# Patient Record
Sex: MALE | Race: WHITE | Employment: FULL TIME | URBAN - METROPOLITAN AREA
[De-identification: names, ages, dates, MRNs, and addresses within clinical notes are randomized per-mention and may not be internally consistent; named-entity substitution may affect disease eponyms.]

---

## 2015-06-15 LAB — LV EF: 60 %

## 2017-02-05 RX ORDER — CARVEDILOL 6.25 MG/1
TABLET ORAL
Qty: 180 TAB | Refills: 1 | Status: SHIPPED | OUTPATIENT
Start: 2017-02-05 | End: 2017-07-18 | Stop reason: SDUPTHER

## 2017-02-06 ENCOUNTER — HOSPITAL ENCOUNTER (EMERGENCY)
Age: 52
Discharge: HOME | End: 2017-02-06
Attending: EMERGENCY MEDICINE
Payer: COMMERCIAL

## 2017-02-06 ENCOUNTER — APPOINTMENT (OUTPATIENT)
Dept: CT IMAGING | Age: 52
End: 2017-02-06
Attending: EMERGENCY MEDICINE
Payer: COMMERCIAL

## 2017-02-06 VITALS
SYSTOLIC BLOOD PRESSURE: 128 MMHG | RESPIRATION RATE: 20 BRPM | DIASTOLIC BLOOD PRESSURE: 85 MMHG | BODY MASS INDEX: 37.65 KG/M2 | HEART RATE: 107 BPM | OXYGEN SATURATION: 94 % | WEIGHT: 263 LBS | TEMPERATURE: 98 F | HEIGHT: 70 IN

## 2017-02-06 DIAGNOSIS — R55 SYNCOPE AND COLLAPSE: Primary | ICD-10-CM

## 2017-02-06 DIAGNOSIS — T50.905A MEDICATION REACTION, INITIAL ENCOUNTER: ICD-10-CM

## 2017-02-06 LAB
ANION GAP BLD CALC-SCNC: 21 MMOL/L (ref 5–15)
APPEARANCE UR: CLEAR
ATRIAL RATE: 100 BPM
BACTERIA URNS QL MICRO: NEGATIVE /HPF
BASOPHILS # BLD AUTO: 0 K/UL (ref 0–0.1)
BASOPHILS # BLD: 0 % (ref 0–1)
BILIRUB UR QL: NEGATIVE
BNP SERPL-MCNC: 12 PG/ML (ref 0–125)
BUN BLD-MCNC: 13 MG/DL (ref 9–20)
CA-I BLD-MCNC: 1.23 MMOL/L (ref 1.12–1.32)
CALCULATED P AXIS, ECG09: 16 DEGREES
CALCULATED R AXIS, ECG10: -30 DEGREES
CALCULATED T AXIS, ECG11: 15 DEGREES
CHLORIDE BLD-SCNC: 100 MMOL/L (ref 98–107)
CK MB CFR SERPL CALC: 0.4 % (ref 0–2.5)
CK MB SERPL-MCNC: 2 NG/ML (ref 5–25)
CK SERPL-CCNC: 491 U/L (ref 39–308)
CO2 BLD-SCNC: 24 MMOL/L (ref 21–32)
COLOR UR: ABNORMAL
CREAT BLD-MCNC: 0.8 MG/DL (ref 0.6–1.3)
DIAGNOSIS, 93000: NORMAL
EOSINOPHIL # BLD: 0.1 K/UL (ref 0–0.4)
EOSINOPHIL NFR BLD: 1 % (ref 0–7)
EPITH CASTS URNS QL MICRO: ABNORMAL /LPF
ERYTHROCYTE [DISTWIDTH] IN BLOOD BY AUTOMATED COUNT: 13.5 % (ref 11.5–14.5)
GLUCOSE BLD STRIP.AUTO-MCNC: 127 MG/DL (ref 65–100)
GLUCOSE BLD-MCNC: 123 MG/DL (ref 75–110)
GLUCOSE UR STRIP.AUTO-MCNC: NEGATIVE MG/DL
HCT VFR BLD AUTO: 44.9 % (ref 36.6–50.3)
HCT VFR BLD CALC: 46 % (ref 36.6–50.3)
HGB BLD-MCNC: 15 G/DL (ref 12.1–17)
HGB BLD-MCNC: 15.6 GM/DL (ref 12.1–17)
HGB UR QL STRIP: NEGATIVE
HYALINE CASTS URNS QL MICRO: ABNORMAL /LPF (ref 0–5)
KETONES UR QL STRIP.AUTO: NEGATIVE MG/DL
LEUKOCYTE ESTERASE UR QL STRIP.AUTO: NEGATIVE
LYMPHOCYTES # BLD AUTO: 15 % (ref 12–49)
LYMPHOCYTES # BLD: 2 K/UL (ref 0.8–3.5)
MCH RBC QN AUTO: 28.6 PG (ref 26–34)
MCHC RBC AUTO-ENTMCNC: 33.4 G/DL (ref 30–36.5)
MCV RBC AUTO: 85.5 FL (ref 80–99)
MONOCYTES # BLD: 0.5 K/UL (ref 0–1)
MONOCYTES NFR BLD AUTO: 4 % (ref 5–13)
NEUTS SEG # BLD: 10.5 K/UL (ref 1.8–8)
NEUTS SEG NFR BLD AUTO: 80 % (ref 32–75)
NITRITE UR QL STRIP.AUTO: NEGATIVE
P-R INTERVAL, ECG05: 152 MS
PH UR STRIP: 6 [PH] (ref 5–8)
PLATELET # BLD AUTO: 269 K/UL (ref 150–400)
POTASSIUM BLD-SCNC: 3.3 MMOL/L (ref 3.5–5.1)
PROT UR STRIP-MCNC: 30 MG/DL
Q-T INTERVAL, ECG07: 356 MS
QRS DURATION, ECG06: 104 MS
QTC CALCULATION (BEZET), ECG08: 459 MS
RBC # BLD AUTO: 5.25 M/UL (ref 4.1–5.7)
RBC #/AREA URNS HPF: ABNORMAL /HPF (ref 0–5)
SERVICE CMNT-IMP: ABNORMAL
SERVICE CMNT-IMP: ABNORMAL
SODIUM BLD-SCNC: 140 MMOL/L (ref 136–145)
SP GR UR REFRACTOMETRY: 1.02 (ref 1–1.03)
TROPONIN I SERPL-MCNC: <0.04 NG/ML
UA: UC IF INDICATED,UAUC: ABNORMAL
UROBILINOGEN UR QL STRIP.AUTO: 0.2 EU/DL (ref 0.2–1)
VENTRICULAR RATE, ECG03: 100 BPM
WBC # BLD AUTO: 13.1 K/UL (ref 4.1–11.1)
WBC URNS QL MICRO: ABNORMAL /HPF (ref 0–4)

## 2017-02-06 PROCEDURE — 82962 GLUCOSE BLOOD TEST: CPT

## 2017-02-06 PROCEDURE — 83880 ASSAY OF NATRIURETIC PEPTIDE: CPT | Performed by: EMERGENCY MEDICINE

## 2017-02-06 PROCEDURE — 93005 ELECTROCARDIOGRAM TRACING: CPT

## 2017-02-06 PROCEDURE — 70450 CT HEAD/BRAIN W/O DYE: CPT

## 2017-02-06 PROCEDURE — 93970 EXTREMITY STUDY: CPT

## 2017-02-06 PROCEDURE — 85025 COMPLETE CBC W/AUTO DIFF WBC: CPT | Performed by: EMERGENCY MEDICINE

## 2017-02-06 PROCEDURE — 74011250636 HC RX REV CODE- 250/636: Performed by: EMERGENCY MEDICINE

## 2017-02-06 PROCEDURE — 84484 ASSAY OF TROPONIN QUANT: CPT | Performed by: EMERGENCY MEDICINE

## 2017-02-06 PROCEDURE — 96361 HYDRATE IV INFUSION ADD-ON: CPT

## 2017-02-06 PROCEDURE — 71275 CT ANGIOGRAPHY CHEST: CPT

## 2017-02-06 PROCEDURE — 74011636320 HC RX REV CODE- 636/320: Performed by: RADIOLOGY

## 2017-02-06 PROCEDURE — 99285 EMERGENCY DEPT VISIT HI MDM: CPT

## 2017-02-06 PROCEDURE — 74011250637 HC RX REV CODE- 250/637: Performed by: EMERGENCY MEDICINE

## 2017-02-06 PROCEDURE — 80047 BASIC METABLC PNL IONIZED CA: CPT

## 2017-02-06 PROCEDURE — 81001 URINALYSIS AUTO W/SCOPE: CPT | Performed by: EMERGENCY MEDICINE

## 2017-02-06 PROCEDURE — 82550 ASSAY OF CK (CPK): CPT | Performed by: EMERGENCY MEDICINE

## 2017-02-06 PROCEDURE — 36415 COLL VENOUS BLD VENIPUNCTURE: CPT | Performed by: EMERGENCY MEDICINE

## 2017-02-06 PROCEDURE — 96360 HYDRATION IV INFUSION INIT: CPT

## 2017-02-06 RX ORDER — CARVEDILOL 6.25 MG/1
6.25 TABLET ORAL
Status: COMPLETED | OUTPATIENT
Start: 2017-02-06 | End: 2017-02-06

## 2017-02-06 RX ADMIN — CARVEDILOL 6.25 MG: 6.25 TABLET, FILM COATED ORAL at 14:08

## 2017-02-06 RX ADMIN — IOPAMIDOL 90 ML: 755 INJECTION, SOLUTION INTRAVENOUS at 13:11

## 2017-02-06 RX ADMIN — SODIUM CHLORIDE 1000 ML: 900 INJECTION, SOLUTION INTRAVENOUS at 13:44

## 2017-02-06 NOTE — ED TRIAGE NOTES
Patient arrived with c/o syncopal episode this morning and bilateral leg pain post fall. Blood sugar was 114. Patient is A & O x 3. Cannot tell me the month.   Denies CP, n/v/d.

## 2017-02-06 NOTE — DISCHARGE INSTRUCTIONS
We hope that we have addressed all of your medical concerns. The examination and treatment you received in the Emergency Department were for an emergent problem and were not intended as complete care. It is important that you follow up with your healthcare provider(s) for ongoing care. If your symptoms worsen or do not improve as expected, and you are unable to reach your usual health care provider(s), you should return to the Emergency Department. Today's healthcare is undergoing tremendous change, and patient satisfaction surveys are one of the many tools to assess the quality of medical care. You may receive a survey from the [employer] regarding your experience in the Emergency Department. I hope that your experience has been completely positive, particularly the medical care that I provided. As such, please participate in the survey; anything less than excellent does not meet my expectations or intentions. [facility] and [facility] participate in nationally recognized quality of care measures. If your blood pressure is greater than 120/80, as reported below, we urge that you seek medical care to address the potential of high blood pressure, commonly known as hypertension. Hypertension can be hereditary or can be caused by certain medical conditions, pain, stress, or \"white coat syndrome. \"       Please make an appointment with your health care provider(s) for follow up of your Emergency Department visit.        VITALS:   Patient Vitals for the past 8 hrs:   Temp Pulse Resp BP SpO2   02/06/17 1345 - (!) 107 20 128/85 94 %   02/06/17 1336 - (!) 103 20 125/78 -   02/06/17 1331 - (!) 112 23 (!) 122/91 95 %   02/06/17 1330 - (!) 104 22 125/78 96 %   02/06/17 1319 - (!) 106 17 138/74 95 %   02/06/17 1233 98 °F (36.7 °C) - - - -   02/06/17 1142 - - - - 96 %   02/06/17 1110 97.8 °F (36.6 °C) 97 17 130/82 98 %          Thank you for allowing us to provide you with medical care today. We realize that you have many choices for your emergency care needs. Please choose us in the future for any continued health care needs. Andrew Rollins MD    [facility], [facility].   Office: [phone]            Recent Results (from the past 24 hour(s))   GLUCOSE, POC    Collection Time: 02/06/17 11:17 AM   Result Value Ref Range    Glucose (POC) 127 (H) 65 - 100 mg/dL    Performed by Grady De Anda    EKG, 12 LEAD, INITIAL    Collection Time: 02/06/17 11:30 AM   Result Value Ref Range    Ventricular Rate 100 BPM    Atrial Rate 100 BPM    P-R Interval 152 ms    QRS Duration 104 ms    Q-T Interval 356 ms    QTC Calculation (Bezet) 459 ms    Calculated P Axis 16 degrees    Calculated R Axis -30 degrees    Calculated T Axis 15 degrees    Diagnosis       Normal sinus rhythm  Left axis deviation  Abnormal ECG  No previous ECGs available     POC CHEM8    Collection Time: 02/06/17 11:40 AM   Result Value Ref Range    Calcium, ionized (POC) 1.23 1.12 - 1.32 MMOL/L    Sodium (POC) 140 136 - 145 MMOL/L    Potassium (POC) 3.3 (L) 3.5 - 5.1 MMOL/L    Chloride (POC) 100 98 - 107 MMOL/L    CO2 (POC) 24 21 - 32 MMOL/L    Anion gap (POC) 21 (H) 5 - 15 mmol/L    Glucose (POC) 123 (H) 75 - 110 MG/DL    BUN (POC) 13 9 - 20 MG/DL    Creatinine (POC) 0.8 0.6 - 1.3 MG/DL    GFR-AA (POC) >60 >60 ml/min/1.73m2    GFR, non-AA (POC) >60 >60 ml/min/1.73m2    Hemoglobin (POC) 15.6 12.1 - 17.0 GM/DL    Hematocrit (POC) 46 36.6 - 50.3 %    Comment Comment Not Indicated. CBC WITH AUTOMATED DIFF    Collection Time: 02/06/17 11:41 AM   Result Value Ref Range    WBC 13.1 (H) 4.1 - 11.1 K/uL    RBC 5.25 4. 10 - 5.70 M/uL    HGB 15.0 12.1 - 17.0 g/dL    HCT 44.9 36.6 - 50.3 %    MCV 85.5 80.0 - 99.0 FL    MCH 28.6 26.0 - 34.0 PG    MCHC 33.4 30.0 - 36.5 g/dL    RDW 13.5 11.5 - 14.5 %    PLATELET 738 723 - 629 K/uL    NEUTROPHILS 80 (H) 32 - 75 %    LYMPHOCYTES 15 12 - 49 %    MONOCYTES 4 (L) 5 - 13 %    EOSINOPHILS 1 0 - 7 %    BASOPHILS 0 0 - 1 %    ABS. NEUTROPHILS 10.5 (H) 1.8 - 8.0 K/UL    ABS. LYMPHOCYTES 2.0 0.8 - 3.5 K/UL    ABS. MONOCYTES 0.5 0.0 - 1.0 K/UL    ABS. EOSINOPHILS 0.1 0.0 - 0.4 K/UL    ABS. BASOPHILS 0.0 0.0 - 0.1 K/UL   CK W/ CKMB & INDEX    Collection Time: 02/06/17 11:41 AM   Result Value Ref Range     (H) 39 - 308 U/L    CK - MB 2.0 <3.6 NG/ML    CK-MB Index 0.4 0 - 2.5     TROPONIN I    Collection Time: 02/06/17 11:41 AM   Result Value Ref Range    Troponin-I, Qt. <0.04 <0.05 ng/mL   PRO-BNP    Collection Time: 02/06/17 11:41 AM   Result Value Ref Range    NT pro-BNP 12 0 - 125 PG/ML   URINALYSIS W/ REFLEX CULTURE    Collection Time: 02/06/17  1:55 PM   Result Value Ref Range    Color YELLOW/STRAW      Appearance CLEAR CLEAR      Specific gravity 1.025 1.003 - 1.030      pH (UA) 6.0 5.0 - 8.0      Protein 30 (A) NEG mg/dL    Glucose NEGATIVE  NEG mg/dL    Ketone NEGATIVE  NEG mg/dL    Bilirubin NEGATIVE  NEG      Blood NEGATIVE  NEG      Urobilinogen 0.2 0.2 - 1.0 EU/dL    Nitrites NEGATIVE  NEG      Leukocyte Esterase NEGATIVE  NEG      WBC 0-4 0 - 4 /hpf    RBC 0-5 0 - 5 /hpf    Epithelial cells FEW FEW /lpf    Bacteria NEGATIVE  NEG /hpf    UA:UC IF INDICATED CULTURE NOT INDICATED BY UA RESULT CNI      Hyaline cast 0-2 0 - 5 /lpf       Ct Head Wo Cont    Result Date: 2/6/2017  EXAM:  CT HEAD WO CONT INDICATION: Syncope with head injury today. COMPARISON: None TECHNIQUE: Noncontrast head CT. Coronal and sagittal reformats. CT dose reduction was achieved through use of a standardized protocol tailored for this examination and automatic exposure control for dose modulation. FINDINGS: The ventricles and sulci are age-appropriate without hydrocephalus. There is no mass effect or midline shift. There is no intracranial hemorrhage or extra-axial fluid collection. There is no abnormal parenchymal attenuation.  The gray-white matter differentiation is maintained. The basal cisterns are patent. The osseous structures are intact. The visualized paranasal sinuses and mastoid air cells are clear. IMPRESSION: There is no acute intracranial abnormality. Cta Chest W Wo Cont    Result Date: 2/6/2017  EXAM:  CT angiography chest INDICATION:  Tachycardia and hypoxia. Syncope this morning. COMPARISON: None. TECHNIQUE: Helical thin section chest CT following uneventful intravenous administration of nonionic contrast 90 mL of Isovue-370 according to departmental PE protocol. Coronal and sagittal reformats were performed. 3D/MIP post processing was performed. CT dose reduction was achieved through use of a standardized protocol tailored for this examination and automatic exposure control for dose modulation. FINDINGS: This is a fair quality study for the evaluation of pulmonary embolism to the proximal segmental arterial level. There is no pulmonary embolism to this level. The aorta and main pulmonary artery are normal in caliber. Cardiac size is within normal limits. No pericardial effusion. No lymphadenopathy by imaging size criteria. The lungs are clear. No pleural effusion or pneumothorax. Central airways are unremarkable. Limited images of the upper abdomen are within normal limits. The bony structures are age-appropriate     IMPRESSION: There is no acute pulmonary embolism or other acute abnormality in the chest.     Venous doppler negative     Fainting: Care Instructions  Your Care Instructions    When you faint, or pass out, you lose consciousness for a short time. A brief drop in blood flow to the brain often causes it. When you fall or lie down, more blood flows to your brain and you regain consciousness. Emotional stress, pain, or overheating--especially if you have been standing--can make you faint. In these cases, fainting is usually not serious. But fainting can be a sign of a more serious problem.  Your doctor may want you to have more tests to rule out other causes. The treatment you need depends on the reason why you fainted. The doctor has checked you carefully, but problems can develop later. If you notice any problems or new symptoms, get medical treatment right away. Follow-up care is a key part of your treatment and safety. Be sure to make and go to all appointments, and call your doctor if you are having problems. It's also a good idea to know your test results and keep a list of the medicines you take. How can you care for yourself at home? · Drink plenty of fluids to prevent dehydration. If you have kidney, heart, or liver disease and have to limit fluids, talk with your doctor before you increase your fluid intake. When should you call for help? Call 911 anytime you think you may need emergency care. For example, call if:  · You have symptoms of a heart problem. These may include:  ¨ Chest pain or pressure. ¨ Severe trouble breathing. ¨ A fast or irregular heartbeat. ¨ Lightheadedness or sudden weakness. ¨ Coughing up pink, foamy mucus. ¨ Passing out. After you call 911, the  may tell you to chew 1 adult-strength or 2 to 4 low-dose aspirin. Wait for an ambulance. Do not try to drive yourself. · You have symptoms of a stroke. These may include:  ¨ Sudden numbness, tingling, weakness, or loss of movement in your face, arm, or leg, especially on only one side of your body. ¨ Sudden vision changes. ¨ Sudden trouble speaking. ¨ Sudden confusion or trouble understanding simple statements. ¨ Sudden problems with walking or balance. ¨ A sudden, severe headache that is different from past headaches. · You passed out (lost consciousness) again. Watch closely for changes in your health, and be sure to contact your doctor if:  · You do not get better as expected. Where can you learn more? Go to http://gerard-kristin.info/. Enter Q773 in the search box to learn more about \"Fainting: Care Instructions. \"  Current as of: May 27, 2016  Content Version: 11.1  © 2197-1201 [employer]. Care instructions adapted under license by [employer] (which disclaims liability or warranty for this information). If you have questions about a medical condition or this instruction, always ask your healthcare professional. [address] any warranty or liability for your use of this information. Side Effects of Medicine: Care Instructions  Your Care Instructions  Medicines are a big part of treatment for many health problems. But all medicines have side effects. Often these are mild problems. They might include a dry mouth or upset stomach. But sometimes medicines can cause dangerous side effects. One example is a bad allergic reaction. The best treatment will depend on what side effects you have. If you have a serious side effect, you may need to stop taking the medicine. You may also need to take another medicine to treat the side effect. If you have a mild side effect, it may go away after you take the medicine for a while. The doctor has checked you carefully, but problems can develop later. If you notice any problems or new symptoms, get medical treatment right away. Follow-up care is a key part of your treatment and safety. Be sure to make and go to all appointments, and call your doctor if you are having problems. It's also a good idea to know your test results and keep a list of the medicines you take. How can you care for yourself at home? · Be safe with medicines. Take your medicines exactly as prescribed. Call your doctor if you think you are having a problem with your medicine. · Call your doctor if side effects bother you and you wonder if you should keep taking a medicine. Your doctor may be able to lower your dose or change your medicine. Do not suddenly quit taking your medicine unless a doctor tells you to.   · Make sure your doctor has a list of all the medicines, vitamins, supplements, and herbal remedies you take. Ask about side effects. When should you call for help? Call 911 anytime you think you may need emergency care. For example, call if:  · You have symptoms of a severe allergic reaction. These may include:  ¨ Sudden raised, red areas (hives) all over your body. ¨ Swelling of the throat, mouth, lips, or tongue. ¨ Trouble breathing. ¨ Passing out (losing consciousness). Or you may feel very lightheaded or suddenly feel weak, confused, or restless. Call your doctor now or seek immediate medical care if:  · You have symptoms of an allergic reaction, such as:  ¨ A rash or hives (raised, red areas on the skin). ¨ Itching. ¨ Swelling. ¨ Belly pain, nausea, or vomiting. Watch closely for changes in your health, and be sure to contact your doctor if:  · You think you are having a new problem with your medicine. · You do not get better as expected. Where can you learn more? Go to [employer]  Enter D152 in the search box to learn more about \"Side Effects of Medicine: Care Instructions. \"   © 0179-2435 [employer]. Care instructions adapted under license by [facility] [employer] (which disclaims liability or warranty for this information). This care instruction is for use with your licensed healthcare professional. If you have questions about a medical condition or this instruction, always ask your healthcare professional. [place] [address] any warranty or liability for your use of this information. Content Version: 24.6.870189; Current as of: November 20, 2015             Vasovagal Syncope: Care Instructions  Your Care Instructions    Vasovagal syncope (say \"[id number]\") is sudden dizziness or fainting that can be set off by things such as pain, stress, fear, or trauma. You may sweat or feel lightheaded, sick to your stomach, or tingly.   The problem causes the heart rate to slow and the blood vessels to widen, or dilate, for a short time. When this happens, blood pools in the lower body, and less blood goes to the brain. You can usually get relief by lying down with your legs raised (elevated). This helps more blood to flow to your brain and may help relieve symptoms like feeling dizzy. Some doctors may recommend a technique that involves tensing your fists and arms. This type of fainting is often easy to predict. For example, it happens to some people when they see blood or have to get a shot. They may feel symptoms before they faint. An episode of vasovagal syncope usually responds well to self-care. Other treatment often isn't needed. But if the fainting keeps happening, your doctor may suggest further treatments. Follow-up care is a key part of your treatment and safety. Be sure to make and go to all appointments, and call your doctor if you are having problems. It's also a good idea to know your test results and keep a list of the medicines you take. How can you care for yourself at home? · Drink plenty of fluids to prevent dehydration. If you have kidney, heart, or liver disease and have to limit fluids, talk with your doctor before you increase your fluid intake. · Try to avoid things that you think may set off vasovagal syncope. · Talk to your doctor about any medicines you take. Some medicines may increase the chance of this condition occurring. · If you feel symptoms, lie down with your legs raised. Talk to your doctor about what to do if your symptoms come back. When should you call for help? Call 911 anytime you think you may need emergency care. For example, call if:  · You have symptoms of a heart problem. These may include:  ¨ Chest pain or pressure. ¨ Severe trouble breathing. ¨ A fast or irregular heartbeat. Watch closely for changes in your health, and be sure to contact your doctor if:  · You have more episodes of fainting at home. · You do not get better as expected.   Where can you learn more? Go to http://gerard-kristin.info/. Enter L754 in the search box to learn more about \"Vasovagal Syncope: Care Instructions. \"  Current as of: May 27, 2016  Content Version: 11.1  © 7600-1823 [employer]. Care instructions adapted under license by [employer] (which disclaims liability or warranty for this information). If you have questions about a medical condition or this instruction, always ask your healthcare professional. [address] any warranty or liability for your use of this information.

## 2017-02-06 NOTE — ED NOTES
Patient ambulated around ED with pulse of 113 approx and O2 sat of 98%. Reported feeling better but not back to baseline.

## 2017-02-06 NOTE — ED PROVIDER NOTES
HPI Comments: 49 y.o. male with past medical history significant for sleep apnea, GERD, depression, HTN, tachycardia, prediabetes, heart attack, colonoscopy, and GI  who presents from home for evaluation after syncopal episode. Pt c/o dizziness that onset 3 hours ago while the pt was sitting at his computer working from home eventually causing the pt to black out. Pt claims he woke up approximately an hour later on the floor of his office face down, and felt as if he was just waking up in the morning, groggy and confused. He called his friend, who claims the pt sounded disoriented with garbled speech, and states the pt was diaphoretic and nauseated upon arrival. Pt also c/o leg pain bilaterally near the achilles when he tries to move, and a slight HA. Per friend, pt's blood sugar was 114, HR was 110(typically in 90s), and his BP was 107/76 upon his arrival to the pt's house. Pt denies urinating on himself, defecating, or biting his tongue during his loss of consciousness. Pt denies anyone else witnessing the episode. Pt claims he had a MI and respiratory failure event 2 years ago while walking he collapsed, was taken to [facility] where he was \"comatose\" for about 24 hours, and had stress tests come back normal. Pt denies any stents or catheterization with the MI episode but was told he had reduced heart function which has since returned to normal. Pt claims the dosage of his testosterone injections (every 7 days, givien in R leg) was recently increased by 0.25mg. Pt also takes effexor, losartin, metformin, and coreg. Pt denies taking his coreg this morning. Pt denies any COPD, asthma, or hx of seizures. Pt denies having any back pain, vomiting, or recent illnesses. There are no other acute medical concerns at this time.     PCP: Melissa Crouch MD  Cardiologist: Dr. Larisa Felton  Note written by Vahid Hunt, as dictated by Patito Hamilton MD 12:21 PM        The history is provided by the patient and a friend. No  was used. Past Medical History:   Diagnosis Date    Depression     GERD (gastroesophageal reflux disease)     Heart attack ([facility] [address] 04/2015    Hypertension     Prediabetes     Tachycardia        Past Surgical History:   Procedure Laterality Date    Hx colonoscopy  11/2016         Family History:   Problem Relation Age of Onset    Cancer Mother      stomach and liver    Hypertension Father     Diabetes Maternal Grandmother     Hypertension Maternal Grandfather        Social History     Social History    Marital status:      Spouse name: N/A    Number of children: N/A    Years of education: N/A     Occupational History    Not on file. Social History Main Topics    Smoking status: Never Smoker    Smokeless tobacco: Not on file    Alcohol use 0.0 oz/week     0 Standard drinks or equivalent per week      Comment: 1 drink/week    Drug use: No    Sexual activity: Yes     Other Topics Concern    Not on file     Social History Narrative         ALLERGIES: Review of patient's allergies indicates no known allergies. Review of Systems   Constitutional: Positive for diaphoresis. Gastrointestinal: Positive for nausea. Negative for diarrhea and vomiting. Genitourinary: Negative for discharge. Musculoskeletal: Negative for back pain. Pain in back of lower legs with movement. Neurological: Positive for dizziness (dizzy prior to passing out) and speech difficulty (\"garbled speech\"). Psychiatric/Behavioral: Positive for confusion (Pt disoriented per friend). All other systems reviewed and are negative. Vitals:    02/06/17 1110   BP: 130/82   Pulse: 97   Resp: 17   Temp: 97.8 °F (36.6 °C)   SpO2: 98%   Weight: 119.3 kg (263 lb)   Height: 5' 9.5\" (1.765 m)            Physical Exam   Constitutional: He is oriented to person, place, and time. He appears well-developed and well-nourished. No distress. HENT:   Head: Normocephalic and atraumatic. Right Ear: External ear normal.   Left Ear: External ear normal.   Eyes: EOM are normal. Pupils are equal, round, and reactive to light. Neck: Normal range of motion. Neck supple. Cardiovascular: Regular rhythm, normal heart sounds and intact distal pulses. Exam reveals no friction rub. No murmur heard. tachycardic   Pulmonary/Chest: Effort normal and breath sounds normal. No respiratory distress. He has no wheezes. He has no rales. He exhibits no tenderness. Abdominal: Soft. Bowel sounds are normal. He exhibits no distension. There is no tenderness. There is no rebound and no guarding. Musculoskeletal: Normal range of motion. He exhibits no edema or tenderness. Palpable dp and pt pulses b/l; FROM both LE with no pain or deformity   Neurological: He is alert and oriented to person, place, and time. No cranial nerve deficit. Coordination normal.   Skin: Skin is warm and dry. He is not diaphoretic. No pallor. Psychiatric: He has a normal mood and affect. His behavior is normal.   Nursing note and vitals reviewed. MDM  Number of Diagnoses or Management Options  Medication reaction, initial encounter:   Syncope and collapse:   Diagnosis management comments: Syncope vs seizure.  Not clearly a seizure sounds more like vasovagal syncope though unclear why as no position change    Achilles discomfort- pt with from may just be strain    eval for PE given vital signs and increase in testosterone dose, can also get seizure and vasodilation from testosterone as well- just did 2nd injection of higher dose recently, check for anemia, electrolyes orthostatics  Ct head given unresponsive for a long period of time       Amount and/or Complexity of Data Reviewed  Clinical lab tests: ordered and reviewed  Tests in the radiology section of CPT®: ordered and reviewed  Obtain history from someone other than the patient: yes (friend)  Independent visualization of images, tracings, or specimens: yes (ekg)    Patient Progress  Patient progress: stable    ED Course       Procedures    EKG interpretation: (Preliminary)  Rhythm: sinus tachycardia; and regular . Rate (approx.): 100; Axis: left axis deviation; P wave: normal; QRS interval: normal ; ST/T wave: non-specific changes    PROGRESS NOTE:  1:30 PM  Spoke with Jet Harrington, Radiologist, trying to get a final result. He doesn't see a large PE but will get official read      PROGRESS NOTE:  2:34 PM  Negative duplex. Talked with pt and , suspect it's a side effect of testosterone. Will call his endocrinologist about stopping or lowering dosage. Until he sees PCP no driving, climbing ladders or swimming for suspicion of seizures. Suspect tachycardia is due to missed dosage of carvedilol this morning. ptr ambulated in department with no difficulty.  They agree with plan and will return if worsening sx

## 2017-02-06 NOTE — PROCEDURES
[facility]  *** FINAL REPORT ***    Name: Ludin Nelson  MRN: [id number]  : 12 Oct 1965  HIS Order #: [id number]  [address] [facility] Visit #: [id number]  Date: 2017    TYPE OF TEST: Peripheral Venous Testing    REASON FOR TEST  Concern for possible PE    Right Leg:-  Deep venous thrombosis:           No  Superficial venous thrombosis:    No  Deep venous insufficiency:        No  Superficial venous insufficiency: No    Left Leg:-  Deep venous thrombosis:           No  Superficial venous thrombosis:    No  Deep venous insufficiency:        No  Superficial venous insufficiency: No      INTERPRETATION/FINDINGS  PROCEDURE:  BILATERAL LE VENOUS DUPLEX. Evaluation of lower extremity veins with ultrasound (B-mode imaging,  pulsed Doppler, color Doppler). Includes the common femoral, deep  femoral, femoral, popliteal, posterior tibial, peroneal, and great  saphenous veins. FINDINGS:  Oliver Paulair scale and color flow duplex images of the veins in  both lower extremities demonstrate normal compressibility, spontaneous   and augmented flow profiles, and absence of filling defects  throughout the deep and superficial veins in both lower extremities. CONCLUSION:  Bilateral lower extremity venous duplex negative for deep   venous thrombosis or thrombophlebitis. NOTE: Venous reflux was seen  in the left calf. ADDITIONAL COMMENTS    I have personally reviewed the data relevant to the interpretation of  this  study. TECHNOLOGIST: Yuliya Estrella. Ayaan  Signed: 2017 02:39 PM    PHYSICIAN: Jim Murdock.  Jay Martin MD  Signed: 2017 09:26 AM

## 2017-03-17 ENCOUNTER — OFFICE VISIT (OUTPATIENT)
Dept: INTERNAL MEDICINE CLINIC | Age: 52
End: 2017-03-17

## 2017-03-17 VITALS
DIASTOLIC BLOOD PRESSURE: 80 MMHG | TEMPERATURE: 97.5 F | OXYGEN SATURATION: 99 % | SYSTOLIC BLOOD PRESSURE: 121 MMHG | HEIGHT: 70 IN | BODY MASS INDEX: 37.31 KG/M2 | WEIGHT: 260.6 LBS | HEART RATE: 90 BPM

## 2017-03-17 DIAGNOSIS — G47.33 OSA ON CPAP: ICD-10-CM

## 2017-03-17 DIAGNOSIS — R74.8 ELEVATED CK: ICD-10-CM

## 2017-03-17 DIAGNOSIS — Z99.89 OSA ON CPAP: ICD-10-CM

## 2017-03-17 DIAGNOSIS — R55 VASOVAGAL SYNCOPE: ICD-10-CM

## 2017-03-17 DIAGNOSIS — R79.89 LOW TESTOSTERONE: ICD-10-CM

## 2017-03-17 DIAGNOSIS — E11.9 DIABETES MELLITUS WITHOUT COMPLICATION (HCC): ICD-10-CM

## 2017-03-17 DIAGNOSIS — I25.10 CORONARY ARTERY DISEASE INVOLVING NATIVE CORONARY ARTERY OF NATIVE HEART WITHOUT ANGINA PECTORIS: ICD-10-CM

## 2017-03-17 DIAGNOSIS — Z20.6 HIV EXPOSURE: ICD-10-CM

## 2017-03-17 DIAGNOSIS — K21.9 GASTROESOPHAGEAL REFLUX DISEASE WITHOUT ESOPHAGITIS: ICD-10-CM

## 2017-03-17 DIAGNOSIS — I10 ESSENTIAL HYPERTENSION WITH GOAL BLOOD PRESSURE LESS THAN 140/90: Primary | ICD-10-CM

## 2017-03-17 NOTE — MR AVS SNAPSHOT
Visit Information Date & Time Provider Department Dept. Phone Encounter #  
 3/17/2017  9:00 AM Chris Rankin, [address] [place] [address],[address] [phone] [id number] Follow-up Instructions Return in about 3 months (around 6/17/2017). Upcoming Health Maintenance Date Due Pneumococcal 19-64 Medium Risk (1 of 1 - PPSV23) 10/12/1985 COLONOSCOPY 11/21/2021 DTaP/Tdap/Td series (2 - Td) 8/29/2026 Allergies as of 3/17/2017  Review Complete On: 3/17/2017 By: Ayaan Bee No Known Allergies Current Immunizations  Reviewed on 10/24/2016 Name Date Influenza Vaccine (Quad) PF 10/24/2016 Tdap 8/29/2016 Not reviewed this visit You Were Diagnosed With   
  
 Codes Comments Essential hypertension with goal blood pressure less than 140/90    -  Primary ICD-10-CM: I10 
ICD-9-CM: 401.9 Coronary artery disease involving native coronary artery of native heart without angina pectoris     ICD-10-CM: I25.10 ICD-9-CM: 414.01 Vasovagal syncope     ICD-10-CM: R55 
ICD-9-CM: 780.2 Low testosterone     ICD-10-CM: E29.1 ICD-9-CM: 257.2 SCOTTY on CPAP     ICD-10-CM: G47.33 
ICD-9-CM: 327.23 Gastroesophageal reflux disease without esophagitis     ICD-10-CM: K21.9 ICD-9-CM: 530.81 Diabetes mellitus without complication ([facility]     RDK-06-HR: E11.9 ICD-9-CM: 250.00 Elevated CK     ICD-10-CM: R74.8 ICD-9-CM: 790.5 HIV exposure     ICD-10-CM: Z20.6 ICD-9-CM: V01.79 Vitals BP Pulse Temp Height(growth percentile) Weight(growth percentile) SpO2  
 121/80 (BP 1 Location: Right arm, BP Patient Position: Sitting) 90 97.5 °F (36.4 °C) (Oral) 5' 9.5\" (1.765 m) 260 lb 9.6 oz (118.2 kg) 99% BMI Smoking Status 37.93 kg/m2 Never Smoker BMI and BSA Data Body Mass Index Body Surface Area  
 37.93 kg/m 2 2.41 m 2 Preferred Pharmacy Pharmacy Name Phone 305 [facility], [address] [place] [address] [address] Chidi Medrano Your Updated Medication List  
  
   
This list is accurate as of: 3/17/17  9:35 AM.  Always use your most recent med list.  
  
  
  
  
 aspirin 81 mg chewable tablet Take 81 mg by mouth daily. carvedilol 6.25 mg tablet Commonly known as:  Cori Gautam Take 1 tablet by mouth two  times daily  
  
 ciprofloxacin-dexamethasone 0.3-0.1 % otic suspension Commonly known as:  Ina Letitia Administer 4 Drops in left ear two (2) times a day. losartan 50 mg tablet Commonly known as:  COZAAR Take 1 Tab by mouth daily. metFORMIN 500 mg tablet Commonly known as:  GLUCOPHAGE Take 1 Tab by mouth two (2) times daily (with meals). omeprazole 20 mg capsule Commonly known as:  PRILOSEC Take 1 Cap by mouth daily. One Touch Delica 33 gauge Misc Generic drug:  lancets ONETOUCH VERIO strip Generic drug:  glucose blood VI test strips  
  
 pravastatin 20 mg tablet Commonly known as:  PRAVACHOL Take 1 Tab by mouth nightly. testosterone cypionate 200 mg/mL injection Commonly known as:  DEPOTESTOTERONE CYPIONATE  
by IntraMUSCular route every seven (7) days. venlafaxine- mg capsule Commonly known as:  EFFEXOR-XR Take 1 Cap by mouth daily. We Performed the Following CBC W/O DIFF [45355 CPT(R)] CK [id number] CPT(R)] HEMOGLOBIN A1C WITH EAG [77709 CPT(R)] METABOLIC PANEL, COMPREHENSIVE [54410 CPT(R)] REFERRAL TO INFECTIOUS DISEASE [REF37 Custom] Comments:  
 Please evaluate patient for hiv prevention TSH 3RD GENERATION [40580 CPT(R)] Follow-up Instructions Return in about 3 months (around 6/17/2017). To-Do List   
 03/17/2017 Imaging:  DUPLEX CAROTID BILATERAL Referral Information Referral ID Referred By Referred To  
  
 [id number] Josef Ahmadi MD   
   [address] [place] [address] [address]ández , [address] Phone: [phone] Fax: [phone] Visits Status Start Date End Date 1 New Request 3/17/17 3/17/18 If your referral has a status of pending review or denied, additional information will be sent to support the outcome of this decision. Patient Instructions Get echocardiogram and event monitor with dr Karla Cespedes Schedule ultrasound of neck Labs today Introducing [facility]! Dear Jenaro Hillman: 
Thank you for requesting a trippiece account. Our records indicate that you already have an active trippiece account. You can access your account anytime at [email or web address] Did you know that you can access your hospital and ER discharge instructions at any time in trippiece? You can also review all of your test results from your hospital stay or ER visit. Additional Information If you have questions, please visit the Frequently Asked Questions section of the trippiece website at [email or web address] Remember, trippiece is NOT to be used for urgent needs. For medical emergencies, dial 911. Now available from your iPhone and Android! Please provide this summary of care documentation to your next provider. Your primary care clinician is listed as Anam Joseph. If you have any questions after today's visit, please call [phone].

## 2017-03-17 NOTE — PATIENT INSTRUCTIONS
Get echocardiogram and event monitor with dr Waylon Arredondo     Schedule ultrasound of neck     Labs today

## 2017-03-18 LAB
ALBUMIN SERPL-MCNC: 4.4 G/DL (ref 3.5–5.5)
ALBUMIN/GLOB SERPL: 1.3 {RATIO} (ref 1.2–2.2)
ALP SERPL-CCNC: 53 IU/L (ref 39–117)
ALT SERPL-CCNC: 27 IU/L (ref 0–44)
AST SERPL-CCNC: 21 IU/L (ref 0–40)
BILIRUB SERPL-MCNC: 0.5 MG/DL (ref 0–1.2)
BUN SERPL-MCNC: 14 MG/DL (ref 6–24)
BUN/CREAT SERPL: 15 (ref 9–20)
CALCIUM SERPL-MCNC: 9.7 MG/DL (ref 8.7–10.2)
CHLORIDE SERPL-SCNC: 98 MMOL/L (ref 96–106)
CK SERPL-CCNC: 220 U/L (ref 24–204)
CO2 SERPL-SCNC: 24 MMOL/L (ref 18–29)
CREAT SERPL-MCNC: 0.92 MG/DL (ref 0.76–1.27)
ERYTHROCYTE [DISTWIDTH] IN BLOOD BY AUTOMATED COUNT: 14.7 % (ref 12.3–15.4)
EST. AVERAGE GLUCOSE BLD GHB EST-MCNC: 117 MG/DL
GLOBULIN SER CALC-MCNC: 3.3 G/DL (ref 1.5–4.5)
GLUCOSE SERPL-MCNC: 104 MG/DL (ref 65–99)
HBA1C MFR BLD: 5.7 % (ref 4.8–5.6)
HCT VFR BLD AUTO: 44.9 % (ref 37.5–51)
HGB BLD-MCNC: 15.3 G/DL (ref 12.6–17.7)
MCH RBC QN AUTO: 28.9 PG (ref 26.6–33)
MCHC RBC AUTO-ENTMCNC: 34.1 G/DL (ref 31.5–35.7)
MCV RBC AUTO: 85 FL (ref 79–97)
PLATELET # BLD AUTO: 319 X10E3/UL (ref 150–379)
POTASSIUM SERPL-SCNC: 4.7 MMOL/L (ref 3.5–5.2)
PROT SERPL-MCNC: 7.7 G/DL (ref 6–8.5)
RBC # BLD AUTO: 5.29 X10E6/UL (ref 4.14–5.8)
SODIUM SERPL-SCNC: 138 MMOL/L (ref 134–144)
TSH SERPL DL<=0.005 MIU/L-ACNC: 0.85 UIU/ML (ref 0.45–4.5)
WBC # BLD AUTO: 9.5 X10E3/UL (ref 3.4–10.8)

## 2017-04-10 RX ORDER — OMEPRAZOLE 20 MG/1
CAPSULE, DELAYED RELEASE ORAL
Qty: 90 CAP | Refills: 2 | Status: SHIPPED | OUTPATIENT
Start: 2017-04-10 | End: 2018-01-28 | Stop reason: SDUPTHER

## 2017-04-10 RX ORDER — PRAVASTATIN SODIUM 20 MG/1
TABLET ORAL
Qty: 90 TAB | Refills: 2 | Status: SHIPPED | OUTPATIENT
Start: 2017-04-10 | End: 2018-01-22 | Stop reason: ALTCHOICE

## 2017-04-10 RX ORDER — METFORMIN HYDROCHLORIDE 500 MG/1
TABLET ORAL
Qty: 180 TAB | Refills: 1 | Status: SHIPPED | OUTPATIENT
Start: 2017-04-10 | End: 2017-11-24 | Stop reason: SDUPTHER

## 2017-04-10 RX ORDER — LOSARTAN POTASSIUM 50 MG/1
TABLET ORAL
Qty: 90 TAB | Refills: 1 | Status: SHIPPED | OUTPATIENT
Start: 2017-04-10 | End: 2017-08-13

## 2017-04-10 RX ORDER — VENLAFAXINE HYDROCHLORIDE 150 MG/1
CAPSULE, EXTENDED RELEASE ORAL
Qty: 90 CAP | Refills: 2 | Status: SHIPPED | OUTPATIENT
Start: 2017-04-10 | End: 2018-01-28 | Stop reason: SDUPTHER

## 2017-06-05 ENCOUNTER — OFFICE VISIT (OUTPATIENT)
Dept: INTERNAL MEDICINE CLINIC | Age: 52
End: 2017-06-05

## 2017-06-05 VITALS
HEART RATE: 96 BPM | SYSTOLIC BLOOD PRESSURE: 127 MMHG | DIASTOLIC BLOOD PRESSURE: 77 MMHG | RESPIRATION RATE: 18 BRPM | OXYGEN SATURATION: 95 % | TEMPERATURE: 97.9 F | HEIGHT: 70 IN

## 2017-06-05 DIAGNOSIS — R74.8 ELEVATED CK: ICD-10-CM

## 2017-06-05 DIAGNOSIS — E11.9 DIABETES MELLITUS WITHOUT COMPLICATION (HCC): ICD-10-CM

## 2017-06-05 DIAGNOSIS — G56.03 BILATERAL CARPAL TUNNEL SYNDROME: ICD-10-CM

## 2017-06-05 DIAGNOSIS — I10 ESSENTIAL HYPERTENSION WITH GOAL BLOOD PRESSURE LESS THAN 140/90: ICD-10-CM

## 2017-06-05 DIAGNOSIS — I25.10 CORONARY ARTERY DISEASE INVOLVING NATIVE CORONARY ARTERY OF NATIVE HEART WITHOUT ANGINA PECTORIS: ICD-10-CM

## 2017-06-05 DIAGNOSIS — Z00.00 PHYSICAL EXAM, ANNUAL: Primary | ICD-10-CM

## 2017-06-05 DIAGNOSIS — F32.9 REACTIVE DEPRESSION: ICD-10-CM

## 2017-06-05 DIAGNOSIS — I50.22 CHRONIC SYSTOLIC CONGESTIVE HEART FAILURE (HCC): ICD-10-CM

## 2017-06-05 NOTE — PROGRESS NOTES
HISTORY OF PRESENT ILLNESS  Blas Gonzales is a 49 y.o. male. HPI   Last here 3/17/17. Pt is here to f/u on chronic conditions.     BP today is controlled  BP at home running around 120s/80s  Continues losartan 50mg and coreg 6.25mg BID    Pt reports recent exacerbation of allergy symptoms with warmer weather  Pt wonders about appropriate allergy medications d/t HTN  Advised zyrtec and to avoid anything with D in it and to take qhs d/t possible sedation    BS at home running around 120-130, average 128, highest 139  Denies any lows under 70  Continues metformin 500mg BID   He also takes ASA 81mg daily    Pt reports 1 week ago he felt like he was walking on something over his left foot  He noticed a blood blister over the area  He states this has been improving    Pt reports continued tingling sensations in his bilateral hands with his right hand worse than left  His sxs are worse at night  He has been wearing a wrist splint occasionally   Discussed wearing wrist splints nightly  If sxs progress, will refer to hand surgery    Pt was in ED for syncopal event, 2/06/17  Reviewed head CT: negative  Reviewed chest CT: negative for PE  Reviewed EKG: nsr   Pt had been sitting at his computer working and got very dizzy   He passed out and woke up on the floor about one hour per pt's partner but remains unclear  His partner notes this was about one hour from times between messages  Pt's BS that day was good at 104 and had been compliant with cpap, his BP was lower and his pulse was elevated at that time   Of note, Dr. José Law had increased his testosterone recently and this was the day after his second dose/injection- this was decreased after his syncopal event   This was ultimately thought related to his increased testosterone dose  This was also attributed to panic attack type sx from increased stress with work   Denies any further syncopal events since this   Advised f/u with his cardiologist, Dr. Jennifer Lerma prilosec 20mg daily for reflux, works well, no breakthrough     Pt follows with Dr. Donato Sky (endo)   Reviewed notes 1/19/17: BP good, on metformin, on effexor, taking pravachol for cholesterol. Increase testosterone to 1.75 with shots  His testosterone dose has since been decreased d/t syncopal event   Dr. Donato Sky checks PSA regularly    Reviewed last labs 5/17  CK improved to 220 in 3/17, down from 491 in 2/17 during ED visit  Repeat labs today    Pt follows with Dr. Sona Jiménez (cardio) annually in June  Advised him to f/u with his cardiologist at this time    Continues pravachol 20mg daily for cholesterol      Wt is up 6 lbs since last visit   Pt has been working on diet and weight loss   He drinks soda occasionally  He has switched to eating brown rice. Pt has been trying to get to the gym more regularly with a goal of 3 times per week.    Discussed weight loss and diet      Pt follows with Dr. Dina Harding (pul)  Last saw him in 9/16, follows annually   This physician manages his SCOTTY and titrates cpap    Compliant with cpap nightly      Continues effexor 150mg daily for depression, works well, happy with dose     Pt's partner is HIV positive  They are interested in medication to prevent transmission  Discussed I do not prescribe this, provided referral to Dr. Rayne Velazquez    He would like to have an HIV test today    PREVENTIVE:  Colonoscopy: 11/21/2016, Dr. Rita Evangelista, repeat 5 years  PSA: Dr. Donato Sky (endo) follows, 1/17 1.4  AAA screen: not needed, non smoker   Tdap: 8/29/2016  Pneumovax: 4/2015  Zostavax: not yet needed  Flu shot: 10/24/2016  Microalbumin: 12/16  Foot exam: 6/17  A1c: 5/16 6.2, 8/16 6.0, 12/16 6.0, 3/17 5.7  Eye exam: Dr. Destiney Aguiar (ophthalmologist), 3/16, due, will schedule  Lipids: 12/16 LDL 58      Patient Active Problem List    Diagnosis Date Noted    Diabetes mellitus without complication ([facility] 86/82/7537    Reactive depression 12/12/2016    Essential hypertension with goal blood pressure less than 140/90 05/23/2016    Coronary artery disease involving native coronary artery of native heart without angina pectoris 05/23/2016    Mild episode of recurrent major depressive disorder ([facility] 05/23/2016    SCOTTY on CPAP 05/23/2016    Chronic systolic congestive heart failure (HCC) 05/23/2016    Low testosterone 05/23/2016    Gastroesophageal reflux disease without esophagitis 05/23/2016     Current Outpatient Prescriptions   Medication Sig Dispense Refill    omeprazole (PRILOSEC) 20 mg capsule Take 1 capsule by mouth  daily 90 Cap 2    losartan (COZAAR) 50 mg tablet Take 1 tablet by mouth  daily 90 Tab 1    pravastatin (PRAVACHOL) 20 mg tablet Take 1 tablet by mouth  nightly 90 Tab 2    venlafaxine-SR (EFFEXOR-XR) 150 mg capsule Take 1 capsule by mouth  daily 90 Cap 2    metFORMIN (GLUCOPHAGE) 500 mg tablet Take 1 tablet by mouth two  times daily with meals 180 Tab 1    carvedilol (COREG) 6.25 mg tablet Take 1 tablet by mouth two  times daily 180 Tab 1    ciprofloxacin-dexamethasone (CIPRODEX) 0.3-0.1 % otic suspension Administer 4 Drops in left ear two (2) times a day. 7.5 mL 0    ONETOUCH VERIO strip       ONE TOUCH DELICA 33 gauge misc       testosterone cypionate (DEPOTESTOTERONE CYPIONATE) 200 mg/mL injection by IntraMUSCular route every seven (7) days.  aspirin 81 mg chewable tablet Take 81 mg by mouth daily.        Past Surgical History:   Procedure Laterality Date    HX COLONOSCOPY  11/2016    HX GI      colonoscopy    HX OTHER SURGICAL      anal fissure      Lab Results  Component Value Date/Time   WBC 9.5 03/17/2017 11:17 AM   Hemoglobin (POC) 15.6 02/06/2017 11:40 AM   HGB 15.3 03/17/2017 11:17 AM   Hematocrit (POC) 46 02/06/2017 11:40 AM   HCT 44.9 03/17/2017 11:17 AM   PLATELET 567 07/67/8508 11:17 AM   MCV 85 03/17/2017 11:17 AM       Lab Results  Component Value Date/Time   Cholesterol, total 168 12/12/2016 09:03 AM   HDL Cholesterol 31 12/12/2016 09:03 AM   LDL, calculated 58 12/12/2016 09:03 AM   Triglyceride 395 12/12/2016 09:03 AM       Lab Results  Component Value Date/Time   GFR est  03/17/2017 11:17 AM   GFR est non-AA 97 03/17/2017 11:17 AM   Creatinine (POC) 0.8 02/06/2017 11:40 AM   Creatinine 0.92 03/17/2017 11:17 AM   BUN 14 03/17/2017 11:17 AM   BUN (POC) 13 02/06/2017 11:40 AM   Sodium (POC) 140 02/06/2017 11:40 AM   Sodium 138 03/17/2017 11:17 AM   Potassium 4.7 03/17/2017 11:17 AM   Potassium (POC) 3.3 02/06/2017 11:40 AM   Chloride (POC) 100 02/06/2017 11:40 AM   Chloride 98 03/17/2017 11:17 AM   CO2 24 03/17/2017 11:17 AM         Review of Systems   Respiratory: Negative for shortness of breath. Cardiovascular: Negative for chest pain. Musculoskeletal: Positive for myalgias (foot pain). Neurological: Positive for tingling (right hand). Physical Exam   Constitutional: He is oriented to person, place, and time. He appears well-developed and well-nourished. No distress. HENT:   Head: Normocephalic and atraumatic. Mouth/Throat: Oropharynx is clear and moist. No oropharyngeal exudate. Slight erythema in bilateral TMs   Eyes: Conjunctivae and EOM are normal. Right eye exhibits no discharge. Left eye exhibits no discharge. Neck: Normal range of motion. Neck supple. No carotid bruits   Cardiovascular: Normal rate, regular rhythm, normal heart sounds and intact distal pulses. Exam reveals no gallop and no friction rub. No murmur heard. Pulmonary/Chest: Effort normal and breath sounds normal. No respiratory distress. He has no wheezes. He has no rales. He exhibits no tenderness. Abdominal: Soft. He exhibits no distension and no mass. There is no tenderness. There is no rebound and no guarding. Musculoskeletal: Normal range of motion. He exhibits no edema, tenderness or deformity. Lymphadenopathy:     He has no cervical adenopathy. Neurological: He is alert and oriented to person, place, and time. He has normal reflexes.  Coordination normal. Monofilament test normal bilaterally. Skin: Skin is warm and dry. No rash noted. He is not diaphoretic. No erythema. No pallor. Psychiatric: He has a normal mood and affect. His behavior is normal.       ASSESSMENT and PLAN    ICD-10-CM ICD-9-CM    1. Physical exam, annual    Discussed diet and weight loss. Due for eye exam. Immunizations UTD. EKG completed with cardiologist and reminded he is due for f/u. Colonoscopy UTD.  J45.51 D55.7 METABOLIC PANEL, COMPREHENSIVE      HEMOGLOBIN A1C WITH EAG      LIPID PANEL      HIV 1/2 AG/AB, 4TH GENERATION,W RFLX CONFIRM       DIABETES FOOT EXAM   2. Diabetes mellitus without complication (HCC)    Well controlled on metformin. Continue. E11.9 250.00    3. Essential hypertension with goal blood pressure less than 140/90    Controlled on losartan 50 mg and coreg 6.25 mg BID I10 401.9    4. Coronary artery disease involving native coronary artery of native heart without angina pectoris    Advised to schedule f/u with Dr. Jesus Lopez. I25.10 414.01    5. Reactive depression    Stable on Effexor. F32.9 300.4    6. Chronic systolic congestive heart failure (HCC)    Stable. I50.22 428.22      428.0    7. Elevated CK    Improved since 2/17 ER visit. Ok to continue pravachol.  R74.8 790.5    8. Bilateral carpal tunnel syndrome    Discussed wrist splints. G56.03 354.0         Written by Bartolo Rock, as dictated by Krysten Pemberton MD.     Current diagnosis and concerns discussed with pt at length. Understands risks and benefits or current treatment plan and medications and accepts the treatment and medication with any possible risks.   Pt asks appropriate questions which were answered.   Pt instructed to call with any concerns or problems. This note will not be viewable in [address]

## 2017-06-05 NOTE — MR AVS SNAPSHOT
Visit Information Date & Time Provider Department Dept. Phone Encounter #  
 6/5/2017  9:00 AM Jefe Villarreal, [address] [phone] Follow-up Instructions Return in about 3 months (around 9/5/2017). Upcoming Health Maintenance Date Due  
 EYE EXAM RETINAL OR DILATED Q1 10/12/1976 Pneumococcal 19-64 Medium Risk (1 of 1 - PPSV23) 10/12/1985 INFLUENZA AGE 9 TO ADULT 8/1/2017 FOOT EXAM Q1 8/29/2017 HEMOGLOBIN A1C Q6M 9/17/2017 MICROALBUMIN Q1 12/12/2017 LIPID PANEL Q1 12/12/2017 COLONOSCOPY 11/21/2021 DTaP/Tdap/Td series (2 - Td) 8/29/2026 Allergies as of 6/5/2017  Review Complete On: 3/17/2017 By: Jefe Villarreal MD  
 No Known Allergies Current Immunizations  Reviewed on 10/24/2016 Name Date Influenza Vaccine (Quad) PF 10/24/2016 Tdap 8/29/2016 Not reviewed this visit You Were Diagnosed With   
  
 Codes Comments Physical exam, annual    -  Primary ICD-10-CM: Z00.00 ICD-9-CM: V70.0 Diabetes mellitus without complication ([facility]     FAHEEM-19-WK: E11.9 ICD-9-CM: 250.00 Essential hypertension with goal blood pressure less than 140/90     ICD-10-CM: I10 
ICD-9-CM: 401.9 Coronary artery disease involving native coronary artery of native heart without angina pectoris     ICD-10-CM: I25.10 ICD-9-CM: 414.01 Reactive depression     ICD-10-CM: F32.9 ICD-9-CM: 300.4 Chronic systolic congestive heart failure (HCC)     ICD-10-CM: I50.22 ICD-9-CM: 428.22, 428.0 Elevated CK     ICD-10-CM: R74.8 ICD-9-CM: 790.5 Bilateral carpal tunnel syndrome     ICD-10-CM: G56.03 
ICD-9-CM: 354.0 Vitals Smoking Status Never Smoker Preferred Pharmacy Pharmacy Name Phone 305 [facility], [address] [address] [place] [address] [address] Your Updated Medication List  
  
   
 This list is accurate as of: 6/5/17  9:09 AM.  Always use your most recent med list.  
  
  
  
  
 aspirin 81 mg chewable tablet Take 81 mg by mouth daily. carvedilol 6.25 mg tablet Commonly known as:  Carline Breed Take 1 tablet by mouth two  times daily  
  
 ciprofloxacin-dexamethasone 0.3-0.1 % otic suspension Commonly known as:  Dusty Button Administer 4 Drops in left ear two (2) times a day. losartan 50 mg tablet Commonly known as:  COZAAR Take 1 tablet by mouth  daily  
  
 metFORMIN 500 mg tablet Commonly known as:  GLUCOPHAGE Take 1 tablet by mouth two  times daily with meals  
  
 omeprazole 20 mg capsule Commonly known as:  PRILOSEC Take 1 capsule by mouth  daily One Touch Delica 33 gauge Misc Generic drug:  lancets ONETOUCH VERIO strip Generic drug:  glucose blood VI test strips  
  
 pravastatin 20 mg tablet Commonly known as:  PRAVACHOL Take 1 tablet by mouth  nightly  
  
 testosterone cypionate 200 mg/mL injection Commonly known as:  DEPOTESTOTERONE CYPIONATE  
by IntraMUSCular route every seven (7) days. venlafaxine- mg capsule Commonly known as:  EFFEXOR-XR Take 1 capsule by mouth  daily We Performed the Following HEMOGLOBIN A1C WITH EAG [47718 CPT(R)] HIV 1/2 AG/AB, 4TH GENERATION,W RFLX CONFIRM [[id number] Custom]  DIABETES FOOT EXAM [HM7 Custom] LIPID PANEL [93244 CPT(R)] METABOLIC PANEL, COMPREHENSIVE [47296 CPT(R)] Follow-up Instructions Return in about 3 months (around 9/5/2017). Patient Instructions Labs today Schedule eye exam  
 
  
Introducing [facility]! Dear Dearl Bert: 
Thank you for requesting a CorkShare account. Our records indicate that you already have an active CorkShare account. You can access your account anytime at [email or web address] Did you know that you can access your hospital and ER discharge instructions at any time in Mechio? You can also review all of your test results from your hospital stay or ER visit. Additional Information If you have questions, please visit the Frequently Asked Questions section of the Mechio website at [email or web address] Remember, Mechio is NOT to be used for urgent needs. For medical emergencies, dial 911. Now available from your iPhone and Android! Please provide this summary of care documentation to your next provider. Your primary care clinician is listed as Frances Rojas. If you have any questions after today's visit, please call [phone].

## 2017-06-06 LAB
ALBUMIN SERPL-MCNC: 4.4 G/DL (ref 3.5–5.5)
ALBUMIN/GLOB SERPL: 1.5 {RATIO} (ref 1.2–2.2)
ALP SERPL-CCNC: 51 IU/L (ref 39–117)
ALT SERPL-CCNC: 36 IU/L (ref 0–44)
AST SERPL-CCNC: 24 IU/L (ref 0–40)
BILIRUB SERPL-MCNC: 0.5 MG/DL (ref 0–1.2)
BUN SERPL-MCNC: 11 MG/DL (ref 6–24)
BUN/CREAT SERPL: 12 (ref 9–20)
CALCIUM SERPL-MCNC: 9.5 MG/DL (ref 8.7–10.2)
CHLORIDE SERPL-SCNC: 95 MMOL/L (ref 96–106)
CHOLEST SERPL-MCNC: 165 MG/DL (ref 100–199)
CO2 SERPL-SCNC: 24 MMOL/L (ref 18–29)
CREAT SERPL-MCNC: 0.9 MG/DL (ref 0.76–1.27)
EST. AVERAGE GLUCOSE BLD GHB EST-MCNC: 120 MG/DL
GLOBULIN SER CALC-MCNC: 3 G/DL (ref 1.5–4.5)
GLUCOSE SERPL-MCNC: 104 MG/DL (ref 65–99)
HBA1C MFR BLD: 5.8 % (ref 4.8–5.6)
HDLC SERPL-MCNC: 30 MG/DL
HIV 1+2 AB+HIV1 P24 AG SERPL QL IA: NON REACTIVE
LDLC SERPL CALC-MCNC: 60 MG/DL (ref 0–99)
POTASSIUM SERPL-SCNC: 4.4 MMOL/L (ref 3.5–5.2)
PROT SERPL-MCNC: 7.4 G/DL (ref 6–8.5)
SODIUM SERPL-SCNC: 137 MMOL/L (ref 134–144)
TRIGL SERPL-MCNC: 376 MG/DL (ref 0–149)
VLDLC SERPL CALC-MCNC: 75 MG/DL (ref 5–40)

## 2017-07-19 RX ORDER — CARVEDILOL 6.25 MG/1
TABLET ORAL
Qty: 180 TAB | Refills: 1 | Status: SHIPPED | OUTPATIENT
Start: 2017-07-19 | End: 2018-01-01 | Stop reason: SDUPTHER

## 2017-08-13 ENCOUNTER — APPOINTMENT (OUTPATIENT)
Dept: CT IMAGING | Age: 52
End: 2017-08-13
Attending: EMERGENCY MEDICINE
Payer: COMMERCIAL

## 2017-08-13 ENCOUNTER — APPOINTMENT (OUTPATIENT)
Dept: GENERAL RADIOLOGY | Age: 52
End: 2017-08-13
Attending: EMERGENCY MEDICINE
Payer: COMMERCIAL

## 2017-08-13 ENCOUNTER — HOSPITAL ENCOUNTER (EMERGENCY)
Age: 52
Discharge: HOME | End: 2017-08-13
Attending: EMERGENCY MEDICINE
Payer: COMMERCIAL

## 2017-08-13 VITALS
RESPIRATION RATE: 28 BRPM | SYSTOLIC BLOOD PRESSURE: 113 MMHG | TEMPERATURE: 99.1 F | HEIGHT: 69 IN | OXYGEN SATURATION: 98 % | DIASTOLIC BLOOD PRESSURE: 77 MMHG | WEIGHT: 262.57 LBS | BODY MASS INDEX: 38.89 KG/M2 | HEART RATE: 119 BPM

## 2017-08-13 DIAGNOSIS — R50.9 FEVER, UNSPECIFIED FEVER CAUSE: Primary | ICD-10-CM

## 2017-08-13 DIAGNOSIS — B34.9 VIRAL ILLNESS: ICD-10-CM

## 2017-08-13 LAB
ALBUMIN SERPL BCP-MCNC: 3.9 G/DL (ref 3.5–5)
ALBUMIN/GLOB SERPL: 1 {RATIO} (ref 1.1–2.2)
ALP SERPL-CCNC: 60 U/L (ref 45–117)
ALT SERPL-CCNC: 41 U/L (ref 12–78)
ANION GAP BLD CALC-SCNC: 12 MMOL/L (ref 5–15)
APPEARANCE UR: CLEAR
AST SERPL W P-5'-P-CCNC: 21 U/L (ref 15–37)
BACTERIA URNS QL MICRO: NEGATIVE /HPF
BASOPHILS # BLD AUTO: 0 K/UL (ref 0–0.1)
BASOPHILS # BLD: 0 % (ref 0–1)
BILIRUB SERPL-MCNC: 1.1 MG/DL (ref 0.2–1)
BILIRUB UR QL: NEGATIVE
BUN SERPL-MCNC: 13 MG/DL (ref 6–20)
BUN/CREAT SERPL: 12 (ref 12–20)
CALCIUM SERPL-MCNC: 9.1 MG/DL (ref 8.5–10.1)
CHLORIDE SERPL-SCNC: 100 MMOL/L (ref 97–108)
CO2 SERPL-SCNC: 26 MMOL/L (ref 21–32)
COLOR UR: ABNORMAL
CREAT SERPL-MCNC: 1.05 MG/DL (ref 0.7–1.3)
EOSINOPHIL # BLD: 0.1 K/UL (ref 0–0.4)
EOSINOPHIL NFR BLD: 0 % (ref 0–7)
EPITH CASTS URNS QL MICRO: ABNORMAL /LPF
ERYTHROCYTE [DISTWIDTH] IN BLOOD BY AUTOMATED COUNT: 13.9 % (ref 11.5–14.5)
GLOBULIN SER CALC-MCNC: 4.1 G/DL (ref 2–4)
GLUCOSE SERPL-MCNC: 124 MG/DL (ref 65–100)
GLUCOSE UR STRIP.AUTO-MCNC: NEGATIVE MG/DL
HCT VFR BLD AUTO: 44.7 % (ref 36.6–50.3)
HGB BLD-MCNC: 15.8 G/DL (ref 12.1–17)
HGB UR QL STRIP: NEGATIVE
KETONES UR QL STRIP.AUTO: ABNORMAL MG/DL
LACTATE SERPL-SCNC: 2.5 MMOL/L (ref 0.4–2)
LEUKOCYTE ESTERASE UR QL STRIP.AUTO: NEGATIVE
LIPASE SERPL-CCNC: 113 U/L (ref 73–393)
LYMPHOCYTES # BLD AUTO: 9 % (ref 12–49)
LYMPHOCYTES # BLD: 1.3 K/UL (ref 0.8–3.5)
MCH RBC QN AUTO: 29.5 PG (ref 26–34)
MCHC RBC AUTO-ENTMCNC: 35.3 G/DL (ref 30–36.5)
MCV RBC AUTO: 83.4 FL (ref 80–99)
MONOCYTES # BLD: 0.6 K/UL (ref 0–1)
MONOCYTES NFR BLD AUTO: 4 % (ref 5–13)
NEUTS SEG # BLD: 12.7 K/UL (ref 1.8–8)
NEUTS SEG NFR BLD AUTO: 87 % (ref 32–75)
NITRITE UR QL STRIP.AUTO: NEGATIVE
PH UR STRIP: 6 [PH] (ref 5–8)
PLATELET # BLD AUTO: 227 K/UL (ref 150–400)
POTASSIUM SERPL-SCNC: 3.3 MMOL/L (ref 3.5–5.1)
PROT SERPL-MCNC: 8 G/DL (ref 6.4–8.2)
PROT UR STRIP-MCNC: 30 MG/DL
RBC # BLD AUTO: 5.36 M/UL (ref 4.1–5.7)
RBC #/AREA URNS HPF: ABNORMAL /HPF (ref 0–5)
SODIUM SERPL-SCNC: 138 MMOL/L (ref 136–145)
SP GR UR REFRACTOMETRY: 1.01 (ref 1–1.03)
UROBILINOGEN UR QL STRIP.AUTO: 0.2 EU/DL (ref 0.2–1)
WBC # BLD AUTO: 14.6 K/UL (ref 4.1–11.1)
WBC URNS QL MICRO: ABNORMAL /HPF (ref 0–4)

## 2017-08-13 PROCEDURE — 99285 EMERGENCY DEPT VISIT HI MDM: CPT

## 2017-08-13 PROCEDURE — 74177 CT ABD & PELVIS W/CONTRAST: CPT

## 2017-08-13 PROCEDURE — 80053 COMPREHEN METABOLIC PANEL: CPT | Performed by: EMERGENCY MEDICINE

## 2017-08-13 PROCEDURE — 96374 THER/PROPH/DIAG INJ IV PUSH: CPT

## 2017-08-13 PROCEDURE — 96361 HYDRATE IV INFUSION ADD-ON: CPT

## 2017-08-13 PROCEDURE — 83605 ASSAY OF LACTIC ACID: CPT | Performed by: EMERGENCY MEDICINE

## 2017-08-13 PROCEDURE — 87086 URINE CULTURE/COLONY COUNT: CPT | Performed by: EMERGENCY MEDICINE

## 2017-08-13 PROCEDURE — 71020 XR CHEST PA LAT: CPT

## 2017-08-13 PROCEDURE — 81001 URINALYSIS AUTO W/SCOPE: CPT | Performed by: EMERGENCY MEDICINE

## 2017-08-13 PROCEDURE — 74011250636 HC RX REV CODE- 250/636: Performed by: EMERGENCY MEDICINE

## 2017-08-13 PROCEDURE — 83690 ASSAY OF LIPASE: CPT | Performed by: EMERGENCY MEDICINE

## 2017-08-13 PROCEDURE — 85025 COMPLETE CBC W/AUTO DIFF WBC: CPT | Performed by: EMERGENCY MEDICINE

## 2017-08-13 PROCEDURE — 87040 BLOOD CULTURE FOR BACTERIA: CPT | Performed by: EMERGENCY MEDICINE

## 2017-08-13 PROCEDURE — 36415 COLL VENOUS BLD VENIPUNCTURE: CPT | Performed by: EMERGENCY MEDICINE

## 2017-08-13 PROCEDURE — 93005 ELECTROCARDIOGRAM TRACING: CPT

## 2017-08-13 PROCEDURE — 74011636320 HC RX REV CODE- 636/320: Performed by: RADIOLOGY

## 2017-08-13 RX ORDER — KETOROLAC TROMETHAMINE 30 MG/ML
30 INJECTION, SOLUTION INTRAMUSCULAR; INTRAVENOUS
Status: COMPLETED | OUTPATIENT
Start: 2017-08-13 | End: 2017-08-13

## 2017-08-13 RX ORDER — SODIUM CHLORIDE 0.9 % (FLUSH) 0.9 %
5-10 SYRINGE (ML) INJECTION AS NEEDED
Status: DISCONTINUED | OUTPATIENT
Start: 2017-08-13 | End: 2017-08-14 | Stop reason: HOSPADM

## 2017-08-13 RX ORDER — LOSARTAN POTASSIUM 50 MG/1
50 TABLET ORAL
COMMUNITY
End: 2017-11-03 | Stop reason: SDUPTHER

## 2017-08-13 RX ADMIN — KETOROLAC TROMETHAMINE 30 MG: 30 INJECTION, SOLUTION INTRAMUSCULAR at 19:51

## 2017-08-13 RX ADMIN — SODIUM CHLORIDE 3573 ML: 900 INJECTION, SOLUTION INTRAVENOUS at 19:33

## 2017-08-13 RX ADMIN — IOPAMIDOL 100 ML: 755 INJECTION, SOLUTION INTRAVENOUS at 19:18

## 2017-08-13 NOTE — LETTER
[address] [place] [address] Dr 
[facility] EMERGENCY DEPT 
[address] [place] [address] [address] [id number] [phone] Work/School Note Date: 8/13/2017 To Whom It May concern: 
 
Alice Goyal was seen and treated today in the emergency room by the following provider(s): 
Attending Provider: Rosa Agee may return to work on 8/16/2017. Sincerely, Daniel Xiong, DO

## 2017-08-13 NOTE — ED PROVIDER NOTES
HPI Comments: 49 y.o. male with past medical history significant for GERD, depression, HTN, Type 2 DM (controlled with PO hypoglycemic medications), chronic tachycardia, who presents from home with chief complaint of fevers and generalized fatigue x 2-3 days.  states that the patient has been complaining of \"not feeling well\" for the past several days. He notes that they were at a friend's house two days ago, but when they got home pt took his temperature found that he had a fever.  reports pt having a Tmax of 102.7F, and his last dose of Tylenol was 2 hours PTA to the ED. Pt additionally c/o chills, a decrease in appetite, daily diarrhea (2 episodes per day), a 4/10 HA, and  states that pt has had a \"weird odor\" and has been more \"dazed and confused\" today. Pt reports that he recently started a new medication, [place], at the end of July, and he is slowly increasing the dose of the medication. He is now up to 1.8 mg of Saxenda as of Friday 8/11/2017.  notes that pt's BG was 104 mg/dL today, which is abnormally low compared to his baseline. Pt denies any abdominal pain, and he states that his last colonoscopy was performed within the past year and was reportedly normal. He specifically denies any nausea, vomiting, cough, SOB, CP, dysuria, groin pain, and rashes. There are no other acute medical concerns at this time. Social hx: - Tobacco, + EtOH (rarely), - Illicit Drug Use  PCP: Bella Wray MD     Note written by Favian Holliday, as dictated by Kristan Costello DO 7:09 PM     The history is provided by the patient and the spouse. No  was used.         Past Medical History:   Diagnosis Date    Depression     GERD (gastroesophageal reflux disease)     Heart attack ([facility] [address] 04/2015    Hypertension     Prediabetes     Tachycardia        Past Surgical History:   Procedure Laterality Date    HX COLONOSCOPY  11/2016    HX GI      colonoscopy  HX OTHER SURGICAL      anal fissure         Family History:   Problem Relation Age of Onset    Cancer Mother      stomach and liver    Hypertension Father     Diabetes Maternal Grandmother     Hypertension Maternal Grandfather        Social History     Social History    Marital status:      Spouse name: N/A    Number of children: N/A    Years of education: N/A     Occupational History    Not on file. Social History Main Topics    Smoking status: Never Smoker    Smokeless tobacco: Not on file    Alcohol use 0.0 oz/week     0 Standard drinks or equivalent per week      Comment: rarely    Drug use: No    Sexual activity: Yes     Other Topics Concern    Not on file     Social History Narrative     ALLERGIES: Review of patient's allergies indicates no known allergies. Review of Systems   Constitutional: Positive for appetite change (decreased), chills, fatigue and fever. Negative for unexpected weight change. HENT: Negative for ear pain, hearing loss, rhinorrhea and trouble swallowing. Eyes: Negative for pain and visual disturbance. Respiratory: Negative for cough, chest tightness and shortness of breath. Cardiovascular: Negative for chest pain and palpitations. Gastrointestinal: Positive for diarrhea. Negative for abdominal distention, abdominal pain, blood in stool and vomiting. Genitourinary: Negative for dysuria, hematuria, penile pain, testicular pain and urgency. Musculoskeletal: Negative for back pain and myalgias. Skin: Negative for rash. Neurological: Positive for headaches. Negative for dizziness, syncope, weakness and numbness. Psychiatric/Behavioral: Positive for confusion. Negative for suicidal ideas. All other systems reviewed and are negative.       Vitals:    08/13/17 1854   BP: 127/68   Pulse: (!) 130   Resp: 20   Temp: 99.5 °F (37.5 °C)   SpO2: 96%   Weight: 119.1 kg (262 lb 9.1 oz)   Height: 5' 9\" (1.753 m)            Physical Exam Constitutional: He is oriented to person, place, and time. He appears well-developed and well-nourished. No distress. HENT:   Head: Normocephalic and atraumatic. Right Ear: External ear normal.   Left Ear: External ear normal.   Nose: Nose normal.   Mouth/Throat: Oropharynx is clear and moist. No oropharyngeal exudate. Eyes: Conjunctivae and EOM are normal. Pupils are equal, round, and reactive to light. Right eye exhibits no discharge. Left eye exhibits no discharge. No scleral icterus. Neck: Normal range of motion. Neck supple. No JVD present. No tracheal deviation present. No meningismus signs   Cardiovascular: Regular rhythm, normal heart sounds and intact distal pulses. Tachycardia present. Exam reveals no gallop and no friction rub. No murmur heard. Pulmonary/Chest: Effort normal and breath sounds normal. No stridor. No respiratory distress. He has no decreased breath sounds. He has no wheezes. He has no rhonchi. He has no rales. He exhibits no tenderness. Abdominal: Soft. Bowel sounds are normal. He exhibits no distension. There is tenderness in the left lower quadrant. There is no rebound and no guarding. Genitourinary: Testes normal and penis normal.   Genitourinary Comments: There are no lesions or skin issues   Musculoskeletal: Normal range of motion. He exhibits no edema or tenderness. Neurological: He is alert and oriented to person, place, and time. He has normal strength and normal reflexes. No cranial nerve deficit or sensory deficit. He exhibits normal muscle tone. Coordination normal. GCS eye subscore is 4. GCS verbal subscore is 5. GCS motor subscore is 6. Skin: Skin is warm and dry. No rash noted. He is not diaphoretic. No erythema. No pallor. Psychiatric: He has a normal mood and affect. His behavior is normal. Judgment and thought content normal.   Nursing note and vitals reviewed. Note written by Ardyth Jeans. Mely Pierce, as dictated by Belkys Peterson, DO 7:10 PM        MDM  Number of Diagnoses or Management Options  Fever, unspecified fever cause:   Viral illness:      Amount and/or Complexity of Data Reviewed  Clinical lab tests: ordered and reviewed  Tests in the radiology section of CPT®: ordered and reviewed  Tests in the medicine section of CPT®: ordered and reviewed  Independent visualization of images, tracings, or specimens: yes (ekg)    Risk of Complications, Morbidity, and/or Mortality  Presenting problems: moderate  Diagnostic procedures: moderate  Management options: moderate    Critical Care  Total time providing critical care: 30-74 minutes (50 minutes CCT regardless of any procedures that might have been done.)    Patient Progress  Patient progress: stable    ED Course       Procedures    PROGRESS NOTE:  10:44 PM  Pt states that he is feeling better and vital signs are improved in the ED. There was no source of infection found, likely viral. Will discharge home with close PCP follow-up. Chief Complaint   Patient presents with    Fever       12:14 AM  The patients presenting problems have been discussed, and they are in agreement with the care plan formulated and outlined with them. I have encouraged them to ask questions as they arise throughout their visit.     MEDICATIONS GIVEN:  Medications   sodium chloride (NS) flush 5-10 mL (not administered)   sodium chloride 0.9 % bolus infusion 3,573 mL (0 mL/kg × 119.1 kg IntraVENous IV Completed 8/13/17 2248)   ketorolac (TORADOL) injection 30 mg (30 mg IntraVENous Given 8/13/17 1951)   iopamidol (ISOVUE-370) 76 % injection 100 mL (100 mL IntraVENous Given 8/13/17 1918)       LABS REVIEWED:  Recent Results (from the past 24 hour(s))   LACTIC ACID    Collection Time: 08/13/17  7:34 PM   Result Value Ref Range    Lactic acid 2.5 (HH) 0.4 - 2.0 MMOL/L   METABOLIC PANEL, COMPREHENSIVE    Collection Time: 08/13/17  7:34 PM   Result Value Ref Range    Sodium 138 136 - 145 mmol/L    Potassium 3.3 (L) 3.5 - 5.1 mmol/L    Chloride 100 97 - 108 mmol/L    CO2 26 21 - 32 mmol/L    Anion gap 12 5 - 15 mmol/L    Glucose 124 (H) 65 - 100 mg/dL    BUN 13 6 - 20 MG/DL    Creatinine 1.05 0.70 - 1.30 MG/DL    BUN/Creatinine ratio 12 12 - 20      GFR est AA >60 >60 ml/min/1.73m2    GFR est non-AA >60 >60 ml/min/1.73m2    Calcium 9.1 8.5 - 10.1 MG/DL    Bilirubin, total 1.1 (H) 0.2 - 1.0 MG/DL    ALT (SGPT) 41 12 - 78 U/L    AST (SGOT) 21 15 - 37 U/L    Alk. phosphatase 60 45 - 117 U/L    Protein, total 8.0 6.4 - 8.2 g/dL    Albumin 3.9 3.5 - 5.0 g/dL    Globulin 4.1 (H) 2.0 - 4.0 g/dL    A-G Ratio 1.0 (L) 1.1 - 2.2     CBC WITH AUTOMATED DIFF    Collection Time: 08/13/17  7:34 PM   Result Value Ref Range    WBC 14.6 (H) 4.1 - 11.1 K/uL    RBC 5.36 4.10 - 5.70 M/uL    HGB 15.8 12.1 - 17.0 g/dL    HCT 44.7 36.6 - 50.3 %    MCV 83.4 80.0 - 99.0 FL    MCH 29.5 26.0 - 34.0 PG    MCHC 35.3 30.0 - 36.5 g/dL    RDW 13.9 11.5 - 14.5 %    PLATELET 164 603 - 698 K/uL    NEUTROPHILS 87 (H) 32 - 75 %    LYMPHOCYTES 9 (L) 12 - 49 %    MONOCYTES 4 (L) 5 - 13 %    EOSINOPHILS 0 0 - 7 %    BASOPHILS 0 0 - 1 %    ABS. NEUTROPHILS 12.7 (H) 1.8 - 8.0 K/UL    ABS. LYMPHOCYTES 1.3 0.8 - 3.5 K/UL    ABS. MONOCYTES 0.6 0.0 - 1.0 K/UL    ABS. EOSINOPHILS 0.1 0.0 - 0.4 K/UL    ABS.  BASOPHILS 0.0 0.0 - 0.1 K/UL   LIPASE    Collection Time: 08/13/17  7:34 PM   Result Value Ref Range    Lipase 113 73 - 393 U/L   URINALYSIS W/MICROSCOPIC    Collection Time: 08/13/17  9:11 PM   Result Value Ref Range    Color YELLOW/STRAW      Appearance CLEAR CLEAR      Specific gravity 1.010 1.003 - 1.030      pH (UA) 6.0 5.0 - 8.0      Protein 30 (A) NEG mg/dL    Glucose NEGATIVE  NEG mg/dL    Ketone TRACE (A) NEG mg/dL    Bilirubin NEGATIVE  NEG      Blood NEGATIVE  NEG      Urobilinogen 0.2 0.2 - 1.0 EU/dL    Nitrites NEGATIVE  NEG      Leukocyte Esterase NEGATIVE  NEG      WBC 0-4 0 - 4 /hpf    RBC 0-5 0 - 5 /hpf    Epithelial cells FEW FEW /lpf    Bacteria NEGATIVE  NEG /hpf       VITAL SIGNS:  Patient Vitals for the past 12 hrs:   Temp Pulse Resp BP SpO2   08/13/17 2230 - - - 113/77 98 %   08/13/17 2215 - (!) 119 - 116/69 95 %   08/13/17 2204 - (!) 119 - - 94 %   08/13/17 2200 - (!) 119 - 112/68 -   08/13/17 2145 - (!) 122 - 115/69 90 %   08/13/17 2111 99.1 °F (37.3 °C) - - - -   08/13/17 2104 - - - - 95 %   08/13/17 2055 - (!) 125 - - 95 %   08/13/17 2030 - (!) 128 28 115/66 -   08/13/17 2015 - (!) 123 25 119/75 92 %   08/13/17 2000 - (!) 126 17 112/79 92 %   08/13/17 1945 100.3 °F (37.9 °C) (!) 132 13 123/77 94 %   08/13/17 1915 - - - 118/87 95 %   08/13/17 1854 99.5 °F (37.5 °C) (!) 130 20 127/68 96 %       RADIOLOGY RESULTS:  The following have been ordered and reviewed:  CT ABD PELV W CONT   Final Result      XR CHEST PA LAT   Final Result        Study Result      EXAM:  CT ABD PELV W CONT     INDICATION: Abdominal pain and fever. The patient reports fevers and generalized  fatigue x 2-3 days.  states that the patient has been complaining of \"not  feeling well\" for the past several days. He notes that they were at a friend's  house two days ago, but when they got home pt took his temperature found that he  had a fever.      COMPARISON: None.     CONTRAST:  100 mL of Isovue-370.     TECHNIQUE: Following the uneventful intravenous administration of contrast, thin  axial images were obtained through the abdomen and pelvis. Coronal and sagittal  reconstructions were generated. Oral contrast was not administered. CT dose  reduction was achieved through use of a standardized protocol tailored for this  examination and automatic exposure control for dose modulation.     FINDINGS:      LUNG BASES: Clear. INCIDENTALLY IMAGED HEART AND MEDIASTINUM: The visualized heart is normal in  size without pericardial effusion. LIVER: Diffusely hypodense with areas of sparing about the gallbladder fossa. No  focal lesions otherwise. GALLBLADDER: Unremarkable.   SPLEEN: Unremarkable. PANCREAS: No mass or duct dilation. ADRENALS: Unremarkable. KIDNEYS: Right renal cysts. No mass, calculi, or hydronephrosis. STOMACH: Unremarkable. SMALL BOWEL: No dilatation or wall thickening. COLON: No dilation or wall thickening. APPENDIX: Unremarkable. PERITONEUM: No ascites or pneumoperitoneum. RETROPERITONEUM: No lymphadenopathy or aortic aneurysm. REPRODUCTIVE ORGANS: The prostate and seminal vesicles appear normal.  URINARY BLADDER: No mass or calculus. BONES: Degenerative spine changes with no acute fracture or aggressive lesion. ADDITIONAL COMMENTS: Left greater the right fat-containing inguinal hernias.     IMPRESSION  IMPRESSION: No acute findings. Study Result      EXAM:  XR CHEST PA LAT     INDICATION:   fever, ? Source. Fevers and generalized fatigue x 2-3 days.  states that the patient has been complaining of \"not feeling well\" for  the past several days     COMPARISON: CT thorax 2/6/2017. .     FINDINGS: PA and lateral radiographs of the chest demonstrate clear lungs. The  cardiac and mediastinal contours and pulmonary vascularity are normal.  The  bones and soft tissues are within normal limits apart from mild degenerative  spine changes and right posterior T8 rib bone island.      IMPRESSION  IMPRESSION: No acute findings. ED EKG interpretation:  Rhythm: sinus tachycardia and LAFB; and regular . Rate (approx.): 130; Axis: normal; P wave: normal; QRS interval: normal ; ST/T wave: normal; Other findings: abnormal ekg. This EKG was interpreted by Arron Ochoa DO,ED Provider. PROGRESS NOTES:  Pt feeling better. VS improved. Discussed results and plan with patient. Patient will be discharged home with PCP followup. Patient instructed to return to the emergency room for any worsening symptoms or any other concerns. DIAGNOSIS:    1. Fever, unspecified fever cause    2.  Viral illness        PLAN:  Follow-up Information     Follow up With Details Comments Contact Info    Marixa Manuel MD Schedule an appointment as soon as possible for a visit  [address] [place] [address] [phone] [address] [facility] DEPT  If symptoms worsen [address] [facility]  [phone]        Discharge Medication List as of 8/13/2017 10:26 PM      CONTINUE these medications which have NOT CHANGED    Details   liraglutide (SAXENDA) 3 mg/0.5 mL (18 mg/3 mL) pen 1.8 mg by SubCUTAneous route daily. , Historical Med      losartan (COZAAR) 50 mg tablet Take 50 mg by mouth nightly., Historical Med      carvedilol (COREG) 6.25 mg tablet Take 1 tablet by mouth two  times daily, Normal, Disp-180 Tab, R-1      omeprazole (PRILOSEC) 20 mg capsule Take 1 capsule by mouth  daily, Normal, Disp-90 Cap, R-2      pravastatin (PRAVACHOL) 20 mg tablet Take 1 tablet by mouth  nightly, Normal, Disp-90 Tab, R-2      venlafaxine-SR (EFFEXOR-XR) 150 mg capsule Take 1 capsule by mouth  daily, Normal, Disp-90 Cap, R-2      metFORMIN (GLUCOPHAGE) 500 mg tablet Take 1 tablet by mouth two  times daily with meals, Normal, Disp-180 Tab, R-1      testosterone cypionate (DEPOTESTOTERONE CYPIONATE) 200 mg/mL injection by IntraMUSCular route every Sunday., Historical Med      aspirin 81 mg chewable tablet Take 81 mg by mouth nightly., Historical Med               ED COURSE: The patients hospital course has been uncomplicated.

## 2017-08-13 NOTE — ED TRIAGE NOTES
Pt reports not feeling well since this afternoon with fever up to 102. Feels generalized weakness. Denies N/V/D. Taken extra strength tylenol about 2 hrs PTA. Started a new medication 18 days ago, saxenda for diabetes.

## 2017-08-14 NOTE — DISCHARGE INSTRUCTIONS
We hope that we have addressed all of your medical concerns. The examination and treatment you received in the Emergency Department were for an emergent problem and were not intended as complete care. It is important that you follow up with your healthcare provider(s) for ongoing care. If your symptoms worsen or do not improve as expected, and you are unable to reach your usual health care provider(s), you should return to the Emergency Department. Today's healthcare is undergoing tremendous change, and patient satisfaction surveys are one of the many tools to assess the quality of medical care. You may receive a survey from the [employer] regarding your experience in the Emergency Department. I hope that your experience has been completely positive, particularly the medical care that I provided. As such, please participate in the survey; anything less than excellent does not meet my expectations or intentions. [facility] and [facility] participate in nationally recognized quality of care measures. If your blood pressure is greater than 120/80, as reported below, we urge that you seek medical care to address the potential of high blood pressure, commonly known as hypertension. Hypertension can be hereditary or can be caused by certain medical conditions, pain, stress, or \"white coat syndrome. \"       Please make an appointment with your health care provider(s) for follow up of your Emergency Department visit.        VITALS:   Patient Vitals for the past 8 hrs:   Temp Pulse Resp BP SpO2   08/13/17 2215 - (!) 119 - 116/69 95 %   08/13/17 2204 - (!) 119 - - 94 %   08/13/17 2200 - (!) 119 - 112/68 -   08/13/17 2145 - (!) 122 - 115/69 90 %   08/13/17 2111 99.1 °F (37.3 °C) - - - -   08/13/17 2104 - - - - 95 %   08/13/17 2055 - (!) 125 - - 95 %   08/13/17 2030 - (!) 128 28 115/66 -   08/13/17 2015 - (!) 123 25 119/75 92 %   08/13/17 2000 - (!) 126 17 112/79 92 %   08/13/17 1945 100.3 °F (37.9 °C) (!) 132 13 123/77 94 %   08/13/17 1915 - - - 118/87 95 %   08/13/17 1854 99.5 °F (37.5 °C) (!) 130 20 127/68 96 %          Thank you for allowing us to provide you with medical care today. We realize that you have many choices for your emergency care needs. Please choose us in the future for any continued health care needs. Keyannaaniya Valencia, [address] [place] [address].   Office: [phone]            Recent Results (from the past 24 hour(s))   LACTIC ACID    Collection Time: 08/13/17  7:34 PM   Result Value Ref Range    Lactic acid 2.5 (HH) 0.4 - 2.0 MMOL/L   METABOLIC PANEL, COMPREHENSIVE    Collection Time: 08/13/17  7:34 PM   Result Value Ref Range    Sodium 138 136 - 145 mmol/L    Potassium 3.3 (L) 3.5 - 5.1 mmol/L    Chloride 100 97 - 108 mmol/L    CO2 26 21 - 32 mmol/L    Anion gap 12 5 - 15 mmol/L    Glucose 124 (H) 65 - 100 mg/dL    BUN 13 6 - 20 MG/DL    Creatinine 1.05 0.70 - 1.30 MG/DL    BUN/Creatinine ratio 12 12 - 20      GFR est AA >60 >60 ml/min/1.73m2    GFR est non-AA >60 >60 ml/min/1.73m2    Calcium 9.1 8.5 - 10.1 MG/DL    Bilirubin, total 1.1 (H) 0.2 - 1.0 MG/DL    ALT (SGPT) 41 12 - 78 U/L    AST (SGOT) 21 15 - 37 U/L    Alk. phosphatase 60 45 - 117 U/L    Protein, total 8.0 6.4 - 8.2 g/dL    Albumin 3.9 3.5 - 5.0 g/dL    Globulin 4.1 (H) 2.0 - 4.0 g/dL    A-G Ratio 1.0 (L) 1.1 - 2.2     CBC WITH AUTOMATED DIFF    Collection Time: 08/13/17  7:34 PM   Result Value Ref Range    WBC 14.6 (H) 4.1 - 11.1 K/uL    RBC 5.36 4.10 - 5.70 M/uL    HGB 15.8 12.1 - 17.0 g/dL    HCT 44.7 36.6 - 50.3 %    MCV 83.4 80.0 - 99.0 FL    MCH 29.5 26.0 - 34.0 PG    MCHC 35.3 30.0 - 36.5 g/dL    RDW 13.9 11.5 - 14.5 %    PLATELET 516 463 - 650 K/uL    NEUTROPHILS 87 (H) 32 - 75 %    LYMPHOCYTES 9 (L) 12 - 49 %    MONOCYTES 4 (L) 5 - 13 %    EOSINOPHILS 0 0 - 7 %    BASOPHILS 0 0 - 1 %    ABS.  NEUTROPHILS 12.7 (H) 1.8 - 8.0 K/UL    ABS. LYMPHOCYTES 1.3 0.8 - 3.5 K/UL    ABS. MONOCYTES 0.6 0.0 - 1.0 K/UL    ABS. EOSINOPHILS 0.1 0.0 - 0.4 K/UL    ABS. BASOPHILS 0.0 0.0 - 0.1 K/UL   LIPASE    Collection Time: 08/13/17  7:34 PM   Result Value Ref Range    Lipase 113 73 - 393 U/L   URINALYSIS W/MICROSCOPIC    Collection Time: 08/13/17  9:11 PM   Result Value Ref Range    Color YELLOW/STRAW      Appearance CLEAR CLEAR      Specific gravity 1.010 1.003 - 1.030      pH (UA) 6.0 5.0 - 8.0      Protein 30 (A) NEG mg/dL    Glucose NEGATIVE  NEG mg/dL    Ketone TRACE (A) NEG mg/dL    Bilirubin NEGATIVE  NEG      Blood NEGATIVE  NEG      Urobilinogen 0.2 0.2 - 1.0 EU/dL    Nitrites NEGATIVE  NEG      Leukocyte Esterase NEGATIVE  NEG      WBC 0-4 0 - 4 /hpf    RBC 0-5 0 - 5 /hpf    Epithelial cells FEW FEW /lpf    Bacteria NEGATIVE  NEG /hpf       Xr Chest Pa Lat    Result Date: 8/13/2017  EXAM:  XR CHEST PA LAT INDICATION:   fever, ? Source. Fevers and generalized fatigue x 2-3 days.  states that the patient has been complaining of \"not feeling well\" for the past several days COMPARISON: CT thorax 2/6/2017. [facility] Maria Dolores FINDINGS: PA and lateral radiographs of the chest demonstrate clear lungs. The cardiac and mediastinal contours and pulmonary vascularity are normal.  The bones and soft tissues are within normal limits apart from mild degenerative spine changes and right posterior T8 rib bone island. IMPRESSION: No acute findings. Ct Abd Pelv W Cont    Result Date: 8/13/2017  EXAM:  CT ABD PELV W CONT INDICATION: Abdominal pain and fever. The patient reports fevers and generalized fatigue x 2-3 days.  states that the patient has been complaining of \"not feeling well\" for the past several days. He notes that they were at a friend's house two days ago, but when they got home pt took his temperature found that he had a fever. COMPARISON: None. CONTRAST:  100 mL of Isovue-370.  TECHNIQUE: Following the uneventful intravenous administration of contrast, thin axial images were obtained through the abdomen and pelvis. Coronal and sagittal reconstructions were generated. Oral contrast was not administered. CT dose reduction was achieved through use of a standardized protocol tailored for this examination and automatic exposure control for dose modulation. FINDINGS: LUNG BASES: Clear. INCIDENTALLY IMAGED HEART AND MEDIASTINUM: The visualized heart is normal in size without pericardial effusion. LIVER: Diffusely hypodense with areas of sparing about the gallbladder fossa. No focal lesions otherwise. GALLBLADDER: Unremarkable. SPLEEN: Unremarkable. PANCREAS: No mass or duct dilation. ADRENALS: Unremarkable. KIDNEYS: Right renal cysts. No mass, calculi, or hydronephrosis. STOMACH: Unremarkable. SMALL BOWEL: No dilatation or wall thickening. COLON: No dilation or wall thickening. APPENDIX: Unremarkable. PERITONEUM: No ascites or pneumoperitoneum. RETROPERITONEUM: No lymphadenopathy or aortic aneurysm. REPRODUCTIVE ORGANS: The prostate and seminal vesicles appear normal. URINARY BLADDER: No mass or calculus. BONES: Degenerative spine changes with no acute fracture or aggressive lesion. ADDITIONAL COMMENTS: Left greater the right fat-containing inguinal hernias. IMPRESSION: No acute findings. Learning About Fever  What is a fever? A fever is a high body temperature. It's one way your body fights being sick. A fever shows that the body is responding to infection or other illnesses, both minor and severe. A fever is a symptom, not an illness by itself. A fever can be a sign that you are ill, but most fevers are not caused by a serious problem. You may have a fever with a minor illness, such as a cold. But sometimes a very serious infection may cause little or no fever.  It is important to look at other symptoms, other conditions you have, and how you feel in general. In children, notice how they act and see what symptoms they complain of.  What is a normal body temperature? A normal body temperature is about 98. 6ºF. Some people have a normal temperature that is a little higher or a little lower than this. Your temperature may be a little lower in the morning than it is later in the day. It may go up during hot weather or when you exercise, wear heavy clothes, or take a hot bath. Your temperature may also be different depending on how you take it. A temperature taken in the mouth (oral) or under the arm may be a little lower than your core temperature (rectal). What is a fever temperature? A core temperature of 100.4°F or above is considered a fever. What can cause a fever? A fever may be caused by:  · Infections. This is the most common cause of a fever. Examples of infections that can cause a fever include the flu, a kidney infection, or pneumonia. · Some medicines. · Severe trauma or injury, such as a heart attack, stroke, heatstroke, or burns. · Other medical conditions, such as arthritis and some cancers. How can you treat a fever at home? · Ask your doctor if you can take an over-the-counter pain medicine, such as acetaminophen (Tylenol), ibuprofen (Advil, Motrin), or naproxen (Aleve). Be safe with medicines. Read and follow all instructions on the label. · To prevent dehydration, drink plenty of fluids. Choose water and other caffeine-free clear liquids until you feel better. If you have kidney, heart, or liver disease and have to limit fluids, talk with your doctor before you increase the amount of fluids you drink. Follow-up care is a key part of your treatment and safety. Be sure to make and go to all appointments, and call your doctor if you are having problems. It's also a good idea to know your test results and keep a list of the medicines you take. Where can you learn more? Go to http://gerard-kristin.info/. Enter G123 in the search box to learn more about \"Learning About Fever. \"  Current as of: March 20, 2017  Content Version: 11.3  © 3657-4087 [employer]. Care instructions adapted under license by [employer] (which disclaims liability or warranty for this information). If you have questions about a medical condition or this instruction, always ask your healthcare professional. [address] any warranty or liability for your use of this information. Viral Infections: Care Instructions  Your Care Instructions  You don't feel well, but it's not clear what's causing it. You may have a viral infection. Viruses cause many illnesses, such as the common cold, influenza, fever, rashes, and the diarrhea, nausea, and vomiting that are often called \"stomach flu. \" You may wonder if antibiotic medicines could make you feel better. But antibiotics only treat infections caused by bacteria. They don't work on viruses. The good news is that viral infections usually aren't serious. Most will go away in a few days without medical treatment. In the meantime, there are a few things you can do to make yourself more comfortable. Follow-up care is a key part of your treatment and safety. Be sure to make and go to all appointments, and call your doctor if you are having problems. It's also a good idea to know your test results and keep a list of the medicines you take. How can you care for yourself at home? · Get plenty of rest if you feel tired. · Take an over-the-counter pain medicine if needed, such as acetaminophen (Tylenol), ibuprofen (Advil, Motrin), or naproxen (Aleve). Read and follow all instructions on the label. · Be careful when taking over-the-counter cold or flu medicines and Tylenol at the same time. Many of these medicines have acetaminophen, which is Tylenol. Read the labels to make sure that you are not taking more than the recommended dose. Too much acetaminophen (Tylenol) can be harmful.   · Drink plenty of fluids, enough so that your urine is light yellow or clear like water. If you have kidney, heart, or liver disease and have to limit fluids, talk with your doctor before you increase the amount of fluids you drink. · Stay home from work, school, and other public places while you have a fever. When should you call for help? Call 911 anytime you think you may need emergency care. For example, call if:  · You have severe trouble breathing. · You passed out (lost consciousness). Call your doctor now or seek immediate medical care if:  · You seem to be getting much sicker. · You have a new or higher fever. · You have blood in your stools. · You have new belly pain, or your pain gets worse. · You have a new rash. Watch closely for changes in your health, and be sure to contact your doctor if:  · You start to get better and then get worse. · You do not get better as expected. Where can you learn more? Go to http://gerard-kristin.info/. Enter S177 in the search box to learn more about \"Viral Infections: Care Instructions. \"  Current as of: March 3, 2017  Content Version: 11.3  © 4144-4906 [employer]. Care instructions adapted under license by [employer] (which disclaims liability or warranty for this information). If you have questions about a medical condition or this instruction, always ask your healthcare professional. [address] any warranty or liability for your use of this information.

## 2017-08-15 LAB
ATRIAL RATE: 130 BPM
CALCULATED P AXIS, ECG09: 28 DEGREES
CALCULATED R AXIS, ECG10: -45 DEGREES
CALCULATED T AXIS, ECG11: 25 DEGREES
DIAGNOSIS, 93000: NORMAL
P-R INTERVAL, ECG05: 142 MS
Q-T INTERVAL, ECG07: 296 MS
QRS DURATION, ECG06: 96 MS
QTC CALCULATION (BEZET), ECG08: 435 MS
VENTRICULAR RATE, ECG03: 130 BPM

## 2017-08-16 LAB
BACTERIA SPEC CULT: NORMAL
CC UR VC: NORMAL
SERVICE CMNT-IMP: NORMAL

## 2017-08-19 LAB
BACTERIA SPEC CULT: NORMAL
BACTERIA SPEC CULT: NORMAL
SERVICE CMNT-IMP: NORMAL
SERVICE CMNT-IMP: NORMAL

## 2017-10-16 ENCOUNTER — OFFICE VISIT (OUTPATIENT)
Dept: INTERNAL MEDICINE CLINIC | Age: 52
End: 2017-10-16

## 2017-10-16 VITALS
HEIGHT: 69 IN | HEART RATE: 98 BPM | BODY MASS INDEX: 38.66 KG/M2 | OXYGEN SATURATION: 97 % | TEMPERATURE: 98.2 F | RESPIRATION RATE: 16 BRPM | SYSTOLIC BLOOD PRESSURE: 131 MMHG | DIASTOLIC BLOOD PRESSURE: 89 MMHG | WEIGHT: 261 LBS

## 2017-10-16 DIAGNOSIS — G47.33 OSA ON CPAP: ICD-10-CM

## 2017-10-16 DIAGNOSIS — F33.0 MILD EPISODE OF RECURRENT MAJOR DEPRESSIVE DISORDER (HCC): ICD-10-CM

## 2017-10-16 DIAGNOSIS — I50.22 CHRONIC SYSTOLIC CONGESTIVE HEART FAILURE (HCC): ICD-10-CM

## 2017-10-16 DIAGNOSIS — R79.89 LOW TESTOSTERONE: ICD-10-CM

## 2017-10-16 DIAGNOSIS — E11.9 DIABETES MELLITUS WITHOUT COMPLICATION (HCC): Primary | ICD-10-CM

## 2017-10-16 DIAGNOSIS — D72.829 LEUKOCYTOSIS, UNSPECIFIED TYPE: ICD-10-CM

## 2017-10-16 DIAGNOSIS — K21.9 GASTROESOPHAGEAL REFLUX DISEASE WITHOUT ESOPHAGITIS: ICD-10-CM

## 2017-10-16 DIAGNOSIS — F32.9 REACTIVE DEPRESSION: ICD-10-CM

## 2017-10-16 DIAGNOSIS — R23.8 PAPULE: ICD-10-CM

## 2017-10-16 DIAGNOSIS — Z23 ENCOUNTER FOR IMMUNIZATION: ICD-10-CM

## 2017-10-16 DIAGNOSIS — Z99.89 OSA ON CPAP: ICD-10-CM

## 2017-10-16 DIAGNOSIS — I10 ESSENTIAL HYPERTENSION WITH GOAL BLOOD PRESSURE LESS THAN 140/90: ICD-10-CM

## 2017-10-16 DIAGNOSIS — I25.10 CORONARY ARTERY DISEASE INVOLVING NATIVE CORONARY ARTERY OF NATIVE HEART WITHOUT ANGINA PECTORIS: ICD-10-CM

## 2017-10-16 NOTE — PROGRESS NOTES
HISTORY OF PRESENT ILLNESS  Charo Sexton is a 47 y.o. male. HPI   Last here 6/5/17.  Pt is here to f/u on chronic conditions.     BP today is 131/89  BP at home running around 110/70  Continues on losartan 50mg and coreg 6.25mg BID    BS at home running around 110-120 in the AM fasting  Continues on metformin 500mg BID   He also takes ASA 81mg daily    Pt c/o spot on face x 2-3 months   Pt states that this spot has been unchanged  Discussed this possibly being an actinic keratosis or beginning of seborrheic keratosis  Discussed covering scalp with hat when in the sun  Provided referral for derm today     Pt reports recent exacerbation of allergy symptoms d/t cold weather     Last visit, pt was in ED for syncopal event on 2/06/17 - resolved  Pt denies any recent syncopal events      Continues on prilosec 20mg daily for reflux, works well, no breakthrough  Discussed long-term consequences of prilosec to include bone-thinning  Discussed tapering down on prilosec and using zantac for breakthrough sx      Pt follows with Dr. Dante Palmer (endo) for PSA and testosterone level checks   Last visit was 8/2017 - will get notes for review  He is on a lower dose of testosterone d/t syncopal event in the past     Reviewed last labs 6/17  HIV negative, kidney and liver nl  Ordered labs to complete prior to next visit     Pt follows with Dr. Sil Daigle (cardio) annually in June  Advised him to f/u with his cardiologist at this time     Continues on pravachol 20mg daily for cholesterol       Wt is stable since last visit   Dr. Dante Palmer (endo) started him on saxenda 1.8mg and then increased to  3mg for weight-loss  However, pt stopped this medication as both doses caused him to feel bloated and uncomfortable even at the lower doses  Discussed weight loss and diet --he is working on diet for now      Pt follows with Dr. Alfredo Mares (pulm) annually  Last visit was 9/2017 - will get notes for review  This physician manages his SCOTTY and titrates CPAP     Pt is compliant with CPAP nightly       Continues on effexor 150mg daily for depression, works well, happy with dose  Not feeling down or sad    His partner is HIV positive  However, he was HIV negative on 6/17 labs  Will repeat labs today     PREVENTIVE:  Colonoscopy: 11/21/2016, Dr. Jessica Nixon, repeat 5 years  PSA: Dr. Arti Reis (endo) follows, 1/17 1.4  AAA screen: not needed, non smoker   Tdap: 8/29/2016  Pneumovax: 4/2015  Zostavax: not yet needed  Flu shot: 10/16/17  Microalbumin: 12/16  Foot exam: 6/17  A1c: 5/16 6.2, 8/16 6.0, 12/16 6.0, 3/17 5.7, 6/17 5.8  Eye exam: Dr. Donya Sandhu (ophthalmologist), 3/16, due, will schedule  Retinal Scan: 10/16/17  Lipids: 6/17LDL 60       Patient Active Problem List    Diagnosis Date Noted    [facility] 25/90/8389    Reactive depression 12/12/2016    Essential hypertension with goal blood pressure less than 140/90 05/23/2016    Coronary artery disease involving native coronary artery of native heart without angina pectoris 05/23/2016    Mild episode of recurrent major depressive disorder ([facility] 05/23/2016    SCOTTY on CPAP 05/23/2016    Chronic systolic congestive heart failure ([facility] 05/23/2016    Low testosterone 05/23/2016    Gastroesophageal reflux disease without esophagitis 05/23/2016     Current Outpatient Prescriptions   Medication Sig Dispense Refill    liraglutide (SAXENDA) 3 mg/0.5 mL (18 mg/3 mL) pen 1.8 mg by SubCUTAneous route daily.  losartan (COZAAR) 50 mg tablet Take 50 mg by mouth nightly.       carvedilol (COREG) 6.25 mg tablet Take 1 tablet by mouth two  times daily 180 Tab 1    omeprazole (PRILOSEC) 20 mg capsule Take 1 capsule by mouth  daily 90 Cap 2    pravastatin (PRAVACHOL) 20 mg tablet Take 1 tablet by mouth  nightly 90 Tab 2    venlafaxine-SR (EFFEXOR-XR) 150 mg capsule Take 1 capsule by mouth  daily 90 Cap 2    metFORMIN (GLUCOPHAGE) 500 mg tablet Take 1 tablet by mouth two  times daily with meals 180 Tab 1    testosterone cypionate (DEPOTESTOTERONE CYPIONATE) 200 mg/mL injection by IntraMUSCular route every Sunday.  aspirin 81 mg chewable tablet Take 81 mg by mouth nightly. Past Surgical History:   Procedure Laterality Date    HX COLONOSCOPY  11/2016    HX GI      colonoscopy    HX OTHER SURGICAL      anal fissure      Lab Results  Component Value Date/Time   WBC 14.6 08/13/2017 07:34 PM   HGB 15.8 08/13/2017 07:34 PM   Hemoglobin (POC) 15.6 02/06/2017 11:40 AM   HCT 44.7 08/13/2017 07:34 PM   Hematocrit (POC) 46 02/06/2017 11:40 AM   PLATELET 261 85/02/6161 07:34 PM   MCV 83.4 08/13/2017 07:34 PM     Lab Results  Component Value Date/Time   Cholesterol, total 165 06/05/2017 09:37 AM   HDL Cholesterol 30 06/05/2017 09:37 AM   LDL, calculated 60 06/05/2017 09:37 AM   Triglyceride 376 06/05/2017 09:37 AM     Lab Results  Component Value Date/Time   GFR est non-AA >60 08/13/2017 07:34 PM   GFRNA, POC >60 02/06/2017 11:40 AM   GFR est AA >60 08/13/2017 07:34 PM   GFRAA, POC >60 02/06/2017 11:40 AM   Creatinine 1.05 08/13/2017 07:34 PM   Creatinine (POC) 0.8 02/06/2017 11:40 AM   BUN 13 08/13/2017 07:34 PM   BUN (POC) 13 02/06/2017 11:40 AM   Sodium 138 08/13/2017 07:34 PM   Sodium (POC) 140 02/06/2017 11:40 AM   Potassium 3.3 08/13/2017 07:34 PM   Potassium (POC) 3.3 02/06/2017 11:40 AM   Chloride 100 08/13/2017 07:34 PM   Chloride (POC) 100 02/06/2017 11:40 AM   CO2 26 08/13/2017 07:34 PM          Review of Systems   Respiratory: Negative for shortness of breath. Cardiovascular: Negative for chest pain. Neurological: Negative for loss of consciousness. Endo/Heme/Allergies: Positive for environmental allergies. Physical Exam   Constitutional: He is oriented to person, place, and time. He appears well-developed and well-nourished. No distress. HENT:   Head: Normocephalic and atraumatic. Mouth/Throat: Oropharynx is clear and moist. No oropharyngeal exudate.    Eyes: Conjunctivae and EOM are normal. Right eye exhibits no discharge. Left eye exhibits no discharge. Neck: Normal range of motion. Neck supple. No thyromegaly present. Cardiovascular: Normal rate, regular rhythm, normal heart sounds and intact distal pulses. Exam reveals no gallop and no friction rub. No murmur heard. Pulmonary/Chest: Effort normal and breath sounds normal. No respiratory distress. He has no wheezes. He has no rales. He exhibits no tenderness. Musculoskeletal: Normal range of motion. He exhibits no edema, tenderness or deformity. Lymphadenopathy:     He has no cervical adenopathy. Neurological: He is alert and oriented to person, place, and time. He has normal reflexes. He exhibits normal muscle tone. Coordination normal.   Skin: Skin is warm and dry. No rash noted. He is not diaphoretic. No erythema. No pallor. Psychiatric: He has a normal mood and affect. His behavior is normal.       ASSESSMENT and PLAN    ICD-10-CM ICD-9-CM    1. Diabetes mellitus without complication ([facility]    Controlled on metformin 2 per day, was unable to tolerate saxenda which was primarily for w/l, pt will need to work on portion control for continued w/l E11.9 250.00 HIV 1/2 AG/AB, 4TH GENERATION,W RFLX CONFIRM      CBC W/O DIFF      METABOLIC PANEL, COMPREHENSIVE      HEMOGLOBIN A1C WITH EAG      MICROALBUMIN, UR, RAND W/ MICROALBUMIN/CREA RATIO   2. Leukocytosis, unspecified type    Present in ER when he was sick, will repeat CBC to ensure back to baseline, of note pt has exposure to HIV, will be repeating this test again to ensure negative D72.829 288.60 HIV 1/2 AG/AB, 4TH GENERATION,W RFLX CONFIRM      CBC W/O DIFF      METABOLIC PANEL, COMPREHENSIVE      HEMOGLOBIN A1C WITH EAG      MICROALBUMIN, UR, RAND W/ MICROALBUMIN/CREA RATIO   3.  Chronic systolic congestive heart failure (HCC)    Stable, due to see Dr. Dewey Ward, advised him to schedule I50.22 428.22 HIV 1/2 AG/AB, 4TH GENERATION,W RFLX CONFIRM     428.0 CBC W/O DIFF METABOLIC PANEL, COMPREHENSIVE      HEMOGLOBIN A1C WITH EAG      MICROALBUMIN, UR, RAND W/ MICROALBUMIN/CREA RATIO   4. Essential hypertension with goal blood pressure less than 140/90    Well-controlled on losartan and coreg in general however he missed doses this am and bp up, discussed taking meds every day I10 401.9 HIV 1/2 AG/AB, 4TH GENERATION,W RFLX CONFIRM      CBC W/O DIFF      METABOLIC PANEL, COMPREHENSIVE      HEMOGLOBIN A1C WITH EAG      MICROALBUMIN, UR, RAND W/ MICROALBUMIN/CREA RATIO   5. Coronary artery disease involving native coronary artery of native heart without angina pectoris    Due to see Dr. Neil Grimes, advised him to schedule  I25.10 414.01 HIV 1/2 AG/AB, 4TH GENERATION,W RFLX CONFIRM      CBC W/O DIFF      METABOLIC PANEL, COMPREHENSIVE      HEMOGLOBIN A1C WITH EAG      MICROALBUMIN, UR, RAND W/ MICROALBUMIN/CREA RATIO   6. Mild episode of recurrent major depressive disorder ([facility]    Controlled on effexor, continue  F33.0 296.31 HIV 1/2 AG/AB, 4TH GENERATION,W RFLX CONFIRM      CBC W/O DIFF      METABOLIC PANEL, COMPREHENSIVE      HEMOGLOBIN A1C WITH EAG      MICROALBUMIN, UR, RAND W/ MICROALBUMIN/CREA RATIO   7. SCOTTY on CPAP    Compliant with CPAP, UTD with Dr. Charmayne Decent in September  G47.33 327.23 HIV 1/2 AG/AB, 4TH GENERATION,W RFLX CONFIRM    Z99.89 V46.8 CBC W/O DIFF      METABOLIC PANEL, COMPREHENSIVE      HEMOGLOBIN A1C WITH EAG      MICROALBUMIN, UR, RAND W/ MICROALBUMIN/CREA RATIO   8.  Gastroesophageal reflux disease without esophagitis    Discussed tapering off of prilosec and using zantac prn K21.9 530.81 HIV 1/2 AG/AB, 4TH GENERATION,W RFLX CONFIRM      CBC W/O DIFF      METABOLIC PANEL, COMPREHENSIVE      HEMOGLOBIN A1C WITH EAG      MICROALBUMIN, UR, RAND W/ MICROALBUMIN/CREA RATIO   9. Reactive depression    See above F32.9 300.4 HIV 1/2 AG/AB, 4TH GENERATION,W RFLX CONFIRM      CBC W/O DIFF      METABOLIC PANEL, COMPREHENSIVE      HEMOGLOBIN A1C WITH EAG      MICROALBUMIN, UR, RAND W/ MICROALBUMIN/CREA RATIO   10. Low testosterone    Follows with Dr. Ansley Pierson for this, continues therapy with him E34.9 259.9 HIV 1/2 AG/AB, 4TH GENERATION,W RFLX CONFIRM      CBC W/O DIFF      METABOLIC PANEL, COMPREHENSIVE      HEMOGLOBIN A1C WITH EAG      MICROALBUMIN, UR, RAND W/ MICROALBUMIN/CREA RATIO   11. Papule    Refer to derm for skin check, papule appears to be an AK or a SK, benign  R23.8 709.8 REFERRAL TO DERMATOLOGY        Scribed by [address], as dictated by Dr. Chris Rankin. Current diagnosis and concerns discussed with pt at length. Pt understands risks and benefits or current treatment plan and medications, and accepts the treatment and medication with any possible risks. Pt asks appropriate questions, which were answered. Pt was instructed to call with any concerns or problems. This note will not be viewable in [address]

## 2017-10-16 NOTE — MR AVS SNAPSHOT
Visit Information Date & Time Provider Department Dept. Phone Encounter #  
 10/16/2017  9:15 AM Mario Akhtar, [address] [id number] Follow-up Instructions Return in about 3 months (around 1/16/2018). Upcoming Health Maintenance Date Due  
 EYE EXAM RETINAL OR DILATED Q1 10/12/1976 Pneumococcal 19-64 Medium Risk (1 of 1 - PPSV23) 10/12/1985 INFLUENZA AGE 9 TO ADULT 8/1/2017 HEMOGLOBIN A1C Q6M 12/5/2017 MICROALBUMIN Q1 12/12/2017 FOOT EXAM Q1 6/5/2018 LIPID PANEL Q1 6/5/2018 COLONOSCOPY 11/21/2021 DTaP/Tdap/Td series (2 - Td) 8/29/2026 Allergies as of 10/16/2017  Review Complete On: 8/13/2017 By: Jeff Founds No Known Allergies Current Immunizations  Reviewed on 10/24/2016 Name Date Influenza Vaccine (Quad) PF 10/24/2016 Tdap 8/29/2016 Not reviewed this visit You Were Diagnosed With   
  
 Codes Comments Diabetes mellitus without complication ([facility]    -  Primary ICD-10-CM: E11.9 ICD-9-CM: 250.00 Leukocytosis, unspecified type     ICD-10-CM: D72.829 ICD-9-CM: 288.60 Chronic systolic congestive heart failure (HCC)     ICD-10-CM: I50.22 ICD-9-CM: 428.22, 428.0 Essential hypertension with goal blood pressure less than 140/90     ICD-10-CM: I10 
ICD-9-CM: 401.9 Coronary artery disease involving native coronary artery of native heart without angina pectoris     ICD-10-CM: I25.10 ICD-9-CM: 414.01 Mild episode of recurrent major depressive disorder (HCC)     ICD-10-CM: F33.0 ICD-9-CM: 296.31   
 SCOTTY on CPAP     ICD-10-CM: G47.33, Z99.89 ICD-9-CM: 327.23, V46.8 Gastroesophageal reflux disease without esophagitis     ICD-10-CM: K21.9 ICD-9-CM: 530.81 Reactive depression     ICD-10-CM: F32.9 ICD-9-CM: 300.4 Low testosterone     ICD-10-CM: E34.9 ICD-9-CM: 259.9 Papule     ICD-10-CM: R23.8 ICD-9-CM: 709.8 Vitals Smoking Status Never Smoker Preferred Pharmacy Pharmacy Name Phone [facility], [address] [address] [place] [address] [address] Chidi Medrano Your Updated Medication List  
  
   
This list is accurate as of: 10/16/17  9:32 AM.  Always use your most recent med list.  
  
  
  
  
 aspirin 81 mg chewable tablet Take 81 mg by mouth nightly. carvedilol 6.25 mg tablet Commonly known as:  Fidel Been Take 1 tablet by mouth two  times daily  
  
 losartan 50 mg tablet Commonly known as:  COZAAR Take 50 mg by mouth nightly. metFORMIN 500 mg tablet Commonly known as:  GLUCOPHAGE Take 1 tablet by mouth two  times daily with meals  
  
 omeprazole 20 mg capsule Commonly known as:  PRILOSEC Take 1 capsule by mouth  daily  
  
 pravastatin 20 mg tablet Commonly known as:  PRAVACHOL Take 1 tablet by mouth  nightly  
  
 testosterone cypionate 200 mg/mL injection Commonly known as:  DEPOTESTOTERONE CYPIONATE  
by IntraMUSCular route every Sunday. venlafaxine- mg capsule Commonly known as:  EFFEXOR-XR Take 1 capsule by mouth  daily We Performed the Following CBC W/O DIFF [50754 CPT(R)] HEMOGLOBIN A1C WITH EAG [70473 CPT(R)] HIV 1/2 AG/AB, 4TH GENERATION,W RFLX CONFIRM [[id number] Custom] METABOLIC PANEL, COMPREHENSIVE [09094 CPT(R)] MICROALBUMIN, UR, RAND W/ MICROALBUMIN/CREA RATIO [id number] CPT(R)] REFERRAL TO DERMATOLOGY [REF19 Custom] Follow-up Instructions Return in about 3 months (around 1/16/2018). Referral Information Referral ID Referred By Referred To  
  
 [id number] [address] [place] [address] [facility] [address] Phone: [phone] Visits Status Start Date End Date 1 New Request 10/16/17 10/16/18  If your referral has a status of pending review or denied, additional information will be sent to support the outcome of this decision. Patient Instructions Schedule eye exam  
 
See dr Robyn [facility] today Introducing [address]! Dear Ronn Calderon: 
Thank you for requesting a [employer] account. Our records indicate that you already have an active [employer] account. You can access your account anytime at [email or web address] Did you know that you can access your hospital and ER discharge instructions at any time in [employer] You can also review all of your test results from your hospital stay or ER visit. Additional Information If you have questions, please visit the Frequently Asked Questions section of the [employer] website at [email or web address] Remember, [employer] is NOT to be used for urgent needs. For medical emergencies, dial 911. Now available from your iPhone and Android! Please provide this summary of care documentation to your next provider. Your primary care clinician is listed as Eliana Morales. If you have any questions after today's visit, please call [phone].

## 2017-10-17 ENCOUNTER — TELEPHONE (OUTPATIENT)
Dept: INTERNAL MEDICINE CLINIC | Age: 52
End: 2017-10-17

## 2017-10-17 LAB
ALBUMIN SERPL-MCNC: 4.3 G/DL (ref 3.5–5.5)
ALBUMIN/CREAT UR: 625.1 MG/G CREAT (ref 0–30)
ALBUMIN/GLOB SERPL: 1.3 {RATIO} (ref 1.2–2.2)
ALP SERPL-CCNC: 62 IU/L (ref 39–117)
ALT SERPL-CCNC: 28 IU/L (ref 0–44)
AST SERPL-CCNC: 21 IU/L (ref 0–40)
BILIRUB SERPL-MCNC: 0.4 MG/DL (ref 0–1.2)
BUN SERPL-MCNC: 9 MG/DL (ref 6–24)
BUN/CREAT SERPL: 12 (ref 9–20)
CALCIUM SERPL-MCNC: 9.6 MG/DL (ref 8.7–10.2)
CHLORIDE SERPL-SCNC: 100 MMOL/L (ref 96–106)
CO2 SERPL-SCNC: 24 MMOL/L (ref 18–29)
CREAT SERPL-MCNC: 0.76 MG/DL (ref 0.76–1.27)
CREAT UR-MCNC: 109.2 MG/DL
ERYTHROCYTE [DISTWIDTH] IN BLOOD BY AUTOMATED COUNT: 14.5 % (ref 12.3–15.4)
EST. AVERAGE GLUCOSE BLD GHB EST-MCNC: 126 MG/DL
GLOBULIN SER CALC-MCNC: 3.4 G/DL (ref 1.5–4.5)
GLUCOSE SERPL-MCNC: 100 MG/DL (ref 65–99)
HBA1C MFR BLD: 6 % (ref 4.8–5.6)
HCT VFR BLD AUTO: 45.4 % (ref 37.5–51)
HGB BLD-MCNC: 15.6 G/DL (ref 12.6–17.7)
HIV 1+2 AB+HIV1 P24 AG SERPL QL IA: NON REACTIVE
MCH RBC QN AUTO: 29 PG (ref 26.6–33)
MCHC RBC AUTO-ENTMCNC: 34.4 G/DL (ref 31.5–35.7)
MCV RBC AUTO: 84 FL (ref 79–97)
MICROALBUMIN UR-MCNC: 682.6 UG/ML
PLATELET # BLD AUTO: 306 X10E3/UL (ref 150–379)
POTASSIUM SERPL-SCNC: 4.2 MMOL/L (ref 3.5–5.2)
PROT SERPL-MCNC: 7.7 G/DL (ref 6–8.5)
RBC # BLD AUTO: 5.38 X10E6/UL (ref 4.14–5.8)
SODIUM SERPL-SCNC: 140 MMOL/L (ref 134–144)
WBC # BLD AUTO: 10.9 X10E3/UL (ref 3.4–10.8)

## 2017-10-17 NOTE — PROGRESS NOTES
Let hime know that wbc still mildly elevated but improved from ED visit    Lets repeat a1c, cbc, bmp 1 week prior to f/u     If wbc still elevated and not back to baseline would eval further at that time

## 2017-10-17 NOTE — PROGRESS NOTES
Let him know there are very mild diabetic changes in the left eye only, r eye fine, set up with dr Ally Garcia, needs eye eval in the next few months

## 2017-10-18 NOTE — TELEPHONE ENCOUNTER
Pt informed of lab results. See recent lab result encounter. Pt informed of imaging results. See recent imaging result encounter. Eye scan.

## 2017-10-18 NOTE — PROGRESS NOTES
Called, spoke to pt. Two pt identifiers confirmed. Pt informed per Dr. Liliane Ceballos wbc still mildly elevated but improved from ED visit. Pt informed per Dr. Liliane Ceballos repeat a1c, cbc, bmp 1 week prior to f/u. Ordered and mailed to pt. Pt informed per Dr. Liliane Ceballos if wbc still elevated and not back to baseline would eval further at that time. Pt verbalized understanding of information discussed w/ no further questions at this time.

## 2017-10-18 NOTE — PROGRESS NOTES
Called, spoke to pt. Two pt identifiers confirmed. Pt informed per Dr. Wu Cousin are very mild diabetic changes in the left eye only, r eye fine-needs eye eval in the next few months. Referred to Dr. Hailey Lantigua. Pt verbalized understanding of information discussed w/ no further questions at this time.

## 2017-10-23 DIAGNOSIS — R19.7 DIARRHEA, UNSPECIFIED TYPE: Primary | ICD-10-CM

## 2017-11-02 LAB
C DIFF TOX A+B STL QL IA: NEGATIVE
C DIFF TOX GENS STL QL NAA+PROBE: NEGATIVE
CAMPYLOBACTER STL CULT: NORMAL
E COLI SXT STL QL IA: NORMAL
O+P SPEC MICRO: NORMAL
SALM + SHIG STL CULT: NORMAL

## 2017-11-04 RX ORDER — LOSARTAN POTASSIUM 50 MG/1
TABLET ORAL
Qty: 90 TAB | Refills: 1 | Status: SHIPPED | OUTPATIENT
Start: 2017-11-04 | End: 2018-04-19 | Stop reason: SDUPTHER

## 2017-11-05 LAB
C DIFF TOX A+B STL QL IA: NORMAL
C DIFF TOX GENS STL QL NAA+PROBE: NORMAL
CAMPYLOBACTER STL CULT: NORMAL
E COLI SXT STL QL IA: NEGATIVE
O+P SPEC MICRO: NORMAL
SALM + SHIG STL CULT: NORMAL

## 2017-11-07 DIAGNOSIS — K21.9 GASTROESOPHAGEAL REFLUX DISEASE WITHOUT ESOPHAGITIS: Primary | ICD-10-CM

## 2017-11-10 ENCOUNTER — HOSPITAL ENCOUNTER (EMERGENCY)
Age: 52
Discharge: HOME | End: 2017-11-10
Attending: EMERGENCY MEDICINE
Payer: COMMERCIAL

## 2017-11-10 ENCOUNTER — APPOINTMENT (OUTPATIENT)
Dept: GENERAL RADIOLOGY | Age: 52
End: 2017-11-10
Attending: EMERGENCY MEDICINE
Payer: COMMERCIAL

## 2017-11-10 VITALS
OXYGEN SATURATION: 98 % | TEMPERATURE: 98 F | HEIGHT: 69 IN | DIASTOLIC BLOOD PRESSURE: 94 MMHG | BODY MASS INDEX: 38.51 KG/M2 | RESPIRATION RATE: 16 BRPM | WEIGHT: 260 LBS | SYSTOLIC BLOOD PRESSURE: 136 MMHG | HEART RATE: 97 BPM

## 2017-11-10 DIAGNOSIS — J20.9 ACUTE BRONCHITIS, UNSPECIFIED ORGANISM: Primary | ICD-10-CM

## 2017-11-10 LAB
ANION GAP SERPL CALC-SCNC: 10 MMOL/L (ref 5–15)
BASOPHILS # BLD: 0.1 K/UL (ref 0–0.1)
BASOPHILS NFR BLD: 1 % (ref 0–1)
BUN SERPL-MCNC: 13 MG/DL (ref 6–20)
BUN/CREAT SERPL: 14 (ref 12–20)
CALCIUM SERPL-MCNC: 9.4 MG/DL (ref 8.5–10.1)
CHLORIDE SERPL-SCNC: 102 MMOL/L (ref 97–108)
CO2 SERPL-SCNC: 27 MMOL/L (ref 21–32)
CREAT SERPL-MCNC: 0.93 MG/DL (ref 0.7–1.3)
EOSINOPHIL # BLD: 0.3 K/UL (ref 0–0.4)
EOSINOPHIL NFR BLD: 2 % (ref 0–7)
ERYTHROCYTE [DISTWIDTH] IN BLOOD BY AUTOMATED COUNT: 13.8 % (ref 11.5–14.5)
GLUCOSE SERPL-MCNC: 83 MG/DL (ref 65–100)
HCT VFR BLD AUTO: 45.4 % (ref 36.6–50.3)
HGB BLD-MCNC: 16 G/DL (ref 12.1–17)
LYMPHOCYTES # BLD: 4.2 K/UL (ref 0.8–3.5)
LYMPHOCYTES NFR BLD: 35 % (ref 12–49)
MCH RBC QN AUTO: 29.1 PG (ref 26–34)
MCHC RBC AUTO-ENTMCNC: 35.2 G/DL (ref 30–36.5)
MCV RBC AUTO: 82.7 FL (ref 80–99)
MONOCYTES # BLD: 0.7 K/UL (ref 0–1)
MONOCYTES NFR BLD: 6 % (ref 5–13)
NEUTS SEG # BLD: 6.6 K/UL (ref 1.8–8)
NEUTS SEG NFR BLD: 56 % (ref 32–75)
PLATELET # BLD AUTO: 258 K/UL (ref 150–400)
POTASSIUM SERPL-SCNC: 3.6 MMOL/L (ref 3.5–5.1)
RBC # BLD AUTO: 5.49 M/UL (ref 4.1–5.7)
SODIUM SERPL-SCNC: 139 MMOL/L (ref 136–145)
WBC # BLD AUTO: 11.8 K/UL (ref 4.1–11.1)

## 2017-11-10 PROCEDURE — 96360 HYDRATION IV INFUSION INIT: CPT

## 2017-11-10 PROCEDURE — 71010 XR CHEST PORT: CPT

## 2017-11-10 PROCEDURE — 85025 COMPLETE CBC W/AUTO DIFF WBC: CPT | Performed by: NURSE PRACTITIONER

## 2017-11-10 PROCEDURE — 74011000250 HC RX REV CODE- 250: Performed by: NURSE PRACTITIONER

## 2017-11-10 PROCEDURE — 99284 EMERGENCY DEPT VISIT MOD MDM: CPT

## 2017-11-10 PROCEDURE — 94640 AIRWAY INHALATION TREATMENT: CPT

## 2017-11-10 PROCEDURE — 74011250636 HC RX REV CODE- 250/636: Performed by: NURSE PRACTITIONER

## 2017-11-10 PROCEDURE — 77030029684 HC NEB SM VOL KT MONA -A

## 2017-11-10 PROCEDURE — 80048 BASIC METABOLIC PNL TOTAL CA: CPT | Performed by: NURSE PRACTITIONER

## 2017-11-10 PROCEDURE — 36415 COLL VENOUS BLD VENIPUNCTURE: CPT | Performed by: NURSE PRACTITIONER

## 2017-11-10 RX ORDER — GUAIFENESIN 600 MG/1
600 TABLET, EXTENDED RELEASE ORAL 2 TIMES DAILY
COMMUNITY
End: 2018-09-04

## 2017-11-10 RX ORDER — IPRATROPIUM BROMIDE AND ALBUTEROL SULFATE 2.5; .5 MG/3ML; MG/3ML
3 SOLUTION RESPIRATORY (INHALATION)
Status: DISCONTINUED | OUTPATIENT
Start: 2017-11-10 | End: 2017-11-10

## 2017-11-10 RX ORDER — LEVOFLOXACIN 750 MG/1
750 TABLET ORAL DAILY
Qty: 7 TAB | Refills: 0 | Status: SHIPPED | OUTPATIENT
Start: 2017-11-10 | End: 2017-11-17

## 2017-11-10 RX ORDER — AMOXICILLIN AND CLAVULANATE POTASSIUM 875; 125 MG/1; MG/1
1 TABLET, FILM COATED ORAL 2 TIMES DAILY
COMMUNITY
End: 2018-09-04

## 2017-11-10 RX ORDER — PREDNISONE 10 MG/1
TABLET ORAL
Qty: 21 TAB | Refills: 0 | Status: SHIPPED | OUTPATIENT
Start: 2017-11-10 | End: 2018-09-04

## 2017-11-10 RX ORDER — MOMETASONE FUROATE 50 UG/1
2 SPRAY, METERED NASAL DAILY
COMMUNITY

## 2017-11-10 RX ADMIN — ALBUTEROL SULFATE 1 DOSE: 2.5 SOLUTION RESPIRATORY (INHALATION) at 15:01

## 2017-11-10 RX ADMIN — SODIUM CHLORIDE 1000 ML: 900 INJECTION, SOLUTION INTRAVENOUS at 15:02

## 2017-11-10 NOTE — ED PROVIDER NOTES
HPI Comments: 47 y.o. male with past medical history significant for GERD, depression, HTN, tachycardia, and MI who presents from home with chief complaint of cough. Patient states that he has had a cough for 2 weeks and was placed on Augmentin by his PCP. He reports taking the full course of the antibiotics with no relief of symptoms. Patient states that his cough is now productive, with \"rust colored\" sputum. He also complains of a headache that worsens with his cough. He reports having a fever and chills today (subjective) which relieved after taking Tylenol. Patient denies having any sinus pain, chest pain, or SOB. There are no other acute medical concerns at this time. Social hx: nonsmoker, + EtOH use, no drug use  PCP: Kiley Rowell MD  Past Medical History:  No date: Depression  No date: GERD (gastroesophageal reflux disease)  04/2015: Heart attack  No date: Hypertension  No date: Prediabetes  No date: Tachycardia  Past Surgical History:  11/2016: HX COLONOSCOPY  No date: HX GI      Comment: colonoscopy  No date: HX OTHER SURGICAL      Comment: anal fissure      Note written by Vahid Ross, as dictated by Alma Mcdonald NP      The history is provided by the patient. No  was used.         Past Medical History:   Diagnosis Date    Depression     GERD (gastroesophageal reflux disease)     Heart attack 04/2015    Hypertension     Prediabetes     Tachycardia        Past Surgical History:   Procedure Laterality Date    HX COLONOSCOPY  11/2016    HX GI      colonoscopy    HX OTHER SURGICAL      anal fissure         Family History:   Problem Relation Age of Onset    Cancer Mother      stomach and liver    Hypertension Father     Diabetes Maternal Grandmother     Hypertension Maternal Grandfather        Social History     Social History    Marital status:      Spouse name: N/A    Number of children: N/A    Years of education: N/A     Occupational History    Not on file. Social History Main Topics    Smoking status: Never Smoker    Smokeless tobacco: Never Used    Alcohol use 0.0 oz/week     0 Standard drinks or equivalent per week      Comment: rarely    Drug use: No    Sexual activity: Yes     Other Topics Concern    Not on file     Social History Narrative         ALLERGIES: Review of patient's allergies indicates no known allergies. Review of Systems   Constitutional: Positive for chills and fever. Negative for activity change, appetite change, diaphoresis and fatigue. HENT: Negative for congestion, ear discharge, ear pain, postnasal drip, rhinorrhea, sinus pain, sinus pressure, sore throat and trouble swallowing. Eyes: Negative for photophobia, pain, discharge, redness, itching and visual disturbance. Respiratory: Positive for cough. Negative for chest tightness, shortness of breath and wheezing. Cardiovascular: Negative for chest pain, palpitations and leg swelling. Gastrointestinal: Negative for abdominal distention, abdominal pain, blood in stool, constipation, diarrhea, nausea and vomiting. Endocrine: Negative. Genitourinary: Negative for difficulty urinating, dysuria, frequency, hematuria and urgency. Musculoskeletal: Negative for arthralgias, back pain, joint swelling, myalgias, neck pain and neck stiffness. Skin: Negative for color change, pallor, rash and wound. Allergic/Immunologic: Negative. Neurological: Positive for headaches. Negative for dizziness, syncope, speech difficulty, weakness, light-headedness and numbness. Hematological: Does not bruise/bleed easily. Psychiatric/Behavioral: Negative for behavioral problems and confusion. The patient is not nervous/anxious. All other systems reviewed and are negative.       Vitals:    11/10/17 1431 11/10/17 1538   BP: 125/83    Pulse: 97    Resp: 16    Temp: 98 °F (36.7 °C)    SpO2: 95% 97%   Weight: 117.9 kg (260 lb)    Height: 5' 9\" (1.753 m) Physical Exam   Constitutional: He is oriented to person, place, and time. He appears well-developed and well-nourished. He appears distressed (moderate distress related to coughing, complains of headache when coughing). HENT:   Head: Normocephalic and atraumatic. Right Ear: External ear normal.   Left Ear: External ear normal.   Nose: Nose normal.   Mouth/Throat: Oropharynx is clear and moist.   Eyes: Conjunctivae and EOM are normal. Pupils are equal, round, and reactive to light. Right eye exhibits no discharge. Left eye exhibits no discharge. No scleral icterus. Neck: Normal range of motion. Neck supple. No JVD present. No tracheal deviation present. No thyromegaly present. Cardiovascular: Normal rate, regular rhythm, normal heart sounds and intact distal pulses. Exam reveals no gallop and no friction rub. No murmur heard. Pulmonary/Chest: Effort normal. No respiratory distress. He has no wheezes. He has no rales. He exhibits no tenderness. Rhonchi throughout   Abdominal: Soft. Bowel sounds are normal. He exhibits no distension and no mass. There is no tenderness. There is no rebound and no guarding. Genitourinary:   Genitourinary Comments: Negative     Musculoskeletal: Normal range of motion. He exhibits no edema or tenderness. Lymphadenopathy:     He has no cervical adenopathy. Neurological: He is alert and oriented to person, place, and time. He has normal strength. He displays no atrophy and no tremor. No cranial nerve deficit. He exhibits normal muscle tone. Coordination and gait normal.   Skin: Skin is warm and dry. No rash noted. He is not diaphoretic. No erythema. No pallor. Psychiatric: He has a normal mood and affect. His behavior is normal. Judgment and thought content normal.   Nursing note and vitals reviewed.        MDM  Number of Diagnoses or Management Options  Acute bronchitis, unspecified organism: new and requires workup  Diagnosis management comments: Plan:  Discharge to home and follow up with PCP. Amount and/or Complexity of Data Reviewed  Clinical lab tests: ordered and reviewed  Tests in the radiology section of CPT®: ordered and reviewed      ED Course   1450: Duoneb ordered secondary to rhonchi.  1530: reassessment of patient, breath sounds improved. Patient feels \"more comfortable. \"  4:06 PM  Pt has been reexamined. Pt has no new complaints, changes or physical findings. Care plan outlined and precautions discussed. All available results were reviewed with pt. All medications were reviewed with pt. All of pt's questions and concerns were addressed. Pt agrees to F/U as instructed and agrees to return to ED upon further deterioration. Pt is ready to go home.   Syeda Daugherty NP      Procedures

## 2017-11-10 NOTE — LETTER
[address] 
[facility] EMERGENCY DEPT 
[address] [place] [address] 
[phone] Work/School Note Date: 11/10/2017 To Whom It May concern: 
 
Andres Lamb was seen and treated today in the emergency room by the following provider(s): 
Attending Provider: Cecilia Gilman MD 
Nurse Practitioner: Kimmy Tipton NP.    
 
 
Sincerely, 
 
 
 
 
Kimmy Tipton NP

## 2017-11-10 NOTE — ED TRIAGE NOTES
Pt c/o sore throat and brown productive cough. Pt was placed on Augmentin. Pt has not had a follow up. Pt also c/o global headache.

## 2017-11-10 NOTE — DISCHARGE INSTRUCTIONS
Please check your blood sugar daily as infections can cause an increase in your blood sugar. If you have an increase, please call PCP for coverage. Bronchitis: Care Instructions  Your Care Instructions    Bronchitis is inflammation of the bronchial tubes, which carry air to the lungs. The tubes swell and produce mucus, or phlegm. The mucus and inflamed bronchial tubes make you cough. You may have trouble breathing. Most cases of bronchitis are caused by viruses like those that cause colds. Antibiotics usually do not help and they may be harmful. Bronchitis usually develops rapidly and lasts about 2 to 3 weeks in otherwise healthy people. Follow-up care is a key part of your treatment and safety. Be sure to make and go to all appointments, and call your doctor if you are having problems. It's also a good idea to know your test results and keep a list of the medicines you take. How can you care for yourself at home? · Take all medicines exactly as prescribed. Call your doctor if you think you are having a problem with your medicine. · Get some extra rest.  · Take an over-the-counter pain medicine, such as acetaminophen (Tylenol), ibuprofen (Advil, Motrin), or naproxen (Aleve) to reduce fever and relieve body aches. Read and follow all instructions on the label. · Do not take two or more pain medicines at the same time unless the doctor told you to. Many pain medicines have acetaminophen, which is Tylenol. Too much acetaminophen (Tylenol) can be harmful. · Take an over-the-counter cough medicine that contains dextromethorphan to help quiet a dry, hacking cough so that you can sleep. Avoid cough medicines that have more than one active ingredient. Read and follow all instructions on the label. · Breathe moist air from a humidifier, hot shower, or sink filled with hot water. The heat and moisture will thin mucus so you can cough it out. · Do not smoke. Smoking can make bronchitis worse.  If you need help quitting, talk to your doctor about stop-smoking programs and medicines. These can increase your chances of quitting for good. When should you call for help? Call 911 anytime you think you may need emergency care. For example, call if:  ? · You have severe trouble breathing. ?Call your doctor now or seek immediate medical care if:  ? · You have new or worse trouble breathing. ? · You cough up dark brown or bloody mucus (sputum). ? · You have a new or higher fever. ? · You have a new rash. ? Watch closely for changes in your health, and be sure to contact your doctor if:  ? · You cough more deeply or more often, especially if you notice more mucus or a change in the color of your mucus. ? · You are not getting better as expected. Where can you learn more? Go to http://gerard-kristin.info/. Enter H333 in the search box to learn more about \"Bronchitis: Care Instructions. \"  Current as of: May 12, 2017  Content Version: 11.4  © 4317-2315 [employer]. Care instructions adapted under license by [employer] (which disclaims liability or warranty for this information). If you have questions about a medical condition or this instruction, always ask your healthcare professional. [address] any warranty or liability for your use of this information.

## 2017-11-25 RX ORDER — METFORMIN HYDROCHLORIDE 500 MG/1
TABLET ORAL
Qty: 180 TAB | Refills: 0 | Status: SHIPPED | OUTPATIENT
Start: 2017-11-25 | End: 2018-02-13 | Stop reason: SDUPTHER

## 2018-01-02 RX ORDER — CARVEDILOL 6.25 MG/1
TABLET ORAL
Qty: 180 TAB | Refills: 1 | Status: SHIPPED | OUTPATIENT
Start: 2018-01-02 | End: 2018-09-25 | Stop reason: SDUPTHER

## 2018-01-22 ENCOUNTER — OFFICE VISIT (OUTPATIENT)
Dept: INTERNAL MEDICINE CLINIC | Age: 53
End: 2018-01-22

## 2018-01-22 VITALS
BODY MASS INDEX: 38.36 KG/M2 | DIASTOLIC BLOOD PRESSURE: 82 MMHG | SYSTOLIC BLOOD PRESSURE: 125 MMHG | TEMPERATURE: 98.4 F | HEART RATE: 95 BPM | HEIGHT: 69 IN | WEIGHT: 259 LBS | RESPIRATION RATE: 16 BRPM | OXYGEN SATURATION: 97 %

## 2018-01-22 DIAGNOSIS — R19.7 DIARRHEA, UNSPECIFIED TYPE: ICD-10-CM

## 2018-01-22 DIAGNOSIS — R79.89 LOW TESTOSTERONE: ICD-10-CM

## 2018-01-22 DIAGNOSIS — E78.00 PURE HYPERCHOLESTEROLEMIA: ICD-10-CM

## 2018-01-22 DIAGNOSIS — F33.0 MILD EPISODE OF RECURRENT MAJOR DEPRESSIVE DISORDER (HCC): ICD-10-CM

## 2018-01-22 DIAGNOSIS — E11.21 TYPE 2 DIABETES MELLITUS WITH NEPHROPATHY (HCC): Primary | ICD-10-CM

## 2018-01-22 DIAGNOSIS — Z99.89 OSA ON CPAP: ICD-10-CM

## 2018-01-22 DIAGNOSIS — I50.22 CHRONIC SYSTOLIC CONGESTIVE HEART FAILURE (HCC): ICD-10-CM

## 2018-01-22 DIAGNOSIS — K21.9 GASTROESOPHAGEAL REFLUX DISEASE WITHOUT ESOPHAGITIS: ICD-10-CM

## 2018-01-22 DIAGNOSIS — G47.33 OSA ON CPAP: ICD-10-CM

## 2018-01-22 DIAGNOSIS — I10 ESSENTIAL HYPERTENSION WITH GOAL BLOOD PRESSURE LESS THAN 140/90: ICD-10-CM

## 2018-01-22 RX ORDER — ROSUVASTATIN CALCIUM 10 MG/1
10 TABLET, COATED ORAL
Qty: 30 TAB | Refills: 6 | Status: SHIPPED | OUTPATIENT
Start: 2018-01-22 | End: 2018-01-26 | Stop reason: SDUPTHER

## 2018-01-22 RX ORDER — RANITIDINE 300 MG/1
300 TABLET ORAL DAILY
Qty: 30 TAB | Refills: 6 | Status: SHIPPED | OUTPATIENT
Start: 2018-01-22 | End: 2018-01-26 | Stop reason: SDUPTHER

## 2018-01-22 NOTE — PROGRESS NOTES
HISTORY OF PRESENT ILLNESS  Catina Richey is a 47 y.o. male. HPI   Last here 10/16/17. Pt is here to f/u on chronic conditions. Pt presents with his partner who provides some of the hx.      BP today is 125/82  BP at home was 120s/70s and 80s  Continues on losartan 50mg and coreg 6.25mg BID     BS at home running around 120s in the AM fasting  Pt denies having BS <70 in the AM fasting  Pt reports elevated BS while on levaquin  Continues on metformin 500mg BID   He also takes ASA 81mg daily    Wt is down 2 lbs since last visit   Lov, Dr. Mary Villa (endo) started him on saxenda 1.8mg and then increased it to 3mg for w/l  However, pt stopped this medication as both doses caused him to feel bloated and uncomfortable  Discussed weight loss and diet    Reviewed labs 10/17  Reviewed picture of labs from Dr. Mary Villa (endo): total cholesterol 191, TGs 578, LDL could not be calculated   Will get labs from Dr. Mary Villa (endo)  Ordered labs to complete prior to next visit     Pt went to the ER for cough with yellow sputum production on 11/10/17  Reviewed CXR 11/10/17: No acute cardiopulmonary disease.    Reviewed labs 11/10/17: kidney, electrolytes and elevated WBC counts    Pt follows with Dr. Mary Villa (endo) for PSA and testosterone level checks   Last visit was 1/15/18 - will get notes for review  Of note, he is on a lower dose of testosterone d/t syncopal event in the past      Pt follows with Dr. Sirena Esparza (cardio) annually in June  Advised him to f/u with his cardiologist     Pt follows with Dr. Olvin Roblero (pulm) annually  Last visit was 9/2017   Pt is compliant with CPAP nightly     Pt follows with NP Carline Richardson (GI)  Pt saw this NP for recurrent diarrhea   Pt was provided with Pepto Bismol QID, which mildly improved his sx  Pt has a COLO scheduled for 2/6/18  Discussed speaking with his GI and tapering down on Pepto Bismol/prilosec    Lov, I discussed tapering down on prilosec and using zantac for breakthrough sx  Continues prilosec 20mg daily for reflux, which works well, no breakthrough  Discussed long-term consequences of prilosec to include bone-thinning  Discussed using prilosec every other day and zantac on the off days  Recall pt states that aciphex improved his sx in the past. However, his insurance no longer covers this medication.      Continues on pravachol 20mg daily for cholesterol   Will stop pravachol and start crestor       Continues on effexor 150mg daily for depression, which works well, happy with dose       Recall his partner is HIV positive  However, pt was HIV negative on 6/17 labs      PREVENTIVE:  Colonoscopy: 11/21/2016, Dr. Jojo Cooper, repeat 5 years, scheduled for 2/6/18  PSA: Dr. Kayla Aparicio (endo) follows, 1/17 1.4, 1/18 1.4 Kayla Aparicio  AAA screen: not needed, non smoker   Tdap: 8/29/2016  Pneumovax: 4/2015  Zostavax: not yet needed  Flu shot: 10/16/17  Microalbumin: 10/17, minimal  Foot exam: 6/17  A1c: 5/16 6.2, 8/16 6.0, 12/16 6.0, 3/17 5.7, 6/17 5.8, 10/17 6.0, 1/18 5.6 Aj   Eye exam: Dr. Cheryl Cooper (ophthalmologist), 3/16, due  Retinal Scan: 10/16/17, mild diabetic retinopathy in L eye  Lipids: 1/18 LDL could not be calculated, Aj    Patient Active Problem List    Diagnosis Date Noted    [facility] 01/22/2018    Diabetes mellitus without complication ([facility] 08/74/4648    Reactive depression 12/12/2016    Essential hypertension with goal blood pressure less than 140/90 05/23/2016    Coronary artery disease involving native coronary artery of native heart without angina pectoris 05/23/2016    Mild episode of recurrent major depressive disorder ([facility] 05/23/2016    SCOTTY on CPAP 05/23/2016    Chronic systolic congestive heart failure ([facility] 05/23/2016    Low testosterone 05/23/2016    Gastroesophageal reflux disease without esophagitis 05/23/2016     Current Outpatient Prescriptions   Medication Sig Dispense Refill    carvedilol (COREG) 6.25 mg tablet TAKE 1 TABLET BY MOUTH TWO  TIMES DAILY 180 Tab 1    metFORMIN (GLUCOPHAGE) 500 mg tablet TAKE 1 TABLET BY MOUTH TWO  TIMES DAILY WITH MEALS 180 Tab 0    losartan (COZAAR) 50 mg tablet TAKE 1 TABLET BY MOUTH  DAILY 90 Tab 1    omeprazole (PRILOSEC) 20 mg capsule Take 1 capsule by mouth  daily 90 Cap 2    pravastatin (PRAVACHOL) 20 mg tablet Take 1 tablet by mouth  nightly 90 Tab 2    venlafaxine-SR (EFFEXOR-XR) 150 mg capsule Take 1 capsule by mouth  daily 90 Cap 2    aspirin 81 mg chewable tablet Take 81 mg by mouth nightly.  amoxicillin-clavulanate (AUGMENTIN) 875-125 mg per tablet Take 1 Tab by mouth two (2) times a day.  mometasone (NASONEX) 50 mcg/actuation nasal spray 2 Sprays daily.  guaiFENesin ER (MUCINEX) 600 mg ER tablet Take 600 mg by mouth two (2) times a day.  predniSONE (STERAPRED DS) 10 mg dose pack Take as directed 21 Tab 0    testosterone cypionate (DEPOTESTOTERONE CYPIONATE) 200 mg/mL injection by IntraMUSCular route every Sunday.        Past Surgical History:   Procedure Laterality Date    HX COLONOSCOPY  11/2016    HX GI      colonoscopy    HX OTHER SURGICAL      anal fissure      Lab Results  Component Value Date/Time   WBC 11.8 11/10/2017 03:04 PM   HGB 16.0 11/10/2017 03:04 PM   Hemoglobin (POC) 15.6 02/06/2017 11:40 AM   HCT 45.4 11/10/2017 03:04 PM   Hematocrit (POC) 46 02/06/2017 11:40 AM   PLATELET 306 40/39/3212 03:04 PM   MCV 82.7 11/10/2017 03:04 PM     Lab Results  Component Value Date/Time   Cholesterol, total 165 06/05/2017 09:37 AM   HDL Cholesterol 30 06/05/2017 09:37 AM   LDL, calculated 60 06/05/2017 09:37 AM   Triglyceride 376 06/05/2017 09:37 AM     Lab Results  Component Value Date/Time   GFR est non-AA >60 11/10/2017 03:04 PM   GFRNA, POC >60 02/06/2017 11:40 AM   GFR est AA >60 11/10/2017 03:04 PM   GFRAA, POC >60 02/06/2017 11:40 AM   Creatinine 0.93 11/10/2017 03:04 PM   Creatinine (POC) 0.8 02/06/2017 11:40 AM   BUN 13 11/10/2017 03:04 PM   BUN (POC) 13 02/06/2017 11:40 AM   Sodium 139 11/10/2017 03:04 PM   Sodium (POC) 140 02/06/2017 11:40 AM   Potassium 3.6 11/10/2017 03:04 PM   Potassium (POC) 3.3 02/06/2017 11:40 AM   Chloride 102 11/10/2017 03:04 PM   Chloride (POC) 100 02/06/2017 11:40 AM   CO2 27 11/10/2017 03:04 PM        Review of Systems   Respiratory: Negative for shortness of breath. Cardiovascular: Negative for chest pain. Gastrointestinal: Positive for diarrhea. Physical Exam   Constitutional: He is oriented to person, place, and time. He appears well-developed and well-nourished. No distress. HENT:   Head: Normocephalic and atraumatic. Mouth/Throat: Oropharynx is clear and moist. No oropharyngeal exudate. Eyes: Conjunctivae and EOM are normal. Right eye exhibits no discharge. Left eye exhibits no discharge. Neck: Normal range of motion. Neck supple. No thyromegaly present. Cardiovascular: Normal rate, regular rhythm and normal heart sounds. Exam reveals no gallop and no friction rub. No murmur heard. Pulmonary/Chest: Effort normal and breath sounds normal. No respiratory distress. He has no wheezes. He has no rales. He exhibits no tenderness. Musculoskeletal: Normal range of motion. He exhibits no edema, tenderness or deformity. Lymphadenopathy:     He has no cervical adenopathy. Neurological: He is alert and oriented to person, place, and time. He has normal reflexes. He exhibits normal muscle tone. Coordination normal.   Skin: Skin is warm and dry. No rash noted. He is not diaphoretic. No erythema. No pallor. Psychiatric: He has a normal mood and affect. His behavior is normal.       ASSESSMENT and PLAN    ICD-10-CM ICD-9-CM    1. Type 2 diabetes mellitus with nephropathy (HCC)    Well-controled on metformin BID, continue, no change to dose    E11.21 250.40 LIPID PANEL     600.90 METABOLIC PANEL, COMPREHENSIVE      HEMOGLOBIN A1C WITH EAG      CBC W/O DIFF      MICROALBUMIN, UR, RAND W/ MICROALBUMIN/CREA RATIO   2.  Mild episode of recurrent major depressive disorder ([facility]    Controled on effexor   F33.0 296.31 LIPID PANEL      METABOLIC PANEL, COMPREHENSIVE      HEMOGLOBIN A1C WITH EAG      CBC W/O DIFF      MICROALBUMIN, UR, RAND W/ MICROALBUMIN/CREA RATIO   3. Chronic systolic congestive heart failure ([facility]    Follows with Dr. Myrtle Mary annually   I50.22 428.22 LIPID PANEL     513.1 METABOLIC PANEL, COMPREHENSIVE      HEMOGLOBIN A1C WITH EAG      CBC W/O DIFF      MICROALBUMIN, UR, RAND W/ MICROALBUMIN/CREA RATIO   4. Essential hypertension with goal blood pressure less than 140/90    Well-controled on losartan and coreg   I10 401.9 LIPID PANEL      METABOLIC PANEL, COMPREHENSIVE      HEMOGLOBIN A1C WITH EAG      CBC W/O DIFF      MICROALBUMIN, UR, RAND W/ MICROALBUMIN/CREA RATIO   5. SCOTTY on CPAP    Compliant with CPAP   G47.33 327.23 LIPID PANEL    Z31.75 K95.3 METABOLIC PANEL, COMPREHENSIVE      HEMOGLOBIN A1C WITH EAG      CBC W/O DIFF      MICROALBUMIN, UR, RAND W/ MICROALBUMIN/CREA RATIO   6. Low testosterone    Follows with Dr. Dipti Mann   E34.9 259.9 LIPID PANEL      METABOLIC PANEL, COMPREHENSIVE      HEMOGLOBIN A1C WITH EAG      CBC W/O DIFF      MICROALBUMIN, UR, RAND W/ MICROALBUMIN/CREA RATIO   7. Gastroesophageal reflux disease without esophagitis    Decrease prilosec to every other day and alternate with zantac   K21.9 530.81 LIPID PANEL      METABOLIC PANEL, COMPREHENSIVE      HEMOGLOBIN A1C WITH EAG      CBC W/O DIFF      MICROALBUMIN, UR, RAND W/ MICROALBUMIN/CREA RATIO   8. Diarrhea, unspecified type    Has COLO scheduled for February for further eval   R19.7 787.91 LIPID PANEL      METABOLIC PANEL, COMPREHENSIVE      HEMOGLOBIN A1C WITH EAG      CBC W/O DIFF      MICROALBUMIN, UR, RAND W/ MICROALBUMIN/CREA RATIO   9.  Pure hypercholesterolemia    Will change pravachol to crestor, work on w/l, check lipids prior to f/u   E78.00 272.0 LIPID PANEL      METABOLIC PANEL, COMPREHENSIVE      HEMOGLOBIN A1C WITH EAG      CBC W/O DIFF      MICROALBUMIN, UR, RAND W/ MICROALBUMIN/CREA RATIO        Scribed by Martin Atkinson of [facility], as dictated by Dr. Alia Mejia. Current diagnosis and concerns discussed with pt at length. Pt understands risks and benefits or current treatment plan and medications, and accepts the treatment and medication with any possible risks. Pt asks appropriate questions, which were answered. Pt was instructed to call with any concerns or problems. This note will not be viewable in [address]

## 2018-01-22 NOTE — MR AVS SNAPSHOT
[address] [facility] 
[phone] Patient: Gracy Herrera MRN: [id number] :[id number] Visit Information Date & Time Provider Department Dept. Phone Encounter #  
 2018  9:15 AM Catina Valadez, [address] [id number] Follow-up Instructions Return in about 3 months (around 2018). Upcoming Health Maintenance Date Due Pneumococcal 19-64 Highest Risk (1 of 3 - PCV13) 10/12/1985 FOOT EXAM Q1 2018 LIPID PANEL Q1 2018 HEMOGLOBIN A1C Q6M 2018 MICROALBUMIN Q1 10/16/2018 EYE EXAM RETINAL OR DILATED Q1 10/16/2018 COLONOSCOPY 2021 DTaP/Tdap/Td series (2 - Td) 2026 Allergies as of 2018  Review Complete On: 2018 By: Kale Watson LPN No Known Allergies Current Immunizations  Reviewed on 10/24/2016 Name Date Influenza Vaccine (Quad) PF 10/16/2017, 10/24/2016 Tdap 2016 Not reviewed this visit You Were Diagnosed With   
  
 Codes Comments Type 2 diabetes mellitus with nephropathy ([facility]    -  Primary ICD-10-CM: E11.21 
ICD-9-CM: 250.40, 583.81 Mild episode of recurrent major depressive disorder (HCC)     ICD-10-CM: F33.0 ICD-9-CM: 296.31 Chronic systolic congestive heart failure (HCC)     ICD-10-CM: I50.22 ICD-9-CM: 428.22, 428.0 Essential hypertension with goal blood pressure less than 140/90     ICD-10-CM: I10 
ICD-9-CM: 401.9 SCOTTY on CPAP     ICD-10-CM: G47.33, Z99.89 ICD-9-CM: 327.23, V46.8 Low testosterone     ICD-10-CM: E34.9 ICD-9-CM: 259.9 Gastroesophageal reflux disease without esophagitis     ICD-10-CM: K21.9 ICD-9-CM: 530.81 Diarrhea, unspecified type     ICD-10-CM: R19.7 ICD-9-CM: 787.91   
 Pure hypercholesterolemia     ICD-10-CM: E78.00 ICD-9-CM: 272.0 Vitals BP Pulse Temp Resp Height(growth percentile) Weight(growth percentile) 125/82 (BP 1 Location: Left arm, BP Patient Position: Sitting) 95 98.4 °F (36.9 °C) (Oral) 16 5' 9\" (1.753 m) 259 lb (117.5 kg) SpO2 BMI Smoking Status 97% 38.25 kg/m2 Never Smoker Vitals History BMI and BSA Data Body Mass Index Body Surface Area  
 38.25 kg/m 2 2.39 m 2 Preferred Pharmacy Pharmacy Name Phone [facility] [place] [address] [phone] Your Updated Medication List  
  
   
This list is accurate as of: 1/22/18  9:27 AM.  Always use your most recent med list.  
  
  
  
  
 aspirin 81 mg chewable tablet Take 81 mg by mouth nightly. AUGMENTIN 875-125 mg per tablet Generic drug:  amoxicillin-clavulanate Take 1 Tab by mouth two (2) times a day. carvedilol 6.25 mg tablet Commonly known as:  COREG  
TAKE 1 TABLET BY MOUTH TWO  TIMES DAILY losartan 50 mg tablet Commonly known as:  COZAAR  
TAKE 1 TABLET BY MOUTH  DAILY  
  
 metFORMIN 500 mg tablet Commonly known as:  GLUCOPHAGE  
TAKE 1 TABLET BY MOUTH TWO  TIMES DAILY WITH MEALS  
  
 MUCINEX 600 mg ER tablet Generic drug:  guaiFENesin ER Take 600 mg by mouth two (2) times a day. NASONEX 50 mcg/actuation nasal spray Generic drug:  mometasone 2 Sprays daily. omeprazole 20 mg capsule Commonly known as:  PRILOSEC Take 1 capsule by mouth  daily  
  
 predniSONE 10 mg dose pack Commonly known as:  STERAPRED DS Take as directed  
  
 raNITIdine 300 mg tablet Commonly known as:  ZANTAC Take 1 Tab by mouth daily. rosuvastatin 10 mg tablet Commonly known as:  CRESTOR Take 1 Tab by mouth nightly. testosterone cypionate 200 mg/mL injection Commonly known as:  DEPOTESTOTERONE CYPIONATE  
by IntraMUSCular route every Sunday. venlafaxine- mg capsule Commonly known as:  EFFEXOR-XR Take 1 capsule by mouth  daily Prescriptions Sent to Pharmacy Refills  
 raNITIdine (ZANTAC) 300 mg tablet 6 Sig: Take 1 Tab by mouth daily. Class: Normal  
 [facility] [address] Ph #: [phone] Route: Oral  
 rosuvastatin (CRESTOR) 10 mg tablet 6 Sig: Take 1 Tab by mouth nightly. Class: Normal  
 Pharmacy: [facility] [address] Ph #: [phone] Route: Oral  
  
Follow-up Instructions Return in about 3 months (around 4/22/2018). To-Do List   
 01/23/2018 Lab:  CBC W/O DIFF   
  
 01/23/2018 Lab:  HEMOGLOBIN A1C WITH EAG   
  
 01/23/2018 Lab:  LIPID PANEL   
  
 01/23/2018 Lab:  METABOLIC PANEL, COMPREHENSIVE   
  
 01/23/2018 Lab:  MICROALBUMIN, UR, RAND W/ MICROALBUMIN/CREA RATIO Patient Instructions Stop pravachol and start crestor Alternate prilosec and zantac every other day Introducing [facility]! Dear Brain Becker: 
Thank you for requesting a Good[employer] account. Our records indicate that you already have an active [employer] account. You can access your account anytime at [email or web address] Did you know that you can access your hospital and ER discharge instructions at any time in [employer]!? You can also review all of your test results from your hospital stay or ER visit. Additional Information If you have questions, please visit the Frequently Asked Questions section of the Good[employer]! website at [email or web address] Remember, Good[employer]! is NOT to be used for urgent needs. For medical emergencies, dial 911. Now available from your iPhone and Android! Please provide this summary of care documentation to your next provider. Your primary care clinician is listed as Alessandro Jeter. If you have any questions after today's visit, please call [phone].

## 2018-01-26 RX ORDER — ROSUVASTATIN CALCIUM 10 MG/1
10 TABLET, COATED ORAL
Qty: 30 TAB | Refills: 6 | Status: SHIPPED | OUTPATIENT
Start: 2018-01-26 | End: 2018-07-24 | Stop reason: SDUPTHER

## 2018-01-26 RX ORDER — RANITIDINE 300 MG/1
300 TABLET ORAL DAILY
Qty: 30 TAB | Refills: 6 | Status: SHIPPED | OUTPATIENT
Start: 2018-01-26 | End: 2020-02-21

## 2018-01-26 NOTE — TELEPHONE ENCOUNTER
Requested Prescriptions     Pending Prescriptions Disp Refills    rosuvastatin (CRESTOR) 10 mg tablet 30 Tab 6     Sig: Take 1 Tab by mouth nightly.  raNITIdine (ZANTAC) 300 mg tablet 30 Tab 6     Sig: Take 1 Tab by mouth daily.          Last Office Visit: 1.22.18    Upcoming Appointment:4.30.18

## 2018-01-29 RX ORDER — OMEPRAZOLE 20 MG/1
CAPSULE, DELAYED RELEASE ORAL
Qty: 90 CAP | Refills: 3 | Status: SHIPPED | OUTPATIENT
Start: 2018-01-29 | End: 2019-01-17

## 2018-01-29 RX ORDER — VENLAFAXINE HYDROCHLORIDE 150 MG/1
CAPSULE, EXTENDED RELEASE ORAL
Qty: 90 CAP | Refills: 3 | Status: SHIPPED | OUTPATIENT
Start: 2018-01-29 | End: 2018-07-08 | Stop reason: SDUPTHER

## 2018-01-29 RX ORDER — PRAVASTATIN SODIUM 20 MG/1
TABLET ORAL
Qty: 90 TAB | Refills: 3 | OUTPATIENT
Start: 2018-01-29

## 2018-02-14 RX ORDER — METFORMIN HYDROCHLORIDE 500 MG/1
TABLET ORAL
Qty: 180 TAB | Refills: 0 | Status: SHIPPED | OUTPATIENT
Start: 2018-02-14 | End: 2018-05-05 | Stop reason: SDUPTHER

## 2018-04-02 ENCOUNTER — APPOINTMENT (OUTPATIENT)
Dept: CT IMAGING | Age: 53
End: 2018-04-02
Attending: EMERGENCY MEDICINE
Payer: COMMERCIAL

## 2018-04-02 ENCOUNTER — HOSPITAL ENCOUNTER (EMERGENCY)
Age: 53
Discharge: HOME | End: 2018-04-02
Attending: EMERGENCY MEDICINE
Payer: COMMERCIAL

## 2018-04-02 VITALS
SYSTOLIC BLOOD PRESSURE: 114 MMHG | HEIGHT: 69 IN | TEMPERATURE: 98.4 F | DIASTOLIC BLOOD PRESSURE: 74 MMHG | WEIGHT: 256 LBS | HEART RATE: 82 BPM | RESPIRATION RATE: 16 BRPM | OXYGEN SATURATION: 94 % | BODY MASS INDEX: 37.92 KG/M2

## 2018-04-02 DIAGNOSIS — G43.909 MIGRAINE WITHOUT STATUS MIGRAINOSUS, NOT INTRACTABLE, UNSPECIFIED MIGRAINE TYPE: Primary | ICD-10-CM

## 2018-04-02 LAB
ANION GAP BLD CALC-SCNC: 16 MMOL/L (ref 10–20)
BUN BLD-MCNC: 14 MG/DL (ref 9–20)
CA-I BLD-MCNC: 1.19 MMOL/L (ref 1.12–1.32)
CHLORIDE BLD-SCNC: 100 MMOL/L (ref 98–107)
CO2 BLD-SCNC: 27 MMOL/L (ref 21–32)
CREAT BLD-MCNC: 0.9 MG/DL (ref 0.6–1.3)
GLUCOSE BLD STRIP.AUTO-MCNC: 103 MG/DL (ref 65–100)
GLUCOSE BLD-MCNC: 109 MG/DL (ref 65–100)
HCT VFR BLD CALC: 50 % (ref 36.6–50.3)
POTASSIUM BLD-SCNC: 3.9 MMOL/L (ref 3.5–5.1)
SERVICE CMNT-IMP: ABNORMAL
SERVICE CMNT-IMP: ABNORMAL
SODIUM BLD-SCNC: 139 MMOL/L (ref 136–145)

## 2018-04-02 PROCEDURE — 96361 HYDRATE IV INFUSION ADD-ON: CPT

## 2018-04-02 PROCEDURE — 80047 BASIC METABLC PNL IONIZED CA: CPT

## 2018-04-02 PROCEDURE — 82962 GLUCOSE BLOOD TEST: CPT

## 2018-04-02 PROCEDURE — 70450 CT HEAD/BRAIN W/O DYE: CPT

## 2018-04-02 PROCEDURE — 96374 THER/PROPH/DIAG INJ IV PUSH: CPT

## 2018-04-02 PROCEDURE — 74011250636 HC RX REV CODE- 250/636: Performed by: EMERGENCY MEDICINE

## 2018-04-02 PROCEDURE — 96375 TX/PRO/DX INJ NEW DRUG ADDON: CPT

## 2018-04-02 PROCEDURE — 99285 EMERGENCY DEPT VISIT HI MDM: CPT

## 2018-04-02 RX ORDER — KETOROLAC TROMETHAMINE 30 MG/ML
30 INJECTION, SOLUTION INTRAMUSCULAR; INTRAVENOUS
Status: COMPLETED | OUTPATIENT
Start: 2018-04-02 | End: 2018-04-02

## 2018-04-02 RX ORDER — SODIUM CHLORIDE 0.9 % (FLUSH) 0.9 %
5-10 SYRINGE (ML) INJECTION AS NEEDED
Status: DISCONTINUED | OUTPATIENT
Start: 2018-04-02 | End: 2018-04-02 | Stop reason: HOSPADM

## 2018-04-02 RX ORDER — SODIUM CHLORIDE 0.9 % (FLUSH) 0.9 %
5-10 SYRINGE (ML) INJECTION EVERY 8 HOURS
Status: DISCONTINUED | OUTPATIENT
Start: 2018-04-02 | End: 2018-04-02 | Stop reason: HOSPADM

## 2018-04-02 RX ORDER — PROCHLORPERAZINE EDISYLATE 5 MG/ML
10 INJECTION INTRAMUSCULAR; INTRAVENOUS
Status: COMPLETED | OUTPATIENT
Start: 2018-04-02 | End: 2018-04-02

## 2018-04-02 RX ORDER — DIPHENHYDRAMINE HYDROCHLORIDE 50 MG/ML
50 INJECTION, SOLUTION INTRAMUSCULAR; INTRAVENOUS
Status: DISCONTINUED | OUTPATIENT
Start: 2018-04-02 | End: 2018-04-02 | Stop reason: HOSPADM

## 2018-04-02 RX ADMIN — SODIUM CHLORIDE 500 ML: 900 INJECTION, SOLUTION INTRAVENOUS at 11:12

## 2018-04-02 RX ADMIN — PROCHLORPERAZINE EDISYLATE 10 MG: 5 INJECTION INTRAMUSCULAR; INTRAVENOUS at 11:14

## 2018-04-02 RX ADMIN — DIPHENHYDRAMINE HYDROCHLORIDE 50 MG: 50 INJECTION, SOLUTION INTRAMUSCULAR; INTRAVENOUS at 11:12

## 2018-04-02 RX ADMIN — KETOROLAC TROMETHAMINE 30 MG: 30 INJECTION, SOLUTION INTRAMUSCULAR; INTRAVENOUS at 11:13

## 2018-04-02 NOTE — ED TRIAGE NOTES
Pt not feeling well since Friday. Had a low grade temp on Saturday. Has been having stuffy nose. This AM pt noted that he is having some dizziness and a headache with sensitivity to light. Initial complaint was that vision was altered but states hurt and made head hurt worse when staring at the computer screen. visitor also states pt was acting confused while on the phone.

## 2018-04-02 NOTE — ED PROVIDER NOTES
HPI Comments: 47 y.o. male with past medical history significant for GERD, depression, hypertension, and heart attack who presents from home with chief complaint of headache. Patient states onset of a moderate frontal headache since this morning while he was on his laptop that has progressively worsened. Patient notes the headache seems to be aggravated with light. Patient admit the headache feels like his prior migraines, which he has a recurrent history of. Patient mentions some congestion over the last 4 days that has also worsened. Patient denies any speech difficulties, change in bowel movement or urine, and any weakness. There are no other acute medical concerns at this time. Social hx: Tobacco Use: No, Alcohol Use: Yes, Drug Use: No    PCP: Claude Diaz MD    Note written by Vahid Ni, as dictated by Bella Fabian MD 10:45 AM      The history is provided by the patient and medical records. Past Medical History:   Diagnosis Date    Depression     GERD (gastroesophageal reflux disease)     Heart attack ([facility] 04/2015    Hypertension     Prediabetes     Tachycardia        Past Surgical History:   Procedure Laterality Date    HX COLONOSCOPY  11/2016    HX GI      colonoscopy    HX OTHER SURGICAL      anal fissure         Family History:   Problem Relation Age of Onset    Cancer Mother      stomach and liver    Hypertension Father     Diabetes Maternal Grandmother     Hypertension Maternal Grandfather        Social History     Social History    Marital status:      Spouse name: N/A    Number of children: N/A    Years of education: N/A     Occupational History    Not on file.      Social History Main Topics    Smoking status: Never Smoker    Smokeless tobacco: Never Used    Alcohol use 0.0 oz/week     0 Standard drinks or equivalent per week      Comment: rarely    Drug use: No    Sexual activity: Yes     Other Topics Concern    Not on file     Social History Narrative         ALLERGIES: Review of patient's allergies indicates no known allergies. Review of Systems   Constitutional: Negative. Negative for appetite change, fever and unexpected weight change. HENT: Positive for congestion. Negative for ear pain, hearing loss, nosebleeds, rhinorrhea, sore throat and trouble swallowing. Respiratory: Negative. Negative for cough, chest tightness and shortness of breath. Cardiovascular: Negative. Negative for chest pain and palpitations. Gastrointestinal: Negative. Negative for abdominal distention, abdominal pain, blood in stool and vomiting. Endocrine: Negative. Genitourinary: Negative for dysuria and hematuria. Musculoskeletal: Negative. Negative for back pain and myalgias. Skin: Negative. Negative for rash. Allergic/Immunologic: Negative. Neurological: Positive for dizziness and headaches. Negative for syncope, weakness and numbness. Hematological: Negative. All other systems reviewed and are negative. Vitals:    04/02/18 1032   BP: 149/85   Pulse: 82   Resp: 16   Temp: 98.4 °F (36.9 °C)   SpO2: 98%   Weight: 116.1 kg (256 lb)   Height: 5' 9\" (1.753 m)            Physical Exam   Constitutional: He is oriented to person, place, and time. He appears well-developed and well-nourished. No distress. HENT:   Head: Normocephalic and atraumatic. Right Ear: External ear normal.   Left Ear: External ear normal.   Nose: Nose normal.   Mouth/Throat: Oropharynx is clear and moist.   Eyes: Conjunctivae and EOM are normal. Pupils are equal, round, and reactive to light. Neck: Normal range of motion. Neck supple. No JVD present. No thyromegaly present. Cardiovascular: Normal rate, regular rhythm, normal heart sounds and intact distal pulses. No murmur heard. Pulmonary/Chest: Effort normal and breath sounds normal. No respiratory distress. He has no wheezes. He has no rales. Abdominal: Soft.  Bowel sounds are normal. He exhibits no distension. There is no tenderness. Musculoskeletal: Normal range of motion. He exhibits no edema. Neurological: He is alert and oriented to person, place, and time. No cranial nerve deficit. Skin: Skin is warm and dry. No rash noted. Psychiatric: He has a normal mood and affect. His behavior is normal. Thought content normal.      Note written by Vahid Carrizales, as dictated by Lanette Montes MD 10:45 AM    [facility]      ED Course       Procedures    11:06 AM  Patient's CT scan of the head is negative. 11:53 AM  Patient's chem 8 is unremarkable. 11:55 AM  After migraine cocktail, the patient's headache has subsided. Will discharge home with follow up as needed.

## 2018-04-02 NOTE — DISCHARGE INSTRUCTIONS
Migraine Headache: Care Instructions  Your Care Instructions  Migraines are painful, throbbing headaches that often start on one side of the head. They may cause nausea and vomiting and make you sensitive to light, sound, or smell. Without treatment, migraines can last from 4 hours to a few days. Medicines can help prevent migraines or stop them after they have started. Your doctor can help you find which ones work best for you. Follow-up care is a key part of your treatment and safety. Be sure to make and go to all appointments, and call your doctor if you are having problems. It's also a good idea to know your test results and keep a list of the medicines you take. How can you care for yourself at home? · Do not drive if you have taken a prescription pain medicine. · Rest in a quiet, dark room until your headache is gone. Close your eyes, and try to relax or go to sleep. Don't watch TV or read. · Put a cold, moist cloth or cold pack on the painful area for 10 to 20 minutes at a time. Put a thin cloth between the cold pack and your skin. · Use a warm, moist towel or a heating pad set on low to relax tight shoulder and neck muscles. · Have someone gently massage your neck and shoulders. · Take your medicines exactly as prescribed. Call your doctor if you think you are having a problem with your medicine. You will get more details on the specific medicines your doctor prescribes. · Be careful not to take pain medicine more often than the instructions allow. You could get worse or more frequent headaches when the medicine wears off. To prevent migraines  · Keep a headache diary so you can figure out what triggers your headaches. Avoiding triggers may help you prevent headaches. Record when each headache began, how long it lasted, and what the pain was like.  (Was it throbbing, aching, stabbing, or dull?) Write down any other symptoms you had with the headache, such as nausea, flashing lights or dark spots, or sensitivity to bright light or loud noise. Note if the headache occurred near your period. List anything that might have triggered the headache. Triggers may include certain foods (chocolate, cheese, wine) or odors, smoke, bright light, stress, or lack of sleep. · If your doctor has prescribed medicine for your migraines, take it as directed. You may have medicine that you take only when you get a migraine and medicine that you take all the time to help prevent migraines. ¨ If your doctor has prescribed medicine for when you get a headache, take it at the first sign of a migraine, unless your doctor has given you other instructions. ¨ If your doctor has prescribed medicine to prevent migraines, take it exactly as prescribed. Call your doctor if you think you are having a problem with your medicine. · Find healthy ways to deal with stress. Migraines are most common during or right after stressful times. Take time to relax before and after you do something that has caused a migraine in the past.  · Try to keep your muscles relaxed by keeping good posture. Check your jaw, face, neck, and shoulder muscles for tension. Try to relax them. When you sit at a desk, change positions often. And make sure to stretch for 30 seconds each hour. · Get plenty of sleep and exercise. · Eat meals on a regular schedule. Avoid foods and drinks that often trigger migraines. These include chocolate, alcohol (especially red wine and port), aspartame, monosodium glutamate (MSG), and some additives found in foods (such as hot dogs, cobb, cold cuts, aged cheeses, and pickled foods). · Limit caffeine. Don't drink too much coffee, tea, or soda. But don't quit caffeine suddenly. That can also give you migraines. · Do not smoke or allow others to smoke around you. If you need help quitting, talk to your doctor about stop-smoking programs and medicines. These can increase your chances of quitting for good.   · If you are taking birth control pills or hormone therapy, talk to your doctor about whether they are triggering your migraines. When should you call for help? Call 911 anytime you think you may need emergency care. For example, call if:  ? · You have signs of a stroke. These may include:  ¨ Sudden numbness, paralysis, or weakness in your face, arm, or leg, especially on only one side of your body. ¨ Sudden vision changes. ¨ Sudden trouble speaking. ¨ Sudden confusion or trouble understanding simple statements. ¨ Sudden problems with walking or balance. ¨ A sudden, severe headache that is different from past headaches. ?Call your doctor now or seek immediate medical care if:  ? · You have new or worse nausea and vomiting. ? · You have a new or higher fever. ? · Your headache gets much worse. ? Watch closely for changes in your health, and be sure to contact your doctor if:  ? · You are not getting better after 2 days (48 hours). Where can you learn more? Go to http://gerard-kristin.info/. Enter T415 in the search box to learn more about \"Migraine Headache: Care Instructions. \"  Current as of: October 14, 2016  Content Version: 11.4  © 5073-6404 [employer]. Care instructions adapted under license by [employer] (which disclaims liability or warranty for this information). If you have questions about a medical condition or this instruction, always ask your healthcare professional. [place] [address] any warranty or liability for your use of this information. We hope that we have addressed all of your medical concerns. The examination and treatment you received in the Emergency Department were for an emergent problem and were not intended as complete care. It is important that you follow up with your healthcare provider(s) for ongoing care.  If your symptoms worsen or do not improve as expected, and you are unable to reach your usual health care provider(s), you should return to the Emergency Department. Today's healthcare is undergoing tremendous change, and patient satisfaction surveys are one of the many tools to assess the quality of medical care. You may receive a survey from the [employer] regarding your experience in the Emergency Department. I hope that your experience has been completely positive, particularly the medical care that I provided. As such, please participate in the survey; anything less than excellent does not meet my expectations or intentions. [address] and [facility] participate in nationally recognized quality of care measures. If your blood pressure is greater than 120/80, as reported below, we urge that you seek medical care to address the potential of high blood pressure, commonly known as hypertension. Hypertension can be hereditary or can be caused by certain medical conditions, pain, stress, or \"white coat syndrome. \"       Please make an appointment with your health care provider(s) for follow up of your Emergency Department visit. VITALS:   Patient Vitals for the past 8 hrs:   Temp Pulse Resp BP SpO2   04/02/18 1032 98.4 °F (36.9 °C) 82 16 149/85 98 %          Thank you for allowing us to provide you with medical care today. We realize that you have many choices for your emergency care needs. Please choose us in the future for any continued health care needs. Regards,           Thien Koroma [facility], [address]: [phone]            Recent Results (from the past 24 hour(s))   GLUCOSE, POC    Collection Time: 04/02/18 10:36 AM   Result Value Ref Range    Glucose (POC) 103 (H) 65 - 100 mg/dL    Performed by Misty MAYER [facility]    Collection Time: 04/02/18 10:59 AM   Result Value Ref Range    Calcium, ionized (POC) 1.19 1.12 - 1.32 mmol/L    Sodium (POC) 139 136 - 145 mmol/L    Potassium (POC) 3.9 3.5 - 5.1 mmol/L    Chloride (POC) 100 98 - 107 mmol/L    CO2 (POC) 27 21 - 32 mmol/L    Anion gap (POC) 16 10 - 20 mmol/L    Glucose (POC) 109 (H) 65 - 100 mg/dL    BUN (POC) 14 9 - 20 mg/dL    Creatinine (POC) 0.9 0.6 - 1.3 mg/dL    GFRAA, POC >60 >60 ml/min/1.73m2    GFRNA, POC >60 >60 ml/min/1.73m2    Hematocrit (POC) 50 36.6 - 50.3 %    Comment Comment Not Indicated. Ct Code Neuro Head Wo Contrast    Result Date: 4/2/2018  EXAM:  CT CODE NEURO HEAD WO CONTRAST INDICATION:  code s, dizzy, new onset this morning of dizziness, headache and light sensitivity, basilar states patient was \"acting confused\". Change in mental status. TECHNIQUE: Thin axial images were obtained through the calvarium and saved with standard and bone window algorithm. Coronal and sagittal reconstructions were generated. CT dose reduction was achieved through use of a standardized protocol tailored for this examination and automatic exposure control for dose modulation. COMPARISON: CT head 2/6/17 FINDINGS: The ventricular size and configuration are normal. The cerebral density is normal throughout. No evidence of intracranial hemorrhage, infarct, mass or abnormal extra-axial fluid collections. Visualized osseous structures of the calvarium and skull base including paranasal sinuses are unremarkable. IMPRESSION: Normal head CT.

## 2018-04-20 RX ORDER — LOSARTAN POTASSIUM 50 MG/1
TABLET ORAL
Qty: 90 TAB | Refills: 1 | Status: SHIPPED | OUTPATIENT
Start: 2018-04-20 | End: 2018-09-29 | Stop reason: SDUPTHER

## 2018-04-24 LAB
ALBUMIN SERPL-MCNC: 4.5 G/DL (ref 3.5–5.5)
ALBUMIN/CREAT UR: 163.6 MG/G CREAT (ref 0–30)
ALBUMIN/GLOB SERPL: 1.4 {RATIO} (ref 1.2–2.2)
ALP SERPL-CCNC: 62 IU/L (ref 39–117)
ALT SERPL-CCNC: 29 IU/L (ref 0–44)
AST SERPL-CCNC: 22 IU/L (ref 0–40)
BILIRUB SERPL-MCNC: 0.5 MG/DL (ref 0–1.2)
BUN SERPL-MCNC: 18 MG/DL (ref 6–24)
BUN/CREAT SERPL: 22 (ref 9–20)
CALCIUM SERPL-MCNC: 9.7 MG/DL (ref 8.7–10.2)
CHLORIDE SERPL-SCNC: 98 MMOL/L (ref 96–106)
CHOLEST SERPL-MCNC: 143 MG/DL (ref 100–199)
CO2 SERPL-SCNC: 27 MMOL/L (ref 18–29)
CREAT SERPL-MCNC: 0.82 MG/DL (ref 0.76–1.27)
CREAT UR-MCNC: 150.4 MG/DL
ERYTHROCYTE [DISTWIDTH] IN BLOOD BY AUTOMATED COUNT: 14.8 % (ref 12.3–15.4)
EST. AVERAGE GLUCOSE BLD GHB EST-MCNC: 123 MG/DL
GFR SERPLBLD CREATININE-BSD FMLA CKD-EPI: 102 ML/MIN/1.73
GFR SERPLBLD CREATININE-BSD FMLA CKD-EPI: 118 ML/MIN/1.73
GLOBULIN SER CALC-MCNC: 3.2 G/DL (ref 1.5–4.5)
GLUCOSE SERPL-MCNC: 113 MG/DL (ref 65–99)
HBA1C MFR BLD: 5.9 % (ref 4.8–5.6)
HCT VFR BLD AUTO: 48.2 % (ref 37.5–51)
HDLC SERPL-MCNC: 35 MG/DL
HGB BLD-MCNC: 16.3 G/DL (ref 13–17.7)
LDLC SERPL CALC-MCNC: 37 MG/DL (ref 0–99)
MCH RBC QN AUTO: 28.6 PG (ref 26.6–33)
MCHC RBC AUTO-ENTMCNC: 33.8 G/DL (ref 31.5–35.7)
MCV RBC AUTO: 85 FL (ref 79–97)
MICROALBUMIN UR-MCNC: 246.1 UG/ML
PLATELET # BLD AUTO: 301 X10E3/UL (ref 150–379)
POTASSIUM SERPL-SCNC: 4.8 MMOL/L (ref 3.5–5.2)
PROT SERPL-MCNC: 7.7 G/DL (ref 6–8.5)
RBC # BLD AUTO: 5.69 X10E6/UL (ref 4.14–5.8)
SODIUM SERPL-SCNC: 139 MMOL/L (ref 134–144)
TRIGL SERPL-MCNC: 356 MG/DL (ref 0–149)
VLDLC SERPL CALC-MCNC: 71 MG/DL (ref 5–40)
WBC # BLD AUTO: 8.8 X10E3/UL (ref 3.4–10.8)

## 2018-04-30 ENCOUNTER — OFFICE VISIT (OUTPATIENT)
Dept: INTERNAL MEDICINE CLINIC | Age: 53
End: 2018-04-30

## 2018-04-30 VITALS
RESPIRATION RATE: 16 BRPM | OXYGEN SATURATION: 97 % | DIASTOLIC BLOOD PRESSURE: 74 MMHG | HEIGHT: 69 IN | SYSTOLIC BLOOD PRESSURE: 114 MMHG | TEMPERATURE: 98.1 F | HEART RATE: 103 BPM | WEIGHT: 260 LBS | BODY MASS INDEX: 38.51 KG/M2

## 2018-04-30 DIAGNOSIS — G47.33 OSA ON CPAP: ICD-10-CM

## 2018-04-30 DIAGNOSIS — F32.9 REACTIVE DEPRESSION: ICD-10-CM

## 2018-04-30 DIAGNOSIS — R60.0 LOCALIZED EDEMA: ICD-10-CM

## 2018-04-30 DIAGNOSIS — R79.89 LOW TESTOSTERONE: ICD-10-CM

## 2018-04-30 DIAGNOSIS — G62.9 NEUROPATHY: ICD-10-CM

## 2018-04-30 DIAGNOSIS — E11.9 DIABETES MELLITUS WITHOUT COMPLICATION (HCC): ICD-10-CM

## 2018-04-30 DIAGNOSIS — K21.9 GASTROESOPHAGEAL REFLUX DISEASE WITHOUT ESOPHAGITIS: ICD-10-CM

## 2018-04-30 DIAGNOSIS — E66.01 SEVERE OBESITY (BMI 35.0-39.9) WITH COMORBIDITY (HCC): Primary | ICD-10-CM

## 2018-04-30 DIAGNOSIS — I10 ESSENTIAL HYPERTENSION WITH GOAL BLOOD PRESSURE LESS THAN 140/90: ICD-10-CM

## 2018-04-30 DIAGNOSIS — I25.10 CORONARY ARTERY DISEASE INVOLVING NATIVE CORONARY ARTERY OF NATIVE HEART WITHOUT ANGINA PECTORIS: ICD-10-CM

## 2018-04-30 DIAGNOSIS — Z99.89 OSA ON CPAP: ICD-10-CM

## 2018-04-30 NOTE — PROGRESS NOTES
HISTORY OF PRESENT ILLNESS  Davida Patricio is a 47 y.o. male. HPI   Last here 1/22/18. Pt is here to f/u on chronic conditions.   Pt presents with his partner who provides some of the hx.      BP today is 114/74  BPs are 110-130s/70-80s at home   Continues on losartan 50mg and coreg 6.25mg BID      BS at home running around 110-120s in the AM fasting  Continues on metformin 500mg BID   He also takes ASA 81mg daily     Wt is stable since last visit   Pt is considering a ketogenic diet  Discussed enrolling in [address] [place] [address] instead   Recall saxenda 3mg caused him to feel bloated and uncomfortable     Reviewed labs 4/18   Ordered labs to complete prior to next visit     Pt c/o some BLE swelling and mild tingling at the bottom of his feet for a few weeks, stable  Pt has been soaking and massaging his feet with a foot cream with improvement to sx  Discussed his sx being d/t diabetes   Discussed starting gabapentin  He is not interested in additional med  Advised him to work on leg elevation and compression hose  Will check vit B12 and D levels     Pt follows with Dr. Law Rangel (endo) for PSA and testosterone level checks   Last visit was 1/15/18 - will get notes for review  Of note, he is on a lower dose of testosterone d/t syncopal event in the past      Pt follows with Dr. Robbie Slade (cardio) annually in June  Advised him to f/u with his cardiologist      Pt follows with Dr. Jessica Vidal (pulm) annually  Last visit was 9/2017   Pt is compliant with CPAP qhs     Pt follows with Dr. Roslyn Ramsey (GI)  Reviewed notes 2/6/18  Pt states that his diarrhea improved after his COLO  Pt is no longer taking Pepto Bismol     Continues prilosec 20mg daily for reflux, which works well, no breakthrough  Recall pt states that aciphex improved his sx in the past. However, his insurance no longer covers this medication.      Lov, I stopped pravachol and started him on crestor 10mg daily for cholesterol - reports compliance   Tolerating well no SE      Continues on effexor 150mg daily for depression, which works well, happy with dose    Not sad or down       Recall his partner is HIV positive  However, pt was HIV negative on 6/17 labs      PREVENTIVE:  Colonoscopy: Dr. Raquel Connolly, 2/6/18, unsure of repeat   PSA: Dr. Darian Hartley (endo) follows, 1/17 1.4, 1/18 1.4 Darian Hartley  AAA screen: not needed, non smoker   Tdap: 8/29/2016  Pneumovax: 4/2015  Shingrix: not yet needed  Flu shot: 10/16/17  Microalbumin: 4/18   Foot exam: 04/30/18   A1c: 5/16 6.2, 8/16 6.0, 12/16 6.0, 3/17 5.7, 6/17 5.8, 10/17 6.0, 1/18 5.6 Aj, 4/18 5.9  Eye exam: Dr. Juan Williamson in [place], 4/18, will get notes for review  Retinal Scan: 10/16/17, mild diabetic retinopathy in L eye  Lipids: 4/18 LDL 37    Patient Active Problem List    Diagnosis Date Noted    [facility] 01/22/2018    Diabetes mellitus without complication ([facility] 93/30/7579    Reactive depression 12/12/2016    Essential hypertension with goal blood pressure less than 140/90 05/23/2016    Coronary artery disease involving native coronary artery of native heart without angina pectoris 05/23/2016    Mild episode of recurrent major depressive disorder ([facility] 05/23/2016    SCOTTY on CPAP 05/23/2016    Chronic systolic congestive heart failure ([facility] 05/23/2016    Low testosterone 05/23/2016    Gastroesophageal reflux disease without esophagitis 05/23/2016     Current Outpatient Prescriptions   Medication Sig Dispense Refill    losartan (COZAAR) 50 mg tablet TAKE 1 TABLET BY MOUTH  DAILY 90 Tab 1    metFORMIN (GLUCOPHAGE) 500 mg tablet TAKE 1 TABLET BY MOUTH TWO  TIMES DAILY WITH MEALS 180 Tab 0    omeprazole (PRILOSEC) 20 mg capsule TAKE 1 CAPSULE BY MOUTH  DAILY 90 Cap 3    venlafaxine-SR (EFFEXOR-XR) 150 mg capsule TAKE 1 CAPSULE BY MOUTH  DAILY 90 Cap 3    rosuvastatin (CRESTOR) 10 mg tablet Take 1 Tab by mouth nightly. 30 Tab 6    raNITIdine (ZANTAC) 300 mg tablet Take 1 Tab by mouth daily.  30 Tab 6    carvedilol (COREG) 6.25 mg tablet TAKE 1 TABLET BY MOUTH TWO  TIMES DAILY 180 Tab 1    mometasone (NASONEX) 50 mcg/actuation nasal spray 2 Sprays daily.  testosterone cypionate (DEPOTESTOTERONE CYPIONATE) 200 mg/mL injection by IntraMUSCular route every Sunday.  aspirin 81 mg chewable tablet Take 81 mg by mouth nightly.  amoxicillin-clavulanate (AUGMENTIN) 875-125 mg per tablet Take 1 Tab by mouth two (2) times a day.  guaiFENesin ER (MUCINEX) 600 mg ER tablet Take 600 mg by mouth two (2) times a day.  predniSONE (STERAPRED DS) 10 mg dose pack Take as directed 21 Tab 0     Past Surgical History:   Procedure Laterality Date    HX COLONOSCOPY  11/2016    HX GI      colonoscopy    HX OTHER SURGICAL      anal fissure      Lab Results  Component Value Date/Time   WBC 8.8 04/23/2018 08:38 AM   HGB 16.3 04/23/2018 08:38 AM   Hemoglobin (POC) 15.6 02/06/2017 11:40 AM   HCT 48.2 04/23/2018 08:38 AM   Hematocrit (POC) 50 04/02/2018 10:59 AM   PLATELET 342 22/74/0133 08:38 AM   MCV 85 04/23/2018 08:38 AM     Lab Results  Component Value Date/Time   Cholesterol, total 143 04/23/2018 08:38 AM   HDL Cholesterol 35 (L) 04/23/2018 08:38 AM   LDL, calculated 37 04/23/2018 08:38 AM   Triglyceride 356 (H) 04/23/2018 08:38 AM     Lab Results  Component Value Date/Time   GFR est non- 04/23/2018 08:38 AM   GFRNA, POC >60 04/02/2018 10:59 AM   GFR est  04/23/2018 08:38 AM   GFRAA, POC >60 04/02/2018 10:59 AM   Creatinine 0.82 04/23/2018 08:38 AM   Creatinine (POC) 0.9 04/02/2018 10:59 AM   BUN 18 04/23/2018 08:38 AM   BUN (POC) 14 04/02/2018 10:59 AM   Sodium 139 04/23/2018 08:38 AM   Sodium (POC) 139 04/02/2018 10:59 AM   Potassium 4.8 04/23/2018 08:38 AM   Potassium (POC) 3.9 04/02/2018 10:59 AM   Chloride 98 04/23/2018 08:38 AM   Chloride (POC) 100 04/02/2018 10:59 AM   CO2 27 04/23/2018 08:38 AM        Review of Systems   Respiratory: Negative for shortness of breath.     Cardiovascular: Positive for leg swelling. Negative for chest pain. Gastrointestinal: Negative for diarrhea. Neurological: Positive for tingling. Physical Exam   Constitutional: He is oriented to person, place, and time. He appears well-developed and well-nourished. No distress. HENT:   Head: Normocephalic and atraumatic. Mouth/Throat: Oropharynx is clear and moist. No oropharyngeal exudate. Eyes: Conjunctivae and EOM are normal. Right eye exhibits no discharge. Left eye exhibits no discharge. Neck: Normal range of motion. Neck supple. No thyromegaly present. Cardiovascular: Normal rate, regular rhythm and normal heart sounds. Exam reveals no gallop and no friction rub. No murmur heard. Pulmonary/Chest: Effort normal and breath sounds normal. No respiratory distress. He has no wheezes. He has no rales. He exhibits no tenderness. Abdominal: Soft. He exhibits no distension. There is no tenderness. There is no rebound and no guarding. Musculoskeletal: Normal range of motion. He exhibits no edema, tenderness or deformity. Lymphadenopathy:     He has no cervical adenopathy. Neurological: He is alert and oriented to person, place, and time. He has normal reflexes. He exhibits normal muscle tone. Coordination normal.   Monofilament nl BLE, good peripheral pulses, no ulcers    Skin: Skin is warm and dry. No rash noted. He is not diaphoretic. No erythema. No pallor. Psychiatric: He has a normal mood and affect. His behavior is normal.       ASSESSMENT and PLAN    ICD-10-CM ICD-9-CM    1. Severe obesity (BMI 35.0-39. 9) with comorbidity ([facility]    Discussed diet and w/l, discussed different diet programs including [employer] and ketogenic diet, suggested [employer] might be more easy long term, he will look into this   E66.01 278.01 VITAMIN B12      VITAMIN D, 25 HYDROXY      HEMOGLOBIN A1C WITH EAG      CBC W/O DIFF      METABOLIC PANEL, BASIC   2.  Diabetes mellitus without complication ([facility]    Well-controled on metformin BID, continue 500mg BID   E11.9 250.00  DIABETES FOOT EXAM      VITAMIN B12      VITAMIN D, 25 HYDROXY      HEMOGLOBIN A1C WITH EAG      CBC W/O DIFF      METABOLIC PANEL, BASIC   3. Reactive depression    Controled on effexor, continue    F32.9 300.4 VITAMIN B12      VITAMIN D, 25 HYDROXY      HEMOGLOBIN A1C WITH EAG      CBC W/O DIFF      METABOLIC PANEL, BASIC   4. Essential hypertension with goal blood pressure less than 140/90    Well-controled on losartan and coreg   I10 401.9 VITAMIN B12      VITAMIN D, 25 HYDROXY      HEMOGLOBIN A1C WITH EAG      CBC W/O DIFF      METABOLIC PANEL, BASIC   5. Coronary artery disease involving native coronary artery of native heart without angina pectoris    Due to see Dr. María Ribeiro in 6/18   I25.10 414.01 VITAMIN B12      VITAMIN D, 25 HYDROXY      HEMOGLOBIN A1C WITH EAG      CBC W/O DIFF      METABOLIC PANEL, BASIC   6. SCOTTY on CPAP    Compliant with CPAP   G47.33 327.23 VITAMIN B12    Z99.89 V46.8 VITAMIN D, 25 HYDROXY      HEMOGLOBIN A1C WITH EAG      CBC W/O DIFF      METABOLIC PANEL, BASIC   7. Low testosterone    Follows with Dr. Veronika Worthy for this who gives him testosterone injections    R79.89 790.99 VITAMIN B12      VITAMIN D, 25 HYDROXY      HEMOGLOBIN A1C WITH EAG      CBC W/O DIFF      METABOLIC PANEL, BASIC   8. Gastroesophageal reflux disease without esophagitis    Controled with prilosec, continue    K21.9 530.81 VITAMIN B12      VITAMIN D, 25 HYDROXY      HEMOGLOBIN A1C WITH EAG      CBC W/O DIFF      METABOLIC PANEL, BASIC   9. Neuropathy    Discussed his mild tingling sx, will check for vit deficiency with next set of labs, discussed tx with things such as gabapentin, he is not interested in additional med   G62.9 355.9 VITAMIN B12      VITAMIN D, 25 HYDROXY      HEMOGLOBIN A1C WITH EAG      CBC W/O DIFF      METABOLIC PANEL, BASIC   10. Localized edema    Compression hose, leg elevation    R60.0 782. 3         Scribed by [address], as dictated by Dr. Rajinder Ayoub. Current diagnosis and concerns discussed with pt at length. Pt understands risks and benefits or current treatment plan and medications, and accepts the treatment and medication with any possible risks. Pt asks appropriate questions, which were answered. Pt was instructed to call with any concerns or problems. This note will not be viewable in [address]

## 2018-04-30 NOTE — MR AVS SNAPSHOT
[address] 
[phone] Patient: Leann Nolasco MRN: [id number] :[id number] Visit Information Date & Time Provider Department Dept. Phone Encounter #  
 2018 11:30 AM Vandana Leon, [address] [place] [address],[address] [phone] [id number] Follow-up Instructions Return in about 3 months (around 2018). Upcoming Health Maintenance Date Due Pneumococcal 19-64 Highest Risk (1 of 3 - PCV13) 10/12/1985 FOOT EXAM Q1 2018 Influenza Age 5 to Adult 2018 EYE EXAM RETINAL OR DILATED Q1 10/16/2018 HEMOGLOBIN A1C Q6M 10/23/2018 MICROALBUMIN Q1 2019 LIPID PANEL Q1 2019 COLONOSCOPY 2023 DTaP/Tdap/Td series (2 - Td) 2026 Allergies as of 2018  Review Complete On: 2018 By: Vandana Leon MD  
 No Known Allergies Current Immunizations  Reviewed on 10/24/2016 Name Date Influenza Vaccine (Quad) PF 10/16/2017, 10/24/2016 Tdap 2016 Not reviewed this visit You Were Diagnosed With   
  
 Codes Comments Severe obesity (BMI 35.0-39. 9) with comorbidity ([address]    -  Primary ICD-10-CM: E66.01 
ICD-9-CM: 278.01 Diabetes mellitus without complication ([facility]     OKR-04-HW: E11.9 ICD-9-CM: 250.00 Reactive depression     ICD-10-CM: F32.9 ICD-9-CM: 300.4 Essential hypertension with goal blood pressure less than 140/90     ICD-10-CM: I10 
ICD-9-CM: 401.9 Coronary artery disease involving native coronary artery of native heart without angina pectoris     ICD-10-CM: I25.10 ICD-9-CM: 414.01   
 SCOTTY on CPAP     ICD-10-CM: G47.33, Z99.89 ICD-9-CM: 327.23, V46.8 Low testosterone     ICD-10-CM: R79.89 ICD-9-CM: 790.99 Gastroesophageal reflux disease without esophagitis     ICD-10-CM: K21.9 ICD-9-CM: 530.81 Neuropathy     ICD-10-CM: G62.9 ICD-9-CM: 157. 9 Vitals BP Pulse Temp Resp Height(growth percentile) Weight(growth percentile) 114/74 (BP 1 Location: Left arm, BP Patient Position: Sitting) (!) 103 98.1 °F (36.7 °C) (Oral) 16 5' 9\" (1.753 m) 260 lb (117.9 kg) SpO2 BMI Smoking Status 97% 38.4 kg/m2 Never Smoker Vitals History BMI and BSA Data Body Mass Index Body Surface Area  
 38.4 kg/m 2 2.4 m 2 Preferred Pharmacy Pharmacy Name Phone [facility], [address] [place] [address] [address] Chidi Medrano Your Updated Medication List  
  
   
This list is accurate as of 4/30/18 12:21 PM.  Always use your most recent med list.  
  
  
  
  
 aspirin 81 mg chewable tablet Take 81 mg by mouth nightly. AUGMENTIN 875-125 mg per tablet Generic drug:  amoxicillin-clavulanate Take 1 Tab by mouth two (2) times a day. carvedilol 6.25 mg tablet Commonly known as:  COREG  
TAKE 1 TABLET BY MOUTH TWO  TIMES DAILY losartan 50 mg tablet Commonly known as:  COZAAR  
TAKE 1 TABLET BY MOUTH  DAILY  
  
 metFORMIN 500 mg tablet Commonly known as:  GLUCOPHAGE  
TAKE 1 TABLET BY MOUTH TWO  TIMES DAILY WITH MEALS  
  
 MUCINEX 600 mg ER tablet Generic drug:  guaiFENesin ER Take 600 mg by mouth two (2) times a day. NASONEX 50 mcg/actuation nasal spray Generic drug:  mometasone 2 Sprays daily. omeprazole 20 mg capsule Commonly known as:  PRILOSEC  
TAKE 1 CAPSULE BY MOUTH  DAILY predniSONE 10 mg dose pack Commonly known as:  STERAPRED DS Take as directed  
  
 raNITIdine 300 mg tablet Commonly known as:  ZANTAC Take 1 Tab by mouth daily. rosuvastatin 10 mg tablet Commonly known as:  CRESTOR Take 1 Tab by mouth nightly. testosterone cypionate 200 mg/mL injection Commonly known as:  DEPOTESTOTERONE CYPIONATE  
by IntraMUSCular route every Sunday. venlafaxine- mg capsule Commonly known as:  EFFEXOR-XR  
TAKE 1 CAPSULE BY MOUTH  DAILY We Performed the Following  DIABETES FOOT EXAM [7 Custom] Follow-up Instructions Return in about 3 months (around 7/30/2018). To-Do List   
 04/30/2018 Lab:  CBC W/O DIFF   
  
 04/30/2018 Lab:  HEMOGLOBIN A1C WITH EAG   
  
 04/30/2018 Lab:  METABOLIC PANEL, BASIC   
  
 04/30/2018 Lab:  VITAMIN B12   
  
 04/30/2018 Lab:  VITAMIN D, 25 HYDROXY Introducing [facility] SERVICES! Dear Yoav Villagran: 
Thank you for requesting a Harri account. Our records indicate that you already have an active Harri account. You can access your account anytime at [email or web address] Did you know that you can access your hospital and ER discharge instructions at any time in Harri? You can also review all of your test results from your hospital stay or ER visit. Additional Information If you have questions, please visit the Frequently Asked Questions section of the Harri website at [email or web address] Remember, Harri is NOT to be used for urgent needs. For medical emergencies, dial 911. Now available from your iPhone and Android! Please provide this summary of care documentation to your next provider. Your primary care clinician is listed as Jannette Casillas. If you have any questions after today's visit, please call [phone].

## 2018-05-06 RX ORDER — METFORMIN HYDROCHLORIDE 500 MG/1
TABLET ORAL
Qty: 180 TAB | Refills: 1 | Status: SHIPPED | OUTPATIENT
Start: 2018-05-06 | End: 2018-09-29 | Stop reason: SDUPTHER

## 2018-07-11 RX ORDER — VENLAFAXINE HYDROCHLORIDE 150 MG/1
150 CAPSULE, EXTENDED RELEASE ORAL DAILY
Qty: 90 CAP | Refills: 3 | Status: SHIPPED | OUTPATIENT
Start: 2018-07-11 | End: 2018-12-21 | Stop reason: SDUPTHER

## 2018-07-11 NOTE — TELEPHONE ENCOUNTER
From: Johann Cobian  To: Robbi Feliz MD  Sent: 7/8/2018 9:34 AM EDT  Subject: Medication Renewal Request    Original authorizing provider: MD Johann Gerber would like a refill of the following medications:  venlafaxine-SR (EFFEXOR-XR) 150 mg capsule Robbi Feliz MD]    Preferred pharmacy: Shanna [facility] [address]     Comment:  My mail order refills are a bit behind. I will not have a refill of my effexor until later this coming week. Can you put in a refill order at [facility] for a weeks worth? Thank you!

## 2018-07-25 RX ORDER — ROSUVASTATIN CALCIUM 10 MG/1
TABLET, COATED ORAL
Qty: 90 TAB | Refills: 1 | Status: SHIPPED | OUTPATIENT
Start: 2018-07-25 | End: 2018-12-21 | Stop reason: SDUPTHER

## 2018-08-29 LAB
25(OH)D3+25(OH)D2 SERPL-MCNC: 29.6 NG/ML (ref 30–100)
BUN SERPL-MCNC: 12 MG/DL (ref 6–24)
BUN/CREAT SERPL: 14 (ref 9–20)
CALCIUM SERPL-MCNC: 9.6 MG/DL (ref 8.7–10.2)
CHLORIDE SERPL-SCNC: 98 MMOL/L (ref 96–106)
CO2 SERPL-SCNC: 25 MMOL/L (ref 20–29)
CREAT SERPL-MCNC: 0.87 MG/DL (ref 0.76–1.27)
ERYTHROCYTE [DISTWIDTH] IN BLOOD BY AUTOMATED COUNT: 14.3 % (ref 12.3–15.4)
EST. AVERAGE GLUCOSE BLD GHB EST-MCNC: 126 MG/DL
GLUCOSE SERPL-MCNC: 108 MG/DL (ref 65–99)
HBA1C MFR BLD: 6 % (ref 4.8–5.6)
HCT VFR BLD AUTO: 46.4 % (ref 37.5–51)
HGB BLD-MCNC: 15.9 G/DL (ref 13–17.7)
MCH RBC QN AUTO: 29 PG (ref 26.6–33)
MCHC RBC AUTO-ENTMCNC: 34.3 G/DL (ref 31.5–35.7)
MCV RBC AUTO: 85 FL (ref 79–97)
PLATELET # BLD AUTO: 249 X10E3/UL (ref 150–379)
POTASSIUM SERPL-SCNC: 4.7 MMOL/L (ref 3.5–5.2)
RBC # BLD AUTO: 5.48 X10E6/UL (ref 4.14–5.8)
SODIUM SERPL-SCNC: 139 MMOL/L (ref 134–144)
VIT B12 SERPL-MCNC: 840 PG/ML (ref 232–1245)
WBC # BLD AUTO: 8.1 X10E3/UL (ref 3.4–10.8)

## 2018-09-04 ENCOUNTER — OFFICE VISIT (OUTPATIENT)
Dept: INTERNAL MEDICINE CLINIC | Age: 53
End: 2018-09-04

## 2018-09-04 VITALS
RESPIRATION RATE: 16 BRPM | SYSTOLIC BLOOD PRESSURE: 121 MMHG | DIASTOLIC BLOOD PRESSURE: 75 MMHG | WEIGHT: 265 LBS | BODY MASS INDEX: 39.25 KG/M2 | OXYGEN SATURATION: 98 % | HEIGHT: 69 IN | TEMPERATURE: 98.1 F | HEART RATE: 93 BPM

## 2018-09-04 DIAGNOSIS — E11.21 TYPE 2 DIABETES MELLITUS WITH NEPHROPATHY (HCC): ICD-10-CM

## 2018-09-04 DIAGNOSIS — F33.0 MILD EPISODE OF RECURRENT MAJOR DEPRESSIVE DISORDER (HCC): ICD-10-CM

## 2018-09-04 DIAGNOSIS — Z11.4 SCREENING FOR HIV (HUMAN IMMUNODEFICIENCY VIRUS): ICD-10-CM

## 2018-09-04 DIAGNOSIS — R79.89 LOW TESTOSTERONE: ICD-10-CM

## 2018-09-04 DIAGNOSIS — Z99.89 OSA ON CPAP: ICD-10-CM

## 2018-09-04 DIAGNOSIS — I10 ESSENTIAL HYPERTENSION WITH GOAL BLOOD PRESSURE LESS THAN 140/90: ICD-10-CM

## 2018-09-04 DIAGNOSIS — E66.01 SEVERE OBESITY (BMI 35.0-39.9) WITH COMORBIDITY (HCC): Primary | ICD-10-CM

## 2018-09-04 DIAGNOSIS — K21.9 GASTROESOPHAGEAL REFLUX DISEASE WITHOUT ESOPHAGITIS: ICD-10-CM

## 2018-09-04 DIAGNOSIS — Z23 ENCOUNTER FOR IMMUNIZATION: ICD-10-CM

## 2018-09-04 DIAGNOSIS — I25.10 CORONARY ARTERY DISEASE INVOLVING NATIVE CORONARY ARTERY OF NATIVE HEART WITHOUT ANGINA PECTORIS: ICD-10-CM

## 2018-09-04 DIAGNOSIS — F32.9 REACTIVE DEPRESSION: ICD-10-CM

## 2018-09-04 DIAGNOSIS — G47.33 OSA ON CPAP: ICD-10-CM

## 2018-09-04 NOTE — MR AVS SNAPSHOT
[address] [facility] 
[phone] Patient: Gus Su MRN: [id number] :[id number] Visit Information Date & Time Provider Department Dept. Phone Encounter #  
 2018  2:45 PM Chris Rankin [address] [phone] [id number] Follow-up Instructions Return in about 3 months (around 2018). Your Appointments 2018  2:45 PM  
ROUTINE CARE with [address] [facility] Appt Note: 3 month follow up; [facility] [address] [id number]  
[address] [place] [address] [address] [facility] Upcoming Health Maintenance Date Due Pneumococcal 19-64 Highest Risk (1 of 3 - PCV13) 10/12/1985 Influenza Age 5 to Adult 2018 HEMOGLOBIN A1C Q6M 2019 EYE EXAM RETINAL OR DILATED Q1 2019 MICROALBUMIN Q1 2019 FOOT EXAM Q1 2019 LIPID PANEL Q1 2019 COLONOSCOPY 2023 DTaP/Tdap/Td series (2 - Td) 2026 Allergies as of 2018  Review Complete On: 2018 By: Chris Rankin MD  
 No Known Allergies Current Immunizations  Reviewed on 10/24/2016 Name Date Influenza Vaccine (Quad) PF 10/16/2017, 10/24/2016 Tdap 2016 Not reviewed this visit You Were Diagnosed With   
  
 Codes Comments Severe obesity (BMI 35.0-39. 9) with comorbidity ([facility]    -  Primary ICD-10-CM: E66.01 
ICD-9-CM: 278.01 Type 2 diabetes mellitus with nephropathy (HCC)     ICD-10-CM: E11.21 
ICD-9-CM: 250.40, 583.81 Mild episode of recurrent major depressive disorder (HCC)     ICD-10-CM: F33.0 ICD-9-CM: 296.31 Essential hypertension with goal blood pressure less than 140/90     ICD-10-CM: I10 
ICD-9-CM: 401.9  Coronary artery disease involving native coronary artery of native heart without angina pectoris     ICD-10-CM: I25.10 ICD-9-CM: 414.01   
 SCOTTY on CPAP     ICD-10-CM: G47.33, Z99.89 ICD-9-CM: 327.23, V46.8 Gastroesophageal reflux disease without esophagitis     ICD-10-CM: K21.9 ICD-9-CM: 530.81 Low testosterone     ICD-10-CM: R79.89 ICD-9-CM: 790.99 Reactive depression     ICD-10-CM: F32.9 ICD-9-CM: 300.4 Screening for HIV (human immunodeficiency virus)     ICD-10-CM: Z11.4 ICD-9-CM: V73.89 Vitals Smoking Status Never Smoker Preferred Pharmacy Pharmacy Name Phone 305 [facility], [address] [place] [address] [address] Your Updated Medication List  
  
   
This list is accurate as of 9/4/18  1:56 PM.  Always use your most recent med list.  
  
  
  
  
 aspirin 81 mg chewable tablet Take 81 mg by mouth nightly. carvedilol 6.25 mg tablet Commonly known as:  COREG  
TAKE 1 TABLET BY MOUTH TWO  TIMES DAILY losartan 50 mg tablet Commonly known as:  COZAAR  
TAKE 1 TABLET BY MOUTH  DAILY  
  
 metFORMIN 500 mg tablet Commonly known as:  GLUCOPHAGE  
TAKE 1 TABLET BY MOUTH TWO  TIMES DAILY WITH MEALS  
  
 NASONEX 50 mcg/actuation nasal spray Generic drug:  mometasone 2 Sprays daily. omeprazole 20 mg capsule Commonly known as:  PRILOSEC  
TAKE 1 CAPSULE BY MOUTH  DAILY  
  
 raNITIdine 300 mg tablet Commonly known as:  ZANTAC Take 1 Tab by mouth daily. rosuvastatin 10 mg tablet Commonly known as:  CRESTOR  
TAKE 1 TABLET BY MOUTH  NIGHTLY  
  
 testosterone cypionate 200 mg/mL injection Commonly known as:  DEPOTESTOTERONE CYPIONATE  
by IntraMUSCular route every Sunday. venlafaxine- mg capsule Commonly known as:  EFFEXOR-XR Take 1 Cap by mouth daily. We Performed the Following REFERRAL TO ENDOCRINOLOGY [BXM08 Custom] REFERRAL TO NUTRITION [REF50 Custom] Follow-up Instructions Return in about 3 months (around 12/4/2018). To-Do List   
 09/04/2018 Lab:  HIV 1/2 AG/AB, 4TH GENERATION,W RFLX CONFIRM   
  
 09/05/2018 Lab:  HEMOGLOBIN A1C WITH EAG   
  
 09/05/2018 Lab:  METABOLIC PANEL, COMPREHENSIVE   
  
 09/05/2018 Lab:  TSH 3RD GENERATION Referral Information Referral ID Referred By Referred To  
  
 [id number] Ebony Certain   
   [address] [facility] [address] [place], [address] Phone: [phone] Visits Status Start Date End Date 1 New Request 9/4/18 9/4/19 If your referral has a status of pending review or denied, additional information will be sent to support the outcome of this decision. Referral ID Referred By Referred To  
 [id number] ZAP[place], [facility] [address] [place], [address] Phone: [phone] Fax: [phone] Visits Status Start Date End Date 1 New Request 9/4/18 9/4/19 If your referral has a status of pending review or denied, additional information will be sent to support the outcome of this decision. Introducing [facility] & HEALTH SERVICES! Dear Sofya Turner: 
Thank you for requesting a DeRev account. Our records indicate that you already have an active DeRev account. You can access your account anytime at [email or web address] Did you know that you can access your hospital and ER discharge instructions at any time in DeRev? You can also review all of your test results from your hospital stay or ER visit. Additional Information If you have questions, please visit the Frequently Asked Questions section of the DeRev website at [email or web address] Remember, DeRev is NOT to be used for urgent needs. For medical emergencies, dial 911. Now available from your iPhone and Android! Please provide this summary of care documentation to your next provider. Your primary care clinician is listed as Jolena Ganser. If you have any questions after today's visit, please call [phone].

## 2018-09-04 NOTE — PROGRESS NOTES
HISTORY OF PRESENT ILLNESS Charo Sexton is a 47 y.o. male. HPI Last here 4/30/18. Pt is here to f/u on chronic conditions. Pt presents with his  who provides some of the hx. 
   
BP today is 121/75 BP at home 123/60, 144/87, 120s-130s/70s-80s, 122/77 Advised him to bring in his home cuff Continues on losartan 50mg and coreg 6.25mg BID 
   
BS at home running around 116 (yesterday), 141, mostly 110-120 in the AM fasting Denies any lows <70 Continues on metformin 500mg BID He also takes ASA 81mg daily 
   
Wt is up 5 lbs since last visit Pt states he is being more affected by salt Pt would like to start [facility] and/or see a dietician Discussed [facility] being a very good option Provided information to see nutrition Recall saxenda 3mg caused him to feel bloated and uncomfortable 
   
Reviewed labs 8/18 Ordered labs to complete prior to next visit  
  
Pt c/o some BLE swelling and mild tingling at the bottom of his feet  lov  improved recently Not issues currently  
 
   
Pt follows with Dr. Dante Palmer (endo) for PSA and testosterone level checks Last visit was 7/18- checked labs, had testosterone injection Of note, he is on a lower dose of testosterone d/t syncopal event in the past 
Pt wonders whether he can switch to a [facility] endo Provided information for other endos 
   
Pt follows with Dr. Sil Daigle (cardio) annually in June Last visit was 6/18 or 7/18 
   
Pt follows with Dr. Alfredo Mares (pulm) annually Last visit was 9/2017 Pt is compliant with CPAP qhs 
Sleeps well with this Next visit scheduled for 10/1/18 
  
Pt follows with Dr. Yen Smith (GI) Last visit was 2/6/18 
   
Continues prilosec 20mg daily for reflux, which works well, no breakthrough Recall pt states that aciphex improved his sx in the past. However, his insurance no longer covers this medication. 
   
Pt continues on crestor 10mg daily for cholesterol 
   
Continues on effexor 150mg daily for depression, which works well, happy with dose Not sad or down  
 
Pt takes a multivitamin 
   
Recall his partner is HIV positive--have ordered repeat hiv check with next labs 9/18 However, pt was HIV negative on 6/17 labs. Ordered repeat 9/18 
   
PREVENTIVE: 
Colonoscopy: Dr. Karoline Guerrier, 2/6/18, unsure of repeat PSA: Dr. Yoel Amado (endo) follows, 1/17 1.4, 1/18 1.4 Yoel Amado AAA screen: not needed, non smoker Tdap: 8/29/2016 Pneumovax: 4/2015 Shingrix: not yet needed Flu shot: 09/04/18 Microalbumin: 4/18 Foot exam: 04/30/18 A1c: 5/16 6.2, 8/16 6.0, 12/16 6.0, 3/17 5.7, 6/17 5.8, 10/17 6.0, 1/18 5.6 Aj, 4/18 5.9, 8/18 6.0 Eye exam: Dr. Yudelka Wisdom in [place], 4/18, will get notes for review Retinal Scan: 10/16/17, mild diabetic retinopathy in L eye Lipids: 7/18 LDL 34 per endo Patient Active Problem List  
 Diagnosis Date Noted  Severe obesity (BMI 35.0-39. 9) with comorbidity ([address] 04/30/2018  Type 2 diabetes mellitus with nephropathy ([address] 01/22/2018  Diabetes mellitus without complication ([address] 48/24/8424  Reactive depression 12/12/2016  Essential hypertension with goal blood pressure less than 140/90 05/23/2016  Coronary artery disease involving native coronary artery of native heart without angina pectoris 05/23/2016  Mild episode of recurrent major depressive disorder ([address] 05/23/2016  SCOTTY on CPAP 05/23/2016  Chronic systolic congestive heart failure ([address] 05/23/2016  Low testosterone 05/23/2016  Gastroesophageal reflux disease without esophagitis 05/23/2016 Current Outpatient Prescriptions Medication Sig Dispense Refill  rosuvastatin (CRESTOR) 10 mg tablet TAKE 1 TABLET BY MOUTH  NIGHTLY 90 Tab 1  venlafaxine-SR (EFFEXOR-XR) 150 mg capsule Take 1 Cap by mouth daily.  90 Cap 3  
 metFORMIN (GLUCOPHAGE) 500 mg tablet TAKE 1 TABLET BY MOUTH TWO  TIMES DAILY WITH MEALS 180 Tab 1  
 losartan (COZAAR) 50 mg tablet TAKE 1 TABLET BY MOUTH  DAILY 90 Tab 1  
  omeprazole (PRILOSEC) 20 mg capsule TAKE 1 CAPSULE BY MOUTH  DAILY 90 Cap 3  
 raNITIdine (ZANTAC) 300 mg tablet Take 1 Tab by mouth daily. 30 Tab 6  carvedilol (COREG) 6.25 mg tablet TAKE 1 TABLET BY MOUTH TWO  TIMES DAILY 180 Tab 1  
 amoxicillin-clavulanate (AUGMENTIN) 875-125 mg per tablet Take 1 Tab by mouth two (2) times a day.  mometasone (NASONEX) 50 mcg/actuation nasal spray 2 Sprays daily.  guaiFENesin ER (MUCINEX) 600 mg ER tablet Take 600 mg by mouth two (2) times a day.  predniSONE (STERAPRED DS) 10 mg dose pack Take as directed 21 Tab 0  
 testosterone cypionate (DEPOTESTOTERONE CYPIONATE) 200 mg/mL injection by IntraMUSCular route every Sunday.  aspirin 81 mg chewable tablet Take 81 mg by mouth nightly. Past Surgical History:  
Procedure Laterality Date  HX COLONOSCOPY  11/2016  HX GI    
 colonoscopy  HX OTHER SURGICAL    
 anal fissure Lab Results Component Value Date/Time WBC 8.1 08/28/2018 09:23 AM  
HGB 15.9 08/28/2018 09:23 AM  
Hemoglobin (POC) 15.6 02/06/2017 11:40 AM  
HCT 46.4 08/28/2018 09:23 AM  
Hematocrit (POC) 50 04/02/2018 10:59 AM  
PLATELET 008 98/36/7189 09:23 AM  
MCV 85 08/28/2018 09:23 AM  
 
Lab Results Component Value Date/Time Cholesterol, total 143 04/23/2018 08:38 AM  
HDL Cholesterol 35 (L) 04/23/2018 08:38 AM  
LDL, calculated 37 04/23/2018 08:38 AM  
LDL-C, External 34 07/16/2018 Triglyceride 356 (H) 04/23/2018 08:38 AM  
 
Lab Results Component Value Date/Time GFR est non- 08/28/2018 09:23 AM  
GFRNA, POC >60 04/02/2018 10:59 AM  
GFR est  08/28/2018 09:23 AM  
GFRAA, POC >60 04/02/2018 10:59 AM  
Creatinine 0.87 08/28/2018 09:23 AM  
Creatinine (POC) 0.9 04/02/2018 10:59 AM  
BUN 12 08/28/2018 09:23 AM  
BUN (POC) 14 04/02/2018 10:59 AM  
Sodium 139 08/28/2018 09:23 AM  
Sodium (POC) 139 04/02/2018 10:59 AM  
Potassium 4.7 08/28/2018 09:23 AM  
Potassium (POC) 3.9 04/02/2018 10:59 AM  
 Chloride 98 08/28/2018 09:23 AM  
Chloride (POC) 100 04/02/2018 10:59 AM  
CO2 25 08/28/2018 09:23 AM  
  
 
Review of Systems Respiratory: Negative for shortness of breath. Cardiovascular: Negative for chest pain. Physical Exam  
Constitutional: He is oriented to person, place, and time. He appears well-developed and well-nourished. No distress. HENT:  
Head: Normocephalic and atraumatic. Mouth/Throat: Oropharynx is clear and moist. No oropharyngeal exudate. Eyes: Conjunctivae and EOM are normal. Right eye exhibits no discharge. Neck: Normal range of motion. Neck supple. Cardiovascular: Normal rate, regular rhythm, normal heart sounds and intact distal pulses. Exam reveals no gallop and no friction rub. No murmur heard. Pulmonary/Chest: Effort normal and breath sounds normal. No respiratory distress. He has no wheezes. He has no rales. He exhibits no tenderness. Musculoskeletal: Normal range of motion. He exhibits no edema, tenderness or deformity. Lymphadenopathy:  
  He has no cervical adenopathy. Neurological: He is alert and oriented to person, place, and time. He has normal reflexes. Coordination normal.  
Skin: Skin is warm and dry. No rash noted. He is not diaphoretic. No erythema. No pallor. Psychiatric: He has a normal mood and affect. His behavior is normal.  
 
 
ASSESSMENT and PLAN 
  ICD-10-CM ICD-9-CM 1. Severe obesity (BMI 35.0-39. 9) with comorbidity ([facility] Not improved from lov, discussed options, he would like to see nutritionist and start [employer], discussed this is a good plan, referral placed E66.01 278.01   
2. Type 2 diabetes mellitus with nephropathy ([facility] Well controlled on metformin XR 500mg BID 
 E11.21 250.40   
  583.81   
3. Mild episode of recurrent major depressive disorder ([facility] Controlled on effexor 150mg daily, no change to dose F33.0 296.31   
4. Essential hypertension with goal blood pressure less than 140/90 Well controlled on losartan 50 and coreg 6.25mg BID I10 401.9 5. Coronary artery disease involving native coronary artery of native heart without angina pectoris Follows with Dr Delia Rowan annually in June, he thinks he had a recent visit, will get notes for review I25.10 414.01   
6. SCOTTY on CPAP Compliant with CPAP, will be seeing Dr Silvia Langford for annually for check up 10/18, tolerating well G47.33 327.23   
 Z99.89 V46.8 7. Gastroesophageal reflux disease without esophagitis Controlled with prilosec, acifex worked better in the past but is not covered, prilosec is adequate K21.9 530.81 Scribed by Frederick Starks of [address] [place] [address] [address], as dictated by Dr. Alem Connolly. Current diagnosis and concerns discussed with pt at length. Pt understands risks and benefits or current treatment plan and medications, and accepts the treatment and medication with any possible risks. Pt asks appropriate questions, which were answered. Pt was instructed to call with any concerns or problems. This note will not be viewable in [address]

## 2018-09-26 RX ORDER — CARVEDILOL 6.25 MG/1
TABLET ORAL
Qty: 180 TAB | Refills: 1 | Status: SHIPPED | OUTPATIENT
Start: 2018-09-26 | End: 2018-12-21 | Stop reason: SDUPTHER

## 2018-09-29 RX ORDER — LOSARTAN POTASSIUM 50 MG/1
TABLET ORAL
Qty: 90 TAB | Refills: 1 | Status: SHIPPED | OUTPATIENT
Start: 2018-09-29 | End: 2019-03-06 | Stop reason: SDUPTHER

## 2018-09-29 RX ORDER — METFORMIN HYDROCHLORIDE 500 MG/1
TABLET ORAL
Qty: 180 TAB | Refills: 0 | Status: SHIPPED | OUTPATIENT
Start: 2018-09-29 | End: 2018-12-21 | Stop reason: SDUPTHER

## 2018-10-11 ENCOUNTER — HOSPITAL ENCOUNTER (OUTPATIENT)
Dept: NUTRITION | Age: 53
Discharge: HOME | End: 2018-10-11
Attending: INTERNAL MEDICINE
Payer: COMMERCIAL

## 2018-10-11 PROCEDURE — 97802 MEDICAL NUTRITION INDIV IN: CPT

## 2018-10-11 NOTE — PROGRESS NOTES
[facility]  UlEdith Pizarro Zynjerica [address] [place] [address], [address] [place] [address], [facility] [address]  Phone: [phone] Fax: [phone]   Nutrition Assessment - Medical Nutrition Therapy   Outpatient Initial Evaluation         Patient Name: Adeline Bradley : 1965   Treatment Diagnosis: Type 2 DM   Referral Source: Ina Gibson MD Start of Care [facility]): 10/11/2018     Gender: male Age: 47 y.o. Ht: 68 in Wt: 258  lb 117 kg   BMI: 44 BMR   Male  BMR Female    Anthropometrics Assessment: Per BMI, pt is considered obese. Moderate abdominal adiposity is evident based on visual observation     Past Medical History includes: Past Medical History:   Diagnosis Date    Depression     GERD (gastroesophageal reflux disease)     Headache     Heart attack ([facility] [address] 2015    Hypertension     Prediabetes     Tachycardia         Pertinent Medications:   Current Outpatient Prescriptions on File Prior to Encounter   Medication Sig Dispense Refill    metFORMIN (GLUCOPHAGE) 500 mg tablet TAKE 1 TABLET BY MOUTH TWO  TIMES DAILY WITH MEALS 180 Tab 0    losartan (COZAAR) 50 mg tablet TAKE 1 TABLET BY MOUTH  DAILY 90 Tab 1    carvedilol (COREG) 6.25 mg tablet TAKE 1 TABLET BY MOUTH TWO  TIMES DAILY 180 Tab 1    rosuvastatin (CRESTOR) 10 mg tablet TAKE 1 TABLET BY MOUTH  NIGHTLY 90 Tab 1    venlafaxine-SR (EFFEXOR-XR) 150 mg capsule Take 1 Cap by mouth daily. 90 Cap 3    omeprazole (PRILOSEC) 20 mg capsule TAKE 1 CAPSULE BY MOUTH  DAILY 90 Cap 3    raNITIdine (ZANTAC) 300 mg tablet Take 1 Tab by mouth daily. 30 Tab 6    mometasone (NASONEX) 50 mcg/actuation nasal spray 2 Sprays daily.  testosterone cypionate (DEPOTESTOTERONE CYPIONATE) 200 mg/mL injection by IntraMUSCular route every .  aspirin 81 mg chewable tablet Take 81 mg by mouth nightly.        No current facility-administered medications on file prior to encounter. Biochemical Data:   Lab Results   Component Value Date/Time    Hemoglobin A1c 6.0 (H) 08/28/2018 09:23 AM    Hemoglobin A1c, External 5.9 07/16/2018     Lab Results   Component Value Date/Time    Cholesterol, total 143 04/23/2018 08:38 AM    HDL Cholesterol 35 (L) 04/23/2018 08:38 AM    LDL, calculated 37 04/23/2018 08:38 AM    VLDL, calculated 71 (H) 04/23/2018 08:38 AM    Triglyceride 356 (H) 04/23/2018 08:38 AM     Lab Results   Component Value Date/Time    ALT (SGPT) 29 04/23/2018 08:38 AM    AST (SGOT) 22 04/23/2018 08:38 AM    Alk. phosphatase 62 04/23/2018 08:38 AM    Bilirubin, total 0.5 04/23/2018 08:38 AM     Lab Results   Component Value Date/Time    Creatinine (POC) 0.9 04/02/2018 10:59 AM    Creatinine 0.87 08/28/2018 09:23 AM     Lab Results   Component Value Date/Time    BUN 12 08/28/2018 09:23 AM    BUN (POC) 14 04/02/2018 10:59 AM     Lab Results   Component Value Date/Time    Microalb/Creat ratio (ug/mg creat.) 163.6 (H) 04/23/2018 08:38 AM        Subjective/Assessment:   Pt present for nutrition counseling for weight loss. Pt had recently joined [employer] and already made great dietary changes and lost a few lbs. Currently areas for improvement are: decrease fast food consumption and increase non-starch vegetable and fruit consumption. Pt eats fast food most days of the week. Discussed making meals since he works from home. Physical activity is minimal.       Current Eating Patterns: Breakfast: apple cinnamon instant oatmeal with honey; 1 cup cereal with milk (crispix)  Snack: started consuming fruit; past: chips and popcorn  Lunch:  Fast food ([employer], [employer], [employer], taco bell, [employer]  Snack: same as above  Dinner: Fried chicken or fish with frozen vegetable; ham steaks; hamburger helper; lasagna; pasta  Dessert: occasionally ice-cream but not often, yogurt and granola      Estimate Needs   Calories: 1700  Protein: 106 Carbs: 191 Fat: 57   Kcal/day  g/day  g/day  g/day percent: 25  45  30               Education & Recommendations provided: Reducing fast food and fried food intake. Increase fruit and non-starchy vegetable consumption, increase physical activity   Handouts Provided: []  Carbohydrates  []  Protein  []  Fiber  []  Serving Sizes  [x]  Meal and Snack Ideas  []  Food Journals []  Diabetes  []  Cholesterol  []  Sodium  [x]  Gen Nutr Guidelines  []  SBGM Guidelines  [x]  Others: plate diagram    Information Reviewed with: pt   Readiness to Change Stage: []  Pre-contemplative    []  Contemplative  []  Preparation               [x]  Action                  []  Maintenance   Potential Barriers to Learning: []  Decline in memory    []  Language barrier   []  Other:  []  Emotional                  []  Limited mobility  []  Lack of motivation     [] Vision, hearing or cognitive impairment   Expected Compliance: Good pt has already made changes     Nutritional Goal - To promote lifestyle changes to result in:    [x]  Weight loss  []  Improved diabetic control  []  Decreased cholesterol levels  []  Decreased blood pressure  []  Weight maintenance []  Preventing any interactions associated with food allergies  []  Adequate weight gain toward goal weight  []  Other:        Patient Goals:  SMART goals - Reduce fast food consumption to 2x per week  - Improve physical activity w/goal to walk 30 minutes per day during lunch, 3 days per week.   - incorporate at least 1 serving of fruit and 1 serving of vegetables to home made lunches      Dietitian Signature: Guera Westbrook RDN Date: 10/11/2018   Follow-up: 6 weeks Time: 3:25 PM

## 2018-11-05 DIAGNOSIS — M54.9 BACK PAIN, UNSPECIFIED BACK LOCATION, UNSPECIFIED BACK PAIN LATERALITY, UNSPECIFIED CHRONICITY: Primary | ICD-10-CM

## 2018-11-05 NOTE — PROGRESS NOTES
From  Cathleen Lara MD To  Isaac Shah, LIBIAN Sent  71/2/8413  5:12 PM   PT tends to be covered better--can place referral for this     If he wants chiropractor we can place referral I just do not have a specific recommendation for provider     Hamzah Cerna, referral placed and mailed to pt.

## 2018-11-06 DIAGNOSIS — M54.9 BACK PAIN, UNSPECIFIED BACK LOCATION, UNSPECIFIED BACK PAIN LATERALITY, UNSPECIFIED CHRONICITY: Primary | ICD-10-CM

## 2018-11-12 ENCOUNTER — HOSPITAL ENCOUNTER (OUTPATIENT)
Dept: PHYSICAL THERAPY | Age: 53
Discharge: HOME | End: 2018-11-12
Payer: COMMERCIAL

## 2018-11-12 PROCEDURE — 97162 PT EVAL MOD COMPLEX 30 MIN: CPT

## 2018-11-12 PROCEDURE — 97110 THERAPEUTIC EXERCISES: CPT

## 2018-11-12 PROCEDURE — 97012 MECHANICAL TRACTION THERAPY: CPT

## 2018-11-12 NOTE — PROGRESS NOTES
PT INITIAL EVALUATION NOTE 2-15    Patient Name: Roque Irsael  Date:2018  : 1965  [x]  Patient  Verified  Payor: Efrem Dumont / Plan: [facility] OTHER / Product Type: PPO /    In time:1205  Out time:0115  Total Treatment Time (min): 70  Visit #: 1     Treatment Area: Upper back pain [M54.9]  The Student Physical Therapist participated in the delivery of services under the direction of a licensed PT directing the service, making the skilled judgment, and who is responsible for the assessment and treatment. SUBJECTIVE  Pain Level (0-10 scale): 3/10  Any medication changes, allergies to medications, adverse drug reactions, diagnosis change, or new procedure performed?: [] No    [x] Yes (see summary sheet for update)  Subjective:     Patient reports pain and numbness and tingling that starts near the cervical region and goes down to his L deltoid region. Denies injuries/trauma. Insidious onset a few weeks ago. He is a  and he notices the pain a few minutes into sitting in his chair at work. There is no pain when he is sitting in his lounge chair at home. He does take medications. He walks around and gets up at work to help with the pain. Denies previous shoulder/cervical injuries. He has tried an at-home massage machine but it does not reach high enough to relieve his pain. No imaging has been completed. His pain can go up to 6/10. OBJECTIVE/EXAMINATION  Posture:   Forward head, protracted shoulders  Other Observations:  None noted  Gait and Functional Mobility:  Functional   Palpation:    Tight but not tender to palpate: bilateralcervical and thoracic paraspinals,    Tender to palpate: L upper trapezius proximal to cervical origin region        Cervical AROM:        R  L    Flexion     WFL     Extension    WFL     Side Bending   [facility]  WFL    Rotation   [facility]  WF            UPPER QUARTER   MUSCLE STRENGTH  KEY       R  L  0 - No Contraction  C1, C2 Neck Flex n/t  n/t  1 - Trace   C3 Side Flex  n/t  n/t  2 - Poor   C4 Sh Elev  5  5  3 - Fair    C5 Deltoid/Biceps 5  4+ P!  4 - Good   C6 Wrist Ext  5  5  5 - Normal   C7 Triceps  5  4+      C8 Thumb Ext  n/t  n/t      T1 Hand Inst  n/t  n/t    Mobility Assessment: Hypomobile cervical and thoracic PA mobs, hypomobile cervical lateral glides      Neurological: Reflexes / Sensations: Grossly intact  Special Tests: Cervical Distraction: +   Cervical Compression: -    Spurling Test: + on L   Deliliah Gins: -     Full can: -    Empty can: +       Modality rationale: decrease pain and increase tissue extensibility to improve the patients ability to perform all work duties   Type Additional Details   [] Estim: []Att   []Unatt        []TENS instruct                  []IFC  []Premod   []NMES                     []Other:  []w/US   []w/ice   []w/heat  Position:  Location:   [x]  Traction (15 min): [x] Cervical       []Lumbar                       [] Prone          []Supine                       []Intermittent   [x]Continuous Lbs: 20  [] before manual  [] after manual  []w/heat   []  Ultrasound: []Continuous   [] Pulsed at:                            []1MHz   []3MHz Location:  W/cm2:   []  Paraffin         Location:  []w/heat   []  Ice     []  Heat  []  Ice massage Position:  Location:   []  Laser  []  Other: Position:  Location:   []  Vasopneumatic Device Pressure:       [] lo [] med [] hi   Temperature:    [x] Skin assessment post-treatment:  [x]intact []redness- no adverse reaction    []redness - adverse reaction:     20 min Therapeutic Exercise:  [x] See flow sheet :   Rationale: increase ROM and increase strength to improve the patients ability to perform all activities for work    With   [x] TE   [] TA   [] neuro   [] other: Patient Education: [x] Review HEP    [] Progressed/Changed HEP based on:   [] positioning   [] body mechanics   [] transfers   [] heat/ice application    [] other:        Other Objective/Functional Measures: FOTO 83/100 (completed for lumbar back - wrong body part)    Pain Level (0-10 scale) post treatment: 2/10      ASSESSMENT:      [x]  See Plan of Shahzad Bustamante. 11/12/2018  12:03 PM

## 2018-11-13 NOTE — PROGRESS NOTES
[address] [address] (MOB IV), [address] [facility]  Luz Elena Pearce  Phone: [phone] Fax: [phone]    Plan of Care/Statement of Necessity for Physical Therapy Services  2-15    Patient name: Stevie Kim  : 1965  Provider#: [id number]  Referral source: Sage Vargas MD      Medical/Treatment Diagnosis: Upper back pain [M54.9]     Prior Hospitalization: see medical history     Comorbidities: Back pain, BMI over 30 , depression, diabetes, headaches, HBP,   Prior Level of Function: 20 min of exercise seldom or never  Medications: Verified on Patient Summary List    Start of Care: 18      Onset Date: 2018       The [address] [place] [address] and following information is based on the information from the initial evaluation. The Student Physical Therapist participated in the delivery of services under the direction of a licensed PT directing the service, making the skilled judgment, and who is responsible for the assessment and treatment. Assessment/ key information:   Patient is a 47year old male presenting to therapy for L cervical, arm and shoulder pain. Based on his subjective history (relief with postural positional changes) and his occupation as a , it is likely that his arm and shoulder pain is radiating from his cervical spine. To note, his symptoms do not go beyond his L elbow. The patient presents with poor posture i.e. a forward head and protracted shoulders. He does experience pain with MMT of his L UE but that may be secondary to general tension of his upper body during testing. He presents with a hypomobile upper spine, cervical and thoracic, in addition to pain with PA mobilizations. Pt is a good candidate for PT and will benefit from skilled services to address these deficits and work toward the goals listed below.           Evaluation Complexity History HIGH Complexity :3+ comorbidities / personal factors will impact the outcome/ POC ; Examination HIGH Complexity : 4+ Standardized tests and measures addressing body structure, function, activity limitation and / or participation in recreation  ;Presentation MEDIUM Complexity : Evolving with changing characteristics  ; Clinical Decision Making MEDIUM Complexity : FOTO score of 26-74  Overall Complexity Rating: MEDIUM    Problem List: pain affecting function, decrease strength, decrease activity tolerance and decrease flexibility/ joint mobility   Treatment Plan may include any combination of the following: Therapeutic exercise, Therapeutic activities, Neuromuscular re-education, Manual therapy, Patient education and Self Care training  Patient / Family readiness to learn indicated by: interest  Persons(s) to be included in education: patient (P)  Barriers to Learning/Limitations: None  Patient Goal (s): resolve pain issue  Patient Self Reported Health Status: good  Rehabilitation Potential: good    Short Term Goals: To be accomplished in 4-5 treatments:   Patient to be independent with HEP   Patient will report no more than 2/10 with all activities. Long Term Goals: To be accomplished in 10-12 treatments:   Patient will increase FOTO by at least the [address] points in order to demonstrate an increase in function. Patient will report that he is able to perform postural corrections in order to decrease his arm/shoulder symptoms. Patient will have increased thoracic mobility and no pain with PA mobilization to the thoracic spine in order to improve posture    Frequency / Duration: Patient to be seen 2 times per week for 12 treatments. Patient/ Caregiver education and instruction: self care, activity modification and exercises    [x]  Plan of care has been reviewed with TRAVIS Hernandez 11/13/2018 6:52 AM    ________________________________________________________________________    I certify that the above Therapy Services are being furnished while the patient is under my care. I agree with the treatment plan and certify that this therapy is necessary.     [de-identified] Signature:____________________  Date:____________Time: _________

## 2018-11-16 ENCOUNTER — HOSPITAL ENCOUNTER (OUTPATIENT)
Dept: PHYSICAL THERAPY | Age: 53
Discharge: HOME | End: 2018-11-16
Payer: COMMERCIAL

## 2018-11-16 PROCEDURE — 97110 THERAPEUTIC EXERCISES: CPT

## 2018-11-16 PROCEDURE — 97140 MANUAL THERAPY 1/> REGIONS: CPT

## 2018-11-16 NOTE — PROGRESS NOTES
PT DAILY TREATMENT NOTE 2-15    Patient Name: Evelyn Turner  Date:2018  : 1965  [x]  Patient  Verified  Payor: Nighat Tai / Plan: [facility] OTHER / Product Type: PPO /    In time:903  Out time:955  Total Treatment Time (min): 52  Visit #: 2     Treatment Area: Neck pain [M54.2]  The Student Physical Therapist participated in the delivery of services under the direction of a licensed PT directing the service, making the skilled judgment, and who is responsible for the assessment and treatment. SUBJECTIVE  Pain Level (0-10 scale): 1/10  Any medication changes, allergies to medications, adverse drug reactions, diagnosis change, or new procedure performed?: [x] No    [] Yes (see summary sheet for update)  Subjective functional status/changes:   [] No changes reported  Patient reports that he is doing well. He has noticed an improvement with his arm/shoulder pain. He reports that he has been able to work at his desk for about an hour before he notices his symptoms whereas in the past, he could only work for about 15-20 minutes prior to symptom onset. OBJECTIVE    42 min Therapeutic Exercise:  [x] See flow sheet :   Rationale: increase ROM and increase strength to improve the patients ability to perform all work duties    10 min Manual Therapy:  Trigger point release to L upper trapezius   Rationale: decrease pain, increase ROM, increase tissue extensibility and decrease trigger points  to improve the patients ability to perform all exercises and function in the community/work. With   [x] TE   [] TA   [] neuro   [] other: Patient Education: [x] Review HEP    [] Progressed/Changed HEP based on:   [] positioning   [] body mechanics   [] transfers   [] heat/ice application    [] other:      Other Objective/Functional Measures:  Foto 48/100     Pain Level (0-10 scale) post treatment: 1/10    ASSESSMENT/Changes in Function:   Patient is able to complete exercises without an increase in symptoms. He demonstrates decreased ability to perform scapular retractions, which results in compensatory strategies with most of his exercises, requiring verbal/tactile cuing. The FOTO was retaken today since the incorrect body part was chosen during the initial evaluation. He continues to demonstrate poor posture,which is likely contributing to hypomobility in his thoracic spine. Patient will continue to benefit from skilled PT services to modify and progress therapeutic interventions, address functional mobility deficits, address ROM deficits, analyze and address soft tissue restrictions, analyze and cue movement patterns and assess and modify postural abnormalities to attain remaining goals. [x]  See Plan of Care  []  See progress note/recertification  []  See Discharge Summary         Progress towards goals / Updated goals:  Patient is progressing towards goals. Will continue to address posture and scapular strengthening in order to improve his ability to perform all his work duties.     PLAN  [x]  Upgrade activities as tolerated     [x]  Continue plan of care  [x]  Update interventions per flow sheet       []  Discharge due to:_  []  Other:_      Joan Slice 11/16/2018  10:13 AM

## 2018-11-21 ENCOUNTER — HOSPITAL ENCOUNTER (OUTPATIENT)
Dept: PHYSICAL THERAPY | Age: 53
Discharge: HOME | End: 2018-11-21
Payer: COMMERCIAL

## 2018-11-21 PROCEDURE — 97110 THERAPEUTIC EXERCISES: CPT | Performed by: PHYSICAL THERAPIST

## 2018-11-21 PROCEDURE — 97140 MANUAL THERAPY 1/> REGIONS: CPT | Performed by: PHYSICAL THERAPIST

## 2018-11-21 NOTE — PROGRESS NOTES
PT DAILY TREATMENT NOTE 2-15 Patient Name: Leslie Wang Date:2018 : 1965 [x]  Patient  Verified Payor: Cristian Mecredes / Plan: [facility] OTHER / Product Type: PPO / In time:9:00 AM  Out time:9:55 AM 
Total Treatment Time (min): 55 Visit #: 3 Treatment Area: Neck pain [M54.2] SUBJECTIVE Pain Level (0-10 scale): 0/10 Any medication changes, allergies to medications, adverse drug reactions, diagnosis change, or new procedure performed?: [x] No    [] Yes (see summary sheet for update) Subjective functional status/changes:   [] No changes reported Patient reports continued improvement with his arm pain and is able to sit for 30-60 minutes before an onset of arm pain. OBJECTIVE 40 min Therapeutic Exercise:  [x] See flow sheet :  
Rationale: increase ROM and increase strength to improve the patients ability to perform all work duties 15 min Manual Therapy:  Trigger point release to L upper trapezius Grade III P/A cervical mobilizations to the lower c-spine Passive stretching for the left upper trapezius Manual cervical traction Infraclavicular pumping Rationale: decrease pain, increase ROM, increase tissue extensibility and decrease trigger points  to improve the patients ability to perform all exercises and function in the community/work. With 
 [x] TE 
 [] TA 
 [] neuro 
 [] other: Patient Education: [x] Review HEP [] Progressed/Changed HEP based on:  
[] positioning   [] body mechanics   [] transfers   [] heat/ice application   
[] other:   
 
Other Objective/Functional Measures:  
 
Pain Level (0-10 scale) post treatment: 0/10 ASSESSMENT/Changes in Function:  
Patient tolerated all ther ex very well and will look to progress resistance/difficulty of exercises next visit.  
Patient will continue to benefit from skilled PT services to modify and progress therapeutic interventions, address functional mobility deficits, address ROM deficits, analyze and address soft tissue restrictions, analyze and cue movement patterns and assess and modify postural abnormalities to attain remaining goals. [x]  See Plan of Care 
[]  See progress note/recertification 
[]  See Discharge Summary Progress towards goals / Updated goals: 
Patient continues to show excellent progress overall towards short term goals and tolerated all interventions well. PLAN [x]  Upgrade activities as tolerated     [x]  Continue plan of care [x]  Update interventions per flow sheet      
[]  Discharge due to:_ 
[]  Other:_   
 
Brittanie Metcalf PT , DPT, OCS, Cert.  DN 
 11/21/2018  10:13 AM

## 2018-11-26 ENCOUNTER — HOSPITAL ENCOUNTER (OUTPATIENT)
Dept: PHYSICAL THERAPY | Age: 53
Discharge: HOME | End: 2018-11-26
Payer: COMMERCIAL

## 2018-11-26 PROCEDURE — 97110 THERAPEUTIC EXERCISES: CPT | Performed by: PHYSICAL THERAPIST

## 2018-11-26 PROCEDURE — 97014 ELECTRIC STIMULATION THERAPY: CPT | Performed by: PHYSICAL THERAPIST

## 2018-11-26 PROCEDURE — 97140 MANUAL THERAPY 1/> REGIONS: CPT | Performed by: PHYSICAL THERAPIST

## 2018-11-26 NOTE — PROGRESS NOTES
PT DAILY TREATMENT NOTE 2-15 Patient Name: Rachel Roberson Date:2018 : 1965 [x]  Patient  Verified Payor: Radha Castro / Plan: [facility] OTHER / Product Type: PPO / In time:9:30 AM  Out time: 10:40 AM 
Total Treatment Time (min): 70 Visit #: 2 Treatment Area: Neck pain [M54.2] SUBJECTIVE Pain Level (0-10 scale): 0/10 Any medication changes, allergies to medications, adverse drug reactions, diagnosis change, or new procedure performed?: [x] No    [] Yes (see summary sheet for update) Subjective functional status/changes:   [] No changes reported Patient reports that over the last day his neck pain has been bothering slightly more. The pain is located along the posterolateral humerus. OBJECTIVE Modalities: E-stim: IFC to the posterolateral shoulder in supine for 15 minutes to allow for decreased pain and improved ROM 40 min Therapeutic Exercise:  [x] See flow sheet :  
Rationale: increase ROM and increase strength to improve the patients ability to perform all work duties 15 min Manual Therapy:  Trigger point release to L upper trapezius Grade III P/A cervical mobilizations to the lower c-spine Passive stretching for the left upper trapezius Manual cervical traction Grade III posterior glenohumeral mobilizations Infraclavicular pumping Rationale: decrease pain, increase ROM, increase tissue extensibility and decrease trigger points  to improve the patients ability to perform all exercises and function in the community/work. With 
 [x] TE 
 [] TA 
 [] neuro 
 [] other: Patient Education: [x] Review HEP [] Progressed/Changed HEP based on:  
[] positioning   [] body mechanics   [] transfers   [] heat/ice application   
[] other:   
 
Other Objective/Functional Measures: ULTTT radial: Negative Pain Level (0-10 scale) post treatment: 0/10 ASSESSMENT/Changes in Function: Patient tolerated all ther ex very well and will look to progress resistance/difficulty of exercises next visit. Patient will continue to benefit from skilled PT services to modify and progress therapeutic interventions, address functional mobility deficits, address ROM deficits, analyze and address soft tissue restrictions, analyze and cue movement patterns and assess and modify postural abnormalities to attain remaining goals. [x]  See Plan of Care 
[]  See progress note/recertification 
[]  See Discharge Summary Progress towards goals / Updated goals: 
Patient tolerated all interventions well and will continue to utilize manual therapy as needed to allow for the achievement of all long term goals. PLAN [x]  Upgrade activities as tolerated     [x]  Continue plan of care [x]  Update interventions per flow sheet      
[]  Discharge due to:_ 
[]  Other:_   
 
Jenna Ca PT , DPT, OCS, Cert.  DN 
 11/26/2018  10:13 AM

## 2018-11-30 ENCOUNTER — HOSPITAL ENCOUNTER (OUTPATIENT)
Dept: PHYSICAL THERAPY | Age: 53
Discharge: HOME | End: 2018-11-30
Payer: COMMERCIAL

## 2018-11-30 PROCEDURE — 97110 THERAPEUTIC EXERCISES: CPT | Performed by: PHYSICAL THERAPIST

## 2018-11-30 PROCEDURE — 97014 ELECTRIC STIMULATION THERAPY: CPT | Performed by: PHYSICAL THERAPIST

## 2018-11-30 PROCEDURE — 97140 MANUAL THERAPY 1/> REGIONS: CPT | Performed by: PHYSICAL THERAPIST

## 2018-11-30 NOTE — PROGRESS NOTES
PT DAILY TREATMENT NOTE 2-15 Patient Name: Rich Myers Date:2018 : 1965 [x]  Patient  Verified Payor: Chucky Reynaga / Plan: [facility] OTHER / Product Type: PPO / In time:9:30 AM  Out time: 10:40 AM 
Total Treatment Time (min): 70 Visit #: 6 Treatment Area: Neck pain [M54.2] SUBJECTIVE Pain Level (0-10 scale): 0/10 Any medication changes, allergies to medications, adverse drug reactions, diagnosis change, or new procedure performed?: [x] No    [] Yes (see summary sheet for update) Subjective functional status/changes:   [] No changes reported Patient reports a significant improvement in his overall arm pain and is able to work on his computer for roughly 30 minutes before the pain sets in. OBJECTIVE Modalities: E-stim: IFC to the posterolateral shoulder in supine for 15 minutes to allow for decreased pain and improved ROM 40 min Therapeutic Exercise:  [x] See flow sheet :  
Rationale: increase ROM and increase strength to improve the patients ability to perform all work duties 15 min Manual Therapy:  Trigger point release to L upper trapezius Grade III P/A cervical mobilizations to the lower c-spine Passive stretching for the left upper trapezius Manual cervical traction Grade III posterior glenohumeral mobilizations Infraclavicular pumping Rationale: decrease pain, increase ROM, increase tissue extensibility and decrease trigger points  to improve the patients ability to perform all exercises and function in the community/work. With 
 [x] TE 
 [] TA 
 [] neuro 
 [] other: Patient Education: [x] Review HEP [] Progressed/Changed HEP based on:  
[] positioning   [] body mechanics   [] transfers   [] heat/ice application   
[] other:   
 
Other Objective/Functional Measures:  
 
Pain Level (0-10 scale) post treatment: 0/10 ASSESSMENT/Changes in Function: Patient will continue to benefit from skilled PT services to modify and progress therapeutic interventions, address functional mobility deficits, address ROM deficits, analyze and address soft tissue restrictions, analyze and cue movement patterns and assess and modify postural abnormalities to attain remaining goals. [x]  See Plan of Care 
[]  See progress note/recertification 
[]  See Discharge Summary Progress towards goals / Updated goals: 
Patient continues to do very well with PT with a significant improvement in left posterior capsule tissue extensibility compared to last visit and is now able to type for longer periods without pain. PLAN [x]  Upgrade activities as tolerated     [x]  Continue plan of care [x]  Update interventions per flow sheet      
[]  Discharge due to:_ 
[]  Other:_   
 
Zandra Lopez, PT , DPT, OCS, Cert.  DN 
 11/30/2018  10:13 AM

## 2018-12-03 ENCOUNTER — HOSPITAL ENCOUNTER (OUTPATIENT)
Dept: PHYSICAL THERAPY | Age: 53
Discharge: HOME | End: 2018-12-03
Payer: COMMERCIAL

## 2018-12-03 PROCEDURE — 97014 ELECTRIC STIMULATION THERAPY: CPT | Performed by: PHYSICAL MEDICINE & REHABILITATION

## 2018-12-03 PROCEDURE — 97110 THERAPEUTIC EXERCISES: CPT | Performed by: PHYSICAL MEDICINE & REHABILITATION

## 2018-12-03 NOTE — PROGRESS NOTES
PT DAILY TREATMENT NOTE 2-15    Patient Name: Leslie Wang  Date:12/3/2018  : 1965  [x]  Patient  Verified  Payor: Cristian Merchantmarcellus / Plan: [facility] [place] [facility] / Product Type: PPO /    In time:1000 AM  Out time: 1105 AM  Total Treatment Time (min): 65  Visit #: 6     Treatment Area: Neck pain [M54.2]    SUBJECTIVE  Pain Level (0-10 scale): 0/10  Any medication changes, allergies to medications, adverse drug reactions, diagnosis change, or new procedure performed?: [x] No    [] Yes (see summary sheet for update)  Subjective functional status/changes:   [] No changes reported  Patient reports he hasn't had any increase in pain since last visit. OBJECTIVE  Modalities: E-stim: IFC to the posterolateral shoulder in supine for 15 minutes to allow for decreased pain and improved ROM    50 min Therapeutic Exercise:  [x] See flow sheet :   Rationale: increase ROM and increase strength to improve the patients ability to perform all work duties            With   [x] TE   [] TA   [] neuro   [] other: Patient Education: [x] Review HEP    [] Progressed/Changed HEP based on:   [] positioning   [] body mechanics   [] transfers   [] heat/ice application    [] other:      Other Objective/Functional Measures:   Updated HEP    Pain Level (0-10 scale) post treatment: 0/10    ASSESSMENT/Changes in Function:     Patient will continue to benefit from skilled PT services to modify and progress therapeutic interventions, address functional mobility deficits, address ROM deficits, analyze and address soft tissue restrictions, analyze and cue movement patterns and assess and modify postural abnormalities to attain remaining goals.      []  See Plan of Care  []  See progress note/recertification  []  See Discharge Summary         Progress towards goals / Updated goals:  Patient continues to do very well with PT with a significant improvement in ability to perform work duties and ADLs without any increase in pain.     PLAN  [x]  Upgrade activities as tolerated     [x]  Continue plan of care  [x]  Update interventions per flow sheet       []  Discharge due to:_  []  Other:_      Dami Lee PTA, CPT   12/3/2018  10:13 AM

## 2018-12-07 ENCOUNTER — APPOINTMENT (OUTPATIENT)
Dept: PHYSICAL THERAPY | Age: 53
End: 2018-12-07
Payer: COMMERCIAL

## 2018-12-21 RX ORDER — ROSUVASTATIN CALCIUM 10 MG/1
TABLET, COATED ORAL
Qty: 90 TAB | Refills: 1 | Status: SHIPPED | OUTPATIENT
Start: 2018-12-21 | End: 2019-07-16 | Stop reason: SDUPTHER

## 2018-12-21 RX ORDER — METFORMIN HYDROCHLORIDE 500 MG/1
TABLET ORAL
Qty: 180 TAB | Refills: 0 | Status: SHIPPED | OUTPATIENT
Start: 2018-12-21 | End: 2019-03-30 | Stop reason: SDUPTHER

## 2018-12-21 RX ORDER — CARVEDILOL 6.25 MG/1
TABLET ORAL
Qty: 180 TAB | Refills: 1 | Status: SHIPPED | OUTPATIENT
Start: 2018-12-21 | End: 2019-08-25 | Stop reason: SDUPTHER

## 2018-12-21 RX ORDER — VENLAFAXINE HYDROCHLORIDE 150 MG/1
CAPSULE, EXTENDED RELEASE ORAL
Qty: 90 CAP | Refills: 3 | Status: SHIPPED | OUTPATIENT
Start: 2018-12-21 | End: 2019-12-07 | Stop reason: SDUPTHER

## 2018-12-22 LAB
ALBUMIN SERPL-MCNC: 4.3 G/DL (ref 3.5–5.5)
ALBUMIN/GLOB SERPL: 1.5 {RATIO} (ref 1.2–2.2)
ALP SERPL-CCNC: 60 IU/L (ref 39–117)
ALT SERPL-CCNC: 23 IU/L (ref 0–44)
AST SERPL-CCNC: 19 IU/L (ref 0–40)
BILIRUB SERPL-MCNC: 0.4 MG/DL (ref 0–1.2)
BUN SERPL-MCNC: 11 MG/DL (ref 6–24)
BUN/CREAT SERPL: 13 (ref 9–20)
CALCIUM SERPL-MCNC: 9.5 MG/DL (ref 8.7–10.2)
CHLORIDE SERPL-SCNC: 102 MMOL/L (ref 96–106)
CO2 SERPL-SCNC: 23 MMOL/L (ref 20–29)
CREAT SERPL-MCNC: 0.87 MG/DL (ref 0.76–1.27)
EST. AVERAGE GLUCOSE BLD GHB EST-MCNC: 126 MG/DL
GLOBULIN SER CALC-MCNC: 2.9 G/DL (ref 1.5–4.5)
GLUCOSE SERPL-MCNC: 123 MG/DL (ref 65–99)
HBA1C MFR BLD: 6 % (ref 4.8–5.6)
HIV 1+2 AB+HIV1 P24 AG SERPL QL IA: NON REACTIVE
POTASSIUM SERPL-SCNC: 4.2 MMOL/L (ref 3.5–5.2)
PROT SERPL-MCNC: 7.2 G/DL (ref 6–8.5)
SODIUM SERPL-SCNC: 140 MMOL/L (ref 134–144)
TSH SERPL DL<=0.005 MIU/L-ACNC: 1.02 UIU/ML (ref 0.45–4.5)

## 2019-01-04 ENCOUNTER — OFFICE VISIT (OUTPATIENT)
Dept: INTERNAL MEDICINE CLINIC | Age: 54
End: 2019-01-04

## 2019-01-04 VITALS
DIASTOLIC BLOOD PRESSURE: 80 MMHG | OXYGEN SATURATION: 97 % | SYSTOLIC BLOOD PRESSURE: 114 MMHG | HEIGHT: 69 IN | WEIGHT: 257 LBS | HEART RATE: 95 BPM | BODY MASS INDEX: 38.06 KG/M2 | TEMPERATURE: 98.1 F | RESPIRATION RATE: 16 BRPM

## 2019-01-04 DIAGNOSIS — D17.0 LIPOMA OF NECK: ICD-10-CM

## 2019-01-04 DIAGNOSIS — I25.10 CORONARY ARTERY DISEASE INVOLVING NATIVE CORONARY ARTERY OF NATIVE HEART WITHOUT ANGINA PECTORIS: ICD-10-CM

## 2019-01-04 DIAGNOSIS — G47.33 OSA ON CPAP: ICD-10-CM

## 2019-01-04 DIAGNOSIS — Z99.89 OSA ON CPAP: ICD-10-CM

## 2019-01-04 DIAGNOSIS — F32.9 REACTIVE DEPRESSION: ICD-10-CM

## 2019-01-04 DIAGNOSIS — E66.9 OBESITY (BMI 35.0-39.9 WITHOUT COMORBIDITY): ICD-10-CM

## 2019-01-04 DIAGNOSIS — Z00.00 PHYSICAL EXAM, ANNUAL: Primary | ICD-10-CM

## 2019-01-04 DIAGNOSIS — E11.21 TYPE 2 DIABETES MELLITUS WITH NEPHROPATHY (HCC): ICD-10-CM

## 2019-01-04 DIAGNOSIS — K21.9 GASTROESOPHAGEAL REFLUX DISEASE WITHOUT ESOPHAGITIS: ICD-10-CM

## 2019-01-04 NOTE — PROGRESS NOTES
HISTORY OF PRESENT ILLNESS Casie Hopper is a 47 y.o. male. HPI Last here 9/4/18. Pt is here to f/u on chronic conditions. 
   
BP today is 114/80 /70s at home Continues on losartan 50mg and coreg 6.25mg BID 
   
BS at home running around mostly 110-120 in the AM fasting Denies any lows <70 Continues on metformin 500mg BID He also takes ASA 81mg daily 
   
Wt today is 257 lbs --down 8 lbs x lov Pt started [employer], is doing this currently to learn what not to eat Recall saxenda 3mg caused him to feel bloated and uncomfortable 
   
Reviewed labs 12/18 
  
Pt c/o some BLE swelling and mild tingling at the bottom of his feet  lov  improved recently Not issues currently  
   
Pt previously followed with Dr. Mortimer Porteous (endo) for PSA and testosterone level checks Last visit was 7/18 Of note, he is on a lower dose of testosterone d/t syncopal event in the past 
Pt has appointment scheduled with Dr John Crouch 1/17/19 Pt follows with Dr. Uri Michael (cardio) annually in July No signs sx/cad/chf 
Reviewed notes 7/18: /76, stable HF, meds managing everything, EKG, f/u in 1 year 
   
Pt follows with Dr. Santi Cohen (pulm) annually Last visit was 10/18 Pt is compliant with CPAP qhs 
Sleeps well with this  
  
Pt follows with Dr. Diamond Fletcher (GI) Last visit was 2/6/18 
   
No longer taking prilosec He is taking zantac to help with reflux and this works well No breakthru Working on diet Recall pt states that aciphex improved his sx in the past. However, his insurance no longer covers this medication. 
   
Pt continues on crestor 10mg daily for cholesterol 
   
Continues on effexor 150mg daily for depression, which works well, happy with dose Not sad or down  1/19 
  
Pt takes a multivitamin Pt c/o a nodule on his neck for over a month No pain, not growing He thinks it has been there longer and he just never noticed it Discussed that this is likely a lipoma, vs sebaceous cyst 
Ordered US 
   
 Recall his partner is HIV positive However, pt was HIV negative on 12/18 labs 
   
PREVENTIVE: 
Colonoscopy: Dr. Mario Delgado, 2/6/18, unsure of repeat PSA: Dr. Anderson Michaud (endo) follows, 1/17 1.4, 1/18 1.4 Anderson Michaud AAA screen: not needed, non smoker Tdap: 8/29/2016 Pneumovax: 4/2015 Shingrix: not yet needed Flu shot: 09/04/18 HIV screen: 12/18, negative Microalbumin: 4/18 Foot exam: 04/30/18  
A1c: 5/16 6.2, 8/16 6.0, 12/16 6.0, 3/17 5.7, 6/17 5.8, 10/17 6.0, 1/18 5.6 Aj, 4/18 5.9, 8/18 6.0, 12/18 6.0 Eye exam: Dr. Keena Paul in [place], 4/4/18 Retinal Scan: 10/16/17, mild diabetic retinopathy in L eye Lipids: 7/18 LDL 34 per endo Patient Active Problem List  
 Diagnosis Date Noted  Severe obesity (BMI 35.0-39. 9) with comorbidity ([facility] 04/30/2018  Type 2 diabetes mellitus with nephropathy ([facility] 01/22/2018  Diabetes mellitus without complication ([facility] 06/81/1550  Reactive depression 12/12/2016  Essential hypertension with goal blood pressure less than 140/90 05/23/2016  Coronary artery disease involving native coronary artery of native heart without angina pectoris 05/23/2016  Mild episode of recurrent major depressive disorder ([address] 05/23/2016  SCOTTY on CPAP 05/23/2016  Chronic systolic congestive heart failure ([facility] 05/23/2016  Low testosterone 05/23/2016  Gastroesophageal reflux disease without esophagitis 05/23/2016 Current Outpatient Medications Medication Sig Dispense Refill  rosuvastatin (CRESTOR) 10 mg tablet TAKE 1 TABLET BY MOUTH  NIGHTLY 90 Tab 1  
 metFORMIN (GLUCOPHAGE) 500 mg tablet TAKE 1 TABLET BY MOUTH TWO  TIMES DAILY WITH MEALS 180 Tab 0  carvedilol (COREG) 6.25 mg tablet TAKE 1 TABLET BY MOUTH TWO  TIMES DAILY 180 Tab 1  venlafaxine-SR (EFFEXOR-XR) 150 mg capsule TAKE 1 CAPSULE BY MOUTH  DAILY 90 Cap 3  
 losartan (COZAAR) 50 mg tablet TAKE 1 TABLET BY MOUTH  DAILY 90 Tab 1  
  omeprazole (PRILOSEC) 20 mg capsule TAKE 1 CAPSULE BY MOUTH  DAILY 90 Cap 3  
 raNITIdine (ZANTAC) 300 mg tablet Take 1 Tab by mouth daily. 30 Tab 6  
 mometasone (NASONEX) 50 mcg/actuation nasal spray 2 Sprays daily.  testosterone cypionate (DEPOTESTOTERONE CYPIONATE) 200 mg/mL injection by IntraMUSCular route every Sunday.  aspirin 81 mg chewable tablet Take 81 mg by mouth nightly. Past Surgical History:  
Procedure Laterality Date  HX COLONOSCOPY  11/2016  HX GI    
 colonoscopy  HX OTHER SURGICAL    
 anal fissure Lab Results Component Value Date/Time WBC 8.1 08/28/2018 09:23 AM  
 HGB 15.9 08/28/2018 09:23 AM  
 Hemoglobin (POC) 15.6 02/06/2017 11:40 AM  
 HCT 46.4 08/28/2018 09:23 AM  
 Hematocrit (POC) 50 04/02/2018 10:59 AM  
 PLATELET 375 64/96/3712 09:23 AM  
 MCV 85 08/28/2018 09:23 AM  
 
Lab Results Component Value Date/Time Cholesterol, total 143 04/23/2018 08:38 AM  
 HDL Cholesterol 35 (L) 04/23/2018 08:38 AM  
 LDL, calculated 37 04/23/2018 08:38 AM  
 LDL-C, External 34 07/16/2018 Triglyceride 356 (H) 04/23/2018 08:38 AM  
 
Lab Results Component Value Date/Time GFR est non-AA 99 12/21/2018 09:09 AM  
 GFRNA, POC >60 04/02/2018 10:59 AM  
 GFR est  12/21/2018 09:09 AM  
 GFRAA, POC >60 04/02/2018 10:59 AM  
 Creatinine 0.87 12/21/2018 09:09 AM  
 Creatinine (POC) 0.9 04/02/2018 10:59 AM  
 BUN 11 12/21/2018 09:09 AM  
 BUN (POC) 14 04/02/2018 10:59 AM  
 Sodium 140 12/21/2018 09:09 AM  
 Sodium (POC) 139 04/02/2018 10:59 AM  
 Potassium 4.2 12/21/2018 09:09 AM  
 Potassium (POC) 3.9 04/02/2018 10:59 AM  
 Chloride 102 12/21/2018 09:09 AM  
 Chloride (POC) 100 04/02/2018 10:59 AM  
 CO2 23 12/21/2018 09:09 AM  
  
Review of Systems Respiratory: Negative for shortness of breath. Cardiovascular: Negative for chest pain. Physical Exam  
Constitutional: He is oriented to person, place, and time.  He appears well-developed and well-nourished. No distress. HENT:  
Head: Normocephalic and atraumatic. Right Ear: External ear normal.  
Left Ear: External ear normal.  
Mouth/Throat: Oropharynx is clear and moist. No oropharyngeal exudate. Eyes: Conjunctivae and EOM are normal. Right eye exhibits no discharge. Left eye exhibits no discharge. Neck: Normal range of motion. Neck supple. No carotid bruits Cardiovascular: Normal rate, regular rhythm, normal heart sounds and intact distal pulses. Exam reveals no gallop and no friction rub. No murmur heard. Pulmonary/Chest: Effort normal and breath sounds normal. No respiratory distress. He has no wheezes. He has no rales. He exhibits no tenderness. Abdominal: Soft. He exhibits no distension and no mass. There is no tenderness. There is no rebound and no guarding. Diastasis of the rectus muscles Musculoskeletal: Normal range of motion. He exhibits no edema, tenderness or deformity. Lymphadenopathy:  
  He has no cervical adenopathy. Neurological: He is alert and oriented to person, place, and time. He has normal reflexes. Coordination normal.  
Skin: Skin is warm and dry. No rash noted. He is not diaphoretic. No erythema. No pallor. Psychiatric: He has a normal mood and affect. His behavior is normal.  
 
 
ASSESSMENT and PLAN 
  ICD-10-CM ICD-9-CM 1. Physical exam, annual 
 
Discussed diet and w/l, doing [employer], wt improved, COLO UTD, labs ordered, eye in April, flu shot UTD, HIV checked, will check this annually Z00.00 V70.0 2. Gastroesophageal reflux disease without esophagitis Controlled with zantac, no longer needing prilosec K21.9 530.81 LIPID PANEL MICROALBUMIN, UR, RAND W/ MICROALB/CREAT RATIO  
   METABOLIC PANEL, BASIC HEMOGLOBIN A1C WITH EAG 3. SCOTTY on CPAP Compliant with BiPAP 
 G47.33 327.23 LIPID PANEL  
 Z99.89 V46.8 MICROALBUMIN, UR, RAND W/ MICROALB/CREAT RATIO  
   METABOLIC PANEL, BASIC  
 HEMOGLOBIN A1C WITH EAG 4. Type 2 diabetes mellitus with nephropathy ([address] Controlled on metformin, continue E11.21 250.40 LIPID PANEL  
  583.81 MICROALBUMIN, UR, RAND W/ MICROALB/CREAT RATIO  
   METABOLIC PANEL, BASIC HEMOGLOBIN A1C WITH EAG 5. Reactive depression Stable with effexor, happy with dose F32.9 300.4 LIPID PANEL MICROALBUMIN, UR, RAND W/ MICROALB/CREAT RATIO  
   METABOLIC PANEL, BASIC HEMOGLOBIN A1C WITH EAG 6. Coronary artery disease involving native coronary artery of native heart without angina pectoris Sees Dr Jose Simmons annually in July I25.10 414.01 LIPID PANEL MICROALBUMIN, UR, RAND W/ MICROALB/CREAT RATIO  
   METABOLIC PANEL, BASIC HEMOGLOBIN A1C WITH EAG  
7. Obesity (BMI 35.0-39.9 without comorbidity) Doing [employer], wt improving, continue with this E66.9 278.00   
8. Lipoma of neck Will check US 
 D17.0 214.1 US HEAD NECK SOFT TISSUE Scribed by Gwyn Dandy of [address] [place] [address] [address], as dictated by Dr. Ofelia Oneil. Current diagnosis and concerns discussed with pt at length. Pt understands risks and benefits or current treatment plan and medications, and accepts the treatment and medication with any possible risks. Pt asks appropriate questions, which were answered. Pt was instructed to call with any concerns or problems. I have reviewed the note documented by the scribe. The services provided are my own. The documentation is accurate

## 2019-01-10 ENCOUNTER — HOSPITAL ENCOUNTER (OUTPATIENT)
Dept: ULTRASOUND IMAGING | Age: 54
Discharge: HOME | End: 2019-01-10
Attending: INTERNAL MEDICINE
Payer: COMMERCIAL

## 2019-01-10 DIAGNOSIS — D17.0 LIPOMA OF NECK: ICD-10-CM

## 2019-01-10 PROCEDURE — 76536 US EXAM OF HEAD AND NECK: CPT

## 2019-01-10 NOTE — PROGRESS NOTES
Let him know the spot on his neck is a LN , looks normal but is on the large size    Will have him see ent for bx--dr Bonilla

## 2019-01-11 DIAGNOSIS — R59.0 ENLARGED LYMPH NODE IN NECK: Primary | ICD-10-CM

## 2019-01-11 NOTE — PROGRESS NOTES
Called, spoke to pt. Two pt identifiers confirmed. Pt informed per Dr. Alfreda De Leon the spot on his neck is a LN , looks normal but is on the large size. Pt informed per Dr. Alfreda De Leon to see ent for bx--dr Lynnette Navarrete. Pt informed of Dr. Miguel A Munoz information [phone]. Pt requesting a referral.- ordered and mailed to pt. Pt verbalized understanding of information discussed w/ no further questions at this time.

## 2019-01-17 ENCOUNTER — OFFICE VISIT (OUTPATIENT)
Dept: ENDOCRINOLOGY | Age: 54
End: 2019-01-17

## 2019-01-17 VITALS
SYSTOLIC BLOOD PRESSURE: 123 MMHG | HEART RATE: 110 BPM | DIASTOLIC BLOOD PRESSURE: 87 MMHG | BODY MASS INDEX: 37.71 KG/M2 | HEIGHT: 69 IN | WEIGHT: 254.6 LBS

## 2019-01-17 DIAGNOSIS — E29.1 HYPOGONADISM IN MALE: ICD-10-CM

## 2019-01-17 DIAGNOSIS — E11.21 TYPE 2 DIABETES MELLITUS WITH NEPHROPATHY (HCC): Primary | ICD-10-CM

## 2019-01-17 NOTE — PROGRESS NOTES
Chief Complaint Patient presents with  Diabetes  
  pcp and pharmacy verified. Eye exam UTD  Hypogonadism History of Present Illness: Kelley Barros is a 47 y.o. male who I was asked to see in consult by Dr. Abbe Tolliver, for evaluation of DM and hypogonadism. Was diagnosed with diabetes around 2014. Current regimen is Metformin 500mg BID. He will check his BGs weekly. He will test fasting and they are in in the 110-120's range. Most recent Hgb A1c was 6.0% in December 2018. A typical day is as follows: Pt wakes around 630AM 
- breakfast: He has breakfast around 9AM, this AM he had a bowl of cereal. 
- He denies any mid-morning snacking 
- lunch: He has lunch around [place], this afternoon he had a caesar salad, 1/2 club salad and iced tea (Equal). - He will have a mid-afternoon snack on occasions. He will have popcorn, or yogurt and granola. - dinner: He has dinner around 630PM, last night he had fried fish with mac and cheese and crystal lite. Exercise consists of house work and chores. Pt works from home. Pt has a \"minor MI\" in 2014, Pt follows with Dr. Cruz Oppenheim (cardio) annually in July. Pt has hx of elevated Urine MA/Cr. He notes he will occasionally get tingling and itching in his left foot. Last eye exam was April 2018, no retinopathy, will request these records. Pt was first diagnosed with hypogonadism around 2015. He was having issues of feeling very sluggish. He was tested for his testosterone and \"it was pretty low\". He is currently taking a weekly injection of Testosterone Cypionate (75ml/150mg). He was previously followed by Dr. Emery Maldonado of Endocrinology, will request these records. He does not recall the last time he had his testosterone tested (6+ months ago). He denies issues of CP, SOB, palpitations, HAs, or LUTS symptoms. He denies issues of mood swings or irritability. Pt took his last dose on Sunday (4 days ago).  He has been on the 150mcg/0.75ml for at least 2 years. He gives himself his injection in the muscle of the thigh. He does not have any injection site issues. Pt follows with Dr. Alonso Kennedy (pulm) annually, for SCOTTY. Pt follows with Dr. Noemy Smart (GI) Past Medical History:  
Diagnosis Date  Depression  GERD (gastroesophageal reflux disease)  Headache   
 Heart attack ([address] 04/2015  Hypertension  Prediabetes  Tachycardia Past Surgical History:  
Procedure Laterality Date  HX COLONOSCOPY  11/2016  HX GI    
 colonoscopy  HX OTHER SURGICAL    
 anal fissure Current Outpatient Medications Medication Sig  
 rosuvastatin (CRESTOR) 10 mg tablet TAKE 1 TABLET BY MOUTH  NIGHTLY  metFORMIN (GLUCOPHAGE) 500 mg tablet TAKE 1 TABLET BY MOUTH TWO  TIMES DAILY WITH MEALS  carvedilol (COREG) 6.25 mg tablet TAKE 1 TABLET BY MOUTH TWO  TIMES DAILY  venlafaxine-SR (EFFEXOR-XR) 150 mg capsule TAKE 1 CAPSULE BY MOUTH  DAILY  losartan (COZAAR) 50 mg tablet TAKE 1 TABLET BY MOUTH  DAILY  raNITIdine (ZANTAC) 300 mg tablet Take 1 Tab by mouth daily.  mometasone (NASONEX) 50 mcg/actuation nasal spray 2 Sprays daily.  testosterone cypionate (DEPOTESTOTERONE CYPIONATE) 200 mg/mL injection by IntraMUSCular route every Sunday. 0.75 ml weekly  aspirin 81 mg chewable tablet Take 81 mg by mouth nightly. No current facility-administered medications for this visit. No Known Allergies Family History Problem Relation Age of Onset  Cancer Mother   
     stomach and liver  Hypertension Father  Diabetes Maternal Grandmother  Kidney Disease Maternal Grandmother  Hypertension Maternal Grandfather  No Known Problems Sister  Drug Abuse Brother Social History Socioeconomic History  Marital status:  Spouse name: Not on file  Number of children: Not on file  Years of education: Not on file  Highest education level: Not on file Social Needs  Financial resource strain: Not on file  Food insecurity - worry: Not on file  Food insecurity - inability: Not on file  Transportation needs - medical: Not on file  Transportation needs - non-medical: Not on file Occupational History  Not on file Tobacco Use  Smoking status: Never Smoker  Smokeless tobacco: Never Used Substance and Sexual Activity  Alcohol use: Yes Alcohol/week: 0.0 oz  
  Comment: rarely  Drug use: No  
 Sexual activity: Yes Other Topics Concern  Not on file Social History Narrative  Not on file Review of Systems: 
- Negative except as noted in HPI Physical Examination: 
Blood pressure 123/87, pulse (!) 110, height 5' 9\" (1.753 m), weight 254 lb 9.6 oz (115.5 kg). - General: pleasant, no distress, good eye contact 
- HEENT: no exopthalmos, no periorbital edema, no scleral/conjunctival injection, EOMI, no lid lag or stare 
- Neck: supple, no thyromegaly, masses, + LN in left neck, no carotid bruits, no supraclavicular or dorsocervical fat pads - Cardiovascular: regular, normal rate, normal S1 and S2, no murmurs/rubs/gallops, 2+ dorsalis pedis pulses bilaterally - Respiratory: clear to auscultation bilaterally - Gastrointestinal: soft, nontender, nondistended, no masses, no hepatosplenomegaly - Musculoskeletal: no proximal muscle weakness in upper or lower extremities - Integumentary: no acanthosis nigricans,  no rashes, no edema, no foot ulcers - Neurological: DTR 2+ no tremors - Psychiatric: normal mood and affect Diabetic foot exam:  
 
Left Foot: 
 Visual Exam: normal  
 Pulse DP: 2+ (normal) Filament test: normal sensation Vibratory sensation: normal 
   
Right Foot: 
 Visual Exam: normal  
 Pulse DP: 2+ (normal) Filament test: normal sensation Vibratory sensation: normal 
 
 
 
Data Reviewed:  
Component Latest Ref Rng & Units 12/21/2018 12/21/2018 12/21/2018 8/28/2018 9:09 AM  9:09 AM  9:09 AM  9:23 AM  
Glucose 65 - 99 mg/dL   123 (H) BUN 
    6 - 24 mg/dL   11 Creatinine 
    0.76 - 1.27 mg/dL   0.87 GFR est non-AA 
    >59 mL/min/1.73   99 GFR est AA 
    >59 mL/min/1.73   115 BUN/Creatinine ratio 9 - 20   13 Sodium 134 - 144 mmol/L   140 Potassium 3.5 - 5.2 mmol/L   4.2 Chloride 96 - 106 mmol/L   102 CO2 
    20 - 29 mmol/L   23 Calcium 8.7 - 10.2 mg/dL   9.5 Protein, total 
    6.0 - 8.5 g/dL   7.2 Albumin 3.5 - 5.5 g/dL   4.3 GLOBULIN, TOTAL 
    1.5 - 4.5 g/dL   2.9 A-G Ratio 1.2 - 2.2   1.5 Bilirubin, total 
    0.0 - 1.2 mg/dL   0.4 Alk. phosphatase 39 - 117 IU/L   60 AST 
    0 - 40 IU/L   19   
ALT (SGPT) 0 - 44 IU/L   23 Hemoglobin A1c, (calculated) 4.8 - 5.6 %  6.0 (H) Estimated average glucose 
    mg/dL  126 Vitamin B12 
    232 - 1,245 pg/mL    840 TSH 
    0.450 - 4.500 uIU/mL 1.020 Component Latest Ref Rng & Units 8/28/2018 8/28/2018 8/28/2018 4/23/2018  
 
      9:23 AM  9:23 AM  9:23 AM  8:38 AM  
WBC 
    3.4 - 10.8 x10E3/uL   8.1   
RBC 
    4.14 - 5.80 x10E6/uL   5.48   
HGB 13.0 - 17.7 g/dL   15.9 HCT 
    37.5 - 51.0 %   46.4 MCV 
    79 - 97 fL   85 MCH 
    26.6 - 33.0 pg   29.0 MCHC 
    31.5 - 35.7 g/dL   34.3   
RDW 
    12.3 - 15.4 %   14.3 PLATELET 
    126 - 392 x10E3/uL   249 Creatinine, urine Not Estab. mg/dL    150.4 Microalbumin, urine Not Estab. ug/mL    246.1 Microalbumin/Creat. Ratio 
    0.0 - 30.0 mg/g creat    163.6 (H) Hemoglobin A1c, (calculated) 4.8 - 5.6 %  6.0 (H) Estimated average glucose 
    mg/dL  126 VITAMIN D, 25-HYDROXY 30.0 - 100.0 ng/mL 29.6 (L) Component Latest Ref Rng & Units 4/23/2018  
 
      8:38 AM  
Cholesterol, total 
    100 - 199 mg/dL 143 Triglyceride 0 - 149 mg/dL 356 (H) HDL Cholesterol >39 mg/dL 35 (L) VLDL, calculated 5 - 40 mg/dL 71 (H) LDL, calculated 0 - 99 mg/dL 37 HISTORY: Lateral left neck lump for 2 months. 
  
TECHNIQUE: Grayscale and color ultrasound performed of the left neck in the 
region of the palpable mass in the superior left neck. 
  
FINDINGS: There is an enlarged lymph node in the left neck measuring 1.6 x 0.7 x 
2.8 cm. This retains a normal morphology with a fatty hilum and a reniform 
shape. 
  
IMPRESSION IMPRESSION: 
Enlarged nonspecific lymph node in the left neck. Assessment/Plan: 1. Type 2 diabetes mellitus with nephropathy ([address] 2. Hypogonadism in male 1) Pt's BGs are very well controlled on Metformin 500mg BID. Will not make any changes at this time. Pt encouraged to keep up the good work. 2) His last dose was 4 days ago, so this is a good time to draw his mid-dose trough. Will check Testosterone, CBC and PSA. Pt notes that his current dose of 0.75ml/150mg weekly is doing very well for him and he is not having any negative side effects. When he needs refills pt will call and let us know. Pt notes he has trouble drawing up his testosterone with the needles he is currently using. We can order a larger needle for the drawing up and the smaller needle for injection. Pt voices understanding and agreement with the plan. RTC 3 months Follow-up Disposition: 
Return in about 3 months (around 4/17/2019). Copy sent to: 
Dr. Daniel Luu

## 2019-01-17 NOTE — LETTER
1/17/2019 3:26 PM 
 
Patient:  Pancho Wang YOB: 1965 Date of Visit: 1/17/2019 Dear Tonio Brunner MD 
[address] [address] VIA In Basket Jason Streling MD 
[address] [place] [address] VIA Facsimile: [phone] 
 : 
 
 
Thank you for referring Mr. Chilo Tanner to me for evaluation/treatment. Below are the relevant portions of my assessment and plan of care. If you have questions, please do not hesitate to call me. I look forward to following Mr. Carline Mcgee along with you. Sincerely, Arun Steele MD

## 2019-01-18 LAB
ERYTHROCYTE [DISTWIDTH] IN BLOOD BY AUTOMATED COUNT: 14.8 % (ref 12.3–15.4)
HCT VFR BLD AUTO: 47.5 % (ref 37.5–51)
HGB BLD-MCNC: 16.8 G/DL (ref 13–17.7)
MCH RBC QN AUTO: 29.4 PG (ref 26.6–33)
MCHC RBC AUTO-ENTMCNC: 35.4 G/DL (ref 31.5–35.7)
MCV RBC AUTO: 83 FL (ref 79–97)
PLATELET # BLD AUTO: 281 X10E3/UL (ref 150–379)
PSA SERPL-MCNC: 1.8 NG/ML (ref 0–4)
RBC # BLD AUTO: 5.72 X10E6/UL (ref 4.14–5.8)
REFLEX CRITERIA: NORMAL
TESTOST FREE SERPL-MCNC: 12.5 PG/ML (ref 7.2–24)
TESTOST SERPL-MCNC: 330 NG/DL (ref 264–916)
WBC # BLD AUTO: 10.4 X10E3/UL (ref 3.4–10.8)

## 2019-01-21 RX ORDER — NEEDLES, DISPOSABLE 27GX1/2"
NEEDLE, DISPOSABLE MISCELLANEOUS
Qty: 100 EACH | Refills: 11 | Status: SHIPPED | OUTPATIENT
Start: 2019-01-21 | End: 2019-05-13 | Stop reason: SDUPTHER

## 2019-02-20 NOTE — PROGRESS NOTES
[address] [place] [address] [address]  Phone: [phone]  Fax: [phone]    Discharge Summary  2-15    Patient name: Maxx Feldman  : 1965  Provider#: [id number]  Referral source: Cyrus Sage MD      Medical/Treatment Diagnosis: Neck pain [M54.2]     Prior Hospitalization: see medical history     Comorbidities: See Plan of Care  Prior Level of Function:See Plan of Care  Medications: Verified on Patient Summary List    Start of Care: 18      Onset Date:2018   Visits from Start of Care: 6     Missed Visits: 0  Reporting Period : 18 to 12/3/18      ASSESSMENT/SUMMARY OF CARE: Mr. Octaviano Cabrera did very well with PT with a significant improvement in cervical AROM and an improvement in scapular strength at the left shoulder. He achieved all long term goals and has not needed to return to the clinic over the past 8 weeks. Short Term Goals: To be accomplished in 4-5 treatments:               Patient to be independent with HEP Met. Patient will report no more than 2/10 with all activities. Met.     Long Term Goals: To be accomplished in 10-12 treatments:               Patient will increase FOTO by at least the [address] points in order to demonstrate an increase in function. Met. Patient will report that he is able to perform postural corrections in order to decrease his arm/shoulder symptoms. Met. Patient will have increased thoracic mobility and no pain with PA mobilization to the thoracic spine in order to improve postureMet. RECOMMENDATIONS:  [x]Discontinue therapy: [x]Patient has reached or is progressing toward set goals      []Patient is non-compliant or has abdicated      []Due to lack of appreciable progress towards set goals      []Other    Miladys Mckeon, PT , DPT, OCS, Cert.  DN   2019

## 2019-03-07 RX ORDER — LOSARTAN POTASSIUM 50 MG/1
TABLET ORAL
Qty: 90 TAB | Refills: 1 | Status: SHIPPED | OUTPATIENT
Start: 2019-03-07 | End: 2019-11-10 | Stop reason: SDUPTHER

## 2019-03-31 RX ORDER — METFORMIN HYDROCHLORIDE 500 MG/1
TABLET ORAL
Qty: 180 TAB | Refills: 0 | Status: SHIPPED | OUTPATIENT
Start: 2019-03-31 | End: 2019-06-10 | Stop reason: SDUPTHER

## 2019-04-10 RX ORDER — HYDROCHLOROTHIAZIDE 12.5 MG/1
12.5 TABLET ORAL DAILY
Qty: 30 TAB | Refills: 1 | Status: SHIPPED | OUTPATIENT
Start: 2019-04-10 | End: 2019-06-05 | Stop reason: SDUPTHER

## 2019-04-27 LAB
ALBUMIN SERPL-MCNC: 4.5 G/DL (ref 3.5–5.5)
ALBUMIN/CREAT UR: 142 MG/G CREAT (ref 0–30)
ALBUMIN/GLOB SERPL: 1.4 {RATIO} (ref 1.2–2.2)
ALP SERPL-CCNC: 63 IU/L (ref 39–117)
ALT SERPL-CCNC: 32 IU/L (ref 0–44)
APPEARANCE UR: CLEAR
AST SERPL-CCNC: 28 IU/L (ref 0–40)
BACTERIA #/AREA URNS HPF: ABNORMAL /[HPF]
BASOPHILS # BLD AUTO: 0 X10E3/UL (ref 0–0.2)
BASOPHILS NFR BLD AUTO: 1 %
BILIRUB SERPL-MCNC: 0.5 MG/DL (ref 0–1.2)
BILIRUB UR QL STRIP: NEGATIVE
BUN SERPL-MCNC: 16 MG/DL (ref 6–24)
BUN/CREAT SERPL: 18 (ref 9–20)
CALCIUM SERPL-MCNC: 9.6 MG/DL (ref 8.7–10.2)
CASTS URNS MICRO: ABNORMAL
CASTS URNS QL MICRO: PRESENT /LPF
CHLORIDE SERPL-SCNC: 98 MMOL/L (ref 96–106)
CO2 SERPL-SCNC: 23 MMOL/L (ref 20–29)
COLOR UR: YELLOW
CREAT SERPL-MCNC: 0.9 MG/DL (ref 0.76–1.27)
CREAT UR-MCNC: 223.2 MG/DL
EOSINOPHIL # BLD AUTO: 0.2 X10E3/UL (ref 0–0.4)
EOSINOPHIL NFR BLD AUTO: 3 %
EPI CELLS #/AREA URNS HPF: ABNORMAL /HPF (ref 0–10)
ERYTHROCYTE [DISTWIDTH] IN BLOOD BY AUTOMATED COUNT: 14.7 % (ref 12.3–15.4)
EST. AVERAGE GLUCOSE BLD GHB EST-MCNC: 128 MG/DL
GLOBULIN SER CALC-MCNC: 3.2 G/DL (ref 1.5–4.5)
GLUCOSE SERPL-MCNC: 125 MG/DL (ref 65–99)
GLUCOSE UR QL: NEGATIVE
HBA1C MFR BLD: 6.1 % (ref 4.8–5.6)
HCT VFR BLD AUTO: 48.7 % (ref 37.5–51)
HGB BLD-MCNC: 17 G/DL (ref 13–17.7)
HGB UR QL STRIP: NEGATIVE
IMM GRANULOCYTES # BLD AUTO: 0 X10E3/UL (ref 0–0.1)
IMM GRANULOCYTES NFR BLD AUTO: 0 %
KETONES UR QL STRIP: NEGATIVE
LEUKOCYTE ESTERASE UR QL STRIP: NEGATIVE
LYMPHOCYTES # BLD AUTO: 2.8 X10E3/UL (ref 0.7–3.1)
LYMPHOCYTES NFR BLD AUTO: 35 %
MCH RBC QN AUTO: 29.4 PG (ref 26.6–33)
MCHC RBC AUTO-ENTMCNC: 34.9 G/DL (ref 31.5–35.7)
MCV RBC AUTO: 84 FL (ref 79–97)
MICRO URNS: ABNORMAL
MICROALBUMIN UR-MCNC: 317 UG/ML
MONOCYTES # BLD AUTO: 0.4 X10E3/UL (ref 0.1–0.9)
MONOCYTES NFR BLD AUTO: 4 %
MUCOUS THREADS URNS QL MICRO: PRESENT
NEUTROPHILS # BLD AUTO: 4.7 X10E3/UL (ref 1.4–7)
NEUTROPHILS NFR BLD AUTO: 57 %
NITRITE UR QL STRIP: NEGATIVE
PH UR STRIP: 5.5 [PH] (ref 5–7.5)
PLATELET # BLD AUTO: 294 X10E3/UL (ref 150–379)
POTASSIUM SERPL-SCNC: 4.2 MMOL/L (ref 3.5–5.2)
PROT SERPL-MCNC: 7.7 G/DL (ref 6–8.5)
PROT UR QL STRIP: ABNORMAL
PSA SERPL-MCNC: 1.7 NG/ML (ref 0–4)
RBC # BLD AUTO: 5.79 X10E6/UL (ref 4.14–5.8)
RBC #/AREA URNS HPF: ABNORMAL /HPF (ref 0–2)
SODIUM SERPL-SCNC: 138 MMOL/L (ref 134–144)
SP GR UR: 1.03 (ref 1–1.03)
UROBILINOGEN UR STRIP-MCNC: 0.2 MG/DL (ref 0.2–1)
WBC # BLD AUTO: 8.1 X10E3/UL (ref 3.4–10.8)
WBC #/AREA URNS HPF: ABNORMAL /HPF (ref 0–5)

## 2019-05-03 ENCOUNTER — OFFICE VISIT (OUTPATIENT)
Dept: INTERNAL MEDICINE CLINIC | Age: 54
End: 2019-05-03

## 2019-05-03 VITALS
RESPIRATION RATE: 16 BRPM | HEIGHT: 69 IN | TEMPERATURE: 98 F | OXYGEN SATURATION: 97 % | SYSTOLIC BLOOD PRESSURE: 116 MMHG | WEIGHT: 263 LBS | BODY MASS INDEX: 38.95 KG/M2 | DIASTOLIC BLOOD PRESSURE: 77 MMHG | HEART RATE: 95 BPM

## 2019-05-03 DIAGNOSIS — K21.9 GASTROESOPHAGEAL REFLUX DISEASE WITHOUT ESOPHAGITIS: ICD-10-CM

## 2019-05-03 DIAGNOSIS — E11.21 TYPE 2 DIABETES MELLITUS WITH NEPHROPATHY (HCC): ICD-10-CM

## 2019-05-03 DIAGNOSIS — E66.01 SEVERE OBESITY (BMI 35.0-39.9) WITH COMORBIDITY (HCC): ICD-10-CM

## 2019-05-03 DIAGNOSIS — G47.33 OSA ON CPAP: ICD-10-CM

## 2019-05-03 DIAGNOSIS — Z99.89 OSA ON CPAP: ICD-10-CM

## 2019-05-03 DIAGNOSIS — I25.10 CORONARY ARTERY DISEASE INVOLVING NATIVE CORONARY ARTERY OF NATIVE HEART WITHOUT ANGINA PECTORIS: ICD-10-CM

## 2019-05-03 DIAGNOSIS — I50.22 CHRONIC SYSTOLIC CONGESTIVE HEART FAILURE (HCC): ICD-10-CM

## 2019-05-03 DIAGNOSIS — R79.89 LOW TESTOSTERONE: ICD-10-CM

## 2019-05-03 DIAGNOSIS — I10 ESSENTIAL HYPERTENSION WITH GOAL BLOOD PRESSURE LESS THAN 140/90: Primary | ICD-10-CM

## 2019-05-03 DIAGNOSIS — F33.0 MILD EPISODE OF RECURRENT MAJOR DEPRESSIVE DISORDER (HCC): ICD-10-CM

## 2019-05-03 NOTE — PROGRESS NOTES
HISTORY OF PRESENT ILLNESS Charo Sexton is a 47 y.o. male. HPI Last here 1/4/19. Pt is here to f/u on chronic conditions. 
   
BP today is 116/77 BP has been around 125/80s at home Continues on losartan 50mg and coreg 6.25mg BID Pt had emailed in 4/19 about swelling, so he is now also on HCTZ 12.5mg He had been having trouble with swelling in his fingers and ankles--mild and bilateral 
Just in last few weeks. This has been improving Discussed warmer weather contributing Discussed that his ankles look nl on exam today BS at home running around mostly 110-120 in the AM fasting Denies any lows <70 Continues on metformin 500mg BID He also takes ASA 81mg daily 
   
Wt today is 263 lbs --up 6 lbs x lov Pt states he fell off the wagon He is currently using WW--will focus on this more He will also start walking more Recall saxenda 3mg caused him to feel bloated and uncomfortable 
   
Reviewed labs 4/19 Ordered labs 
   
Pt previously followed with Dr. Dante Palmer (endo) for PSA and testosterone level checks Last visit was 7/18 Of note, he is on a lower dose of testosterone d/t syncopal event in the past 
Pt is now following with Dr Salty Carl (endo) Reviewed notes 1/17/19: 
1. Type 2 diabetes mellitus with nephropathy ([facility] [address] 2. Hypogonadism in male 1) Pt's BGs are very well controlled on Metformin 500mg BID. Will not make any changes at this time. Pt encouraged to keep up the good work. 2) His last dose was 4 days ago, so this is a good time to draw his mid-dose trough. Will check Testosterone, CBC and PSA. Pt notes that his current dose of 0.75ml/150mg weekly is doing very well for him and he is not having any negative side effects. When he needs refills pt will call and let us know. Pt notes he has trouble drawing up his testosterone with the needles he is currently using. We can order a larger needle for the drawing up and the smaller needle for injection. Pt voices understanding and agreement with the plan. RTC 3 months Net visit is scheduled for 5/13/19 Pt follows with Dr. Rianna Dooley (cardio) annually in July No signs sx/cad/chf Last visit was 7/18 Pt follows with Dr. Kelly Lee (pulm) annually Last visit was 10/18 Pt is compliant with CPAP qhs 
Sleeps well with this  
  
Pt follows with Dr. Win Ratliff (GI) Last visit was 2/6/18 
   
No longer taking prilosec He is taking zantac to help with reflux and this works well Recall pt states that aciphex improved his sx in the past. However, his insurance no longer covers this medication. 
   
Pt continues on crestor 10mg daily for cholesterol 
   
Continues on effexor 150mg daily for depression, which works well, happy with dose  5/19 Not sad or down  
  
Pt takes a multivitamin 
  
Lov, pt c/o a nodule in his neck Reviewed US neck 1/19: Enlarged nonspecific lymph node in the left neck. Previously gave referral to ENT - pt still has not gone This nodule is still present, same size as lov Provided this referral again Pt sometimes feels like there is something in his L ear Discussed that there is wax that looks hard and packed in 
Discussed seeing ENT for this 
   
Recall his partner is HIV positive However, pt was HIV negative on 12/18 labs 
   
PREVENTIVE: 
Colonoscopy: Dr. Win Ratliff, 2/6/18, unsure of repeat PSA: 1/17 1.4, 1/18 1.4, 4/19 1.7  
AAA screen: not needed, non smoker Tdap: 8/29/2016 Pneumovax: 4/2015 Shingrix: not yet needed Flu shot: 09/04/18  
HIV screen: 12/18, negative Microalbumin: 4/19 Foot exam: 5/19 A1c: 5/16 6.2, 8/16 6.0, 12/16 6.0, 3/17 5.7, 6/17 5.8, 10/17 6.0, 1/18 5.6 Aj, 4/18 5.9, 8/18 6.0, 12/18 6.0, 4/19 6.1 Eye exam: Dr. 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  
 Diagnosis Date Noted  Severe obesity (BMI 35.0-39. 9) with comorbidity ([facility] [address] 04/30/2018  Type 2 diabetes mellitus with nephropathy ([facility] 01/22/2018  Diabetes mellitus without complication ([facility] 30/59/3359  Reactive depression 12/12/2016  Essential hypertension with goal blood pressure less than 140/90 05/23/2016  Coronary artery disease involving native coronary artery of native heart without angina pectoris 05/23/2016  Mild episode of recurrent major depressive disorder ([facility] 05/23/2016  SCOTTY on CPAP 05/23/2016  Chronic systolic congestive heart failure ([facility] 05/23/2016  Low testosterone 05/23/2016  Gastroesophageal reflux disease without esophagitis 05/23/2016 Current Outpatient Medications Medication Sig Dispense Refill  hydroCHLOROthiazide (HYDRODIURIL) 12.5 mg tablet Take 1 Tab by mouth daily. 30 Tab 1  
 glucose blood VI test strips (ONETOUCH VERIO) strip Check blood sugar once daily. 50 Strip 5  Syringe with Needle, Disp, 1 mL 20 gauge x 1\" syrg For injecting Testosterone once every week 100 Each 11  
 metFORMIN (GLUCOPHAGE) 500 mg tablet TAKE 1 TABLET BY MOUTH TWO  TIMES DAILY WITH MEALS 180 Tab 0  
 losartan (COZAAR) 50 mg tablet TAKE 1 TABLET BY MOUTH  DAILY 90 Tab 1  Needle, Disp, 16 G 16 gauge x 1\" ndle For drawing up your testosterone each week. 100 Each 11  
 rosuvastatin (CRESTOR) 10 mg tablet TAKE 1 TABLET BY MOUTH  NIGHTLY 90 Tab 1  carvedilol (COREG) 6.25 mg tablet TAKE 1 TABLET BY MOUTH TWO  TIMES DAILY 180 Tab 1  venlafaxine-SR (EFFEXOR-XR) 150 mg capsule TAKE 1 CAPSULE BY MOUTH  DAILY 90 Cap 3  
 raNITIdine (ZANTAC) 300 mg tablet Take 1 Tab by mouth daily. 30 Tab 6  
 mometasone (NASONEX) 50 mcg/actuation nasal spray 2 Sprays daily.  testosterone cypionate (DEPOTESTOTERONE CYPIONATE) 200 mg/mL injection by IntraMUSCular route every Sunday. 0.75 ml weekly  aspirin 81 mg chewable tablet Take 81 mg by mouth nightly. Past Surgical History:  
Procedure Laterality Date  HX COLONOSCOPY  11/2016  HX GI    
 colonoscopy  HX OTHER SURGICAL    
 anal fissure Lab Results Component Value Date/Time WBC 8.1 04/26/2019 08:38 AM  
 HGB 17.0 04/26/2019 08:38 AM  
 Hemoglobin (POC) 15.6 02/06/2017 11:40 AM  
 HCT 48.7 04/26/2019 08:38 AM  
 Hematocrit (POC) 50 04/02/2018 10:59 AM  
 PLATELET 839 14/90/7334 08:38 AM  
 MCV 84 04/26/2019 08:38 AM  
 
Lab Results Component Value Date/Time Cholesterol, total 143 04/23/2018 08:38 AM  
 HDL Cholesterol 35 (L) 04/23/2018 08:38 AM  
 LDL, calculated 37 04/23/2018 08:38 AM  
 LDL-C, External 34 07/16/2018 Triglyceride 356 (H) 04/23/2018 08:38 AM  
 
Lab Results Component Value Date/Time GFR est non-AA 98 04/26/2019 08:38 AM  
 GFRNA, POC >60 04/02/2018 10:59 AM  
 GFR est  04/26/2019 08:38 AM  
 GFRAA, POC >60 04/02/2018 10:59 AM  
 Creatinine 0.90 04/26/2019 08:38 AM  
 Creatinine (POC) 0.9 04/02/2018 10:59 AM  
 BUN 16 04/26/2019 08:38 AM  
 BUN (POC) 14 04/02/2018 10:59 AM  
 Sodium 138 04/26/2019 08:38 AM  
 Sodium (POC) 139 04/02/2018 10:59 AM  
 Potassium 4.2 04/26/2019 08:38 AM  
 Potassium (POC) 3.9 04/02/2018 10:59 AM  
 Chloride 98 04/26/2019 08:38 AM  
 Chloride (POC) 100 04/02/2018 10:59 AM  
 CO2 23 04/26/2019 08:38 AM  
  
Review of Systems Respiratory: Negative for shortness of breath. Cardiovascular: Negative for chest pain. Physical Exam  
Constitutional: He is oriented to person, place, and time. He appears well-developed and well-nourished. No distress. HENT:  
Head: Normocephalic and atraumatic. Right Ear: External ear normal.  
Left Ear: External ear normal.  
Mouth/Throat: Oropharynx is clear and moist. No oropharyngeal exudate. Cerumen to L ear Eyes: Conjunctivae and EOM are normal. Right eye exhibits no discharge. Left eye exhibits no discharge. Neck: Normal range of motion. Neck supple. Cardiovascular: Normal rate, regular rhythm and normal heart sounds. Exam reveals no gallop and no friction rub. No murmur heard. Pulmonary/Chest: Effort normal and breath sounds normal. No respiratory distress. He has no wheezes. He has no rales. He exhibits no tenderness. Musculoskeletal: Normal range of motion. He exhibits no edema, tenderness or deformity. Lymphadenopathy:  
  He has no cervical adenopathy. Neurological: He is alert and oriented to person, place, and time. He has normal reflexes. Coordination normal.  
Monofilament nl BLE, good peripheral pulses, no ulcers Skin: Skin is warm and dry. No rash noted. He is not diaphoretic. No erythema. No pallor. Psychiatric: He has a normal mood and affect. His behavior is normal.  
 
 
ASSESSMENT and PLAN 
  ICD-10-CM ICD-9-CM 1. Essential hypertension with goal blood pressure less than 140/90 Well controlled on HCTZ, coreg, and losartan I10 401.9 LIPID PANEL  
   METABOLIC PANEL, BASIC HEMOGLOBIN A1C WITH EAG 2. Chronic systolic congestive heart failure ([facility] Follows with Dr Alfred Chen annually in July, stable, medically managed I50.22 428.22 LIPID PANEL  
  099.3 METABOLIC PANEL, BASIC HEMOGLOBIN A1C WITH EAG 3. Severe obesity (BMI 35.0-39. 9) with comorbidity ([facility] Working on getting back on the [employer] with [employer] which has helped in the past, recently has gained back wt 
 E66.01 278.01 LIPID PANEL  
   METABOLIC PANEL, BASIC HEMOGLOBIN A1C WITH EAG  
4. Mild episode of recurrent major depressive disorder ([facility] Stable with effexor, happy with dose F33.0 296.31 LIPID PANEL  
   METABOLIC PANEL, BASIC HEMOGLOBIN A1C WITH EAG 5. Low testosterone Now following with Dr Myra Ojeda, continues on injections R79.89 790.99 LIPID PANEL  
   METABOLIC PANEL, BASIC HEMOGLOBIN A1C WITH EAG 6. Type 2 diabetes mellitus with nephropathy ([facility] Well controlled on metformin, continue E11.21 250.40 LIPID PANEL  
  808.77 METABOLIC PANEL, BASIC    HEMOGLOBIN A1C WITH EAG  
 7. Gastroesophageal reflux disease without esophagitis Controlled with zantac, no longer on PPI 
 K21.9 530.81 LIPID PANEL  
   METABOLIC PANEL, BASIC HEMOGLOBIN A1C WITH EAG  
8. SCOTTY on CPAP Compliant with CPAP, due for f/u in the fall G47.33 327.23 LIPID PANEL  
 G66.38 Q24.0 METABOLIC PANEL, BASIC HEMOGLOBIN A1C WITH EAG 9. Coronary artery disease involving native coronary artery of native heart without angina pectoris Medically managed, UTD with Dr Carlie Alvares 
 I25.10 414.01 LIPID PANEL  
   METABOLIC PANEL, BASIC HEMOGLOBIN A1C WITH EAG Scribed by Radha Gómez of [address] [place] [address] [address], as dictated by Dr. Amado Contreras. Current diagnosis and concerns discussed with pt at length. Pt understands risks and benefits or current treatment plan and medications, and accepts the treatment and medication with any possible risks. Pt asks appropriate questions, which were answered. Pt was instructed to call with any concerns or problems. I have reviewed the note documented by the scribe. The services provided are my own. The documentation is accurate

## 2019-05-13 ENCOUNTER — OFFICE VISIT (OUTPATIENT)
Dept: ENDOCRINOLOGY | Age: 54
End: 2019-05-13

## 2019-05-13 VITALS
HEIGHT: 69 IN | WEIGHT: 258.8 LBS | SYSTOLIC BLOOD PRESSURE: 130 MMHG | HEART RATE: 100 BPM | BODY MASS INDEX: 38.33 KG/M2 | DIASTOLIC BLOOD PRESSURE: 86 MMHG

## 2019-05-13 DIAGNOSIS — E29.1 HYPOGONADISM IN MALE: ICD-10-CM

## 2019-05-13 DIAGNOSIS — E11.21 TYPE 2 DIABETES MELLITUS WITH NEPHROPATHY (HCC): Primary | ICD-10-CM

## 2019-05-13 RX ORDER — NEEDLES, DISPOSABLE 27GX1/2"
NEEDLE, DISPOSABLE MISCELLANEOUS
Qty: 100 EACH | Refills: 11 | Status: SHIPPED | OUTPATIENT
Start: 2019-05-13 | End: 2020-07-27 | Stop reason: SDUPTHER

## 2019-05-13 NOTE — PROGRESS NOTES
Chief Complaint Patient presents with  Diabetes  
  pcp and pharmacy verified. Eye exam due.  Hypogonadism Records from last visit reviewed. History of Present Illness: Teagan Pereyra is a 47 y.o. male here for follow up of diabetes and hypogonadism. His A1C in April 2019 was 6.1%. Pt is still taking the Metformin 500mg BID. He checks his BGs once per week, fasting. It will run in the 110-120's range. Pt wakes around 630AM 
- breakfast: He has breakfast around 9AM, this AM he had a bowl of cereal and coffee (Equal). - He denies any mid-morning snacking 
- lunch: He has lunch around Noon, yesterday he had a tuna salad sandwich, some chips and crystal light.  
- He will have a mid-afternoon snack on occasions. He will have popcorn, or yogurt and granola. - dinner: He has dinner around 630PM, last night he had fried chicken, carrots, asparagus and crystal lite. Exercise consists of house work and chores. Pt works from home. Pt has a \"minor MI\" in 2014, Pt follows with Dr. Aicha Rudolph (cardio) annually. Pt has hx of elevated Urine MA/Cr. He notes he will occasionally get tingling and itching in his left foot. Last eye exam was April 2018, no retinopathy. Pt was first diagnosed with hypogonadism around 2015. He was having issues of feeling very sluggish. He was tested for his testosterone and \"it was pretty low\". He is currently taking a weekly injection of Testosterone Cypionate (75ml/150mg). He took his last injection yesterday (5/12/19). He denies issues of CP, SOB, palpitations, HAs, or LUTS symptoms. He denies issues of mood swings or irritability. He gives himself his injection in the muscle of the thigh. He does not have any injection site issues. Pt follows with Dr. Kathleen Marie (pulm) annually, for SCOTTY. Pt follows with Dr. Garima Madden (GI) Current Outpatient Medications Medication Sig  
 glucose blood VI test strips (ONETOUCH VERIO) strip Check blood sugar once daily.  hydroCHLOROthiazide (HYDRODIURIL) 12.5 mg tablet Take 1 Tab by mouth daily.  Syringe with Needle, Disp, 1 mL 20 gauge x 1\" syrg For injecting Testosterone once every week  metFORMIN (GLUCOPHAGE) 500 mg tablet TAKE 1 TABLET BY MOUTH TWO  TIMES DAILY WITH MEALS  
 losartan (COZAAR) 50 mg tablet TAKE 1 TABLET BY MOUTH  DAILY  Needle, Disp, 16 G 16 gauge x 1\" ndle For drawing up your testosterone each week.  rosuvastatin (CRESTOR) 10 mg tablet TAKE 1 TABLET BY MOUTH  NIGHTLY  carvedilol (COREG) 6.25 mg tablet TAKE 1 TABLET BY MOUTH TWO  TIMES DAILY  venlafaxine-SR (EFFEXOR-XR) 150 mg capsule TAKE 1 CAPSULE BY MOUTH  DAILY  raNITIdine (ZANTAC) 300 mg tablet Take 1 Tab by mouth daily.  mometasone (NASONEX) 50 mcg/actuation nasal spray 2 Sprays daily.  testosterone cypionate (DEPOTESTOTERONE CYPIONATE) 200 mg/mL injection by IntraMUSCular route every Sunday. 0.75 ml weekly  aspirin 81 mg chewable tablet Take 81 mg by mouth nightly. No current facility-administered medications for this visit. No Known Allergies Review of Systems: - Eyes: no blurry vision or double vision - Cardiovascular: no chest pain - Respiratory: no shortness of breath - Musculoskeletal: no myalgias - Neurological: no numbness/tingling in extremities Physical Examination: 
Blood pressure 130/86, pulse 100, height 5' 9\" (1.753 m), weight 258 lb 12.8 oz (117.4 kg). - General: pleasant, no distress, good eye contact  
- Neck: no carotid bruits - Cardiovascular: regular, normal rate, nl s1 and s2, no m/r/g, 2+ DP pulses - Respiratory: clear bilaterally - Integumentary: no edema, no foot ulcers,  
- Psychiatric: normal mood and affect Diabetic foot exam:  
 
Left Foot: 
 Visual Exam: normal  
 Pulse DP: 2+ (normal) Filament test: normal sensation Vibratory sensation: normal 
   
Right Foot: 
 Visual Exam: normal  
 Pulse DP: 2+ (normal) Filament test: normal sensation Vibratory sensation: normal 
 
 
 
Data Reviewed:  
Component Latest Ref Rng & Units 4/26/2019 4/26/2019 4/26/2019  
 
      8:38 AM  8:38 AM  8:38 AM  
Glucose 65 - 99 mg/dL   125 (H) BUN 
    6 - 24 mg/dL   16  
Creatinine 
    0.76 - 1.27 mg/dL   0.90 GFR est non-AA 
    >59 mL/min/1.73   98 GFR est AA 
    >59 mL/min/1.73   113 BUN/Creatinine ratio 9 - 20   18 Sodium 134 - 144 mmol/L   138 Potassium 3.5 - 5.2 mmol/L   4.2 Chloride 96 - 106 mmol/L   98  
CO2 
    20 - 29 mmol/L   23 Calcium 8.7 - 10.2 mg/dL   9.6 Protein, total 
    6.0 - 8.5 g/dL   7.7 Albumin 3.5 - 5.5 g/dL   4.5  
GLOBULIN, TOTAL 
    1.5 - 4.5 g/dL   3.2 A-G Ratio 1.2 - 2.2   1.4 Bilirubin, total 
    0.0 - 1.2 mg/dL   0.5 Alk. phosphatase 39 - 117 IU/L   63 AST 
    0 - 40 IU/L   28 ALT (SGPT) 0 - 44 IU/L   32 Prostate Specific Ag 
    0.0 - 4.0 ng/mL   1.7 WBC 
    3.4 - 10.8 x10E3/uL   8.1  
RBC 
    4.14 - 5.80 x10E6/uL   5.79 HGB 13.0 - 17.7 g/dL   17.0 HCT 
    37.5 - 51.0 %   48.7 MCV 
    79 - 97 fL   84 MCH 
    26.6 - 33.0 pg   29.4 MCHC 
    31.5 - 35.7 g/dL   34.9  
RDW 
    12.3 - 15.4 %   14.7 PLATELET 
    159 - 516 x10E3/uL   294 NEUTROPHILS Not Estab. %   57 Lymphocytes Not Estab. %   35 MONOCYTES Not Estab. %   4 EOSINOPHILS Not Estab. %   3  
BASOPHILS Not Estab. %   1  
ABS. NEUTROPHILS 
    1.4 - 7.0 x10E3/uL   4.7 Abs Lymphocytes 0.7 - 3.1 x10E3/uL   2.8  
ABS. MONOCYTES 
    0.1 - 0.9 x10E3/uL   0.4  
ABS. EOSINOPHILS 
    0.0 - 0.4 x10E3/uL   0.2  
ABS. BASOPHILS 
    0.0 - 0.2 x10E3/uL   0.0 IMMATURE GRANULOCYTES Not Estab. %   0  
ABS. IMM. GRANS. 0.0 - 0.1 x10E3/uL   0.0 Specific Gravity 1.005 - 1.030   1.027  
pH (UA) 
    5.0 - 7.5   5.5 Color Yellow   Yellow Appearance Clear   Clear Leukocyte Esterase Negative   Negative Protein Negative/Trace   2+ (A) Glucose Negative   Negative Ketone Negative   Negative Blood Negative   Negative Bilirubin Negative   Negative Urobilinogen 0.2 - 1.0 mg/dL   0.2 Nitrites Negative   Negative Microscopic Examination See additional order Creatinine, urine Not Estab. mg/dL  223.2 Microalbumin, urine Not Estab. ug/mL  317.0 Microalbumin/Creat. Ratio 
    0.0 - 30.0 mg/g creat  142.0 (H) Hemoglobin A1c, (calculated) 4.8 - 5.6 % 6.1 (H) Estimated average glucose 
    mg/dL 128 Assessment/Plan:  
1) DM > His A1C in April 2019 was 6.1%. Pt encouraged to keep up the excellent work. Pt to continue the Metformin 500mg BID. 2) Hypogonadism > He took his last dose yesterday. Pt to have his testosterone drawn on Wednesday (5/15/19) and I will adjust his dose as needed. Pt denies issues of LUTS, CP, SOB or HAs. Pt voices understanding and agreement with the plan. RTC 3 months Copy sent to: 
Dr. Niraj Ballesteros

## 2019-05-23 LAB
ERYTHROCYTE [DISTWIDTH] IN BLOOD BY AUTOMATED COUNT: 14.8 % (ref 12.3–15.4)
HCT VFR BLD AUTO: 47.3 % (ref 37.5–51)
HGB BLD-MCNC: 16.2 G/DL (ref 13–17.7)
MCH RBC QN AUTO: 29.1 PG (ref 26.6–33)
MCHC RBC AUTO-ENTMCNC: 34.2 G/DL (ref 31.5–35.7)
MCV RBC AUTO: 85 FL (ref 79–97)
PLATELET # BLD AUTO: 244 X10E3/UL (ref 150–450)
PSA SERPL-MCNC: 1.8 NG/ML (ref 0–4)
RBC # BLD AUTO: 5.56 X10E6/UL (ref 4.14–5.8)
REFLEX CRITERIA: NORMAL
TESTOST FREE SERPL-MCNC: 19.8 PG/ML (ref 7.2–24)
TESTOST SERPL-MCNC: 557 NG/DL (ref 264–916)
WBC # BLD AUTO: 9.4 X10E3/UL (ref 3.4–10.8)

## 2019-06-11 RX ORDER — METFORMIN HYDROCHLORIDE 500 MG/1
TABLET ORAL
Qty: 180 TAB | Refills: 0 | Status: SHIPPED | OUTPATIENT
Start: 2019-06-11 | End: 2019-08-21 | Stop reason: SDUPTHER

## 2019-06-14 RX ORDER — HYDROCHLOROTHIAZIDE 12.5 MG/1
12.5 TABLET ORAL DAILY
Qty: 90 TAB | Refills: 1 | Status: SHIPPED | OUTPATIENT
Start: 2019-06-14 | End: 2019-10-26 | Stop reason: SDUPTHER

## 2019-06-14 NOTE — TELEPHONE ENCOUNTER
Requested Prescriptions     Pending Prescriptions Disp Refills    hydroCHLOROthiazide (HYDRODIURIL) 12.5 mg tablet 90 Tab 3     Sig: Indications: high blood pressure      PCP: Jarett Kaur MD    Last appt: 5/3/2019  Future Appointments   Date Time Provider Maria Teresa Mi   8/19/2019 10:50 AM Gwendolyn Ceja MD RDE [facility] [address]   9/6/2019 11:30 AM J[facility] Kaur MD [facility]

## 2019-07-17 RX ORDER — ROSUVASTATIN CALCIUM 10 MG/1
TABLET, COATED ORAL
Qty: 90 TAB | Refills: 1 | Status: SHIPPED | OUTPATIENT
Start: 2019-07-17 | End: 2019-12-04 | Stop reason: SDUPTHER

## 2019-07-29 DIAGNOSIS — E29.1 HYPOGONADISM IN MALE: Primary | ICD-10-CM

## 2019-07-29 RX ORDER — TESTOSTERONE CYPIONATE 200 MG/ML
INJECTION INTRAMUSCULAR
Qty: 12 VIAL | Refills: 1 | Status: SHIPPED | OUTPATIENT
Start: 2019-07-29 | End: 2020-03-19 | Stop reason: SDUPTHER

## 2019-08-16 ENCOUNTER — PATIENT MESSAGE (OUTPATIENT)
Dept: INTERNAL MEDICINE CLINIC | Age: 54
End: 2019-08-16

## 2019-08-19 ENCOUNTER — OFFICE VISIT (OUTPATIENT)
Dept: ENDOCRINOLOGY | Age: 54
End: 2019-08-19

## 2019-08-19 VITALS
DIASTOLIC BLOOD PRESSURE: 87 MMHG | HEIGHT: 69 IN | WEIGHT: 252.4 LBS | HEART RATE: 94 BPM | BODY MASS INDEX: 37.38 KG/M2 | SYSTOLIC BLOOD PRESSURE: 127 MMHG

## 2019-08-19 DIAGNOSIS — E29.1 HYPOGONADISM IN MALE: ICD-10-CM

## 2019-08-19 DIAGNOSIS — E11.21 TYPE 2 DIABETES MELLITUS WITH NEPHROPATHY (HCC): Primary | ICD-10-CM

## 2019-08-19 DIAGNOSIS — Z20.6 HIV EXPOSURE: Primary | ICD-10-CM

## 2019-08-19 LAB — HBA1C MFR BLD HPLC: 5.7 %

## 2019-08-19 RX ORDER — CEFIXIME 400 MG/1
400 CAPSULE ORAL ONCE
Qty: 1 CAP | Refills: 0 | Status: SHIPPED | OUTPATIENT
Start: 2019-08-19 | End: 2019-08-19

## 2019-08-19 RX ORDER — AZITHROMYCIN 250 MG/1
1000 TABLET, FILM COATED ORAL SEE ADMIN INSTRUCTIONS
Qty: 4 TAB | Refills: 0 | Status: SHIPPED | OUTPATIENT
Start: 2019-08-19 | End: 2019-09-06

## 2019-08-19 NOTE — PROGRESS NOTES
Chief Complaint   Patient presents with    Diabetes     pcp and pharmacy verified. Eye exam UTD    Hypogonadism   Records from last visit reviewed. History of Present Illness: Reinaldo Cheng is a 47 y.o. male here for follow up of diabetes and hypogonadism. At our last visit in May 2019 his A1C was 6.1% on Metformin 500mg BID. His A1C today was 5.7%. His weight today was 252 pounds, which is down 11 pounds since our last visit. Pt is still taking the Metformin 500mg BID. He checks his BGs 3 times per week, fasting. It will run in the 110-120's range. Pt wakes around 630AM  - breakfast: He has breakfast around 9AM, this AM he had a bowl of cereal and coffee (Equal). - He denies any mid-morning snacking  - lunch: He has lunch around Noon, yesterday he had an egg salad sandwich, no side items and crystal light.   - He will have a mid-afternoon snack on occasions. He will have popcorn, or yogurt and granola. - dinner: He has dinner around 630PM, last night he had chicken-taco salad. Exercise consists of house work and chores. Pt works from home. Pt had a \"minor MI\" in 2014, Pt follows with Dr. Gwendolyn Mendoza (cardio) annually. Pt has hx of elevated Urine MA/Cr. He notes he will occasionally get tingling and itching in his left foot. Last eye exam was July 2019, no retinopathy. Pt was first diagnosed with hypogonadism around 2015. He was having issues of feeling very sluggish. He was tested for his testosterone and \"it was pretty low\". He is currently taking a weekly injection of Testosterone Cypionate (75ml/150mg). He took his last injection 8/18/19. He denies issues of CP, SOB, palpitations, HAs, or LUTS symptoms. He denies issues of mood swings or irritability. He gives himself his injection in the muscle of the thigh. He does not have any injection site issues. Pt follows with Dr. Niko Tinoco (pulm) annually, for SCOTTY.   Pt follows with Dr. Monica Mendoza (GI)    Current Outpatient Medications Medication Sig    testosterone cypionate (DEPOTESTOTERONE CYPIONATE) 200 mg/mL injection Inject 0.75 ml weekly    Syringe with Needle, Disp, 1 mL 20 gauge x 1\" syrg For injecting Testosterone once every week    rosuvastatin (CRESTOR) 10 mg tablet TAKE 1 TABLET BY MOUTH  NIGHTLY    hydroCHLOROthiazide (HYDRODIURIL) 12.5 mg tablet Take 1 Tab by mouth daily. Indications: high blood pressure    metFORMIN (GLUCOPHAGE) 500 mg tablet TAKE 1 TABLET BY MOUTH TWO  TIMES DAILY WITH MEALS    Needle, Disp, 16 G 16 gauge x 1\" ndle For drawing up your testosterone each week.  glucose blood VI test strips (ONETOUCH VERIO) strip Check blood sugar once daily.  losartan (COZAAR) 50 mg tablet TAKE 1 TABLET BY MOUTH  DAILY    carvedilol (COREG) 6.25 mg tablet TAKE 1 TABLET BY MOUTH TWO  TIMES DAILY    venlafaxine-SR (EFFEXOR-XR) 150 mg capsule TAKE 1 CAPSULE BY MOUTH  DAILY    raNITIdine (ZANTAC) 300 mg tablet Take 1 Tab by mouth daily.  mometasone (NASONEX) 50 mcg/actuation nasal spray 2 Sprays daily.  aspirin 81 mg chewable tablet Take 81 mg by mouth nightly. No current facility-administered medications for this visit. No Known Allergies  Review of Systems:  - Eyes: no blurry vision or double vision  - Cardiovascular: no chest pain  - Respiratory: no shortness of breath  - Musculoskeletal: no myalgias  - Neurological: no numbness/tingling in extremities    Physical Examination:  Blood pressure 127/87, pulse 94, height 5' 9\" (1.753 m), weight 252 lb 6.4 oz (114.5 kg).   - General: pleasant, no distress, good eye contact   - Neck: no carotid bruits  - Cardiovascular: regular, normal rate, nl s1 and s2, no m/r/g, 2+ DP pulses   - Respiratory: clear bilaterally  - Integumentary: no edema, no foot ulcers,   - Psychiatric: normal mood and affect    Diabetic foot exam:     Left Foot:   Visual Exam: normal    Pulse DP: 2+ (normal)   Filament test: normal sensation    Vibratory sensation: normal      Right Foot:   Visual Exam: normal    Pulse DP: 2+ (normal)   Filament test: normal sensation    Vibratory sensation: normal        Data Reviewed:   His A1C today was 5.7%  Assessment/Plan:   1) DM > His A1C today was 5.7%. Pt encouraged to keep up the excellent work. Pt to continue the Metformin 500mg BID. 2) Hypogonadism > He took his last dose of Testosterone Cypionate (75ml/150mg) yesterday. Pt to have his testosterone, CBC and PSA drawn on Wednesday (8/21/19) and I will adjust his dose as needed. Pt denies issues of LUTS, CP, SOB or HAs. Pt voices understanding and agreement with the plan. RTC 6 months  Follow-up and Dispositions    · Return in about 6 months (around 2/19/2020).        Copy sent to:  Dr. Miguel Randle

## 2019-08-20 DIAGNOSIS — Z20.2 EXPOSURE TO STD: Primary | ICD-10-CM

## 2019-08-22 RX ORDER — METFORMIN HYDROCHLORIDE 500 MG/1
TABLET ORAL
Qty: 180 TAB | Refills: 0 | Status: SHIPPED | OUTPATIENT
Start: 2019-08-22 | End: 2019-10-16 | Stop reason: SDUPTHER

## 2019-08-26 RX ORDER — CARVEDILOL 6.25 MG/1
TABLET ORAL
Qty: 180 TAB | Refills: 1 | Status: SHIPPED | OUTPATIENT
Start: 2019-08-26 | End: 2020-02-06

## 2019-08-27 LAB — HIV 1+2 AB+HIV1 P24 AG SERPL QL IA: NON REACTIVE

## 2019-08-28 LAB
C TRACH RRNA VAG QL NAA+PROBE: NEGATIVE
N GONORRHOEA RRNA VAG QL NAA+PROBE: NEGATIVE
T VAGINALIS RRNA VAG QL NAA+PROBE: NEGATIVE

## 2019-08-31 LAB
BUN SERPL-MCNC: 15 MG/DL (ref 6–24)
BUN/CREAT SERPL: 17 (ref 9–20)
CALCIUM SERPL-MCNC: 9.7 MG/DL (ref 8.7–10.2)
CHLORIDE SERPL-SCNC: 97 MMOL/L (ref 96–106)
CHOLEST SERPL-MCNC: 132 MG/DL (ref 100–199)
CO2 SERPL-SCNC: 25 MMOL/L (ref 20–29)
CREAT SERPL-MCNC: 0.88 MG/DL (ref 0.76–1.27)
EST. AVERAGE GLUCOSE BLD GHB EST-MCNC: 123 MG/DL
GLUCOSE SERPL-MCNC: 134 MG/DL (ref 65–99)
HBA1C MFR BLD: 5.9 % (ref 4.8–5.6)
HDLC SERPL-MCNC: 31 MG/DL
LDLC SERPL CALC-MCNC: 24 MG/DL (ref 0–99)
POTASSIUM SERPL-SCNC: 4.1 MMOL/L (ref 3.5–5.2)
SODIUM SERPL-SCNC: 137 MMOL/L (ref 134–144)
TRIGL SERPL-MCNC: 384 MG/DL (ref 0–149)
VLDLC SERPL CALC-MCNC: 77 MG/DL (ref 5–40)

## 2019-09-06 ENCOUNTER — OFFICE VISIT (OUTPATIENT)
Dept: INTERNAL MEDICINE CLINIC | Age: 54
End: 2019-09-06

## 2019-09-06 VITALS
HEIGHT: 69 IN | TEMPERATURE: 98.1 F | OXYGEN SATURATION: 95 % | WEIGHT: 261 LBS | SYSTOLIC BLOOD PRESSURE: 111 MMHG | RESPIRATION RATE: 16 BRPM | BODY MASS INDEX: 38.66 KG/M2 | HEART RATE: 91 BPM | DIASTOLIC BLOOD PRESSURE: 75 MMHG

## 2019-09-06 DIAGNOSIS — E11.21 TYPE 2 DIABETES MELLITUS WITH NEPHROPATHY (HCC): Primary | ICD-10-CM

## 2019-09-06 DIAGNOSIS — I25.10 CORONARY ARTERY DISEASE INVOLVING NATIVE CORONARY ARTERY OF NATIVE HEART WITHOUT ANGINA PECTORIS: ICD-10-CM

## 2019-09-06 DIAGNOSIS — Z99.89 OSA ON CPAP: ICD-10-CM

## 2019-09-06 DIAGNOSIS — Z23 ENCOUNTER FOR IMMUNIZATION: ICD-10-CM

## 2019-09-06 DIAGNOSIS — K21.9 GASTROESOPHAGEAL REFLUX DISEASE WITHOUT ESOPHAGITIS: ICD-10-CM

## 2019-09-06 DIAGNOSIS — I10 ESSENTIAL HYPERTENSION WITH GOAL BLOOD PRESSURE LESS THAN 140/90: ICD-10-CM

## 2019-09-06 DIAGNOSIS — Z78.9 HEPATITIS B IMMUNE: ICD-10-CM

## 2019-09-06 DIAGNOSIS — I50.22 CHRONIC SYSTOLIC CONGESTIVE HEART FAILURE (HCC): ICD-10-CM

## 2019-09-06 DIAGNOSIS — E66.01 SEVERE OBESITY (BMI 35.0-39.9) WITH COMORBIDITY (HCC): ICD-10-CM

## 2019-09-06 DIAGNOSIS — G47.33 OSA ON CPAP: ICD-10-CM

## 2019-09-06 DIAGNOSIS — R79.89 LOW TESTOSTERONE: ICD-10-CM

## 2019-09-06 DIAGNOSIS — R25.1 TREMOR: ICD-10-CM

## 2019-09-06 DIAGNOSIS — F33.0 MILD EPISODE OF RECURRENT MAJOR DEPRESSIVE DISORDER (HCC): ICD-10-CM

## 2019-09-06 PROBLEM — E11.9 DIABETES MELLITUS WITHOUT COMPLICATION (HCC): Status: RESOLVED | Noted: 2017-03-17 | Resolved: 2019-09-06

## 2019-09-06 NOTE — PROGRESS NOTES
HISTORY OF PRESENT ILLNESS  Lionel Tinoco is a 47 y.o. male. HPI   Last here 5/3/19. Pt is here to f/u on chronic conditions.      BP today is controlled   BP has been around 130/80   Continues on losartan 50mg  coreg 6.25mg BID and  HCTZ 12.5mg       BS at home running around 120 in the AM fasting   Denies any lows <70  Continues on metformin 500mg BID   He also takes ASA 81mg daily      Wt today is 261lb  He has stopped doing [employer] recently but states he will get back on track   Discussed diet and wt loss   Recall saxenda 3mg caused him to feel bloated and uncomfortable      Reviewed labs 8/19  Will get labs today   Will check immunity for hepatitis         Pt previously followed with Dr. Delmar Randolph (endo) for PSA and testosterone level checks   Last visit was 7/18  Of note, he is on a lower dose of testosterone d/t syncopal event in the past  Pt is now following with Dr Ai Gibson (endo)  Reviewed notes   Reviewed notes 8/19/19: 1) DM > His A1C today was 5.7%. Pt encouraged to keep up the excellent work. Pt to continue the Metformin 500mg BID. 2) Hypogonadism > He took his last dose of Testosterone Cypionate (75ml/150mg) yesterday. Pt to have his testosterone, CBC and PSA drawn on Wednesday (8/21/19) and I will adjust his dose as needed. Pt denies issues of LUTS, CP, SOB or HAs. Reviewed notes 5/13/19: 1) DM > His A1C in April 2019 was 6.1%. Pt encouraged to keep up the excellent work. Pt to continue the Metformin 500mg BID. 2) Hypogonadism > He took his last dose yesterday. Pt to have his testosterone drawn on Wednesday (5/15/19) and I will adjust his dose as needed. Pt denies issues of LUTS, CP, SOB or HAs.         Pt follows with Dr. Delila Goldberg (cardio) annually in July   No signs sx/cad/chf  Last visit was 7/18  Next visit scheduled for 10/31/19     Pt follows with Dr. Narciso Martinez (pulm) annually  Last visit was 10/18  Pt is compliant with CPAP qhs  Sleeps well with this   Next visit scheduled for 10/19      Pt follows with Dr. Martinez Wilkerson (GI)  Last visit was 2/6/18      No longer taking prilosec  He is taking zantac to help with reflux and this works well  Recall pt states that aciphex improved his sx in the past. However, his insurance no longer covers this medication.      Pt continues on crestor 10mg daily for cholesterol      Continues on effexor 150mg daily for depression, which works well, happy with dose 9/19   Not sad or down      Pt takes a multivitamin       Reviewed US neck 1/19: Enlarged nonspecific lymph node in the left neck. Saw ENT for this 5/19   Had a biopsy for this at [facility]  This was nl per pt     Pt saw ortho for R knee pain   Was given a steroid injection   Pain has resolved      Pt c/o getting the shaked in his L hand and arm   States his partner can feel this when they hold hands   States this happens when he is also doing nothing   States this is slight and looks like a spasm   No rest tremor on exam   Discussed this tremor is likely an intention tremor and is benign   Discussed this is typically familial   Discussed this is likely not parkinsons       Recall his partner is HIV positive  However, pt was HIV negative on 8/19 labs      PREVENTIVE:  Colonoscopy: Dr. Martinez Wilkerson, 2/6/18, unsure of repeat   PSA: 1/17 1.4, 1/18 1.4, 4/19 1.7   AAA screen: not needed, non smoker   Tdap: 8/29/2016  Pneumovax: 4/2015  Shingrix: not yet needed  Flu shot: 09/06/19   HIV screen: 8/19,  negative  Microalbumin: 4/19  Foot exam: 5/19  A1c:  3/17 5.7, 6/17 5.8, 10/17 6.0, 1/18 5.6 Aj, 4/18 5.9, 8/18 6.0, 12/18 6.0, 4/19 6.1, 8/19 5.9   Eye exam: Dr. Ephraim Gonzalez in [place], 7/31/19  Retinal Scan: 10/16/17, mild diabetic retinopathy in L eye  Lipids: 8/19 LDL 24       Patient Active Problem List    Diagnosis Date Noted    Severe obesity (BMI 35.0-39. 9) with comorbidity ([facility] 04/30/2018    Type 2 diabetes mellitus with nephropathy ([facility] 01/22/2018    Diabetes mellitus without complication ([facility] 45/52/0076    Reactive depression 12/12/2016    Essential hypertension with goal blood pressure less than 140/90 05/23/2016    Coronary artery disease involving native coronary artery of native heart without angina pectoris 05/23/2016    Mild episode of recurrent major depressive disorder ([facility] 05/23/2016    SCOTTY on CPAP 05/23/2016    Chronic systolic congestive heart failure (HCC) 05/23/2016    Low testosterone 05/23/2016    Gastroesophageal reflux disease without esophagitis 05/23/2016     Current Outpatient Medications   Medication Sig Dispense Refill    carvedilol (COREG) 6.25 mg tablet TAKE 1 TABLET BY MOUTH TWO  TIMES DAILY 180 Tab 1    metFORMIN (GLUCOPHAGE) 500 mg tablet TAKE 1 TABLET BY MOUTH TWO  TIMES DAILY WITH MEALS 180 Tab 0    azithromycin (ZITHROMAX) 250 mg tablet Take 4 Tabs by mouth See Admin Instructions for 1 dose. 4 Tab 0    testosterone cypionate (DEPOTESTOTERONE CYPIONATE) 200 mg/mL injection Inject 0.75 ml weekly 12 Vial 1    Syringe with Needle, Disp, 1 mL 20 gauge x 1\" syrg For injecting Testosterone once every week 100 Each 11    rosuvastatin (CRESTOR) 10 mg tablet TAKE 1 TABLET BY MOUTH  NIGHTLY 90 Tab 1    hydroCHLOROthiazide (HYDRODIURIL) 12.5 mg tablet Take 1 Tab by mouth daily. Indications: high blood pressure 90 Tab 1    Needle, Disp, 16 G 16 gauge x 1\" ndle For drawing up your testosterone each week. 100 Each 11    glucose blood VI test strips (ONETOUCH VERIO) strip Check blood sugar once daily. 50 Strip 5    losartan (COZAAR) 50 mg tablet TAKE 1 TABLET BY MOUTH  DAILY 90 Tab 1    venlafaxine-SR (EFFEXOR-XR) 150 mg capsule TAKE 1 CAPSULE BY MOUTH  DAILY 90 Cap 3    raNITIdine (ZANTAC) 300 mg tablet Take 1 Tab by mouth daily. 30 Tab 6    mometasone (NASONEX) 50 mcg/actuation nasal spray 2 Sprays daily.  aspirin 81 mg chewable tablet Take 81 mg by mouth nightly.        Past Surgical History:   Procedure Laterality Date    HX COLONOSCOPY  11/2016    HX GI      colonoscopy    HX OTHER SURGICAL      anal fissure      Lab Results   Component Value Date/Time    WBC 9.4 05/22/2019 11:32 AM    HGB 16.2 05/22/2019 11:32 AM    Hemoglobin (POC) 15.6 02/06/2017 11:40 AM    HCT 47.3 05/22/2019 11:32 AM    Hematocrit (POC) 50 04/02/2018 10:59 AM    PLATELET 227 72/87/6693 11:32 AM    MCV 85 05/22/2019 11:32 AM     Lab Results   Component Value Date/Time    Cholesterol, total 132 08/30/2019 08:21 AM    HDL Cholesterol 31 (L) 08/30/2019 08:21 AM    LDL, calculated 24 08/30/2019 08:21 AM    LDL-C, External 34 07/16/2018    Triglyceride 384 (H) 08/30/2019 08:21 AM     Lab Results   Component Value Date/Time    GFR est non-AA 99 08/30/2019 08:21 AM    GFRNA, POC >60 04/02/2018 10:59 AM    GFR est  08/30/2019 08:21 AM    GFRAA, POC >60 04/02/2018 10:59 AM    Creatinine 0.88 08/30/2019 08:21 AM    Creatinine (POC) 0.9 04/02/2018 10:59 AM    BUN 15 08/30/2019 08:21 AM    BUN (POC) 14 04/02/2018 10:59 AM    Sodium 137 08/30/2019 08:21 AM    Sodium (POC) 139 04/02/2018 10:59 AM    Potassium 4.1 08/30/2019 08:21 AM    Potassium (POC) 3.9 04/02/2018 10:59 AM    Chloride 97 08/30/2019 08:21 AM    Chloride (POC) 100 04/02/2018 10:59 AM    CO2 25 08/30/2019 08:21 AM        Review of Systems   Respiratory: Negative for shortness of breath. Cardiovascular: Negative for chest pain. Physical Exam   Constitutional: He is oriented to person, place, and time. He appears well-developed and well-nourished. No distress. HENT:   Head: Normocephalic and atraumatic. Mouth/Throat: Oropharynx is clear and moist. No oropharyngeal exudate. Eyes: Conjunctivae and EOM are normal. Right eye exhibits no discharge. Left eye exhibits no discharge. Neck: Normal range of motion. Neck supple. Cardiovascular: Normal rate, regular rhythm and normal heart sounds. Exam reveals no gallop and no friction rub. No murmur heard. Pulmonary/Chest: Effort normal and breath sounds normal. No respiratory distress.  He has no wheezes. He has no rales. He exhibits no tenderness. Musculoskeletal: Normal range of motion. He exhibits no edema, tenderness or deformity. Lymphadenopathy:     He has no cervical adenopathy. Neurological: He is alert and oriented to person, place, and time. He has normal reflexes. Coordination normal.   Skin: Skin is warm and dry. No rash noted. He is not diaphoretic. No erythema. No pallor. Psychiatric: He has a normal mood and affect. His behavior is normal.       ASSESSMENT and PLAN    ICD-10-CM ICD-9-CM    1. Type 2 diabetes mellitus with nephropathy (HCC)                a1c at goal on recent labs continue metformin BID  E11.21 250.40      583.81    2. Mild episode of recurrent major depressive disorder (HCC)              Stable on effexor 150 mg  F33.0 296.31    3. Chronic systolic congestive heart failure ([address]                Follows with cardio dr Adrian Munoz annually in July medically managed stable  I50.22 428.22      428.0    4. Severe obesity (BMI 35.0-39. 9) with comorbidity [facility])              Working with [employer] in the past has recently stopped but will resume this   E66.01 278.01    5. Essential hypertension with goal blood pressure less than 140/90          Controlled on losartan and coreg and hctz  I10 401.9    6. Coronary artery disease involving native coronary artery of native heart without angina pectoris              utd with dr Adrian Munoz has f/u pending for oct  I25.10 414.01    7. SCOTTY on CPAP                C/w cpap due to see dr Chitra Emanuel next month  G47.33 327.23     Z99.89 V46.8    8. Reactive depression            See above  F32.9 300.4    9. Gastroesophageal reflux disease without esophagitis                Using zantac prn  K21.9 530.81    10. Low testosterone                    Follows with endo dr Godfrey Irizarry for this  R79.89 790.99       11.  Tremor- appears to be benign essential tremor discussed essential tremors he has no rest tremor will check rpr         Scribed by Dexter Draper of [address] [place] [address] [address], as dictated by Dr. Mandi Mclaughlin. Current diagnosis and concerns discussed with pt at length. Pt understands risks and benefits or current treatment plan and medications, and accepts the treatment and medication with any possible risks. Pt asks appropriate questions, which were answered. Pt was instructed to call with any concerns or problems. I have reviewed the note documented by the scribe. The services provided are my own.   The documentation is accurate

## 2019-10-15 ENCOUNTER — HOSPITAL ENCOUNTER (EMERGENCY)
Age: 54
Discharge: HOME | End: 2019-10-15
Attending: STUDENT IN AN ORGANIZED HEALTH CARE EDUCATION/TRAINING PROGRAM
Payer: COMMERCIAL

## 2019-10-15 ENCOUNTER — APPOINTMENT (OUTPATIENT)
Dept: CT IMAGING | Age: 54
End: 2019-10-15
Attending: PHYSICIAN ASSISTANT
Payer: COMMERCIAL

## 2019-10-15 VITALS
BODY MASS INDEX: 39.4 KG/M2 | DIASTOLIC BLOOD PRESSURE: 105 MMHG | OXYGEN SATURATION: 97 % | HEART RATE: 117 BPM | TEMPERATURE: 99.2 F | RESPIRATION RATE: 20 BRPM | WEIGHT: 260 LBS | SYSTOLIC BLOOD PRESSURE: 140 MMHG | HEIGHT: 68 IN

## 2019-10-15 DIAGNOSIS — R10.31 ABDOMINAL PAIN, RIGHT LOWER QUADRANT: Primary | ICD-10-CM

## 2019-10-15 LAB
ALBUMIN SERPL-MCNC: 3.9 G/DL (ref 3.5–5)
ALBUMIN/GLOB SERPL: 0.9 {RATIO} (ref 1.1–2.2)
ALP SERPL-CCNC: 62 U/L (ref 45–117)
ALT SERPL-CCNC: 33 U/L (ref 12–78)
ANION GAP SERPL CALC-SCNC: 4 MMOL/L (ref 5–15)
APPEARANCE UR: CLEAR
AST SERPL-CCNC: 21 U/L (ref 15–37)
BACTERIA URNS QL MICRO: NEGATIVE /HPF
BASOPHILS # BLD: 0 K/UL (ref 0–0.1)
BASOPHILS NFR BLD: 0 % (ref 0–1)
BILIRUB SERPL-MCNC: 0.8 MG/DL (ref 0.2–1)
BILIRUB UR QL: NEGATIVE
BUN SERPL-MCNC: 14 MG/DL (ref 6–20)
BUN/CREAT SERPL: 14 (ref 12–20)
CALCIUM SERPL-MCNC: 9.1 MG/DL (ref 8.5–10.1)
CHLORIDE SERPL-SCNC: 101 MMOL/L (ref 97–108)
CO2 SERPL-SCNC: 31 MMOL/L (ref 21–32)
COLOR UR: ABNORMAL
CREAT SERPL-MCNC: 1.02 MG/DL (ref 0.7–1.3)
DIFFERENTIAL METHOD BLD: ABNORMAL
EOSINOPHIL # BLD: 0.2 K/UL (ref 0–0.4)
EOSINOPHIL NFR BLD: 2 % (ref 0–7)
EPITH CASTS URNS QL MICRO: ABNORMAL /LPF
ERYTHROCYTE [DISTWIDTH] IN BLOOD BY AUTOMATED COUNT: 13.4 % (ref 11.5–14.5)
GLOBULIN SER CALC-MCNC: 4.3 G/DL (ref 2–4)
GLUCOSE SERPL-MCNC: 141 MG/DL (ref 65–100)
GLUCOSE UR STRIP.AUTO-MCNC: NEGATIVE MG/DL
HCT VFR BLD AUTO: 52.6 % (ref 36.6–50.3)
HGB BLD-MCNC: 17.3 G/DL (ref 12.1–17)
HGB UR QL STRIP: ABNORMAL
HYALINE CASTS URNS QL MICRO: ABNORMAL /LPF (ref 0–5)
IMM GRANULOCYTES # BLD AUTO: 0.1 K/UL (ref 0–0.04)
IMM GRANULOCYTES NFR BLD AUTO: 1 % (ref 0–0.5)
KETONES UR QL STRIP.AUTO: NEGATIVE MG/DL
LEUKOCYTE ESTERASE UR QL STRIP.AUTO: NEGATIVE
LIPASE SERPL-CCNC: 169 U/L (ref 73–393)
LYMPHOCYTES # BLD: 1.4 K/UL (ref 0.8–3.5)
LYMPHOCYTES NFR BLD: 12 % (ref 12–49)
MCH RBC QN AUTO: 28.8 PG (ref 26–34)
MCHC RBC AUTO-ENTMCNC: 32.9 G/DL (ref 30–36.5)
MCV RBC AUTO: 87.5 FL (ref 80–99)
MONOCYTES # BLD: 0.4 K/UL (ref 0–1)
MONOCYTES NFR BLD: 4 % (ref 5–13)
NEUTS SEG # BLD: 9.3 K/UL (ref 1.8–8)
NEUTS SEG NFR BLD: 81 % (ref 32–75)
NITRITE UR QL STRIP.AUTO: NEGATIVE
NRBC # BLD: 0 K/UL (ref 0–0.01)
NRBC BLD-RTO: 0 PER 100 WBC
PH UR STRIP: 6.5 [PH] (ref 5–8)
PLATELET # BLD AUTO: 256 K/UL (ref 150–400)
PMV BLD AUTO: 8.8 FL (ref 8.9–12.9)
POTASSIUM SERPL-SCNC: 3.5 MMOL/L (ref 3.5–5.1)
PROT SERPL-MCNC: 8.2 G/DL (ref 6.4–8.2)
PROT UR STRIP-MCNC: 100 MG/DL
RBC # BLD AUTO: 6.01 M/UL (ref 4.1–5.7)
RBC #/AREA URNS HPF: ABNORMAL /HPF (ref 0–5)
SODIUM SERPL-SCNC: 136 MMOL/L (ref 136–145)
SP GR UR REFRACTOMETRY: 1.02 (ref 1–1.03)
UROBILINOGEN UR QL STRIP.AUTO: 1 EU/DL (ref 0.2–1)
WBC # BLD AUTO: 11.4 K/UL (ref 4.1–11.1)
WBC URNS QL MICRO: ABNORMAL /HPF (ref 0–4)

## 2019-10-15 PROCEDURE — 96375 TX/PRO/DX INJ NEW DRUG ADDON: CPT

## 2019-10-15 PROCEDURE — 96361 HYDRATE IV INFUSION ADD-ON: CPT

## 2019-10-15 PROCEDURE — 74177 CT ABD & PELVIS W/CONTRAST: CPT

## 2019-10-15 PROCEDURE — 74011250636 HC RX REV CODE- 250/636: Performed by: PHYSICIAN ASSISTANT

## 2019-10-15 PROCEDURE — 74011636320 HC RX REV CODE- 636/320: Performed by: RADIOLOGY

## 2019-10-15 PROCEDURE — 80053 COMPREHEN METABOLIC PANEL: CPT

## 2019-10-15 PROCEDURE — 81001 URINALYSIS AUTO W/SCOPE: CPT

## 2019-10-15 PROCEDURE — 99285 EMERGENCY DEPT VISIT HI MDM: CPT

## 2019-10-15 PROCEDURE — 83690 ASSAY OF LIPASE: CPT

## 2019-10-15 PROCEDURE — 96374 THER/PROPH/DIAG INJ IV PUSH: CPT

## 2019-10-15 PROCEDURE — 85025 COMPLETE CBC W/AUTO DIFF WBC: CPT

## 2019-10-15 PROCEDURE — 93005 ELECTROCARDIOGRAM TRACING: CPT

## 2019-10-15 PROCEDURE — 74011250636 HC RX REV CODE- 250/636: Performed by: STUDENT IN AN ORGANIZED HEALTH CARE EDUCATION/TRAINING PROGRAM

## 2019-10-15 PROCEDURE — 36415 COLL VENOUS BLD VENIPUNCTURE: CPT

## 2019-10-15 RX ORDER — ONDANSETRON 2 MG/ML
4 INJECTION INTRAMUSCULAR; INTRAVENOUS
Status: COMPLETED | OUTPATIENT
Start: 2019-10-15 | End: 2019-10-15

## 2019-10-15 RX ORDER — SODIUM CHLORIDE 9 MG/ML
1000 INJECTION, SOLUTION INTRAVENOUS ONCE
Status: COMPLETED | OUTPATIENT
Start: 2019-10-15 | End: 2019-10-15

## 2019-10-15 RX ORDER — MORPHINE SULFATE 4 MG/ML
4 INJECTION INTRAVENOUS
Status: COMPLETED | OUTPATIENT
Start: 2019-10-15 | End: 2019-10-15

## 2019-10-15 RX ORDER — CARVEDILOL 6.25 MG/1
6.25 TABLET ORAL
Status: DISCONTINUED | OUTPATIENT
Start: 2019-10-15 | End: 2019-10-16 | Stop reason: HOSPADM

## 2019-10-15 RX ADMIN — SODIUM CHLORIDE 1000 ML: 900 INJECTION, SOLUTION INTRAVENOUS at 20:50

## 2019-10-15 RX ADMIN — SODIUM CHLORIDE 1000 ML: 900 INJECTION, SOLUTION INTRAVENOUS at 18:38

## 2019-10-15 RX ADMIN — MORPHINE SULFATE 4 MG: 4 INJECTION INTRAVENOUS at 19:21

## 2019-10-15 RX ADMIN — ONDANSETRON 4 MG: 2 INJECTION INTRAMUSCULAR; INTRAVENOUS at 19:19

## 2019-10-15 RX ADMIN — IOPAMIDOL 100 ML: 755 INJECTION, SOLUTION INTRAVENOUS at 18:49

## 2019-10-15 NOTE — ED TRIAGE NOTES
Pt here for RLQ pain  starting Thursday with diarrhea today Low grade fever at home.  Tachycardic on arrival.

## 2019-10-15 NOTE — ED PROVIDER NOTES
I have evaluated the patient as the Provider in Triage. I have reviewed His vital signs and the triage nurse assessment. I have talked with the patient and any available family and advised that I am the provider in triage and have ordered the appropriate study to initiate their work up based on the clinical presentation during my assessment. I have advised that the patient will be accommodated in the Main ED as soon as possible. I have also requested to contact the triage nurse or myself immediately if the patient experiences any changes in their condition during this brief waiting period. Pt complains of lower abdominal pain for the past 5 days with onset of diarrhea and low grade fever today. Family notes hx of MI. Pt denies hx of abdominal surgeries. Pt denies nausea, or vomiting. Note written by Vahid Murillo, as dictated by CORETTA Banks 5:25 PM        27-year-old male history of diabetes, coronary disease, hypertension and hyperlipidemia presenting to the emergency department today with abdominal pain. He reports that 4 days ago he began with mild right lower quadrant abdominal pain which is been getting progressively worse. Today it got much worse since now severe and described as sharp and constant. Nothing makes it better or worse. Does not radiate. He has lost his appetite and has had nausea without vomiting. He has had several episodes of diarrhea without blood in the stools today. Has not had fever but notices his heart rate is high. No history of abdominal surgeries in the past.    The history is provided by the patient and a relative.         Past Medical History:   Diagnosis Date    Depression     GERD (gastroesophageal reflux disease)     Headache     Heart attack ([facility] 04/2015    Hypertension     Prediabetes     Tachycardia        Past Surgical History:   Procedure Laterality Date    HX COLONOSCOPY  11/2016    HX GI      colonoscopy    HX OTHER SURGICAL      anal fissure         Family History:   Problem Relation Age of Onset    Cancer Mother         stomach and liver    Hypertension Father     Diabetes Maternal Grandmother     Kidney Disease Maternal Grandmother     Hypertension Maternal Grandfather     No Known Problems Sister     Drug Abuse Brother        Social History     Socioeconomic History    Marital status:      Spouse name: Not on file    Number of children: Not on file    Years of education: Not on file    Highest education level: Not on file   Occupational History    Not on file   Social Needs    Financial resource strain: Not on file    Food insecurity:     Worry: Not on file     Inability: Not on file    Transportation needs:     Medical: Not on file     Non-medical: Not on file   Tobacco Use    Smoking status: Never Smoker    Smokeless tobacco: Never Used   Substance and Sexual Activity    Alcohol use: Yes     Alcohol/week: 0.0 standard drinks     Comment: rarely    Drug use: No    Sexual activity: Yes   Lifestyle    Physical activity:     Days per week: Not on file     Minutes per session: Not on file    Stress: Not on file   Relationships    Social connections:     Talks on phone: Not on file     Gets together: Not on file     Attends [employer] service: Not on file     Active member of club or organization: Not on file     Attends meetings of clubs or organizations: Not on file     Relationship status: Not on file    Intimate partner violence:     Fear of current or ex partner: Not on file     Emotionally abused: Not on file     Physically abused: Not on file     Forced sexual activity: Not on file   Other Topics Concern    Not on file   Social History Narrative    Not on file         ALLERGIES: Patient has no known allergies. Review of Systems   Constitutional: Negative for chills and fever. HENT: Negative for congestion and sore throat. Eyes: Negative for pain and redness.    Respiratory: Negative for cough and shortness of breath. Cardiovascular: Positive for palpitations. Negative for chest pain. Gastrointestinal: Positive for abdominal pain, diarrhea and nausea. Negative for vomiting. Genitourinary: Negative for frequency and hematuria. Musculoskeletal: Negative for back pain and neck pain. Skin: Negative for rash and wound. Neurological: Negative for dizziness and headaches. Hematological: Does not bruise/bleed easily. Vitals:    10/15/19 1726 10/15/19 1842   BP: 136/90    Pulse: (!) 134    Resp: 16    Temp: 99.2 °F (37.3 °C)    SpO2: 97% 98%   Weight: 117.9 kg (260 lb)    Height: 5' 8\" (1.727 m)             Physical Exam   Constitutional: He is oriented to person, place, and time. He appears well-developed and well-nourished. Appears uncomfortable due to pain   HENT:   Head: Normocephalic and atraumatic. Mouth/Throat: Oropharynx is clear and moist.   Eyes: Pupils are equal, round, and reactive to light. Conjunctivae and EOM are normal.   Neck: Normal range of motion. Neck supple. Cardiovascular: Regular rhythm, normal heart sounds and intact distal pulses. Exam reveals no gallop and no friction rub. No murmur heard. Tachycardic   Pulmonary/Chest: Effort normal and breath sounds normal. No respiratory distress. He has no wheezes. He has no rales. Abdominal: Soft. Bowel sounds are normal.   Diffusely tender worse in the right lower quadrant with guarding. Abdomen appears mildly distended but there are bowel sounds. Musculoskeletal: Normal range of motion. He exhibits no edema. Neurological: He is alert and oriented to person, place, and time. Skin: Skin is warm and dry. Capillary refill takes less than 2 seconds. No rash noted. Psychiatric: He has a normal mood and affect. Nursing note and vitals reviewed.     EKG Interpretation:   ED Physician interpretation  Sinus tachycardia rate of 128, left axis deviation, normal intervals, no acute ST elevation or depression    Labs Reviewed:   Mild leukocytosis  Elevated hgb at 17.3  UA not c/w UTI  Normal renal function  No AG  Normal bicarb  Normal sodium and k      Imaging Reviewed:   CT shows no acute process      Course:  Morphine, Zofran, and IVF given    8:13 PM re-evaluated. abd pain better. Abdominal exam without peritonitic findings. CT neg except for renal cyst and discussed this with patient.  bp stable. No acute distress. Will give more IVF (seems like he had a lot of diarrhea today) and he is requesting home dose of Coreg. Tachycardia could be from not taking Coreg as he states his is usually high gilmer when not taking meds. I discussed likely plan for dc home and need for follow up tomorrow if not better or sooner if any worse--worsening pain, vomiting, fever. MDM:  49 y.o. male here with RLQ abdominal pain and tachycardia. Initial concern for appendicitis given location of pain however CT negative for acute process including appendicitis, sbo, diverticulitis, abscess. UA not c/w UTI or pyelo. Labs not c/w biliary obstructive process or hepatitis. Is tachycardic while here however has had diarrhea therefore likely a component of dehydration in addition to missing his b-blocker today. He was given IVF and home dose of bb. Patient was signed out to Dr. Ruthie Huerta pending repeat liter of IVF and dose of his b-blocker. Plan for re-eval and likely dc.     Sonny Harrison DO

## 2019-10-16 LAB
ATRIAL RATE: 128 BPM
CALCULATED P AXIS, ECG09: 43 DEGREES
CALCULATED R AXIS, ECG10: -46 DEGREES
CALCULATED T AXIS, ECG11: 33 DEGREES
DIAGNOSIS, 93000: NORMAL
P-R INTERVAL, ECG05: 140 MS
Q-T INTERVAL, ECG07: 306 MS
QRS DURATION, ECG06: 92 MS
QTC CALCULATION (BEZET), ECG08: 446 MS
VENTRICULAR RATE, ECG03: 128 BPM

## 2019-10-16 NOTE — DISCHARGE INSTRUCTIONS
PLEASE RETURN IF ABDOMINAL PAIN WORSENS, FEVER, OR IF YOU ARE NOT ABLE TO KEEP DOWN LIQUIDS. FOLLOW UP IN 24 HOURS EITHER IN THE EMERGENCY DEPARTMENT OR WITH YOUR PRIMARY DOCTOR IF PAIN IS STILL PRESENT. YOU WERE FOUND TO HAVE A RIGHT SIDED RENAL CYST ON CAT SCAN. JUST FOLLOW UP WITH YOUR PRIMARY DOCTOR REGARDING THIS.      HOLD YOUR METFORMIN FOR THE NEXT 48 HOURS

## 2019-10-16 NOTE — ED NOTES
Patient signed out to me by Dr. Donaldo Bingham. He remains tachycardic after IV fluid. Not exactly sure why so tachycardic. Could be because he did not take his blood pressure medicine earlier today. I offered him admission to continue monitoring working out for his tachycardia. However, he declines admission. He agrees to return to the ER with any new or worsening symptoms.

## 2019-10-17 RX ORDER — METFORMIN HYDROCHLORIDE 500 MG/1
TABLET ORAL
Qty: 180 TAB | Refills: 0 | Status: SHIPPED | OUTPATIENT
Start: 2019-10-17 | End: 2020-01-10

## 2019-10-27 RX ORDER — HYDROCHLOROTHIAZIDE 12.5 MG/1
TABLET ORAL
Qty: 90 TAB | Refills: 1 | Status: SHIPPED | OUTPATIENT
Start: 2019-10-27 | End: 2020-04-14

## 2019-11-11 RX ORDER — LOSARTAN POTASSIUM 50 MG/1
TABLET ORAL
Qty: 90 TAB | Refills: 1 | Status: SHIPPED | OUTPATIENT
Start: 2019-11-11 | End: 2020-04-29

## 2019-11-28 LAB
ALBUMIN SERPL-MCNC: 4.5 G/DL (ref 3.5–5.5)
ALBUMIN/GLOB SERPL: 1.4 {RATIO} (ref 1.2–2.2)
ALP SERPL-CCNC: 61 IU/L (ref 39–117)
ALT SERPL-CCNC: 29 IU/L (ref 0–44)
AST SERPL-CCNC: 27 IU/L (ref 0–40)
BILIRUB SERPL-MCNC: 0.7 MG/DL (ref 0–1.2)
BUN SERPL-MCNC: 17 MG/DL (ref 6–24)
BUN/CREAT SERPL: 17 (ref 9–20)
CALCIUM SERPL-MCNC: 10.1 MG/DL (ref 8.7–10.2)
CHLORIDE SERPL-SCNC: 97 MMOL/L (ref 96–106)
CO2 SERPL-SCNC: 23 MMOL/L (ref 20–29)
CREAT SERPL-MCNC: 1.02 MG/DL (ref 0.76–1.27)
EST. AVERAGE GLUCOSE BLD GHB EST-MCNC: 123 MG/DL
GLOBULIN SER CALC-MCNC: 3.2 G/DL (ref 1.5–4.5)
GLUCOSE SERPL-MCNC: 112 MG/DL (ref 65–99)
HAV AB SER QL IA: POSITIVE
HAV IGM SERPL QL IA: NEGATIVE
HBA1C MFR BLD: 5.9 % (ref 4.8–5.6)
HBV CORE AB SERPL QL IA: POSITIVE
HBV SURFACE AB SER QL: REACTIVE
POTASSIUM SERPL-SCNC: 4 MMOL/L (ref 3.5–5.2)
PROT SERPL-MCNC: 7.7 G/DL (ref 6–8.5)
RPR SER QL: NON REACTIVE
SODIUM SERPL-SCNC: 138 MMOL/L (ref 134–144)
TSH SERPL DL<=0.005 MIU/L-ACNC: 1.39 UIU/ML (ref 0.45–4.5)

## 2019-12-02 LAB
ERYTHROCYTE [DISTWIDTH] IN BLOOD BY AUTOMATED COUNT: 15.3 % (ref 12.3–15.4)
HCT VFR BLD AUTO: 54.5 % (ref 37.5–51)
HGB BLD-MCNC: 18.1 G/DL (ref 13–17.7)
MCH RBC QN AUTO: 28.6 PG (ref 26.6–33)
MCHC RBC AUTO-ENTMCNC: 33.2 G/DL (ref 31.5–35.7)
MCV RBC AUTO: 86 FL (ref 79–97)
PLATELET # BLD AUTO: 322 X10E3/UL (ref 150–450)
PSA SERPL-MCNC: 1.6 NG/ML (ref 0–4)
RBC # BLD AUTO: 6.33 X10E6/UL (ref 4.14–5.8)
REFLEX CRITERIA: NORMAL
TESTOST FREE SERPL-MCNC: 29.1 PG/ML (ref 7.2–24)
TESTOST SERPL-MCNC: 1232 NG/DL (ref 264–916)
WBC # BLD AUTO: 9.4 X10E3/UL (ref 3.4–10.8)

## 2019-12-05 RX ORDER — ROSUVASTATIN CALCIUM 10 MG/1
TABLET, COATED ORAL
Qty: 90 TAB | Refills: 1 | Status: SHIPPED | OUTPATIENT
Start: 2019-12-05 | End: 2020-05-23

## 2019-12-06 ENCOUNTER — OFFICE VISIT (OUTPATIENT)
Dept: INTERNAL MEDICINE CLINIC | Age: 54
End: 2019-12-06

## 2019-12-06 VITALS
SYSTOLIC BLOOD PRESSURE: 117 MMHG | HEART RATE: 98 BPM | BODY MASS INDEX: 39.86 KG/M2 | TEMPERATURE: 98.1 F | WEIGHT: 263 LBS | RESPIRATION RATE: 16 BRPM | OXYGEN SATURATION: 97 % | HEIGHT: 68 IN | DIASTOLIC BLOOD PRESSURE: 76 MMHG

## 2019-12-06 DIAGNOSIS — F33.0 MILD EPISODE OF RECURRENT MAJOR DEPRESSIVE DISORDER (HCC): ICD-10-CM

## 2019-12-06 DIAGNOSIS — I50.22 CHRONIC SYSTOLIC CONGESTIVE HEART FAILURE (HCC): ICD-10-CM

## 2019-12-06 DIAGNOSIS — E11.21 TYPE 2 DIABETES MELLITUS WITH NEPHROPATHY (HCC): ICD-10-CM

## 2019-12-06 DIAGNOSIS — G47.33 OSA ON CPAP: ICD-10-CM

## 2019-12-06 DIAGNOSIS — R79.89 LOW TESTOSTERONE: ICD-10-CM

## 2019-12-06 DIAGNOSIS — K21.9 GASTROESOPHAGEAL REFLUX DISEASE WITHOUT ESOPHAGITIS: ICD-10-CM

## 2019-12-06 DIAGNOSIS — Z99.89 OSA ON CPAP: ICD-10-CM

## 2019-12-06 DIAGNOSIS — R76.8 HEPATITIS B CORE ANTIBODY POSITIVE: Primary | ICD-10-CM

## 2019-12-06 DIAGNOSIS — I25.10 CORONARY ARTERY DISEASE INVOLVING NATIVE CORONARY ARTERY OF NATIVE HEART WITHOUT ANGINA PECTORIS: ICD-10-CM

## 2019-12-06 DIAGNOSIS — E66.01 SEVERE OBESITY (BMI 35.0-39.9) WITH COMORBIDITY (HCC): ICD-10-CM

## 2019-12-06 DIAGNOSIS — L91.8 SKIN TAG: ICD-10-CM

## 2019-12-06 DIAGNOSIS — I10 ESSENTIAL HYPERTENSION WITH GOAL BLOOD PRESSURE LESS THAN 140/90: ICD-10-CM

## 2019-12-06 RX ORDER — OMEPRAZOLE 10 MG/1
10 CAPSULE, DELAYED RELEASE ORAL DAILY
COMMUNITY
End: 2020-02-21

## 2019-12-06 NOTE — PROGRESS NOTES
HISTORY OF PRESENT ILLNESS  Mere Lewis is a 49 y.o. male. HPI   Last here 9/6/19.  Pt is here to f/u on chronic conditions.      BP today is 117/76   BP has been around 140/96   Pt states he has noticed he will break into sweats sometimes   He thinks it may be related to food but has noticed his BP is high when this happens   Advised to check BP again when he is in a calm environment   Advised to check sugars when this happens   Continues on losartan 50mg  coreg 6.25mg BID and  HCTZ 12.5mg      he is diabetic  BS at home running around 110 120   Denies any lows <70  Continues on metformin 500mg BID   He also takes ASA 81mg daily      Wt today is 263 lbs-- up 2 lbs x lov   He has stopped doing [employer] recently but states he will get back on track   Discussed diet and wt loss   Pt got an air fryer and trying to do this more   Recall saxenda 3mg caused him to feel bloated and uncomfortable      Reviewed labs 11/19   Pt had a positive hep B core antibody    He also positive hep A antibodies that were consistent with immunity   Will check hep B titers            Pt previously followed with Dr. Eli Rodriguez (endo) for PSA and testosterone level checks   Last visit was 7/18  Of note, he is on a lower dose of testosterone d/t syncopal event in the past  Pt is now following with Dr Ector Woodall (endo)  Last visit  8/19/19  Next visit 2/21/20         Pt follows with Dr. Lashon Ellis (cardio) annually in July   No signs sx/cad/chf  Last visit was 10/19  Reviewed notes 10/31/19: f/u on CHF bp 122/92, ordered echo, ekg completed, no med changes      Pt follows with Dr. Panfilo Quiles (pulm) annually  Last visit was 10/19  Pt is compliant with CPAP qhs  Sleeps well with this   Most recently saw  Dr Kyle Xavier notes 10/1/19: f/u on sleep apnea, continue cpap current settings      Pt follows with Dr. Teresa Vidal (GI)  Last visit was 2/6/18      No longer taking prilosec  He is taking zantac daily  to help with reflux and this works well 12/19  Recall pt states that aciphex improved his sx in the past. However, his insurance no longer covers this medication.      Pt continues on crestor 10mg daily for cholesterol      Continues on effexor 150mg daily for depression, which works well, happy with dose 12/19   Not sad or down      Pt takes a multivitamin        Reviewed US neck 1/19: Enlarged nonspecific lymph node in the left neck. Saw ENT for this 5/19   Had a biopsy for this at [facility]  This was nl per pt        Pt c/o new skin tags on his neck and under his R nipple   Skin tags present under armpits and neck, scaly papule under R nipple   Advised to see derm          Recall his partner is HIV positive  However, pt was HIV negative on 8/19 labs      PREVENTIVE:  Colonoscopy: Dr. Leigh Ann Villarreal, 2/6/18, unsure of repeat   PSA: 1/17 1.4, 1/18 1.4, 4/19 1.7   AAA screen: not needed, non smoker   Tdap: 8/29/2016  Pneumovax: 4/2015  Shingrix: not yet needed  Flu shot: 09/06/19   HIV screen: 8/19,  negative  Microalbumin: 4/19  Foot exam: 5/19  A1c:  3/17 5.7, 6/17 5.8, 10/17 6.0, 1/18 5.6 Aj, 4/18 5.9, 8/18 6.0, 12/18 6.0, 4/19 6.1, 8/19 5.9 11/19 5.9   Eye exam: Dr. Chyna Cabrera in [place], 7/31/19, reviewed notes   Retinal Scan: 10/16/17, mild diabetic retinopathy in L eye  Lipids: 8/19 LDL 24     Patient Active Problem List    Diagnosis Date Noted    Severe obesity (BMI 35.0-39. 9) with comorbidity ([address] 04/30/2018    Type 2 diabetes mellitus with nephropathy ([address] 01/22/2018    Reactive depression 12/12/2016    Essential hypertension with goal blood pressure less than 140/90 05/23/2016    Coronary artery disease involving native coronary artery of native heart without angina pectoris 05/23/2016    Mild episode of recurrent major depressive disorder ([address] 05/23/2016    SCOTTY on CPAP 05/23/2016    Chronic systolic congestive heart failure ([address] 05/23/2016    Low testosterone 05/23/2016    Gastroesophageal reflux disease without esophagitis 05/23/2016     Current Outpatient Medications   Medication Sig Dispense Refill    rosuvastatin (CRESTOR) 10 mg tablet TAKE 1 TABLET BY MOUTH  NIGHTLY 90 Tab 1    losartan (COZAAR) 50 mg tablet TAKE 1 TABLET BY MOUTH  DAILY 90 Tab 1    hydroCHLOROthiazide (HYDRODIURIL) 12.5 mg tablet TAKE 1 TABLET BY MOUTH  DAILY FOR HIGH BLOOD  PRESSURE 90 Tab 1    metFORMIN (GLUCOPHAGE) 500 mg tablet TAKE 1 TABLET BY MOUTH TWO  TIMES DAILY WITH MEALS 180 Tab 0    carvedilol (COREG) 6.25 mg tablet TAKE 1 TABLET BY MOUTH TWO  TIMES DAILY 180 Tab 1    testosterone cypionate (DEPOTESTOTERONE CYPIONATE) 200 mg/mL injection Inject 0.75 ml weekly 12 Vial 1    Syringe with Needle, Disp, 1 mL 20 gauge x 1\" syrg For injecting Testosterone once every week 100 Each 11    Needle, Disp, 16 G 16 gauge x 1\" ndle For drawing up your testosterone each week. 100 Each 11    glucose blood VI test strips (ONETOUCH VERIO) strip Check blood sugar once daily. 50 Strip 5    venlafaxine-SR (EFFEXOR-XR) 150 mg capsule TAKE 1 CAPSULE BY MOUTH  DAILY 90 Cap 3    raNITIdine (ZANTAC) 300 mg tablet Take 1 Tab by mouth daily. 30 Tab 6    mometasone (NASONEX) 50 mcg/actuation nasal spray 2 Sprays daily.  aspirin 81 mg chewable tablet Take 81 mg by mouth nightly.        Past Surgical History:   Procedure Laterality Date    HX COLONOSCOPY  11/2016    HX GI      colonoscopy    HX OTHER SURGICAL      anal fissure      Lab Results   Component Value Date/Time    WBC 9.4 11/27/2019 09:42 AM    HGB 18.1 (H) 11/27/2019 09:42 AM    Hemoglobin (POC) 15.6 02/06/2017 11:40 AM    HCT 54.5 (H) 11/27/2019 09:42 AM    Hematocrit (POC) 50 04/02/2018 10:59 AM    PLATELET 523 00/61/0907 09:42 AM    MCV 86 11/27/2019 09:42 AM     Lab Results   Component Value Date/Time    Cholesterol, total 132 08/30/2019 08:21 AM    HDL Cholesterol 31 (L) 08/30/2019 08:21 AM    LDL, calculated 24 08/30/2019 08:21 AM    LDL-C, External 34 07/16/2018    Triglyceride 384 (H) 08/30/2019 08:21 AM     Lab Results   Component Value Date/Time    GFR est non-AA 84 11/27/2019 09:37 AM    GFRNA, POC >60 04/02/2018 10:59 AM    GFR est AA 97 11/27/2019 09:37 AM    GFRAA, POC >60 04/02/2018 10:59 AM    Creatinine 1.02 11/27/2019 09:37 AM    Creatinine (POC) 0.9 04/02/2018 10:59 AM    BUN 17 11/27/2019 09:37 AM    BUN (POC) 14 04/02/2018 10:59 AM    Sodium 138 11/27/2019 09:37 AM    Sodium (POC) 139 04/02/2018 10:59 AM    Potassium 4.0 11/27/2019 09:37 AM    Potassium (POC) 3.9 04/02/2018 10:59 AM    Chloride 97 11/27/2019 09:37 AM    Chloride (POC) 100 04/02/2018 10:59 AM    CO2 23 11/27/2019 09:37 AM        Review of Systems   Respiratory: Negative for shortness of breath. Cardiovascular: Negative for chest pain. Physical Exam  Constitutional:       General: He is not in acute distress. Appearance: He is well-developed. He is not diaphoretic. HENT:      Head: Normocephalic and atraumatic. Mouth/Throat:      Mouth: Mucous membranes are moist.      Pharynx: No oropharyngeal exudate or posterior oropharyngeal erythema. Eyes:      Conjunctiva/sclera: Conjunctivae normal.   Neck:      Musculoskeletal: Normal range of motion and neck supple. Cardiovascular:      Rate and Rhythm: Normal rate and regular rhythm. Heart sounds: Normal heart sounds. No murmur. No friction rub. No gallop. Pulmonary:      Effort: Pulmonary effort is normal. No respiratory distress. Breath sounds: Normal breath sounds. No wheezing or rales. Chest:      Chest wall: No tenderness. Abdominal:      General: Abdomen is flat. There is distension. Palpations: Abdomen is soft. There is mass. Tenderness: There is tenderness. Hernia: A hernia is present. Musculoskeletal: Normal range of motion. General: No tenderness or deformity. Lymphadenopathy:      Cervical: No cervical adenopathy. Skin:     General: Skin is warm and dry. Coloration: Skin is not pale. Findings: No erythema or rash. Comments: 30+ skin tags under R and L  arm     Scaly 4 mm papule under R nipple     20+ skin tag under neck    Neurological:      Mental Status: He is alert and oriented to person, place, and time. Coordination: Coordination normal.      Deep Tendon Reflexes: Reflexes are normal and symmetric. Psychiatric:         Mood and Affect: Mood normal.         Behavior: Behavior normal.         ASSESSMENT and PLAN    ICD-10-CM ICD-9-CM    1. Hepatitis B core antibody positive                  Will check hep B titer to see if there is active infection  R76.8 795.79    2. Mild episode of recurrent major depressive disorder (HCC)                  Stable on effexor 150 mg daily  F33.0 296.31    3. Chronic systolic congestive heart failure ([facility]                Follows with dr Daniela Sneed annually in oct  I50.22 428.22      428.0    4. Type 2 diabetes mellitus with nephropathy (HCC)                Controlled on metformin bid  E11.21 250.40      583.81    5. Severe obesity (BMI 35.0-39. 9) with comorbidity ([facility]                Working on Sy[employer] loss has been doing ww which has helped  E66.01 278.01    6. Essential hypertension with goal blood pressure less than 140/90              Controlled on losartan coreg and hctz  I10 401.9    7. Coronary artery disease involving native coronary artery of native heart without angina pectoris                    utd with dr Daniela Sneed medically managed  I25.10 414.01    8. SCOTTY on CPAP                  Compliant with cpap utd with dr Dieter Hillman office  G47.33 327.23     Z99.89 V46.8    9. Gastroesophageal reflux disease without esophagitis                    On zantac as needed  K21.9 530.81    10. Low testosterone                  Now follows with dr Denny Baumgarten next appt in feb  R79.89 790.99             Scribed by Micheline Marcelino of [address], as dictated by Dr. Kevin Goldstein. Current diagnosis and concerns discussed with pt at length.  Pt understands risks and benefits or current treatment plan and medications, and accepts the treatment and medication with any possible risks. Pt asks appropriate questions, which were answered. Pt was instructed to call with any concerns or problems. I have reviewed the note documented by the scribe. The services provided are my own.   The documentation is accurate

## 2019-12-07 LAB
HBV DNA SERPL NAA+PROBE-ACNC: NORMAL IU/ML
HBV DNA SERPL NAA+PROBE-LOG IU: NORMAL LOG10 IU/ML
REF LAB TEST REF RANGE: NORMAL

## 2019-12-07 RX ORDER — VENLAFAXINE HYDROCHLORIDE 150 MG/1
CAPSULE, EXTENDED RELEASE ORAL
Qty: 90 CAP | Refills: 3 | Status: SHIPPED | OUTPATIENT
Start: 2019-12-07 | End: 2020-12-30

## 2019-12-23 DIAGNOSIS — I50.22 CHRONIC SYSTOLIC CONGESTIVE HEART FAILURE (HCC): ICD-10-CM

## 2019-12-23 DIAGNOSIS — G47.33 OSA ON CPAP: ICD-10-CM

## 2019-12-23 DIAGNOSIS — L91.8 SKIN TAG: ICD-10-CM

## 2019-12-23 DIAGNOSIS — I25.10 CORONARY ARTERY DISEASE INVOLVING NATIVE CORONARY ARTERY OF NATIVE HEART WITHOUT ANGINA PECTORIS: ICD-10-CM

## 2019-12-23 DIAGNOSIS — K21.9 GASTROESOPHAGEAL REFLUX DISEASE WITHOUT ESOPHAGITIS: ICD-10-CM

## 2019-12-23 DIAGNOSIS — I10 ESSENTIAL HYPERTENSION WITH GOAL BLOOD PRESSURE LESS THAN 140/90: ICD-10-CM

## 2019-12-23 DIAGNOSIS — E11.21 TYPE 2 DIABETES MELLITUS WITH NEPHROPATHY (HCC): ICD-10-CM

## 2019-12-23 DIAGNOSIS — Z99.89 OSA ON CPAP: ICD-10-CM

## 2019-12-23 DIAGNOSIS — R79.89 LOW TESTOSTERONE: ICD-10-CM

## 2019-12-23 DIAGNOSIS — R76.8 HEPATITIS B CORE ANTIBODY POSITIVE: ICD-10-CM

## 2019-12-23 DIAGNOSIS — E66.01 SEVERE OBESITY (BMI 35.0-39.9) WITH COMORBIDITY (HCC): ICD-10-CM

## 2019-12-23 DIAGNOSIS — F33.0 MILD EPISODE OF RECURRENT MAJOR DEPRESSIVE DISORDER (HCC): ICD-10-CM

## 2019-12-30 DIAGNOSIS — E29.1 HYPOGONADISM IN MALE: Primary | ICD-10-CM

## 2020-01-10 RX ORDER — METFORMIN HYDROCHLORIDE 500 MG/1
TABLET ORAL
Qty: 180 TAB | Refills: 0 | Status: SHIPPED | OUTPATIENT
Start: 2020-01-10 | End: 2020-04-04

## 2020-02-06 RX ORDER — CARVEDILOL 6.25 MG/1
TABLET ORAL
Qty: 180 TAB | Refills: 1 | Status: SHIPPED | OUTPATIENT
Start: 2020-02-06 | End: 2020-07-26

## 2020-02-10 DIAGNOSIS — E29.1 HYPOGONADISM IN MALE: ICD-10-CM

## 2020-02-21 ENCOUNTER — OFFICE VISIT (OUTPATIENT)
Dept: ENDOCRINOLOGY | Age: 55
End: 2020-02-21

## 2020-02-21 VITALS
DIASTOLIC BLOOD PRESSURE: 87 MMHG | HEART RATE: 94 BPM | SYSTOLIC BLOOD PRESSURE: 125 MMHG | BODY MASS INDEX: 39.56 KG/M2 | HEIGHT: 68 IN | WEIGHT: 261 LBS

## 2020-02-21 DIAGNOSIS — E11.21 TYPE 2 DIABETES MELLITUS WITH NEPHROPATHY (HCC): Primary | ICD-10-CM

## 2020-02-21 DIAGNOSIS — E29.1 HYPOGONADISM IN MALE: ICD-10-CM

## 2020-02-21 LAB — HBA1C MFR BLD HPLC: 6.3 %

## 2020-02-21 RX ORDER — FAMOTIDINE 20 MG/1
20 TABLET, FILM COATED ORAL DAILY
COMMUNITY

## 2020-03-05 LAB
ALBUMIN SERPL-MCNC: 4.6 G/DL (ref 3.8–4.9)
ALBUMIN SERPL-MCNC: 4.7 G/DL (ref 3.8–4.9)
ALBUMIN/CREAT UR: 274 MG/G CREAT (ref 0–29)
ALBUMIN/CREAT UR: 293 MG/G CREAT (ref 0–29)
ALBUMIN/GLOB SERPL: 1.4 {RATIO} (ref 1.2–2.2)
ALBUMIN/GLOB SERPL: 1.6 {RATIO} (ref 1.2–2.2)
ALP SERPL-CCNC: 70 IU/L (ref 39–117)
ALP SERPL-CCNC: 72 IU/L (ref 39–117)
ALT SERPL-CCNC: 30 IU/L (ref 0–44)
ALT SERPL-CCNC: 30 IU/L (ref 0–44)
AST SERPL-CCNC: 23 IU/L (ref 0–40)
AST SERPL-CCNC: 24 IU/L (ref 0–40)
BILIRUB SERPL-MCNC: 0.4 MG/DL (ref 0–1.2)
BILIRUB SERPL-MCNC: 0.5 MG/DL (ref 0–1.2)
BUN SERPL-MCNC: 10 MG/DL (ref 6–24)
BUN SERPL-MCNC: 11 MG/DL (ref 6–24)
BUN/CREAT SERPL: 10 (ref 9–20)
BUN/CREAT SERPL: 12 (ref 9–20)
CALCIUM SERPL-MCNC: 10.2 MG/DL (ref 8.7–10.2)
CALCIUM SERPL-MCNC: 10.2 MG/DL (ref 8.7–10.2)
CHLORIDE SERPL-SCNC: 98 MMOL/L (ref 96–106)
CHLORIDE SERPL-SCNC: 98 MMOL/L (ref 96–106)
CHOLEST SERPL-MCNC: 134 MG/DL (ref 100–199)
CO2 SERPL-SCNC: 23 MMOL/L (ref 20–29)
CO2 SERPL-SCNC: 25 MMOL/L (ref 20–29)
CREAT SERPL-MCNC: 0.94 MG/DL (ref 0.76–1.27)
CREAT SERPL-MCNC: 0.97 MG/DL (ref 0.76–1.27)
CREAT UR-MCNC: 211.3 MG/DL
CREAT UR-MCNC: 222.7 MG/DL
ERYTHROCYTE [DISTWIDTH] IN BLOOD BY AUTOMATED COUNT: 13.8 % (ref 11.6–15.4)
EST. AVERAGE GLUCOSE BLD GHB EST-MCNC: 134 MG/DL
GLOBULIN SER CALC-MCNC: 2.9 G/DL (ref 1.5–4.5)
GLOBULIN SER CALC-MCNC: 3.2 G/DL (ref 1.5–4.5)
GLUCOSE SERPL-MCNC: 115 MG/DL (ref 65–99)
GLUCOSE SERPL-MCNC: 118 MG/DL (ref 65–99)
HBA1C MFR BLD: 6.3 % (ref 4.8–5.6)
HCT VFR BLD AUTO: 49.4 % (ref 37.5–51)
HDLC SERPL-MCNC: 30 MG/DL
HGB BLD-MCNC: 17.1 G/DL (ref 13–17.7)
INTERPRETATION, 910389: NORMAL
LDLC SERPL CALC-MCNC: 29 MG/DL (ref 0–99)
Lab: NORMAL
MCH RBC QN AUTO: 28.8 PG (ref 26.6–33)
MCHC RBC AUTO-ENTMCNC: 34.6 G/DL (ref 31.5–35.7)
MCV RBC AUTO: 83 FL (ref 79–97)
MICROALBUMIN UR-MCNC: 609.9 UG/ML
MICROALBUMIN UR-MCNC: 619.1 UG/ML
PLATELET # BLD AUTO: 284 X10E3/UL (ref 150–450)
POTASSIUM SERPL-SCNC: 4.2 MMOL/L (ref 3.5–5.2)
POTASSIUM SERPL-SCNC: 4.4 MMOL/L (ref 3.5–5.2)
PROT SERPL-MCNC: 7.6 G/DL (ref 6–8.5)
PROT SERPL-MCNC: 7.8 G/DL (ref 6–8.5)
PSA SERPL-MCNC: 1.8 NG/ML (ref 0–4)
RBC # BLD AUTO: 5.94 X10E6/UL (ref 4.14–5.8)
REFLEX CRITERIA: NORMAL
SODIUM SERPL-SCNC: 138 MMOL/L (ref 134–144)
SODIUM SERPL-SCNC: 139 MMOL/L (ref 134–144)
TESTOST FREE SERPL-MCNC: 16.3 PG/ML (ref 7.2–24)
TESTOST SERPL-MCNC: 491 NG/DL (ref 264–916)
TRIGL SERPL-MCNC: 376 MG/DL (ref 0–149)
VLDLC SERPL CALC-MCNC: 75 MG/DL (ref 5–40)
WBC # BLD AUTO: 8.8 X10E3/UL (ref 3.4–10.8)

## 2020-03-13 ENCOUNTER — OFFICE VISIT (OUTPATIENT)
Dept: INTERNAL MEDICINE CLINIC | Age: 55
End: 2020-03-13

## 2020-03-13 VITALS
DIASTOLIC BLOOD PRESSURE: 83 MMHG | RESPIRATION RATE: 16 BRPM | OXYGEN SATURATION: 96 % | HEART RATE: 98 BPM | BODY MASS INDEX: 39.62 KG/M2 | HEIGHT: 68 IN | SYSTOLIC BLOOD PRESSURE: 116 MMHG | TEMPERATURE: 97.9 F | WEIGHT: 261.4 LBS

## 2020-03-13 DIAGNOSIS — G47.33 OSA ON CPAP: ICD-10-CM

## 2020-03-13 DIAGNOSIS — I10 ESSENTIAL HYPERTENSION WITH GOAL BLOOD PRESSURE LESS THAN 140/90: Primary | ICD-10-CM

## 2020-03-13 DIAGNOSIS — K21.9 GASTROESOPHAGEAL REFLUX DISEASE WITHOUT ESOPHAGITIS: ICD-10-CM

## 2020-03-13 DIAGNOSIS — R79.89 LOW TESTOSTERONE: ICD-10-CM

## 2020-03-13 DIAGNOSIS — E66.01 SEVERE OBESITY (BMI 35.0-39.9) WITH COMORBIDITY (HCC): ICD-10-CM

## 2020-03-13 DIAGNOSIS — F33.0 MILD EPISODE OF RECURRENT MAJOR DEPRESSIVE DISORDER (HCC): ICD-10-CM

## 2020-03-13 DIAGNOSIS — Z99.89 OSA ON CPAP: ICD-10-CM

## 2020-03-13 DIAGNOSIS — I50.22 CHRONIC SYSTOLIC CONGESTIVE HEART FAILURE (HCC): ICD-10-CM

## 2020-03-13 DIAGNOSIS — E11.21 TYPE 2 DIABETES MELLITUS WITH NEPHROPATHY (HCC): ICD-10-CM

## 2020-03-13 DIAGNOSIS — I25.10 CORONARY ARTERY DISEASE INVOLVING NATIVE CORONARY ARTERY OF NATIVE HEART WITHOUT ANGINA PECTORIS: ICD-10-CM

## 2020-03-13 NOTE — PROGRESS NOTES
HISTORY OF PRESENT ILLNESS  Yony Gonzalez is a 49 y.o. male. HPI   Last here 12/6/19. Pt is here to f/u on chronic conditions.      BP today is controlled   BP at home 133/85   Continues on losartan 50mg  coreg 6.25mg BID and  HCTZ 12.5mg      he is diabetic  BS at home running around 100-120   Denies any lows <70  Continues on metformin 500mg BID   He also takes ASA 81mg daily      Wt today is 260-- down 3 lbs x lov   Pt is trying to decrease carbs, eat more salads   Trying to walk more   Discussed diet and wt loss   Recall saxenda 3mg caused him to feel bloated and uncomfortable      Reviewed labs 3/20   Pt had a positive hep B core antibody     Checked  hep B titers-- checked and negative no active hep B          Pt previously followed with Dr. Agnes Khan (endo) for PSA and testosterone level checks   Last visit was 7/18  Of note, he is on a lower dose of testosterone d/t syncopal event in the past  Pt is now following with Dr Eunice Martinez (endo)  Reviewed notes 2/21/20: 1) DM > His A1C today was 6.3%. Pt encouraged to keep up the excellent work. Pt to continue the Metformin 500mg BID. 2) Hypogonadism > He took his last dose of Testosterone Cypionate (75ml/150mg) yesterday. Pt to have his testosterone, CBC and PSA drawn on Wednesday and I will adjust his dose as needed. Pt denies issues of LUTS, CP, SOB or HAs.    Continues with testosterone injections      Pt follows with Dr. Dori Barraza (cardio)   No signs sx/cad/chf  Last visit was 10/19  Next visit scheduled for this summer      Pt follows with Dr. Shannon Perez (pulm) annually  Most recently saw  Dr Srinivas Castillo on 10/19   Pt is compliant with CPAP qhs  Sleeps well with this          Pt follows with Dr. Vernon Cancer (GI)  Last visit was 2/6/18      No longer taking prilosec  He is taking zantac daily  to help with reflux and this works well 12/19  Recall pt states that aciphex improved his sx in the past. However, his insurance no longer covers this medication.      Pt continues on crestor 10mg daily for cholesterol      Continues on effexor 150mg daily for depression, which works well, happy with dose 3/20   Not sad or down      Pt takes a multivitamin      Recall his partner is HIV positive  However, pt was HIV negative on 8/19 labs      PREVENTIVE:  Colonoscopy: Dr. Tory Romo, 2/6/18, nl, unsure of repeat,  PSA: 1/17 1.4, 1/18 1.4, 4/19 1.7  3/20 1.8   AAA screen: not needed, non smoker   Tdap: 8/29/2016  Pneumovax: 4/2015  Shingrix: not yet needed  Flu shot: 09/06/19   HIV screen: 8/19,  negative  Microalbumin: 3/20   Foot exam: 5/19  A1c:  3/17 5.7, 6/17 5.8, 10/17 6.0, 1/18 5.6 Aj, 4/18 5.9, 8/18 6.0, 12/18 6.0, 4/19 6.1, 8/19 5.9 11/19 5.9 3/20 6.3   Eye exam: Dr. Alonzo in [place], 7/31/19, reviewed notes   Retinal Scan: 10/16/17, mild diabetic retinopathy in L eye  Lipids: 3/10  LDL 29     Patient Active Problem List    Diagnosis Date Noted    Severe obesity (BMI 35.0-39. 9) with comorbidity ([facility] 04/30/2018    Type 2 diabetes mellitus with nephropathy ([facility] 01/22/2018    Essential hypertension with goal blood pressure less than 140/90 05/23/2016    Coronary artery disease involving native coronary artery of native heart without angina pectoris 05/23/2016    Mild episode of recurrent major depressive disorder ([facility] [address] 05/23/2016    SCOTTY on CPAP 05/23/2016    Chronic systolic congestive heart failure (HCC) 05/23/2016    Low testosterone 05/23/2016    Gastroesophageal reflux disease without esophagitis 05/23/2016     Current Outpatient Medications   Medication Sig Dispense Refill    famotidine (PEPCID) 20 mg tablet Take 20 mg by mouth daily.       carvediloL (COREG) 6.25 mg tablet TAKE 1 TABLET BY MOUTH TWO  TIMES DAILY 180 Tab 1    metFORMIN (GLUCOPHAGE) 500 mg tablet TAKE 1 TABLET BY MOUTH TWO  TIMES DAILY WITH MEALS 180 Tab 0    venlafaxine-SR (EFFEXOR-XR) 150 mg capsule TAKE 1 CAPSULE BY MOUTH  DAILY 90 Cap 3    rosuvastatin (CRESTOR) 10 mg tablet TAKE 1 TABLET BY MOUTH NIGHTLY 90 Tab 1    losartan (COZAAR) 50 mg tablet TAKE 1 TABLET BY MOUTH  DAILY 90 Tab 1    hydroCHLOROthiazide (HYDRODIURIL) 12.5 mg tablet TAKE 1 TABLET BY MOUTH  DAILY FOR HIGH BLOOD  PRESSURE 90 Tab 1    testosterone cypionate (DEPOTESTOTERONE CYPIONATE) 200 mg/mL injection Inject 0.75 ml weekly 12 Vial 1    Syringe with Needle, Disp, 1 mL 20 gauge x 1\" syrg For injecting Testosterone once every week 100 Each 11    Needle, Disp, 16 G 16 gauge x 1\" ndle For drawing up your testosterone each week. 100 Each 11    glucose blood VI test strips (ONETOUCH VERIO) strip Check blood sugar once daily. 50 Strip 5    mometasone (NASONEX) 50 mcg/actuation nasal spray 2 Sprays daily.  aspirin 81 mg chewable tablet Take 81 mg by mouth nightly.        Past Surgical History:   Procedure Laterality Date    HX COLONOSCOPY  11/2016    HX GI      colonoscopy    HX OTHER SURGICAL      anal fissure      Lab Results   Component Value Date/Time    WBC 8.8 03/04/2020 09:19 AM    HGB 17.1 03/04/2020 09:19 AM    Hemoglobin (POC) 15.6 02/06/2017 11:40 AM    HCT 49.4 03/04/2020 09:19 AM    Hematocrit (POC) 50 04/02/2018 10:59 AM    PLATELET 601 48/10/8102 09:19 AM    MCV 83 03/04/2020 09:19 AM     Lab Results   Component Value Date/Time    Cholesterol, total 134 03/04/2020 09:19 AM    HDL Cholesterol 30 (L) 03/04/2020 09:19 AM    LDL, calculated 29 03/04/2020 09:19 AM    LDL-C, External 34 07/16/2018    Triglyceride 376 (H) 03/04/2020 09:19 AM     Lab Results   Component Value Date/Time    GFR est non-AA 92 03/04/2020 09:24 AM    GFRNA, POC >60 04/02/2018 10:59 AM    GFR est  03/04/2020 09:24 AM    GFRAA, POC >60 04/02/2018 10:59 AM    Creatinine 0.94 03/04/2020 09:24 AM    Creatinine (POC) 0.9 04/02/2018 10:59 AM    BUN 11 03/04/2020 09:24 AM    BUN (POC) 14 04/02/2018 10:59 AM    Sodium 139 03/04/2020 09:24 AM    Sodium (POC) 139 04/02/2018 10:59 AM    Potassium 4.4 03/04/2020 09:24 AM    Potassium (POC) 3.9 04/02/2018 10:59 AM    Chloride 98 03/04/2020 09:24 AM    Chloride (POC) 100 04/02/2018 10:59 AM    CO2 25 03/04/2020 09:24 AM        Review of Systems   Respiratory: Negative for shortness of breath. Cardiovascular: Negative for chest pain. Physical Exam  Constitutional:       General: He is not in acute distress. Appearance: He is well-developed. He is not diaphoretic. HENT:      Head: Normocephalic and atraumatic. Eyes:      General:         Right eye: No discharge. Left eye: No discharge. Conjunctiva/sclera: Conjunctivae normal.   Neck:      Musculoskeletal: Normal range of motion and neck supple. Cardiovascular:      Rate and Rhythm: Normal rate and regular rhythm. Heart sounds: Normal heart sounds. No murmur. No friction rub. No gallop. Pulmonary:      Effort: Pulmonary effort is normal. No respiratory distress. Breath sounds: Normal breath sounds. No wheezing or rales. Chest:      Chest wall: No tenderness. Musculoskeletal: Normal range of motion. General: No tenderness or deformity. Right lower leg: No edema. Left lower leg: No edema. Lymphadenopathy:      Cervical: No cervical adenopathy. Skin:     General: Skin is warm and dry. Coloration: Skin is not pale. Findings: No erythema or rash. Neurological:      General: No focal deficit present. Mental Status: He is alert and oriented to person, place, and time. Coordination: Coordination normal.      Deep Tendon Reflexes: Reflexes are normal and symmetric. Psychiatric:         Mood and Affect: Mood normal.         Behavior: Behavior normal.         ASSESSMENT and PLAN    ICD-10-CM ICD-9-CM    1. Essential hypertension with goal blood pressure less than 140/90                  Controlled on losartan coreg and hctz continue  I10 401.9    2. Type 2 diabetes mellitus with nephropathy (HCC)            a1c at goal at 6.3 on metformin bid continue  E11.21 250.40      583.81    3. Chronic systolic congestive heart failure ([facility]                    Medically managed follows withdr Karla Cespedes annually did see him last in oct  I50.22 428.22      428.0    4. Mild episode of recurrent major depressive disorder ([facility]                    Controlled on effexor  F33.0 296.31    5. Severe obesity (BMI 35.0-39. 9) with comorbidity ([facility]                  Discussed need for wt loss did well with Ww focusing on lower carb diet  E66.01 278.01    6. SCOTTY on CPAP                  Compliant with cpap  G47.33 327.23     Z99.89 V46.8    7. Low testosterone        Follows with dr Zoila Hays utd from feb continues on testosterone injections  R79.89 790.99    8. Coronary artery disease involving native coronary artery of native heart without angina pectoris          Medically managed utd with bipin  I25.10 414.01    9. Gastroesophageal reflux disease without esophagitis                Doing well with diet uses zantac as needed no longer on prilosec  K21.9 530.81       Depression screen reviewed and negative. Scribed by Aden Gamboa of [facility], as dictated by Dr. Breana Bowen. Current diagnosis and concerns discussed with pt at length. Pt understands risks and benefits or current treatment plan and medications, and accepts the treatment and medication with any possible risks. Pt asks appropriate questions, which were answered. Pt was instructed to call with any concerns or problems. I have reviewed the note documented by the scribe. The services provided are my own.   The documentation is accurate

## 2020-03-19 DIAGNOSIS — E29.1 HYPOGONADISM IN MALE: ICD-10-CM

## 2020-03-19 RX ORDER — TESTOSTERONE CYPIONATE 200 MG/ML
INJECTION INTRAMUSCULAR
Qty: 12 VIAL | Refills: 1 | Status: SHIPPED | OUTPATIENT
Start: 2020-03-19 | End: 2020-11-30

## 2020-03-19 NOTE — TELEPHONE ENCOUNTER
Per results 3/6/2020: 1) Your testosterone level is very good. Continue your current dose of the 0.75ml weekly.

## 2020-04-04 RX ORDER — METFORMIN HYDROCHLORIDE 500 MG/1
TABLET ORAL
Qty: 180 TAB | Refills: 0 | Status: SHIPPED | OUTPATIENT
Start: 2020-04-04 | End: 2020-07-31

## 2020-04-14 RX ORDER — HYDROCHLOROTHIAZIDE 12.5 MG/1
TABLET ORAL
Qty: 90 TAB | Refills: 1 | Status: SHIPPED | OUTPATIENT
Start: 2020-04-14 | End: 2020-10-02

## 2020-04-17 ENCOUNTER — DOCUMENTATION ONLY (OUTPATIENT)
Dept: INTERNAL MEDICINE CLINIC | Age: 55
End: 2020-04-17

## 2020-04-17 DIAGNOSIS — Z76.89 REFERRAL OF PATIENT: Primary | ICD-10-CM

## 2020-04-21 ENCOUNTER — TELEPHONE (OUTPATIENT)
Dept: FAMILY MEDICINE CLINIC | Age: 55
End: 2020-04-21

## 2020-04-21 NOTE — TELEPHONE ENCOUNTER
----- Message from Hailey Casillas sent at 4/21/2020 10:54 AM EDT -----  Regarding: Immanuel Moreland MD/ telephone  General Message/Vendor Calls    Caller's first and last name: Sanna Alexandra      Reason for call: Pt was referred by Dr Kervin Miramontes and would need to set up an appt .  To start a medication Prep       Callback required yes/no and why:      Best contact number(s): [phone]      Details to clarify the request:      Hailey Casillas

## 2020-04-22 NOTE — TELEPHONE ENCOUNTER
Two pt identifiers confirmed. Pt offered and accepted VV appt for    04/28/2020 @1500 (3pm). Per Dr. Elvis Fernandez she agrees anytime next week for 1 hour (new pt)    Pt verbalized understanding of information discussed w/ no further questions at this time.

## 2020-04-28 ENCOUNTER — VIRTUAL VISIT (OUTPATIENT)
Dept: FAMILY MEDICINE CLINIC | Age: 55
End: 2020-04-28

## 2020-04-28 DIAGNOSIS — I10 ESSENTIAL HYPERTENSION WITH GOAL BLOOD PRESSURE LESS THAN 140/90: ICD-10-CM

## 2020-04-28 DIAGNOSIS — E66.9 OBESITY (BMI 35.0-39.9 WITHOUT COMORBIDITY): ICD-10-CM

## 2020-04-28 DIAGNOSIS — Z20.6 HIV EXPOSURE: ICD-10-CM

## 2020-04-28 DIAGNOSIS — R79.89 LOW TESTOSTERONE: ICD-10-CM

## 2020-04-28 DIAGNOSIS — E55.9 VITAMIN D DEFICIENCY: ICD-10-CM

## 2020-04-28 DIAGNOSIS — E11.21 TYPE 2 DIABETES MELLITUS WITH NEPHROPATHY (HCC): Primary | ICD-10-CM

## 2020-04-28 DIAGNOSIS — I25.10 CORONARY ARTERY DISEASE INVOLVING NATIVE CORONARY ARTERY OF NATIVE HEART WITHOUT ANGINA PECTORIS: ICD-10-CM

## 2020-04-28 DIAGNOSIS — R76.8 HEPATITIS B ANTIBODY POSITIVE: ICD-10-CM

## 2020-04-28 DIAGNOSIS — Z20.2 POSSIBLE EXPOSURE TO STD: ICD-10-CM

## 2020-04-28 NOTE — PATIENT INSTRUCTIONS
[employer] Activation Thank you for requesting access to [facility] [facility]. Please follow the instructions below to securely access and download your online medical record. [facility] allows you to send messages to your doctor, view your test results, renew your prescriptions, schedule appointments, and more. How Do I Sign Up? 1. In your internet browser, go to [email or web address]. 2. Click on the First Time User? Click Here link in the Sign In box. You will see the New Member Sign Up page. 3. Enter your [facility] [facility] Access Code exactly as it appears below. You will not need to use this code after youve completed the sign-up process. If you do not sign up before the expiration date, you must request a new code. [employer] [facility] Access Code: Activation code not generated Current [facility] Status: Active (This is the date your [employer] [facility] access code will ) 4. Enter the last four digits of your Social Security Number (xxxx) and Date of Birth (mm/dd/yyyy) as indicated and click Submit. You will be taken to the next sign-up page. 5. Create a [facility] [facility] ID. This will be your [facility] [facility] login ID and cannot be changed, so think of one that is secure and easy to remember. 6. Create a [employer] [facility] password. You can change your password at any time. 7. Enter your Password Reset Question and Answer. This can be used at a later time if you forget your password. 8. Enter your e-mail address. You will receive e-mail notification when new information is available in [address] 9. Click Sign Up. You can now view and download portions of your medical record. 10. Click the Download Summary menu link to download a portable copy of your medical information. Additional Information If you have questions, please visit the Frequently Asked Questions section of the [employer] website at [email or web address] Remember, [employer] is NOT to be used for urgent needs. For medical emergencies, dial 911.

## 2020-04-28 NOTE — PROGRESS NOTES
Chief Complaint   Patient presents with    New Patient     referred by Dr. Deepak Hood       Pt states he wants to get on an HIV preventative medication.

## 2020-04-28 NOTE — PROGRESS NOTES
Infectious Disease Consult    Date of Consultation:  April 28, 2020    Referring Physician: Dr Alfonso Peralta:     Patient is a 49 y.o. male who is being seen for- PrEP. [employer]    Pursuant to the emergency declaration under the [facility], [facility] waiver authority and the [employer] Act, this Virtual Visit was conducted, with patient's consent, to reduce the patient's risk of exposure to COVID-19 and provide continuity of care for an established patient. Services were provided through a video synchronous discussion virtually to substitute for in-person visit. Shannon Cook is a 49 yr old male with history of diabetes, coronary disease, hypertension and hyperlipidemia   who is seen on Consultation in view of  start ing PrEP.   &  in a monogamous relationship. STD- Gonorhea - 35 yrs ago , no recent STD  H/o Hep B core ab +. No known H/o Hep B   HBV negative   Diabetes well controlled. Pt advised to drink lots of water , exercise , get base line bone density testing ,   Vit D testing & take Vit D daily  Pt advised he would need to have baseline HIV , std, renal & liver function testing , thereafter every 3-4 months . Pt agreeable & voiced understanding. Advised him he would need to take medication every day , ideally same time daily. After initial follow up with me  , he could follow up with PCP.      Patient Active Problem List   Diagnosis Code    Essential hypertension with goal blood pressure less than 140/90 I10    Coronary artery disease involving native coronary artery of native heart without angina pectoris I25.10    Mild episode of recurrent major depressive disorder ([facility] F33.0    SCOTTY on CPAP G47.33, Z99.89    Chronic systolic congestive heart failure (HCC) I50.22    Low testosterone R79.89    Gastroesophageal reflux disease without esophagitis K21.9    Type 2 diabetes mellitus with nephropathy (HCC) E11.21    Severe obesity (BMI 35.0-39. 9) with comorbidity ([facility] E66.01     Past Medical History:   Diagnosis Date    Depression     GERD (gastroesophageal reflux disease)     Headache     Heart attack ([facility] 04/2015    Hypertension     Prediabetes     Tachycardia       Family History   Problem Relation Age of Onset    Cancer Mother         stomach and liver    Hypertension Father     Diabetes Maternal Grandmother     Kidney Disease Maternal Grandmother     Hypertension Maternal Grandfather     No Known Problems Sister     Drug Abuse Brother       Social History     Tobacco Use    Smoking status: Never Smoker    Smokeless tobacco: Never Used   Substance Use Topics    Alcohol use: Yes     Alcohol/week: 0.0 standard drinks     Comment: rarely     Past Surgical History:   Procedure Laterality Date    HX COLONOSCOPY  11/2016    HX GI      colonoscopy    HX OTHER SURGICAL      anal fissure      Prior to Admission medications    Medication Sig Start Date End Date Taking? Authorizing Provider   hydroCHLOROthiazide (HYDRODIURIL) 12.5 mg tablet TAKE 1 TABLET BY MOUTH  DAILY FOR HIGH BLOOD  PRESSURE 4/14/20  Yes Filomena Jean-Baptiste MD   metFORMIN (GLUCOPHAGE) 500 mg tablet TAKE 1 TABLET BY MOUTH TWO  TIMES DAILY WITH MEALS 4/4/20  Yes Filomena Jean-Baptiste MD   Syringe with Needle, Disp, 1 mL 20 gauge x 1\" syrg For injecting Testosterone once every week 3/19/20  Yes Gee Oliva MD   testosterone cypionate (DEPOTESTOTERONE CYPIONATE) 200 mg/mL injection Inject 0.75 ml weekly 3/19/20  Yes Gee Oliva MD   famotidine (PEPCID) 20 mg tablet Take 20 mg by mouth daily.    Yes Provider, Historical   carvediloL (COREG) 6.25 mg tablet TAKE 1 TABLET BY MOUTH TWO  TIMES DAILY 2/6/20  Yes Filomena Jean-Baptiste MD   venlafaxine-SR [facility] 150 mg capsule TAKE 1 CAPSULE BY MOUTH  DAILY 12/7/19  Yes Filomena Jean-Baptiste MD   rosuvastatin (CRESTOR) 10 mg tablet TAKE 1 TABLET BY MOUTH NIGHTLY 12/5/19  Yes Cipriano Wilkins MD   losartan (COZAAR) 50 mg tablet TAKE 1 TABLET BY MOUTH  DAILY 11/11/19  Yes Cipriano Wilkins MD   Needle, Disp, 16 G 16 gauge x 1\" ndle For drawing up your testosterone each week. 5/13/19  Yes Valentina Griggs MD   glucose blood VI test strips (ONETOUCH VERIO) strip Check blood sugar once daily. 5/8/19  Yes Valentina Griggs MD   mometasone (NASONEX) 50 mcg/actuation nasal spray 2 Sprays daily. Yes Other, MD oLttie   aspirin 81 mg chewable tablet Take 81 mg by mouth nightly. Yes Provider, Historical     No Known Allergies     Review of Systems:  A comprehensive review of systems was negative except for that written in the History of Present Illness. 10 point ROS obtained. All other systems negative    Objective: There were no vitals taken for this visit. @Banner(85)@  Current Outpatient Medications   Medication Sig    hydroCHLOROthiazide (HYDRODIURIL) 12.5 mg tablet TAKE 1 TABLET BY MOUTH  DAILY FOR HIGH BLOOD  PRESSURE    metFORMIN (GLUCOPHAGE) 500 mg tablet TAKE 1 TABLET BY MOUTH TWO  TIMES DAILY WITH MEALS    Syringe with Needle, Disp, 1 mL 20 gauge x 1\" syrg For injecting Testosterone once every week    testosterone cypionate (DEPOTESTOTERONE CYPIONATE) 200 mg/mL injection Inject 0.75 ml weekly    famotidine (PEPCID) 20 mg tablet Take 20 mg by mouth daily.  carvediloL (COREG) 6.25 mg tablet TAKE 1 TABLET BY MOUTH TWO  TIMES DAILY    venlafaxine-SR (EFFEXOR-XR) 150 mg capsule TAKE 1 CAPSULE BY MOUTH  DAILY    rosuvastatin (CRESTOR) 10 mg tablet TAKE 1 TABLET BY MOUTH  NIGHTLY    losartan (COZAAR) 50 mg tablet TAKE 1 TABLET BY MOUTH  DAILY    Needle, Disp, 16 G 16 gauge x 1\" ndle For drawing up your testosterone each week.  glucose blood VI test strips (ONETOUCH VERIO) strip Check blood sugar once daily.  mometasone (NASONEX) 50 mcg/actuation nasal spray 2 Sprays daily.  aspirin 81 mg chewable tablet Take 81 mg by mouth nightly.      No current facility-administered medications for this visit. Exam:    General:  Alert, cooperative, appears stated age   Eyes:  Sclera anicteric. Pupils equally round and reactive to light. Mouth/Throat: Mucous membranes normal, oral pharynx clear   Lungs:   Regular respirations , no distress   CV:  Could not assess   Abdomen:   Non-distended   Extremities: No edema   Skin: Skin color, texture, turgor normal. no acute rash or lesions   Lymph nodes: Cervical and supraclavicular normal   Musculoskeletal: No swelling or deformity   Psych: Alert and oriented, normal mood affect        Data Review:    Lab Results   Component Value Date/Time    Culture result: NO GROWTH 1 DAY 08/13/2017 09:11 PM    Culture result: NO GROWTH 6 DAYS 08/13/2017 07:35 PM    Culture result: NO GROWTH 6 DAYS 08/13/2017 07:34 PM          XR Results (most recent):  Results from Hospital Encounter encounter on 11/10/17   XR CHEST PORT    Narrative INDICATION: Sore throat, productive cough. Portable AP upright view of the chest.    Direct comparison made to prior chest x-ray dated August 2017. Cardiomediastinal silhouette is stable. Lungs are clear bilaterally. Pleural  spaces are normal. Osseous structures are intact. Impression IMPRESSION: No acute cardiopulmonary disease. IMPRESSION/PLAN:   1. Pt is new to care evaluation for PrEP, risk factor spouse is HIV +  2. Pts \" \"is on Odefsey , undetectable for many years , reminded pt that U=U, risk is low  3. H/o DM, HTN  4. Pt has core ab + , HBV negative   5. For baseline labs & start Descovy, pt agreeable for lab slip to be mailed to him  6. Pt agreeable for HIV testing every 3-4 months , advisd pt he could follow up with PCP   7. Pt advised to exercise , do resistance work, take Vit D supplement , drink lots of water, do a bone      density test         I have discussed the diagnosis with the patient and the intended plan as seen in the above orders.      The patient has received an after-visit summary and questions were answered concerning future plans. I have discussed medication side effects and warnings with the patient as well.     Reviewed test results at length with patient    Signed By: Lizabeth Cooks, MD FACP    April 28, 2020

## 2020-04-29 RX ORDER — LOSARTAN POTASSIUM 50 MG/1
TABLET ORAL
Qty: 90 TAB | Refills: 1 | Status: SHIPPED | OUTPATIENT
Start: 2020-04-29 | End: 2020-10-16

## 2020-05-04 DIAGNOSIS — Z20.822 EXPOSURE TO COVID-19 VIRUS: Primary | ICD-10-CM

## 2020-05-07 LAB
25(OH)D3+25(OH)D2 SERPL-MCNC: 30.8 NG/ML (ref 30–100)
ALBUMIN SERPL-MCNC: 4.6 G/DL (ref 3.8–4.9)
ALBUMIN/GLOB SERPL: 1.5 {RATIO} (ref 1.2–2.2)
ALP SERPL-CCNC: 60 IU/L (ref 39–117)
ALT SERPL-CCNC: 27 IU/L (ref 0–44)
AST SERPL-CCNC: 24 IU/L (ref 0–40)
BILIRUB SERPL-MCNC: 0.7 MG/DL (ref 0–1.2)
BUN SERPL-MCNC: 12 MG/DL (ref 6–24)
BUN/CREAT SERPL: 12 (ref 9–20)
CALCIUM SERPL-MCNC: 9.8 MG/DL (ref 8.7–10.2)
CHLORIDE SERPL-SCNC: 100 MMOL/L (ref 96–106)
CO2 SERPL-SCNC: 23 MMOL/L (ref 20–29)
CREAT SERPL-MCNC: 0.98 MG/DL (ref 0.76–1.27)
GLOBULIN SER CALC-MCNC: 3 G/DL (ref 1.5–4.5)
GLUCOSE SERPL-MCNC: 142 MG/DL (ref 65–99)
HAV IGM SERPL QL IA: NEGATIVE
HBV CORE AB SERPL QL IA: POSITIVE
HBV CORE IGM SERPL QL IA: NEGATIVE
HBV SURFACE AG SERPL QL IA: NEGATIVE
HCV AB S/CO SERPL IA: <0.1 S/CO RATIO (ref 0–0.9)
HIV 1+2 AB+HIV1 P24 AG SERPL QL IA: NON REACTIVE
POTASSIUM SERPL-SCNC: 4.2 MMOL/L (ref 3.5–5.2)
PROT SERPL-MCNC: 7.6 G/DL (ref 6–8.5)
RPR SER QL: NON REACTIVE
SARS-COV-2 AB, IGG, CORG1M: NEGATIVE
SODIUM SERPL-SCNC: 138 MMOL/L (ref 134–144)

## 2020-05-23 RX ORDER — ROSUVASTATIN CALCIUM 10 MG/1
TABLET, COATED ORAL
Qty: 90 TAB | Refills: 1 | Status: SHIPPED | OUTPATIENT
Start: 2020-05-23 | End: 2020-11-05

## 2020-05-26 ENCOUNTER — TELEPHONE (OUTPATIENT)
Dept: FAMILY MEDICINE CLINIC | Age: 55
End: 2020-05-26

## 2020-05-26 DIAGNOSIS — Z20.6 HIV EXPOSURE: Primary | ICD-10-CM

## 2020-05-26 RX ORDER — EMTRICITABINE AND TENOFOVIR ALAFENAMIDE 200; 25 MG/1; MG/1
1 TABLET ORAL DAILY
Qty: 30 TAB | Refills: 3 | Status: SHIPPED | OUTPATIENT
Start: 2020-05-26 | End: 2020-07-21 | Stop reason: SDUPTHER

## 2020-05-26 NOTE — TELEPHONE ENCOUNTER
LPN is routing to provider for review. ----- Message from Bernard Merchant sent at 5/22/2020  7:21 PM EDT -----  Regarding: Prescription Question  Contact: [phone]  I have not heard from you. I took the blood tests. When are you going to write a prescription for me to start prep? Anice Rounds   to Zak Wray MD   9:14 AM 05/17/2020  Tests results are done. Am I ready to start prep?

## 2020-06-12 DIAGNOSIS — Z11.3 SCREEN FOR STD (SEXUALLY TRANSMITTED DISEASE): Primary | ICD-10-CM

## 2020-06-13 LAB — RPR SER QL: NON REACTIVE

## 2020-06-18 LAB
BUN SERPL-MCNC: 13 MG/DL (ref 6–24)
BUN/CREAT SERPL: 13 (ref 9–20)
CALCIUM SERPL-MCNC: 10.2 MG/DL (ref 8.7–10.2)
CHLORIDE SERPL-SCNC: 97 MMOL/L (ref 96–106)
CO2 SERPL-SCNC: 25 MMOL/L (ref 20–29)
CREAT SERPL-MCNC: 1 MG/DL (ref 0.76–1.27)
EST. AVERAGE GLUCOSE BLD GHB EST-MCNC: 123 MG/DL
GLUCOSE SERPL-MCNC: 111 MG/DL (ref 65–99)
HBA1C MFR BLD: 5.9 % (ref 4.8–5.6)
POTASSIUM SERPL-SCNC: 4.2 MMOL/L (ref 3.5–5.2)
SODIUM SERPL-SCNC: 136 MMOL/L (ref 134–144)

## 2020-06-22 NOTE — PROGRESS NOTES
HISTORY OF PRESENT ILLNESS  Giana Chopra is a 49 y.o. male. HPI   Last here 3/13/20. Pt is here to f/u on chronic conditions. This is an established visit completed with telemedicine was completed with video assist  the patient acknowledges and agrees to this method of visitation doxyme       BP at home 127/86 this AM   Was 140/96 one day, mostly running 129/81, 116/83   Continues on losartan 50mg  coreg 6.25mg BID and  HCTZ 12.5mg      he is diabetic  BS at home running VBPCIP 431 this AM, 126, 110, 121  Denies any lows <70  Continues on metformin 500mg BID   He also takes ASA 81mg daily      Wt today is 260-- stable x lov   Discussed diet and wt loss   Recall saxenda 3mg caused him to feel bloated and uncomfortable      Reviewed labs 6/20          Pt previously followed with Dr. Galdino Aldana (endo) for PSA and testosterone level checks   Last visit was 7/18  Of note, he is on a lower dose of testosterone d/t syncopal event in the past  Pt is now following with Dr Kendra Fuller (endo)  Last visit was 2/21/20  Continues with testosterone injections       Pt follows with Dr. Margaret Mendoza (cardio)   No signs sx/cad/chf  Last visit was 10/19  Next visit scheduled for this summer      Pt follows with Dr. Ally Boyd (pulm) annually  Most recently saw  Dr Kenyatta Wang on 10/19   Pt is compliant with CPAP qhs  Sleeps well with this         Pt follows with Dr. Shruthi Cheng (GI)  Last visit was 2/6/18    Pt now follows with Dr. Lisa Kaye (innfectious disease)  Reviewed note 4/28/20: Pt is new to care evaluation for PrEP, risk factor spouse is HIV +  1. Pts \" \"is on Odefsey , undetectable for many years , reminded pt that U=U, risk is low  2. H/o DM, HTN  3. Pt has core ab + , HBV negative   4. For baseline labs & start Descovy, pt agreeable for lab slip to be mailed to him  5.  Pt agreeable for HIV testing every 3-4 months , advisd pt he could follow up with PCP   Pt advised to exercise , do resistance work, take Vit D supplement , drink lots of water, do a bone      density test  Pt is now on descovy, denies any issues taking this   Will f/u every 6 mos       No longer taking prilosec  He is taking now taking pepcid  daily  to help with reflux and this works well 6/20   No longer taking zantac   Recall pt states that aciphex improved his sx in the past. However, his insurance no longer covers this medication.      Pt continues on crestor 10mg daily for cholesterol      Continues on effexor 150mg daily for depression, which works well, happy with dose 6/20   Not sad or down      Pt takes a multivitamin      Recall his partner is HIV positive  However, pt was HIV negative on 8/19 labs      PREVENTIVE:  Colonoscopy: Dr. Swapna Ramos, 2/6/18, nl, unsure of repeat,  PSA: 1/17 1.4, 1/18 1.4, 4/19 1.7  3/20 1.8   AAA screen: not needed, non smoker   Tdap: 8/29/2016  Pneumovax: 4/2015  Shingrix: not yet needed, will get closer to 60   Flu shot: 09/06/19   HIV screen: 8/19,  negative  Microalbumin: 3/20   Foot exam: 5/19  A1c:  3/17 5.7, 6/17 5.8, 10/17 6.0, 1/18 5.6 Aj, 4/18 5.9, 8/18 6.0, 12/18 6.0, 4/19 6.1, 8/19 5.9 11/19 5.9 3/20 6.3  6/20 5.9  Eye exam: Dr. Alonzo in [place], 7/31/19, reviewed notes   Retinal Scan: 10/16/17, mild diabetic retinopathy in L eye  Lipids: 3/20  LDL 29     Patient Active Problem List    Diagnosis Date Noted    Severe obesity (BMI 35.0-39. 9) with comorbidity ([facility] 04/30/2018    Type 2 diabetes mellitus with nephropathy ([facility] 01/22/2018    Essential hypertension with goal blood pressure less than 140/90 05/23/2016    Coronary artery disease involving native coronary artery of native heart without angina pectoris 05/23/2016    Mild episode of recurrent major depressive disorder ([facility] 05/23/2016    SCOTTY on CPAP 05/23/2016    Chronic systolic congestive heart failure ([facility] 05/23/2016    Low testosterone 05/23/2016    Gastroesophageal reflux disease without esophagitis 05/23/2016     Current Outpatient Medications   Medication Sig Dispense Refill    emtricitabine-tenofovir alafen (Descovy) tablet Take 1 Tab by mouth daily. 30 Tab 3    rosuvastatin (CRESTOR) 10 mg tablet TAKE 1 TABLET BY MOUTH  NIGHTLY 90 Tab 1    losartan (COZAAR) 50 mg tablet TAKE 1 TABLET BY MOUTH  DAILY 90 Tab 1    hydroCHLOROthiazide (HYDRODIURIL) 12.5 mg tablet TAKE 1 TABLET BY MOUTH  DAILY FOR HIGH BLOOD  PRESSURE 90 Tab 1    metFORMIN (GLUCOPHAGE) 500 mg tablet TAKE 1 TABLET BY MOUTH TWO  TIMES DAILY WITH MEALS 180 Tab 0    Syringe with Needle, Disp, 1 mL 20 gauge x 1\" syrg For injecting Testosterone once every week 100 Each 11    testosterone cypionate (DEPOTESTOTERONE CYPIONATE) 200 mg/mL injection Inject 0.75 ml weekly 12 Vial 1    famotidine (PEPCID) 20 mg tablet Take 20 mg by mouth daily.  carvediloL (COREG) 6.25 mg tablet TAKE 1 TABLET BY MOUTH TWO  TIMES DAILY 180 Tab 1    venlafaxine-SR (EFFEXOR-XR) 150 mg capsule TAKE 1 CAPSULE BY MOUTH  DAILY 90 Cap 3    Needle, Disp, 16 G 16 gauge x 1\" ndle For drawing up your testosterone each week. 100 Each 11    glucose blood VI test strips (ONETOUCH VERIO) strip Check blood sugar once daily. 50 Strip 5    mometasone (NASONEX) 50 mcg/actuation nasal spray 2 Sprays daily.  aspirin 81 mg chewable tablet Take 81 mg by mouth nightly.        Past Surgical History:   Procedure Laterality Date    HX COLONOSCOPY  11/2016    HX GI      colonoscopy    HX OTHER SURGICAL      anal fissure      Lab Results   Component Value Date/Time    WBC 8.8 03/04/2020 09:19 AM    HGB 17.1 03/04/2020 09:19 AM    Hemoglobin (POC) 15.6 02/06/2017 11:40 AM    HCT 49.4 03/04/2020 09:19 AM    Hematocrit (POC) 50 04/02/2018 10:59 AM    PLATELET 847 44/98/5249 09:19 AM    MCV 83 03/04/2020 09:19 AM     Lab Results   Component Value Date/Time    Cholesterol, total 134 03/04/2020 09:19 AM    HDL Cholesterol 30 (L) 03/04/2020 09:19 AM    LDL, calculated 29 03/04/2020 09:19 AM    LDL-C, External 34 07/16/2018    Triglyceride 376 (H) 03/04/2020 09:19 AM     Lab Results   Component Value Date/Time    GFR est non-AA 86 06/17/2020 09:16 AM    GFRNA, POC >60 04/02/2018 10:59 AM    GFR est AA 99 06/17/2020 09:16 AM    GFRAA, POC >60 04/02/2018 10:59 AM    Creatinine 1.00 06/17/2020 09:16 AM    Creatinine (POC) 0.9 04/02/2018 10:59 AM    BUN 13 06/17/2020 09:16 AM    BUN (POC) 14 04/02/2018 10:59 AM    Sodium 136 06/17/2020 09:16 AM    Sodium (POC) 139 04/02/2018 10:59 AM    Potassium 4.2 06/17/2020 09:16 AM    Potassium (POC) 3.9 04/02/2018 10:59 AM    Chloride 97 06/17/2020 09:16 AM    Chloride (POC) 100 04/02/2018 10:59 AM    CO2 25 06/17/2020 09:16 AM        Review of Systems   Respiratory: Negative for shortness of breath. Cardiovascular: Negative for chest pain. Physical Exam  Constitutional:       Appearance: Normal appearance. He is not toxic-appearing or diaphoretic. HENT:      Head: Normocephalic and atraumatic. Eyes:      Conjunctiva/sclera: Conjunctivae normal.      Pupils: Pupils are equal, round, and reactive to light. Neurological:      Mental Status: He is alert and oriented to person, place, and time. Mental status is at baseline. Gait: Gait normal.   Psychiatric:         Mood and Affect: Mood normal.         Behavior: Behavior normal.         ASSESSMENT and PLAN    ICD-10-CM ICD-9-CM    1. Type 2 diabetes mellitus with nephropathy (HCC)            Well controlled on metformin continue  E11.21 250.40      583.81    2. Severe obesity (BMI 35.0-39. 9) with comorbidity ([address]            Had been doing ww and made great progress wt has stabilized out but has not gained wt back continue to work on this  E66.01 278.01    3. SCOTTY on CPAP              Compliant with cpap  G47.33 327.23     Z99.89 V46.8    4. Low testosterone              Follows with dr Jorge Dodge for this  R79.89 790.99    5.  Coronary artery disease involving native coronary artery of native heart without angina pectoris              Medically managed asymptomatic utd with dr Radha Cid last saw him in October  I25.10 414.01    6. Gastroesophageal reflux disease without esophagitis          Controlled on pepcid  K21.9 530.81    7. Essential hypertension with goal blood pressure less than 140/90            bp well controlled in clinic adequately controlled at home continue losartan coreg and hctz  I10 401.9    8. Chronic systolic congestive heart failure ([facility]          Medically managed utd with dr Radha Cid  I50.22 428.22      428.0    9. Exposure to HIV            Now on descovy follows with dr jeffrey  Z20.6 V01.79    10. Mild episode of recurrent major depressive disorder ([facility]              Controlled on effexor  F33.0 296.31           Depression screen reviewed and negative       Scribed by Rachael Aguila, as dictated by Dr. Gayathri Her. Current diagnosis and concerns discussed with pt at length. Pt understands risks and benefits or current treatment plan and medications, and accepts the treatment and medication with any possible risks. Pt asks appropriate questions, which were answered. Pt was instructed to call with any concerns or problems. I have reviewed the note documented by the scribe. The services provided are my own. The documentation is accurate       Belkys Sharp is a 49 y.o. male who was evaluated by an audio-video encounter for concerns as above. Patient identification was verified prior to start of the visit. A caregiver was present when appropriate. Due to this being a TeleHealth encounter (During DGLTR-05 public health emergency), evaluation of the following organ systems was limited: Vitals/Constitutional/EENT/Resp/CV/GI//MS/Neuro/Skin/Heme-Lymph-Imm.   Pursuant to the emergency declaration under the [facility] waiver authority and the [employer] Act, this Virtual Visit was conducted, with patient's (and/or legal guardian's) consent, to reduce the patient's risk of exposure to COVID-19 and provide necessary medical care. Services were provided through a synchronous discussion virtually to substitute for in-person clinic visit. I was in the office. The patient was at home.

## 2020-06-23 ENCOUNTER — VIRTUAL VISIT (OUTPATIENT)
Dept: INTERNAL MEDICINE CLINIC | Age: 55
End: 2020-06-23

## 2020-06-23 DIAGNOSIS — I50.22 CHRONIC SYSTOLIC CONGESTIVE HEART FAILURE (HCC): ICD-10-CM

## 2020-06-23 DIAGNOSIS — F33.0 MILD EPISODE OF RECURRENT MAJOR DEPRESSIVE DISORDER (HCC): ICD-10-CM

## 2020-06-23 DIAGNOSIS — G47.33 OSA ON CPAP: ICD-10-CM

## 2020-06-23 DIAGNOSIS — Z99.89 OSA ON CPAP: ICD-10-CM

## 2020-06-23 DIAGNOSIS — I10 ESSENTIAL HYPERTENSION WITH GOAL BLOOD PRESSURE LESS THAN 140/90: ICD-10-CM

## 2020-06-23 DIAGNOSIS — E66.01 SEVERE OBESITY (BMI 35.0-39.9) WITH COMORBIDITY (HCC): ICD-10-CM

## 2020-06-23 DIAGNOSIS — I25.10 CORONARY ARTERY DISEASE INVOLVING NATIVE CORONARY ARTERY OF NATIVE HEART WITHOUT ANGINA PECTORIS: ICD-10-CM

## 2020-06-23 DIAGNOSIS — Z20.6 EXPOSURE TO HIV: ICD-10-CM

## 2020-06-23 DIAGNOSIS — R79.89 LOW TESTOSTERONE: ICD-10-CM

## 2020-06-23 DIAGNOSIS — K21.9 GASTROESOPHAGEAL REFLUX DISEASE WITHOUT ESOPHAGITIS: ICD-10-CM

## 2020-06-23 DIAGNOSIS — E11.21 TYPE 2 DIABETES MELLITUS WITH NEPHROPATHY (HCC): Primary | ICD-10-CM

## 2020-07-21 NOTE — TELEPHONE ENCOUNTER
PCP: Audelia Rodriguez MD    Last appt: 4/28/2020  Future Appointments   Date Time Provider Maria Teresa Stark   8/21/2020 10:50 AM Eduardo Padgett MD [facility]        Requested Prescriptions     Pending Prescriptions Disp Refills    emtricitabine-tenofovir alafen (Descovy) tablet 30 Tab 3     Sig: Take 1 Tab by mouth daily. Orders Only on 06/12/2020   Component Date Value Ref Range Status    RPR 06/12/2020 Non Reactive  Non Reactive Final    Glucose 06/17/2020 111* 65 - 99 mg/dL Final    BUN 06/17/2020 13  6 - 24 mg/dL Final    Creatinine 06/17/2020 1.00  0.76 - 1.27 mg/dL Final    GFR est non-AA 06/17/2020 86  >59 mL/min/1.73 Final    GFR est AA 06/17/2020 99  >59 mL/min/1.73 Final    BUN/Creatinine ratio 06/17/2020 13  9 - 20 Final    Sodium 06/17/2020 136  134 - 144 mmol/L Final    Potassium 06/17/2020 4.2  3.5 - 5.2 mmol/L Final    Chloride 06/17/2020 97  96 - 106 mmol/L Final    CO2 06/17/2020 25  20 - 29 mmol/L Final    Calcium 06/17/2020 10.2  8.7 - 10.2 mg/dL Final    Hemoglobin A1c 06/17/2020 5.9* 4.8 - 5.6 % Final    Comment:          Prediabetes: 5.7 - 6.4           Diabetes: >6.4           Glycemic control for adults with diabetes: <7.0      Estimated average glucose 06/17/2020 123  mg/dL Final   Orders Only on 05/04/2020   Component Date Value Ref Range Status    SARS-CoV-2 Ab, IgG 05/06/2020 Negative  Negative Final    Comment: This sample does not contain detectable SARS-CoV-2 IgG antibodies. This negative result does not rule out SARS-CoV-2 infection. Correlation with epidemiologic risk factors and other clinical and  laboratory findings is recommended. Serologic results should not be  used as the sole basis to diagnose or exclude recent SARS-CoV-2  infection. This assay was performed using the Abbott SARS-CoV-2 IgG assay. This test has not been reviewed by the Food and Drug Administration.      Virtual Visit on 04/28/2020   Component Date Value Ref Range Status    Glucose 05/06/2020 142* 65 - 99 mg/dL Final    BUN 05/06/2020 12  6 - 24 mg/dL Final    Creatinine 05/06/2020 0.98  0.76 - 1.27 mg/dL Final    GFR est non-AA 05/06/2020 88  >59 mL/min/1.73 Final    GFR est AA 05/06/2020 101  >59 mL/min/1.73 Final    BUN/Creatinine ratio 05/06/2020 12  9 - 20 Final    Sodium 05/06/2020 138  134 - 144 mmol/L Final    Potassium 05/06/2020 4.2  3.5 - 5.2 mmol/L Final    Chloride 05/06/2020 100  96 - 106 mmol/L Final    CO2 05/06/2020 23  20 - 29 mmol/L Final    Calcium 05/06/2020 9.8  8.7 - 10.2 mg/dL Final    Protein, total 05/06/2020 7.6  6.0 - 8.5 g/dL Final    Albumin 05/06/2020 4.6  3.8 - 4.9 g/dL Final    GLOBULIN, TOTAL 05/06/2020 3.0  1.5 - 4.5 g/dL Final    A-G Ratio 05/06/2020 1.5  1.2 - 2.2 Final    Bilirubin, total 05/06/2020 0.7  0.0 - 1.2 mg/dL Final    Alk. phosphatase 05/06/2020 60  39 - 117 IU/L Final    AST (SGOT) 05/06/2020 24  0 - 40 IU/L Final    ALT (SGPT) 05/06/2020 27  0 - 44 IU/L Final    HIV SCREEN 4TH GENERATION WRFX 05/06/2020 Non Reactive  Non Reactive Final    Hepatitis A Ab, IgM 05/06/2020 Negative  Negative Final    Hep B surface Ag screen 05/06/2020 Negative  Negative Final    Hep B Core Ab, IgM 05/06/2020 Negative  Negative Final    Hep C Virus Ab 05/06/2020 <0.1  0.0 - 0.9 s/co ratio Final    Comment:                                   Negative:     < 0.8                               Indeterminate: 0.8 - 0.9                                    Positive:     > 0.9   The CDC recommends that a positive HCV antibody result   be followed up with a HCV Nucleic Acid Amplification   test ([id number]).       Hep B Core Ab, total 05/06/2020 Positive* Negative Final    RPR 05/06/2020 Non Reactive  Non Reactive Final    VITAMIN D, 25-HYDROXY 05/06/2020 30.8  30.0 - 100.0 ng/mL Final    Comment: Vitamin D deficiency has been defined by the [place] of  Medicine and an Endocrine Society practice guideline as a  level of serum 25-OH vitamin D less than 20 ng/mL (1,2). The Endocrine Society went on to further define vitamin D  insufficiency as a level between 21 and 29 ng/mL (2). 1. IOM ([place] of Medicine). 2010. Dietary reference     intakes for calcium and D. [address] [facility]: The     [employer]. 2. Elle BARCENAS, Fernando REEVES, Parker ELAINE, et al.     Evaluation, treatment, and prevention of vitamin D     deficiency: an Endocrine Society clinical practice     guideline. JCEM. 2011 Jul; 96(7):1911-30.

## 2020-07-23 RX ORDER — EMTRICITABINE AND TENOFOVIR ALAFENAMIDE 200; 25 MG/1; MG/1
1 TABLET ORAL DAILY
Qty: 30 TAB | Refills: 3 | Status: SHIPPED | OUTPATIENT
Start: 2020-07-23 | End: 2020-11-27 | Stop reason: SDUPTHER

## 2020-07-24 DIAGNOSIS — I10 ESSENTIAL HYPERTENSION WITH GOAL BLOOD PRESSURE LESS THAN 140/90: ICD-10-CM

## 2020-07-24 DIAGNOSIS — Z20.2 POSSIBLE EXPOSURE TO STD: ICD-10-CM

## 2020-07-24 DIAGNOSIS — R76.8 HEPATITIS B ANTIBODY POSITIVE: ICD-10-CM

## 2020-07-24 DIAGNOSIS — Z20.6 HIV EXPOSURE: Primary | ICD-10-CM

## 2020-07-26 RX ORDER — CARVEDILOL 6.25 MG/1
TABLET ORAL
Qty: 180 TAB | Refills: 3 | Status: SHIPPED | OUTPATIENT
Start: 2020-07-26 | End: 2021-06-20

## 2020-07-27 RX ORDER — NEEDLES, DISPOSABLE 27GX1/2"
NEEDLE, DISPOSABLE MISCELLANEOUS
Qty: 13 EACH | Refills: 0 | Status: SHIPPED | OUTPATIENT
Start: 2020-07-27 | End: 2020-11-11

## 2020-07-31 RX ORDER — METFORMIN HYDROCHLORIDE 500 MG/1
TABLET ORAL
Qty: 180 TAB | Refills: 0 | Status: SHIPPED | OUTPATIENT
Start: 2020-07-31 | End: 2020-10-20

## 2020-08-04 DIAGNOSIS — E29.1 HYPOGONADISM IN MALE: Primary | ICD-10-CM

## 2020-08-15 LAB
ERYTHROCYTE [DISTWIDTH] IN BLOOD BY AUTOMATED COUNT: 14.9 % (ref 11.6–15.4)
HCT VFR BLD AUTO: 51.5 % (ref 37.5–51)
HGB BLD-MCNC: 17.7 G/DL (ref 13–17.7)
MCH RBC QN AUTO: 28.8 PG (ref 26.6–33)
MCHC RBC AUTO-ENTMCNC: 34.4 G/DL (ref 31.5–35.7)
MCV RBC AUTO: 84 FL (ref 79–97)
PLATELET # BLD AUTO: 248 X10E3/UL (ref 150–450)
PSA SERPL-MCNC: 1.7 NG/ML (ref 0–4)
RBC # BLD AUTO: 6.14 X10E6/UL (ref 4.14–5.8)
REFLEX CRITERIA: NORMAL
TESTOST FREE SERPL-MCNC: 14.6 PG/ML (ref 7.2–24)
TESTOST SERPL-MCNC: 488 NG/DL (ref 264–916)
WBC # BLD AUTO: 8.2 X10E3/UL (ref 3.4–10.8)

## 2020-08-16 LAB
ALBUMIN SERPL-MCNC: 4.7 G/DL (ref 3.8–4.9)
ALBUMIN/GLOB SERPL: 1.7 {RATIO} (ref 1.2–2.2)
ALP SERPL-CCNC: 62 IU/L (ref 39–117)
ALT SERPL-CCNC: 36 IU/L (ref 0–44)
AST SERPL-CCNC: 25 IU/L (ref 0–40)
BASOPHILS # BLD AUTO: 0.1 X10E3/UL (ref 0–0.2)
BASOPHILS NFR BLD AUTO: 1 %
BILIRUB SERPL-MCNC: 0.6 MG/DL (ref 0–1.2)
BUN SERPL-MCNC: 14 MG/DL (ref 6–24)
BUN/CREAT SERPL: 14 (ref 9–20)
C TRACH RRNA SPEC QL NAA+PROBE: NEGATIVE
CALCIUM SERPL-MCNC: 9.9 MG/DL (ref 8.7–10.2)
CHLORIDE SERPL-SCNC: 98 MMOL/L (ref 96–106)
CO2 SERPL-SCNC: 25 MMOL/L (ref 20–29)
CREAT SERPL-MCNC: 1.01 MG/DL (ref 0.76–1.27)
EOSINOPHIL # BLD AUTO: 0.2 X10E3/UL (ref 0–0.4)
EOSINOPHIL NFR BLD AUTO: 3 %
ERYTHROCYTE [DISTWIDTH] IN BLOOD BY AUTOMATED COUNT: 14.9 % (ref 11.6–15.4)
GLOBULIN SER CALC-MCNC: 2.7 G/DL (ref 1.5–4.5)
GLUCOSE SERPL-MCNC: 127 MG/DL (ref 65–99)
HAV IGM SERPL QL IA: NEGATIVE
HBV CORE IGM SERPL QL IA: NEGATIVE
HBV SURFACE AG SERPL QL IA: NEGATIVE
HCT VFR BLD AUTO: 52.3 % (ref 37.5–51)
HCV AB S/CO SERPL IA: <0.1 S/CO RATIO (ref 0–0.9)
HGB BLD-MCNC: 17.8 G/DL (ref 13–17.7)
HIV 1+2 AB+HIV1 P24 AG SERPL QL IA: NON REACTIVE
IMM GRANULOCYTES # BLD AUTO: 0 X10E3/UL (ref 0–0.1)
IMM GRANULOCYTES NFR BLD AUTO: 0 %
LYMPHOCYTES # BLD AUTO: 3 X10E3/UL (ref 0.7–3.1)
LYMPHOCYTES NFR BLD AUTO: 38 %
MCH RBC QN AUTO: 28.6 PG (ref 26.6–33)
MCHC RBC AUTO-ENTMCNC: 34 G/DL (ref 31.5–35.7)
MCV RBC AUTO: 84 FL (ref 79–97)
MONOCYTES # BLD AUTO: 0.5 X10E3/UL (ref 0.1–0.9)
MONOCYTES NFR BLD AUTO: 6 %
N GONORRHOEA RRNA SPEC QL NAA+PROBE: NEGATIVE
NEUTROPHILS # BLD AUTO: 4.2 X10E3/UL (ref 1.4–7)
NEUTROPHILS NFR BLD AUTO: 52 %
PLATELET # BLD AUTO: 255 X10E3/UL (ref 150–450)
POTASSIUM SERPL-SCNC: 4.4 MMOL/L (ref 3.5–5.2)
PROT SERPL-MCNC: 7.4 G/DL (ref 6–8.5)
RBC # BLD AUTO: 6.23 X10E6/UL (ref 4.14–5.8)
RPR SER QL: NON REACTIVE
SODIUM SERPL-SCNC: 139 MMOL/L (ref 134–144)
WBC # BLD AUTO: 8 X10E3/UL (ref 3.4–10.8)

## 2020-08-19 NOTE — TELEPHONE ENCOUNTER
Patient returned sanjay;l & scheduled appt for Wed., 9/23/20 for an In Office appt at 12 noon. Patient states he needs a call back in reference to getting lab orders to have Blood Work done Prior to appt. Please call.  Thank you

## 2020-08-20 ENCOUNTER — TELEPHONE (OUTPATIENT)
Dept: FAMILY MEDICINE CLINIC | Age: 55
End: 2020-08-20

## 2020-08-21 ENCOUNTER — VIRTUAL VISIT (OUTPATIENT)
Dept: ENDOCRINOLOGY | Age: 55
End: 2020-08-21
Payer: COMMERCIAL

## 2020-08-21 DIAGNOSIS — I10 ESSENTIAL HYPERTENSION: ICD-10-CM

## 2020-08-21 DIAGNOSIS — E29.1 HYPOGONADISM IN MALE: ICD-10-CM

## 2020-08-21 DIAGNOSIS — E78.2 MIXED HYPERLIPIDEMIA: ICD-10-CM

## 2020-08-21 DIAGNOSIS — E11.21 TYPE 2 DIABETES MELLITUS WITH NEPHROPATHY (HCC): Primary | ICD-10-CM

## 2020-08-21 PROCEDURE — 99214 OFFICE O/P EST MOD 30 MIN: CPT | Performed by: INTERNAL MEDICINE

## 2020-08-21 RX ORDER — ACETAMINOPHEN, DIPHENHYDRAMINE HCL, PHENYLEPHRINE HCL 325; 25; 5 MG/1; MG/1; MG/1
TABLET ORAL
COMMUNITY

## 2020-08-21 NOTE — PROGRESS NOTES
Doxy- [phone]    Identified pt with two pt identifiers(name and ). Reviewed record in preparation for visit and have obtained necessary documentation. Chief Complaint   Patient presents with    Follow-up    Diabetes        Health Maintenance Due   Topic    Shingrix Vaccine Age 50> (1 of 2)      There were no vitals taken for this visit. Pain Scale: /10    Coordination of Care Questionnaire:  :   1. Have you been to the ER, urgent care clinic since your last visit? Hospitalized since your last visit? No    2. Have you seen or consulted any other health care providers outside of the [address] [place] [address] since your last visit? Include any pap smears or colon screening. No      Pt states blood glucose is 102.

## 2020-08-21 NOTE — PROGRESS NOTES
Chief Complaint   Patient presents with    Follow-up    Diabetes   Records from last visit reviewed. **THIS IS A VIRTUAL VISIT VIA A VIDEO ENCOUNTER. PATIENT AGREED TO HAVE THEIR CARE DELIVERED OVER VIDEO IN PLACE OF THEIR REGULARLY SCHEDULED OFFICE VISIT**      History of Present Illness: Son Ferreira is a 49 y.o. male here for follow up of diabetes and hypogonadism. His A1C in June 2020 was 5.9%. Pt is still taking the Metformin 500mg BID. He checks his BGs 1-2 times per week, fasting. It will run in the 100-120's range. He denies issues of hypogonadism. He denies issues of illnesses, injuries, or hospitalizations. Exercise consists of house work and chores. Pt works from home. Pt had a \"minor MI\" in 2014, Pt follows with Dr. Kurtis Carnes (cardio) annually. Pt has hx of elevated Urine MA/Cr. He notes he will occasionally get tingling and itching in his left foot. Last eye exam was July 2019, no retinopathy. Pt was first diagnosed with hypogonadism around 2015. He was having issues of feeling very sluggish. He was tested for his testosterone and \"it was pretty low\". He is currently taking a weekly injection of Testosterone Cypionate (75ml/150mg). He takes his injection every Sunday, his last injection was 8/16/20. He denies issues of CP, SOB, palpitations, HAs, or LUTS symptoms. He denies issues of mood swings or irritability. He gives himself his injection in the muscle of the thigh. He does not have any injection site issues. Pt follows with Dr. Rachele Heredia (pulm) annually, for SCOTTY. Pt follows with Dr. Alicia Dutton (GI)    Current Outpatient Medications   Medication Sig    melatonin 10 mg tab Take  by mouth.     metFORMIN (GLUCOPHAGE) 500 mg tablet TAKE 1 TABLET BY MOUTH  TWICE DAILY WITH MEALS    Syringe with Needle, Disp, 1 mL 20 gauge x 1\" syrg For injecting Testosterone once every week    Needle, Disp, 16 G (Disposable Needles) 16 gauge x 1\" ndle USE FOR DRAWING UP YOUR TESTOSTERONE EACH WEEK    carvediloL (COREG) 6.25 mg tablet TAKE 1 TABLET BY MOUTH TWO  TIMES DAILY    emtricitabine-tenofovir alafen (Descovy) tablet Take 1 Tab by mouth daily.  rosuvastatin (CRESTOR) 10 mg tablet TAKE 1 TABLET BY MOUTH  NIGHTLY    losartan (COZAAR) 50 mg tablet TAKE 1 TABLET BY MOUTH  DAILY    hydroCHLOROthiazide (HYDRODIURIL) 12.5 mg tablet TAKE 1 TABLET BY MOUTH  DAILY FOR HIGH BLOOD  PRESSURE    testosterone cypionate (DEPOTESTOTERONE CYPIONATE) 200 mg/mL injection Inject 0.75 ml weekly    famotidine (PEPCID) 20 mg tablet Take 20 mg by mouth daily.  venlafaxine-SR (EFFEXOR-XR) 150 mg capsule TAKE 1 CAPSULE BY MOUTH  DAILY    glucose blood VI test strips (ONETOUCH VERIO) strip Check blood sugar once daily.  mometasone (NASONEX) 50 mcg/actuation nasal spray 2 Sprays daily.  aspirin 81 mg chewable tablet Take 81 mg by mouth nightly. No current facility-administered medications for this visit. No Known Allergies  Review of Systems:  - Eyes: no blurry vision or double vision  - Cardiovascular: no chest pain  - Respiratory: no shortness of breath  - Musculoskeletal: no myalgias  - Neurological: no numbness/tingling in extremities    Physical Examination:  There were no vitals taken for this visit.   - GENERAL: NCAT, Appears well nourished   - EYES: EOMI, non-icteric, no proptosis   - Ear/Nose/Throat: NCAT, no visible inflammation or masses   - CARDIOVASCULAR: no cyanosis, no visible JVD   - RESPIRATORY: respiratory effort normal without any distress or labored breathing   - MUSCULOSKELETAL: Normal ROM of neck and upper extremities observed   - SKIN: No rash on face   - NEUROLOGIC:  No facial asymmetry (Cranial nerve 7 motor function), No gaze palsy   - PSYCHIATRIC: Normal affect, Normal insight and judgement         Data Reviewed:   Component      Latest Ref Rng & Units 8/12/2020 8/12/2020 8/12/2020           9:21 AM  9:21 AM  9:21 AM   WBC      3.4 - 10.8 x10E3/uL  8.2 RBC      4.14 - 5.80 x10E6/uL  6.14 (H)    HGB      13.0 - 17.7 g/dL  17.7    HCT      37.5 - 51.0 %  51.5 (H)    MCV      79 - 97 fL  84    MCH      26.6 - 33.0 pg  28.8    MCHC      31.5 - 35.7 g/dL  34.4    RDW      11.6 - 15.4 %  14.9    PLATELET      836 - 680 x10E3/uL  248    Testosterone      264 - 916 ng/dL   488   Free testosterone (Direct)      7.2 - 24.0 pg/mL   14.6   Prostate Specific Ag      0.0 - 4.0 ng/mL 1.7     Reflex Criteria       Comment       Component      Latest Ref Rng & Units 6/17/2020           9:16 AM   Hemoglobin A1c, (calculated)      4.8 - 5.6 % 5.9 (H)   Estimated average glucose      mg/dL 123     Assessment/Plan:   1) DM > His A1C in June 2020 was 5.9%. Pt encouraged to keep up the excellent work. Pt to continue the Metformin 500mg BID. 2) Hypogonadism > He is doing well clinically, his Testosterone is in a good range, his PSA is unchanged and his CBC is looking ok. Pt denies issues of LUTS, CP, SOB or HAs. Pt to continue the Testosterone 75mg weekly. 3) HTN > Pt is on an  ARB for renal protection. Will not make any changes at this time    4) HLD > Pt is tolerating the Rosuvastatin 10mg daily. His lipid panel in March 2020 was at goal.    Pt voices understanding and agreement with the plan.     RTC 6 months    Copy sent to:  Dr. Jessica Alatorre

## 2020-08-26 DIAGNOSIS — I10 ESSENTIAL HYPERTENSION WITH GOAL BLOOD PRESSURE LESS THAN 140/90: Primary | ICD-10-CM

## 2020-08-26 DIAGNOSIS — E78.5 DYSLIPIDEMIA: ICD-10-CM

## 2020-08-26 DIAGNOSIS — E11.21 TYPE 2 DIABETES MELLITUS WITH NEPHROPATHY (HCC): ICD-10-CM

## 2020-09-09 DIAGNOSIS — Z20.6 EXPOSURE TO HIV: Primary | ICD-10-CM

## 2020-09-09 NOTE — PROGRESS NOTES
Pete Fair MD  You 1 hour ago (8:43 AM)       Yes order    Message text      Vicenta Mendoza; lab ordered/mailed out.

## 2020-09-18 LAB
BUN SERPL-MCNC: 13 MG/DL (ref 6–24)
BUN/CREAT SERPL: 13 (ref 9–20)
CALCIUM SERPL-MCNC: 10.2 MG/DL (ref 8.7–10.2)
CHLORIDE SERPL-SCNC: 96 MMOL/L (ref 96–106)
CHOLEST SERPL-MCNC: 127 MG/DL (ref 100–199)
CO2 SERPL-SCNC: 25 MMOL/L (ref 20–29)
CREAT SERPL-MCNC: 0.98 MG/DL (ref 0.76–1.27)
EST. AVERAGE GLUCOSE BLD GHB EST-MCNC: 131 MG/DL
GLUCOSE SERPL-MCNC: 128 MG/DL (ref 65–99)
HBA1C MFR BLD: 6.2 % (ref 4.8–5.6)
HDLC SERPL-MCNC: 32 MG/DL
HIV 1+2 AB+HIV1 P24 AG SERPL QL IA: NON REACTIVE
LDLC SERPL CALC-MCNC: 37 MG/DL (ref 0–99)
POTASSIUM SERPL-SCNC: 3.9 MMOL/L (ref 3.5–5.2)
SODIUM SERPL-SCNC: 137 MMOL/L (ref 134–144)
TRIGL SERPL-MCNC: 397 MG/DL (ref 0–149)
TSH SERPL DL<=0.005 MIU/L-ACNC: 1.3 UIU/ML (ref 0.45–4.5)
VLDLC SERPL CALC-MCNC: 58 MG/DL (ref 5–40)

## 2020-09-21 NOTE — PROGRESS NOTES
HISTORY OF PRESENT ILLNESS  Josefa Ramon is a 49 y.o. male. HPI     Last here 6/23/20. Pt is here to f/u on chronic conditions.      He contacted clinic regarding high bp and HA before this visit and increased hctz from 12.5 to 25 mg  HA have been occurring for about 2 weeks and his blood pressure has been fluctuating over this time and has been having some difficulty with his vision in general   he is still having HA coming and going, he thinks this may be related to vision  He currently has a HA currently   He will be seeing eye dr tomorrow 9/24/20  He has been feeling dizzy with positional changes  He fell last week after getting up and has a bump on his head   Discussed if HA have not improved after eye dr to contact office     BP today is 133/91, repeat 117/72  BP at home 118/65 138/92 117/74 125/77 140/94  Discussed checking while at rest and fluctuation is nl   Continues on losartan 50mg  coreg 6.25mg BID and  HCTZ  25 mg, increased from 12.5mg since lov due to higher BP readings  He states he doesn't feel diff since increasing dose of hctz      he is diabetic   130   Continues on metformin 500mg BID   He also takes ASA 81mg daily      Wt is 261lbs - up 1 lb x lov   Discussed diet and wt loss   Recall saxenda 3mg caused him to feel bloated and uncomfortable  Martita Moder  Pt previously followed with Dr. Graham Shepard (endo) for PSA and testosterone level checks   Last visit was 7/18  Of note, he is on a lower dose of testosterone d/t syncopal event in the past  Pt is now following with Dr Rahul Vital (endo)  Reviewed note 8/21/20: 1) DM > His A1C in June 2020 was 5.9%. Pt encouraged to keep up the excellent work. Pt to continue the Metformin 500mg BID. 2) Hypogonadism > He is doing well clinically, his Testosterone is in a good range, his PSA is unchanged and his CBC is looking ok. Pt denies issues of LUTS, CP, SOB or HAs. Pt to continue the Testosterone 75mg weekly.   3) HTN > Pt is on an  ARB for renal protection. Will not make any changes at this time  4) HLD > Pt is tolerating the Rosuvastatin 10mg daily. His lipid panel in March 2020 was at goal.  Continues with testosterone injections       Pt follows with Dr. Adrian Holley (cardio)   No signs sx/cad/chf  Last visit was 10/19  Will reschedule apt       Pt follows with Dr. Gloria Mendez (pulm) annually  Most recently saw  Dr Yeni Rouse 10/19   Pt is compliant with CPAP qhs  Sleeps well with this         Pt follows with Dr. Татьяна Tubbs (GI)  Last visit was 2/6/18     Pt now follows with Dr. Grazyna Avila (innfectious disease)  Last visist 4/28/20  Pt is now on descovy, denies any issues taking this   I will now take over this      No longer taking prilosec  He is taking now taking pepcid  daily  to help with reflux and this works well 9/20   No longer taking zantac   Recall pt states that aciphex improved his sx in the past. However, his insurance no longer covers this medication.      Pt continues on crestor 10mg daily for cholesterol      Continues on effexor 150mg daily for depression, which works well, happy with dose 9/20   Not sad or down      Pt takes a multivitamin      Recall his partner is HIV positive  However, pt was HIV negative on 8/19 labs      PREVENTIVE:  Colonoscopy: Dr. Татьяна Tubbs, 2/6/18, nl, unsure of repeat,  PSA: 1/17 1.4, 1/18 1.4, 4/19 1.7  3/20 1.8 8/20 1.7  AAA screen: not needed, non smoker   Tdap: 8/29/2016  Pneumovax: 4/2015  Shingrix: not yet needed, will get closer to 60   Flu shot: 09/06/19   HIV screen: 9/20,  negative  Microalbumin: 3/20   Foot exam: 9/23/20  A1c:  3/17 5.7, 6/17 5.8, 10/17 6.0, 1/18 5.6 Aj, 4/18 5.9, 8/18 6.0, 12/18 6.0, 4/19 6.1, 8/19 5.9 11/19 5.9 3/20 6.3  6/20 5.9 9/20 6.2  Eye exam: Dr. Alonzo in [place], 7/31/19, scheduled 9/24/20  Retinal Scan: 10/16/17, mild diabetic retinopathy in L eye  Lipids: 9/20  LDL 37    Patient Active Problem List    Diagnosis Date Noted    Severe obesity (BMI 35.0-39. 9) with comorbidity ([facility] 04/30/2018    Type 2 diabetes mellitus with nephropathy ([facility] 01/22/2018    Essential hypertension with goal blood pressure less than 140/90 05/23/2016    Coronary artery disease involving native coronary artery of native heart without angina pectoris 05/23/2016    Mild episode of recurrent major depressive disorder ([facility] 05/23/2016    SCOTTY on CPAP 05/23/2016    Chronic systolic congestive heart failure (HCC) 05/23/2016    Low testosterone 05/23/2016    Gastroesophageal reflux disease without esophagitis 05/23/2016     Current Outpatient Medications   Medication Sig Dispense Refill    melatonin 10 mg tab Take  by mouth.  metFORMIN (GLUCOPHAGE) 500 mg tablet TAKE 1 TABLET BY MOUTH  TWICE DAILY WITH MEALS 180 Tab 0    Syringe with Needle, Disp, 1 mL 20 gauge x 1\" syrg For injecting Testosterone once every week 100 Each 11    Needle, Disp, 16 G (Disposable Needles) 16 gauge x 1\" ndle USE FOR DRAWING UP YOUR  TESTOSTERONE EACH WEEK 13 Each 0    carvediloL (COREG) 6.25 mg tablet TAKE 1 TABLET BY MOUTH TWO  TIMES DAILY 180 Tab 3    emtricitabine-tenofovir alafen (Descovy) tablet Take 1 Tab by mouth daily. 30 Tab 3    rosuvastatin (CRESTOR) 10 mg tablet TAKE 1 TABLET BY MOUTH  NIGHTLY 90 Tab 1    losartan (COZAAR) 50 mg tablet TAKE 1 TABLET BY MOUTH  DAILY 90 Tab 1    hydroCHLOROthiazide (HYDRODIURIL) 12.5 mg tablet TAKE 1 TABLET BY MOUTH  DAILY FOR HIGH BLOOD  PRESSURE 90 Tab 1    testosterone cypionate (DEPOTESTOTERONE CYPIONATE) 200 mg/mL injection Inject 0.75 ml weekly 12 Vial 1    famotidine (PEPCID) 20 mg tablet Take 20 mg by mouth daily.  venlafaxine-SR (EFFEXOR-XR) 150 mg capsule TAKE 1 CAPSULE BY MOUTH  DAILY 90 Cap 3    glucose blood VI test strips (ONETOUCH VERIO) strip Check blood sugar once daily. 50 Strip 5    mometasone (NASONEX) 50 mcg/actuation nasal spray 2 Sprays daily.  aspirin 81 mg chewable tablet Take 81 mg by mouth nightly.        Past Surgical History: Procedure Laterality Date    HX COLONOSCOPY  11/2016    HX GI      colonoscopy    HX OTHER SURGICAL      anal fissure      Lab Results   Component Value Date/Time    WBC 8.2 08/12/2020 09:21 AM    HGB 17.7 08/12/2020 09:21 AM    Hemoglobin (POC) 15.6 02/06/2017 11:40 AM    HCT 51.5 (H) 08/12/2020 09:21 AM    Hematocrit (POC) 50 04/02/2018 10:59 AM    PLATELET 997 44/01/9062 09:21 AM    MCV 84 08/12/2020 09:21 AM     Lab Results   Component Value Date/Time    Cholesterol, total 127 09/17/2020 08:21 AM    HDL Cholesterol 32 (L) 09/17/2020 08:21 AM    LDL, calculated 29 03/04/2020 09:19 AM    LDL Chol Calc (NIH) 37 09/17/2020 08:21 AM    LDL-C, External 34 07/16/2018    Triglyceride 397 (H) 09/17/2020 08:21 AM     Lab Results   Component Value Date/Time    GFR est non-AA 88 09/17/2020 08:21 AM    GFRNA, POC >60 04/02/2018 10:59 AM    GFR est  09/17/2020 08:21 AM    GFRAA, POC >60 04/02/2018 10:59 AM    Creatinine 0.98 09/17/2020 08:21 AM    Creatinine (POC) 0.9 04/02/2018 10:59 AM    BUN 13 09/17/2020 08:21 AM    BUN (POC) 14 04/02/2018 10:59 AM    Sodium 137 09/17/2020 08:21 AM    Sodium (POC) 139 04/02/2018 10:59 AM    Potassium 3.9 09/17/2020 08:21 AM    Potassium (POC) 3.9 04/02/2018 10:59 AM    Chloride 96 09/17/2020 08:21 AM    Chloride (POC) 100 04/02/2018 10:59 AM    CO2 25 09/17/2020 08:21 AM        Review of Systems   Respiratory: Negative for shortness of breath. Cardiovascular: Negative for chest pain. Neurological: Positive for headaches. Physical Exam  Constitutional:       General: He is not in acute distress. Appearance: Normal appearance. He is not ill-appearing, toxic-appearing or diaphoretic. HENT:      Head: Normocephalic and atraumatic. Right Ear: External ear normal.      Left Ear: External ear normal.   Eyes:      General:         Right eye: No discharge. Left eye: No discharge.       Conjunctiva/sclera: Conjunctivae normal.      Pupils: Pupils are equal, round, and reactive to light. Neck:      Musculoskeletal: Normal range of motion and neck supple. Cardiovascular:      Rate and Rhythm: Normal rate and regular rhythm. Pulses: Normal pulses. Heart sounds: Normal heart sounds. No murmur. No friction rub. No gallop. Pulmonary:      Effort: No respiratory distress. Breath sounds: Normal breath sounds. No wheezing or rales. Chest:      Chest wall: No tenderness. Musculoskeletal: Normal range of motion. Skin:     General: Skin is warm and dry. Neurological:      Mental Status: He is alert and oriented to person, place, and time. Mental status is at baseline. Coordination: Coordination normal.      Gait: Gait normal.      Comments: Sensory exam of the foot is normal, tested with the monofilament. Good pulses, no lesions or ulcers, good peripheral pulses. Psychiatric:         Mood and Affect: Mood normal.         Behavior: Behavior normal.         ASSESSMENT and PLAN    ICD-10-CM ICD-9-CM    1. Chronic systolic congestive heart failure ([facility] [place] [facility]       Follows with cardiology normally sees him this summer missed the appointment due to Jm working on rescheduling it     K32.65 709.34 METABOLIC PANEL, COMPREHENSIVE     428.0 CBC W/O DIFF      HEMOGLOBIN A1C WITH EAG      HIV 1/2 AG/AB, 4TH GENERATION,W RFLX CONFIRM      RPR      CHLAMYDIA/GC PCR   2. Type 2 diabetes mellitus with nephropathy (HCC)  O22.53 660.67 METABOLIC PANEL, COMPREHENSIVE     583.81 CBC W/O DIFF   Has been well controlled A1c is at goal continue metformin twice daily   HEMOGLOBIN A1C WITH EAG      HIV 1/2 AG/AB, 4TH GENERATION,W RFLX CONFIRM      RPR      CHLAMYDIA/GC PCR   3. Severe obesity (BMI 35.0-39. 9) with comorbidity ([facility] [place] [facility]  V97.63 845.96 METABOLIC PANEL, COMPREHENSIVE      CBC W/O DIFF   Working on weight loss working on portions low-carb diet   HEMOGLOBIN A1C WITH EAG      HIV 1/2 AG/AB, 4TH GENERATION,W RFLX CONFIRM      RPR      CHLAMYDIA/GC PCR   4.  SCOTTY on CPAP  Z18.88 945.75 METABOLIC PANEL, COMPREHENSIVE    Z99.89 V46.8 CBC W/O DIFF   Compliant with CPAP will be seeing sleep specialist later this fall   HEMOGLOBIN A1C WITH EAG      HIV 1/2 AG/AB, 4TH GENERATION,W RFLX CONFIRM      RPR      CHLAMYDIA/GC PCR   5. Gastroesophageal reflux disease without esophagitis  G22.2 732.13 METABOLIC PANEL, COMPREHENSIVE      CBC W/O DIFF      HEMOGLOBIN A1C WITH EAG   Controlled with Pepcid daily no longer needing PPI   HIV 1/2 AG/AB, 4TH GENERATION,W RFLX CONFIRM      RPR      CHLAMYDIA/GC PCR   6. Essential hypertension with goal blood pressure less than 140/90  R99 236.5 METABOLIC PANEL, COMPREHENSIVE      CBC W/O DIFF      HEMOGLOBIN A1C WITH EAG   Has had some blood pressure fluctuations recently however most of these fluctuations appear to be normal    Blood pressure is very well controlled today on losartan 50 mg Coreg 6.25 twice daily and hydrochlorothiazide 25 mg daily    Hydrochlorothiazide was recently increased    Discussed checking blood pressure when calm and at rest and if elevated to wait 10 minutes after sitting and check it again    He has had occasional lightheadedness and he might actually be getting some orthostatic symptoms we will continue current regimen for now however if he gets persistent lightheadedness whenever he stands we will go back on hydrochlorothiazide and discussed how to check blood pressure in the future at length   HIV 1/2 AG/AB, 4TH GENERATION,W RFLX CONFIRM      RPR      CHLAMYDIA/GC PCR   7. Coronary artery disease involving native coronary artery of native heart without angina pectoris  B46.89 098.67 METABOLIC PANEL, COMPREHENSIVE      CBC W/O DIFF      HEMOGLOBIN A1C WITH EAG   Medically managed no symptoms follow-up with cardiology is being rescheduled   HIV 1/2 AG/AB, 4TH GENERATION,W RFLX CONFIRM      RPR      CHLAMYDIA/GC PCR   8.  Mild episode of recurrent major depressive disorder (HCC)  F56.7 791.67 METABOLIC PANEL, COMPREHENSIVE CBC W/O DIFF      HEMOGLOBIN A1C WITH EAG   Well-controlled on Effexor   HIV 1/2 AG/AB, 4TH GENERATION,W RFLX CONFIRM      RPR      CHLAMYDIA/GC PCR   9. Low testosterone  E20.52 418.06 METABOLIC PANEL, COMPREHENSIVE   Follows with endocrinology up-to-date gets testosterone injections   CBC W/O DIFF      HEMOGLOBIN A1C WITH EAG      HIV 1/2 AG/AB, 4TH GENERATION,W RFLX CONFIRM      RPR      CHLAMYDIA/GC PCR    10 high cholesterol controlled on Crestor  11 HIV prevention he is on Descovy tolerating well got a message from infectious disease doctor she would like me to prescribe it I will prescribe it she discussed that we should check HIV RPR and gonorrhea and Chlamydia testing every 3 months which I will order with his labs I see him every 3 months anyway if any new issues arise he will go back to see her again  12 headaches    Patient has had some mild headaches for the last 2 weeks he thought initially blood pressure was related but pressure is looking pretty good today he plans on seeing his eye doctor tomorrow he thinks his vision is the cause depending on what happens with the eye visit if headaches not improved will go ahead and do brain imaging next with an MRI and then consider different treatment options discussed all this with the patient he understands and will email if further imaging is needed       Scribed by Jose De Paz of [address] [place] [address] [address], as dictated by Dr. Pramod Talley. Current diagnosis and concerns discussed with pt at length. Pt understands risks and benefits or current treatment plan and medications, and accepts the treatment and medication with any possible risks. Pt asks appropriate questions, which were answered. Pt was instructed to call with any concerns or problems. I have reviewed the note documented by the scribe. The services provided are my own.   The documentation is accurate

## 2020-09-23 ENCOUNTER — OFFICE VISIT (OUTPATIENT)
Dept: INTERNAL MEDICINE CLINIC | Age: 55
End: 2020-09-23
Payer: COMMERCIAL

## 2020-09-23 VITALS
BODY MASS INDEX: 39.56 KG/M2 | DIASTOLIC BLOOD PRESSURE: 72 MMHG | HEART RATE: 99 BPM | TEMPERATURE: 97.4 F | HEIGHT: 68 IN | OXYGEN SATURATION: 97 % | WEIGHT: 261 LBS | SYSTOLIC BLOOD PRESSURE: 117 MMHG | RESPIRATION RATE: 16 BRPM

## 2020-09-23 DIAGNOSIS — Z23 NEEDS FLU SHOT: ICD-10-CM

## 2020-09-23 DIAGNOSIS — G47.33 OSA ON CPAP: ICD-10-CM

## 2020-09-23 DIAGNOSIS — K21.9 GASTROESOPHAGEAL REFLUX DISEASE WITHOUT ESOPHAGITIS: ICD-10-CM

## 2020-09-23 DIAGNOSIS — I25.10 CORONARY ARTERY DISEASE INVOLVING NATIVE CORONARY ARTERY OF NATIVE HEART WITHOUT ANGINA PECTORIS: ICD-10-CM

## 2020-09-23 DIAGNOSIS — Z99.89 OSA ON CPAP: ICD-10-CM

## 2020-09-23 DIAGNOSIS — F33.0 MILD EPISODE OF RECURRENT MAJOR DEPRESSIVE DISORDER (HCC): ICD-10-CM

## 2020-09-23 DIAGNOSIS — I10 ESSENTIAL HYPERTENSION WITH GOAL BLOOD PRESSURE LESS THAN 140/90: ICD-10-CM

## 2020-09-23 DIAGNOSIS — R79.89 LOW TESTOSTERONE: ICD-10-CM

## 2020-09-23 DIAGNOSIS — E66.01 SEVERE OBESITY (BMI 35.0-39.9) WITH COMORBIDITY (HCC): ICD-10-CM

## 2020-09-23 DIAGNOSIS — I50.22 CHRONIC SYSTOLIC CONGESTIVE HEART FAILURE (HCC): Primary | ICD-10-CM

## 2020-09-23 DIAGNOSIS — G44.209 ACUTE NON INTRACTABLE TENSION-TYPE HEADACHE: ICD-10-CM

## 2020-09-23 DIAGNOSIS — E11.21 TYPE 2 DIABETES MELLITUS WITH NEPHROPATHY (HCC): ICD-10-CM

## 2020-09-23 PROCEDURE — 99214 OFFICE O/P EST MOD 30 MIN: CPT

## 2020-09-23 PROCEDURE — 90686 IIV4 VACC NO PRSV 0.5 ML IM: CPT | Performed by: INTERNAL MEDICINE

## 2020-09-23 PROCEDURE — 90471 IMMUNIZATION ADMIN: CPT | Performed by: INTERNAL MEDICINE

## 2020-09-23 NOTE — PROGRESS NOTES
Kendell Claude is a 49 y.o. male who presents for routine immunizations. He denies any symptoms , reactions or allergies that would exclude them from being immunized today. Risks and adverse reactions were discussed and the VIS was given to them. All questions were addressed. He was observed for 5 min post injection. There were no reactions observed.     Saulo Pressley

## 2020-09-24 DIAGNOSIS — I10 ESSENTIAL HYPERTENSION WITH GOAL BLOOD PRESSURE LESS THAN 140/90: ICD-10-CM

## 2020-09-24 DIAGNOSIS — I25.10 CORONARY ARTERY DISEASE INVOLVING NATIVE CORONARY ARTERY OF NATIVE HEART WITHOUT ANGINA PECTORIS: ICD-10-CM

## 2020-09-24 DIAGNOSIS — I50.22 CHRONIC SYSTOLIC CONGESTIVE HEART FAILURE (HCC): ICD-10-CM

## 2020-09-24 DIAGNOSIS — F33.0 MILD EPISODE OF RECURRENT MAJOR DEPRESSIVE DISORDER (HCC): ICD-10-CM

## 2020-09-24 DIAGNOSIS — E11.21 TYPE 2 DIABETES MELLITUS WITH NEPHROPATHY (HCC): ICD-10-CM

## 2020-09-24 DIAGNOSIS — R79.89 LOW TESTOSTERONE: ICD-10-CM

## 2020-09-24 DIAGNOSIS — Z99.89 OSA ON CPAP: ICD-10-CM

## 2020-09-24 DIAGNOSIS — K21.9 GASTROESOPHAGEAL REFLUX DISEASE WITHOUT ESOPHAGITIS: ICD-10-CM

## 2020-09-24 DIAGNOSIS — G47.33 OSA ON CPAP: ICD-10-CM

## 2020-09-24 DIAGNOSIS — E66.01 SEVERE OBESITY (BMI 35.0-39.9) WITH COMORBIDITY (HCC): ICD-10-CM

## 2020-11-05 RX ORDER — ROSUVASTATIN CALCIUM 10 MG/1
TABLET, COATED ORAL
Qty: 90 TAB | Refills: 3 | Status: SHIPPED | OUTPATIENT
Start: 2020-11-05 | End: 2021-09-29

## 2020-11-27 RX ORDER — EMTRICITABINE AND TENOFOVIR ALAFENAMIDE 200; 25 MG/1; MG/1
1 TABLET ORAL DAILY
Qty: 90 TAB | Refills: 0 | Status: SHIPPED | OUTPATIENT
Start: 2020-11-27 | End: 2020-12-29 | Stop reason: SDUPTHER

## 2020-12-03 ENCOUNTER — TELEPHONE (OUTPATIENT)
Dept: FAMILY MEDICINE CLINIC | Age: 55
End: 2020-12-03

## 2020-12-03 NOTE — TELEPHONE ENCOUNTER
Ning Mendoza with [facility] rx prior [facility]. Rx:descovy. Please call @[phone]. Need to know if there is a change in quantity.

## 2020-12-15 LAB
ALBUMIN SERPL-MCNC: 4.4 G/DL (ref 3.8–4.9)
ALBUMIN/GLOB SERPL: 1.4 {RATIO} (ref 1.2–2.2)
ALP SERPL-CCNC: 64 IU/L (ref 39–117)
ALT SERPL-CCNC: 26 IU/L (ref 0–44)
AST SERPL-CCNC: 21 IU/L (ref 0–40)
BILIRUB SERPL-MCNC: 0.7 MG/DL (ref 0–1.2)
BUN SERPL-MCNC: 14 MG/DL (ref 6–24)
BUN/CREAT SERPL: 14 (ref 9–20)
C TRACH RRNA SPEC QL NAA+PROBE: NEGATIVE
CALCIUM SERPL-MCNC: 10.2 MG/DL (ref 8.7–10.2)
CHLORIDE SERPL-SCNC: 99 MMOL/L (ref 96–106)
CO2 SERPL-SCNC: 26 MMOL/L (ref 20–29)
CREAT SERPL-MCNC: 1.01 MG/DL (ref 0.76–1.27)
ERYTHROCYTE [DISTWIDTH] IN BLOOD BY AUTOMATED COUNT: 14 % (ref 11.6–15.4)
EST. AVERAGE GLUCOSE BLD GHB EST-MCNC: 128 MG/DL
GLOBULIN SER CALC-MCNC: 3.1 G/DL (ref 1.5–4.5)
GLUCOSE SERPL-MCNC: 114 MG/DL (ref 65–99)
HBA1C MFR BLD: 6.1 % (ref 4.8–5.6)
HCT VFR BLD AUTO: 54 % (ref 37.5–51)
HGB BLD-MCNC: 18.2 G/DL (ref 13–17.7)
HIV 1+2 AB+HIV1 P24 AG SERPL QL IA: NON REACTIVE
MCH RBC QN AUTO: 29.4 PG (ref 26.6–33)
MCHC RBC AUTO-ENTMCNC: 33.7 G/DL (ref 31.5–35.7)
MCV RBC AUTO: 87 FL (ref 79–97)
N GONORRHOEA RRNA SPEC QL NAA+PROBE: NEGATIVE
PLATELET # BLD AUTO: 285 X10E3/UL (ref 150–450)
POTASSIUM SERPL-SCNC: 4.2 MMOL/L (ref 3.5–5.2)
PROT SERPL-MCNC: 7.5 G/DL (ref 6–8.5)
RBC # BLD AUTO: 6.19 X10E6/UL (ref 4.14–5.8)
RPR SER QL: NON REACTIVE
SODIUM SERPL-SCNC: 138 MMOL/L (ref 134–144)
WBC # BLD AUTO: 8.9 X10E3/UL (ref 3.4–10.8)

## 2020-12-15 NOTE — PROGRESS NOTES
HISTORY OF PRESENT ILLNESS  Jorge L Acharya is a 48 y.o. male. HPI     Last here 9/23/20. Pt is here to f/u on chronic conditions.  This is an established visit completed with telemedicine was completed with video assist  the patient acknowledges and agrees to this method of visitation doxyme      BP at home 118/65  Continues on losartan 50mg  coreg 6.25mg BID and  HCTZ  25 mg       he is diabetic  BS  100-120  Continues on metformin 500mg BID   He also takes ASA 81mg daily      Wt is 258 lbs - down 3 lbs x lov   Discussed diet and wt loss   Recall saxenda 3mg caused him to feel bloated and uncomfortable  Dinorah Winslow (endo) for PSA and testosterone level checks   Last visit was 7/18  Of note, he is on a lower dose of testosterone d/t syncopal event in the past  Pt is now following with Dr Ewelina Gould (endo)  Last there 8/21/20  Discussed talking to him about thicker blood and testosterone      Pt follows with Dr. Kirsten Monahan (cardio) for CAD and CHF  No signs sx/cad/chf  Last visit was 10/19  Will reschedule apt       Pt follows with Dr. Jayson Chambers (pulm) annually  Most recently saw  Dr Renteria Score 10/19   Pt is compliant with CPAP qhs 12/20  Sleeps well with this         Pt follows with Dr. Ivan Gillette (GI)  Last visit was 2/6/18     Pt now follows with Dr. Juan Ramachandran (innfectious disease)  Last visist 4/28/20  Pt is now on descovy -- got prior authorization  I will now take over this      No longer taking prilosec  He is taking now taking pepcid  daily  to help with reflux and this works well 12/20   Recall pt states that aciphex improved his sx in the past. However, his insurance no longer covers this medication.      Pt continues on crestor 10mg daily for cholesterol      Continues on effexor 150mg daily for depression, which works well, happy with dose 12/20   Not sad or down      Pt takes a multivitamin      Recall his partner is HIV positive  However, pt was HIV negative on 12/20 labs    Discussed covid vaccine      PREVENTIVE:  Colonoscopy: Dr. Sandra Luis, 2/6/18, nl, 10 year repeat, he will verify  PSA: 1/17 1.4, 1/18 1.4, 4/19 1.7  3/20 1.8 8/20 1.7  AAA screen: not needed, non smoker   Tdap: 8/29/2016  Pneumovax: 4/2015  Shingrix: not yet needed, will get closer to 60   Flu shot: 09/20  HIV screen: 12/20 negative  Microalbumin: 3/20   Foot exam: 9/23/20  A1c:  3/17 5.7, 6/17 5.8, 10/17 6.0, 1/18 5.6 Aj, 4/18 5.9, 8/18 6.0, 12/18 6.0, 4/19 6.1, 8/19 5.9 11/19 5.9 3/20 6.3  6/20 5.9 9/20 6.2 12/20 6.1  Eye exam: [facility] center 9/24/20  Retinal Scan: 10/16/17, mild diabetic retinopathy in L eye  Lipids: 9/20  LDL 37    Patient Active Problem List    Diagnosis Date Noted    Severe obesity (BMI 35.0-39. 9) with comorbidity ([facility] [address] 04/30/2018    Type 2 diabetes mellitus with nephropathy ([facility] [address] 01/22/2018    Essential hypertension with goal blood pressure less than 140/90 05/23/2016    Coronary artery disease involving native coronary artery of native heart without angina pectoris 05/23/2016    Mild episode of recurrent major depressive disorder ([facility] [address] 05/23/2016    SCOTTY on CPAP 05/23/2016    Chronic systolic congestive heart failure (HCC) 05/23/2016    Low testosterone 05/23/2016    Gastroesophageal reflux disease without esophagitis 05/23/2016     Current Outpatient Medications   Medication Sig Dispense Refill    emtricitabine-tenofovir alafen (Descovy) tablet Take 1 Tab by mouth daily.  90 Tab 0    rosuvastatin (CRESTOR) 10 mg tablet TAKE 1 TABLET BY MOUTH AT  NIGHT 90 Tab 3    metFORMIN (GLUCOPHAGE) 500 mg tablet TAKE 1 TABLET BY MOUTH  TWICE DAILY WITH MEALS 180 Tab 0    losartan (COZAAR) 50 mg tablet TAKE 1 TABLET BY MOUTH  DAILY 90 Tab 1    hydroCHLOROthiazide (HYDRODIURIL) 12.5 mg tablet TAKE 1 TABLET BY MOUTH  DAILY FOR HIGH BLOOD  PRESSURE 90 Tab 0    carvediloL (COREG) 6.25 mg tablet TAKE 1 TABLET BY MOUTH TWO  TIMES DAILY 180 Tab 3    famotidine (PEPCID) 20 mg tablet Take 20 mg by mouth daily.  venlafaxine-SR (EFFEXOR-XR) 150 mg capsule TAKE 1 CAPSULE BY MOUTH  DAILY 90 Cap 3    testosterone cypionate (DEPOTESTOTERONE CYPIONATE) 200 mg/mL injection INJECT INTRAMUSCULARLY  0.75ML WEEKLY (DISCARD  UNUSED PORTION AFTER FIRST  USE) 12 mL 3    Needle, Disp, 16 G (Disposable Needles) 16 gauge x 1\" ndle USE FOR DRAWING UP YOUR  TESTOSTERONE EACH WEEK 13 Each 3    melatonin 10 mg tab Take  by mouth.  Syringe with Needle, Disp, 1 mL 20 gauge x 1\" syrg For injecting Testosterone once every week 100 Each 11    glucose blood VI test strips (ONETOUCH VERIO) strip Check blood sugar once daily. 50 Strip 5    mometasone (NASONEX) 50 mcg/actuation nasal spray 2 Sprays daily.  aspirin 81 mg chewable tablet Take 81 mg by mouth nightly.        Past Surgical History:   Procedure Laterality Date    HX COLONOSCOPY  11/2016    HX GI      colonoscopy    HX OTHER SURGICAL      anal fissure      Lab Results   Component Value Date/Time    WBC 8.9 12/14/2020 09:12 AM    HGB 18.2 (H) 12/14/2020 09:12 AM    Hemoglobin (POC) 15.6 02/06/2017 11:40 AM    HCT 54.0 (H) 12/14/2020 09:12 AM    Hematocrit (POC) 50 04/02/2018 10:59 AM    PLATELET 239 13/27/7212 09:12 AM    MCV 87 12/14/2020 09:12 AM     Lab Results   Component Value Date/Time    Cholesterol, total 127 09/17/2020 08:21 AM    HDL Cholesterol 32 (L) 09/17/2020 08:21 AM    LDL, calculated 29 03/04/2020 09:19 AM    LDL Chol Calc (NIH) 37 09/17/2020 08:21 AM    LDL-C, External 34 07/16/2018    Triglyceride 397 (H) 09/17/2020 08:21 AM     Lab Results   Component Value Date/Time    GFR est non-AA 84 12/14/2020 09:12 AM    GFRNA, POC >60 04/02/2018 10:59 AM    GFR est AA 97 12/14/2020 09:12 AM    GFRAA, POC >60 04/02/2018 10:59 AM    Creatinine 1.01 12/14/2020 09:12 AM    Creatinine (POC) 0.9 04/02/2018 10:59 AM    BUN 14 12/14/2020 09:12 AM    BUN (POC) 14 04/02/2018 10:59 AM    Sodium 138 12/14/2020 09:12 AM    Sodium (POC) 139 04/02/2018 10:59 AM    Potassium 4.2 12/14/2020 09:12 AM    Potassium (POC) 3.9 04/02/2018 10:59 AM    Chloride 99 12/14/2020 09:12 AM    Chloride (POC) 100 04/02/2018 10:59 AM    CO2 26 12/14/2020 09:12 AM        Review of Systems   Respiratory: Negative for shortness of breath. Cardiovascular: Negative for chest pain. Physical Exam  Constitutional:       General: He is not in acute distress. Appearance: Normal appearance. He is not ill-appearing, toxic-appearing or diaphoretic. HENT:      Head: Normocephalic and atraumatic. Eyes:      General:         Right eye: No discharge. Left eye: No discharge. Conjunctiva/sclera: Conjunctivae normal.   Pulmonary:      Effort: No respiratory distress. Neurological:      Mental Status: He is alert and oriented to person, place, and time. Mental status is at baseline. Gait: Gait normal.   Psychiatric:         Mood and Affect: Mood normal.         Behavior: Behavior normal.         ASSESSMENT and PLAN    ICD-10-CM ICD-9-CM    1. Severe obesity (BMI 35.0-39. 9) with comorbidity ([facility] [address]         Continue working on portion control was doing [employer] which helped   E66.01 278.01 HIV 1/2 AG/AB, 4TH GENERATION,W RFLX CONFIRM      RPR      METABOLIC PANEL, BASIC      HEMOGLOBIN A1C WITH EAG      CBC W/O DIFF      CHLAMYDIA/GC PCR   2. Type 2 diabetes mellitus with nephropathy (HCC)  E11.21 250.40 HIV 1/2 AG/AB, 4TH GENERATION,W RFLX CONFIRM     583.81 RPR   Continue metformin twice daily continue   METABOLIC PANEL, BASIC      HEMOGLOBIN A1C WITH EAG      CBC W/O DIFF      CHLAMYDIA/GC PCR   3. Mild episode of recurrent major depressive disorder (HCC)  F33.0 296.31 HIV 1/2 AG/AB, 4TH GENERATION,W RFLX CONFIRM      RPR      METABOLIC PANEL, BASIC   Well-controlled on Effexor continue   HEMOGLOBIN A1C WITH EAG      CBC W/O DIFF      CHLAMYDIA/GC PCR   4.  Coronary artery disease involving native coronary artery of native heart without angina pectoris  I25.10 414.01 HIV 1/2 AG/AB, 4TH GENERATION,W RFLX CONFIRM      RPR      METABOLIC PANEL, BASIC   Medically managed asymptomatic   HEMOGLOBIN A1C WITH EAG      CBC W/O DIFF      CHLAMYDIA/GC PCR   5. Gastroesophageal reflux disease without esophagitis  K21.9 530.81 HIV 1/2 AG/AB, 4TH GENERATION,W RFLX CONFIRM      RPR      METABOLIC PANEL, BASIC   Controlled on Pepcid   HEMOGLOBIN A1C WITH EAG      CBC W/O DIFF      CHLAMYDIA/GC PCR   6. Essential hypertension with goal blood pressure less than 140/90  I10 401.9 HIV 1/2 AG/AB, 4TH GENERATION,W RFLX CONFIRM      RPR      METABOLIC PANEL, BASIC    losartan 50mg  coreg 6.25mg BID and  HCTZ  25 mg     At goal no change dose   HEMOGLOBIN A1C WITH EAG      CBC W/O DIFF      CHLAMYDIA/GC PCR   7. Chronic systolic congestive heart failure (HCC)  I50.22 428.22 HIV 1/2 AG/AB, 4TH GENERATION,W RFLX CONFIRM     428.0 RPR   Medically managed on Coreg and losartan due to follow-up with cardiology   METABOLIC PANEL, BASIC      HEMOGLOBIN A1C WITH EAG      CBC W/O DIFF      CHLAMYDIA/GC PCR   8. SCOTTY on CPAP  G47.33 327.23 HIV 1/2 AG/AB, 4TH GENERATION,W RFLX CONFIRM    Z99.89 V46.8 RPR   Compliant with CPAP   METABOLIC PANEL, BASIC      HEMOGLOBIN A1C WITH EAG      CBC W/O DIFF      CHLAMYDIA/GC PCR   9. Low testosterone  R79.89 790.99 HIV 1/2 AG/AB, 4TH GENERATION,W RFLX CONFIRM      RPR   Getting testosterone through endocrinology   METABOLIC PANEL, BASIC      HEMOGLOBIN A1C WITH EAG      CBC W/O DIFF      CHLAMYDIA/GC PCR   10.  Erythrocytosis  D75.1 289.0 HIV 1/2 AG/AB, 4TH GENERATION,W RFLX CONFIRM      RPR   Likely related to testosterone discussed this with patient he will address with his endocrinologist in February   METABOLIC PANEL, BASIC      HEMOGLOBIN A1C WITH EAG      CBC W/O DIFF      CHLAMYDIA/GC PCR    11 on [place] for HIV prevention ordering appropriate labs every 3 to 4 months       Scribed by Manish Gutierrez of [facility] Chaidez, as dictated by Dr. Ewa Olmedo Ko.     Current diagnosis and concerns discussed with pt at length. Pt understands risks and benefits or current treatment plan and medications, and accepts the treatment and medication with any possible risks. Pt asks appropriate questions, which were answered. Pt was instructed to call with any concerns or problems. I have reviewed the note documented by the scribe. The services provided are my own. The documentation is accurate     María Marus, who was evaluated through a synchronous (real-time) audio-video encounter, and/or his healthcare decision maker, is aware that it is a billable service, with coverage as determined by his insurance carrier. He provided verbal consent to proceed: Yes, and patient identification was verified. It was conducted pursuant to the emergency declaration under the [address] [place] [address], [address] [place] [address] authority and the [employer] Act. A caregiver was present when appropriate. Ability to conduct physical exam was limited. I was at home. The patient was at home.

## 2020-12-18 ENCOUNTER — VIRTUAL VISIT (OUTPATIENT)
Dept: INTERNAL MEDICINE CLINIC | Age: 55
End: 2020-12-18
Payer: COMMERCIAL

## 2020-12-18 DIAGNOSIS — R79.89 LOW TESTOSTERONE: ICD-10-CM

## 2020-12-18 DIAGNOSIS — I10 ESSENTIAL HYPERTENSION WITH GOAL BLOOD PRESSURE LESS THAN 140/90: ICD-10-CM

## 2020-12-18 DIAGNOSIS — G47.33 OSA ON CPAP: ICD-10-CM

## 2020-12-18 DIAGNOSIS — I25.10 CORONARY ARTERY DISEASE INVOLVING NATIVE CORONARY ARTERY OF NATIVE HEART WITHOUT ANGINA PECTORIS: ICD-10-CM

## 2020-12-18 DIAGNOSIS — D75.1 ERYTHROCYTOSIS: ICD-10-CM

## 2020-12-18 DIAGNOSIS — F33.0 MILD EPISODE OF RECURRENT MAJOR DEPRESSIVE DISORDER (HCC): ICD-10-CM

## 2020-12-18 DIAGNOSIS — E11.21 TYPE 2 DIABETES MELLITUS WITH NEPHROPATHY (HCC): ICD-10-CM

## 2020-12-18 DIAGNOSIS — E66.01 SEVERE OBESITY (BMI 35.0-39.9) WITH COMORBIDITY (HCC): Primary | ICD-10-CM

## 2020-12-18 DIAGNOSIS — Z99.89 OSA ON CPAP: ICD-10-CM

## 2020-12-18 DIAGNOSIS — I50.22 CHRONIC SYSTOLIC CONGESTIVE HEART FAILURE (HCC): ICD-10-CM

## 2020-12-18 DIAGNOSIS — E29.1 HYPOGONADISM IN MALE: Primary | ICD-10-CM

## 2020-12-18 DIAGNOSIS — K21.9 GASTROESOPHAGEAL REFLUX DISEASE WITHOUT ESOPHAGITIS: ICD-10-CM

## 2020-12-18 PROCEDURE — 99214 OFFICE O/P EST MOD 30 MIN: CPT | Performed by: INTERNAL MEDICINE

## 2020-12-22 RX ORDER — HYDROCHLOROTHIAZIDE 12.5 MG/1
TABLET ORAL
Qty: 90 TAB | Refills: 3 | Status: SHIPPED | OUTPATIENT
Start: 2020-12-22 | End: 2021-11-19

## 2020-12-29 RX ORDER — EMTRICITABINE AND TENOFOVIR ALAFENAMIDE 200; 25 MG/1; MG/1
1 TABLET ORAL DAILY
Qty: 90 TAB | Refills: 1 | Status: SHIPPED | OUTPATIENT
Start: 2020-12-29 | End: 2021-07-06 | Stop reason: SDUPTHER

## 2020-12-29 NOTE — TELEPHONE ENCOUNTER
Last Visit: 4/28/2020 with MD Lisa Kaye  Next Appointment: noted to f/u in 3 months  Previous Refill Encounter(s): 11/27/2020 per MD Saenz #[id number]     Requested Prescriptions     Pending Prescriptions Disp Refills    emtricitabine-tenofovir alafen (Descovy) tablet 90 Tab 0     Sig: Take 1 Tab by mouth daily.

## 2020-12-30 RX ORDER — VENLAFAXINE HYDROCHLORIDE 150 MG/1
CAPSULE, EXTENDED RELEASE ORAL
Qty: 90 CAP | Refills: 3 | Status: SHIPPED | OUTPATIENT
Start: 2020-12-30 | End: 2021-11-22

## 2021-01-25 DIAGNOSIS — E29.1 HYPOGONADISM IN MALE: ICD-10-CM

## 2021-01-29 LAB
ERYTHROCYTE [DISTWIDTH] IN BLOOD BY AUTOMATED COUNT: 14 % (ref 11.6–15.4)
HCT VFR BLD AUTO: 51.6 % (ref 37.5–51)
HGB BLD-MCNC: 17.8 G/DL (ref 13–17.7)
MCH RBC QN AUTO: 29.6 PG (ref 26.6–33)
MCHC RBC AUTO-ENTMCNC: 34.5 G/DL (ref 31.5–35.7)
MCV RBC AUTO: 86 FL (ref 79–97)
PLATELET # BLD AUTO: 292 X10E3/UL (ref 150–450)
PSA SERPL-MCNC: 2.1 NG/ML (ref 0–4)
RBC # BLD AUTO: 6.01 X10E6/UL (ref 4.14–5.8)
TESTOST FREE SERPL-MCNC: 27.6 PG/ML (ref 7.2–24)
TESTOST SERPL-MCNC: 918 NG/DL (ref 264–916)
WBC # BLD AUTO: 8.8 X10E3/UL (ref 3.4–10.8)

## 2021-02-05 ENCOUNTER — VIRTUAL VISIT (OUTPATIENT)
Dept: ENDOCRINOLOGY | Age: 56
End: 2021-02-05
Payer: COMMERCIAL

## 2021-02-05 DIAGNOSIS — E29.1 HYPOGONADISM IN MALE: ICD-10-CM

## 2021-02-05 DIAGNOSIS — E78.2 MIXED HYPERLIPIDEMIA: ICD-10-CM

## 2021-02-05 DIAGNOSIS — E11.21 TYPE 2 DIABETES MELLITUS WITH NEPHROPATHY (HCC): Primary | ICD-10-CM

## 2021-02-05 DIAGNOSIS — I10 ESSENTIAL HYPERTENSION: ICD-10-CM

## 2021-02-05 PROCEDURE — 99214 OFFICE O/P EST MOD 30 MIN: CPT | Performed by: INTERNAL MEDICINE

## 2021-02-05 NOTE — PROGRESS NOTES
Chief Complaint   Patient presents with    Diabetes     pcp and pharmacy verified. eye exam: 2020   DOXY. ME    Hypogonadism   Records from last visit reviewed. **THIS IS A VIRTUAL VISIT VIA A VIDEO ENCOUNTER. PATIENT AGREED TO HAVE THEIR CARE DELIVERED OVER VIDEO IN PLACE OF THEIR REGULARLY SCHEDULED OFFICE VISIT**      History of Present Illness: Jamison Rodríguez is a 48 y.o. male here for follow up of diabetes and hypogonadism. His A1C in December 2020 was 6.1%. Pt is still taking the Metformin 500mg BID. He checks his BGs 1-2 times per week, fasting. It will run in the 100-120's range. He denies issues of hypoglycemia. He denies issues of illnesses, injuries, or hospitalizations. Exercise consists of house work and chores. Pt works from home. Pt had a \"minor MI\" in 2014, Pt follows with Dr. Talia Mcclure (cardio) annually. Pt has hx of elevated Urine MA/Cr. He notes he will occasionally get tingling and itching in his left foot. Last eye exam was November 2020, no retinopathy. Pt was first diagnosed with hypogonadism around 2015. He was having issues of feeling very sluggish. He was tested for his testosterone and \"it was pretty low\". He is currently taking a weekly injection of Testosterone Cypionate (75ml/150mg). He takes his injection every Sunday, his last injection was 2/6/21. He had his Testosterone levels drawn on Wednesday. He denies issues of CP, SOB, palpitations, HAs, or LUTS symptoms. He denies issues of mood swings or irritability. He gives himself his injection in the muscle of the thigh. He does not have any injection site issues. Pt follows with Dr. Ester Enriquez (pulm) annually, for SCOTTY.   Pt follows with Dr. Mary Joel (GI)    Current Outpatient Medications   Medication Sig    metFORMIN (GLUCOPHAGE) 500 mg tablet TAKE 1 TABLET BY MOUTH  TWICE DAILY WITH MEALS    venlafaxine-SR (EFFEXOR-XR) 150 mg capsule TAKE 1 CAPSULE BY MOUTH  DAILY    emtricitabine-tenofovir alafen (Descovy) tablet Take 1 Tab by mouth daily.  hydroCHLOROthiazide (HYDRODIURIL) 12.5 mg tablet TAKE 1 TABLET BY MOUTH  DAILY FOR HIGH BLOOD  PRESSURE    testosterone cypionate (DEPOTESTOTERONE CYPIONATE) 200 mg/mL injection INJECT INTRAMUSCULARLY  0.75ML WEEKLY (DISCARD  UNUSED PORTION AFTER FIRST  USE)    Needle, Disp, 16 G (Disposable Needles) 16 gauge x 1\" ndle USE FOR DRAWING UP YOUR  TESTOSTERONE EACH WEEK    rosuvastatin (CRESTOR) 10 mg tablet TAKE 1 TABLET BY MOUTH AT  NIGHT    losartan (COZAAR) 50 mg tablet TAKE 1 TABLET BY MOUTH  DAILY    melatonin 10 mg tab Take  by mouth.  Syringe with Needle, Disp, 1 mL 20 gauge x 1\" syrg For injecting Testosterone once every week    carvediloL (COREG) 6.25 mg tablet TAKE 1 TABLET BY MOUTH TWO  TIMES DAILY    famotidine (PEPCID) 20 mg tablet Take 20 mg by mouth daily.  glucose blood VI test strips (ONETOUCH VERIO) strip Check blood sugar once daily.  mometasone (NASONEX) 50 mcg/actuation nasal spray 2 Sprays daily.  aspirin 81 mg chewable tablet Take 81 mg by mouth nightly. No current facility-administered medications for this visit. No Known Allergies  Review of Systems:  - Eyes: no blurry vision or double vision  - Cardiovascular: no chest pain  - Respiratory: no shortness of breath  - Musculoskeletal: no myalgias  - Neurological: no numbness/tingling in extremities    Physical Examination:  There were no vitals taken for this visit.   - GENERAL: NCAT, Appears well nourished   - EYES: EOMI, non-icteric, no proptosis   - Ear/Nose/Throat: NCAT, no visible inflammation or masses   - CARDIOVASCULAR: no cyanosis, no visible JVD   - RESPIRATORY: respiratory effort normal without any distress or labored breathing   - MUSCULOSKELETAL: Normal ROM of neck and upper extremities observed   - SKIN: No rash on face   - NEUROLOGIC:  No facial asymmetry (Cranial nerve 7 motor function), No gaze palsy   - PSYCHIATRIC: Normal affect, Normal insight and judgement     Data Reviewed:   Component      Latest Ref Rng & Units 1/27/2021 1/27/2021 1/27/2021 12/14/2020          11:58 AM 11:58 AM 11:58 AM  9:12 AM   WBC      3.4 - 10.8 x10E3/uL 8.8      RBC      4.14 - 5.80 x10E6/uL 6.01 (H)      HGB      13.0 - 17.7 g/dL 17.8 (H)      HCT      37.5 - 51.0 % 51.6 (H)      MCV      79 - 97 fL 86      MCH      26.6 - 33.0 pg 29.6      MCHC      31.5 - 35.7 g/dL 34.5      RDW      11.6 - 15.4 % 14.0      PLATELET      975 - 748 x10E3/uL 292      Hemoglobin A1c, (calculated)      4.8 - 5.6 %    6.1 (H)   Estimated average glucose      mg/dL    128   Testosterone      264 - 916 ng/dL  918 (H)     Free testosterone (Direct)      7.2 - 24.0 pg/mL  27.6 (H)     Prostate Specific Ag      0.0 - 4.0 ng/mL   2.1      Component      Latest Ref Rng & Units 12/14/2020           9:12 AM   Glucose      65 - 99 mg/dL 114 (H)   BUN      6 - 24 mg/dL 14   Creatinine      0.76 - 1.27 mg/dL 1.01   GFR est non-AA      >59 mL/min/1.73 84   GFR est AA      >59 mL/min/1.73 97   BUN/Creatinine ratio      9 - 20 14   Sodium      134 - 144 mmol/L 138   Potassium      3.5 - 5.2 mmol/L 4.2   Chloride      96 - 106 mmol/L 99   CO2      20 - 29 mmol/L 26   Calcium      8.7 - 10.2 mg/dL 10.2   Protein, total      6.0 - 8.5 g/dL 7.5   Albumin      3.8 - 4.9 g/dL 4.4   GLOBULIN, TOTAL      1.5 - 4.5 g/dL 3.1   A-G Ratio      1.2 - 2.2 1.4   Bilirubin, total      0.0 - 1.2 mg/dL 0.7   Alk. phosphatase      39 - 117 IU/L 64   AST      0 - 40 IU/L 21   ALT      0 - 44 IU/L 26     Assessment/Plan:   1) DM > His A1C in December 2020 was 6.1%. Pt encouraged to keep up the excellent work. Pt to continue the Metformin 500mg BID. 2) Hypogonadism > He is doing well clinically, his Testosterone is in a good range, his PSA was 2.1 and his CBC is looking ok. Pt denies issues of LUTS, CP, SOB or HAs. Pt to continue the Testosterone 150mg weekly. 3) HTN > Pt is on an  ARB for renal protection.  Will not make any changes at this time    4) HLD > Pt is tolerating the Rosuvastatin 10mg daily. His lipid panel in August 2020 was at goal.    Pt voices understanding and agreement with the plan.    RTC 6 months    Copy sent to:  Dr. Lisette Connell

## 2021-03-04 DIAGNOSIS — H66.90 EAR INFECTION: Primary | ICD-10-CM

## 2021-03-30 ENCOUNTER — TELEPHONE (OUTPATIENT)
Dept: INTERNAL MEDICINE CLINIC | Age: 56
End: 2021-03-30

## 2021-03-30 DIAGNOSIS — S00.91XA: Primary | ICD-10-CM

## 2021-03-30 DIAGNOSIS — L08.9: Primary | ICD-10-CM

## 2021-03-30 NOTE — TELEPHONE ENCOUNTER
----- Message from Coni Figueroa MD sent at 3/29/2021  2:44 PM EDT -----  Regarding: FW: Non-Urgent Medical Question  Contact: [phone]  Place referral for dermatology--refer to shae walters  ----- Message -----  From: Mahalina Jennie: 3/29/2021  10:18 AM EDT  To: Coni Figueroa MD  Subject: FW: Non-Urgent Medical Question                    ----- Message -----  From: Leslie Thorpe  Sent: 3/28/2021   2:47 PM EDT  To: Heri Blanca  Subject: Non-Urgent Medical Question                      Can you send me a reference for a dermatologist to look at this infection on my head?      Thanks

## 2021-04-11 LAB
BUN SERPL-MCNC: 14 MG/DL (ref 6–24)
BUN/CREAT SERPL: 14 (ref 9–20)
C TRACH RRNA SPEC QL NAA+PROBE: NEGATIVE
CALCIUM SERPL-MCNC: 9.4 MG/DL (ref 8.7–10.2)
CHLORIDE SERPL-SCNC: 99 MMOL/L (ref 96–106)
CO2 SERPL-SCNC: 25 MMOL/L (ref 20–29)
CREAT SERPL-MCNC: 1.01 MG/DL (ref 0.76–1.27)
ERYTHROCYTE [DISTWIDTH] IN BLOOD BY AUTOMATED COUNT: 14.4 % (ref 11.6–15.4)
EST. AVERAGE GLUCOSE BLD GHB EST-MCNC: 126 MG/DL
GLUCOSE SERPL-MCNC: 152 MG/DL (ref 65–99)
HBA1C MFR BLD: 6 % (ref 4.8–5.6)
HCT VFR BLD AUTO: 52.6 % (ref 37.5–51)
HGB BLD-MCNC: 18.1 G/DL (ref 13–17.7)
HIV 1+2 AB+HIV1 P24 AG SERPL QL IA: NON REACTIVE
MCH RBC QN AUTO: 30 PG (ref 26.6–33)
MCHC RBC AUTO-ENTMCNC: 34.4 G/DL (ref 31.5–35.7)
MCV RBC AUTO: 87 FL (ref 79–97)
N GONORRHOEA RRNA SPEC QL NAA+PROBE: NEGATIVE
PLATELET # BLD AUTO: 276 X10E3/UL (ref 150–450)
POTASSIUM SERPL-SCNC: 3.8 MMOL/L (ref 3.5–5.2)
RBC # BLD AUTO: 6.03 X10E6/UL (ref 4.14–5.8)
RPR SER QL: NON REACTIVE
SODIUM SERPL-SCNC: 141 MMOL/L (ref 134–144)
WBC # BLD AUTO: 8.9 X10E3/UL (ref 3.4–10.8)

## 2021-04-14 NOTE — PROGRESS NOTES
HISTORY OF PRESENT ILLNESS  Joselin Caraballo is a 48 y.o. male. HPI     Last here 12/18/20 Pt is here to f/u on chronic conditions. This is an established visit completed with telemedicine was completed with video assist  the patient acknowledges and agrees to this method of visitation doxyme      BP at home 119/52 135/95  Continues on losartan 50mg  coreg 6.25mg BID and  HCTZ  25 mg       he is diabetic  BS 120 this am, usually 100-130  BS is rarely below 100  Continues on metformin 500mg BID   He also takes ASA 81mg daily      Wt is 254 lbs per pt - down 4 lbs x lov  Discussed diet and wt loss   Recall saxenda 3mg caused him to feel bloated and uncomfortable      Reviewed labs    Ordered labs       Pt previously followed with Dr. Theresa Aguilar (endo) for PSA and testosterone level checks   Last visit was 7/18  Of note, he is on a lower dose of testosterone d/t syncopal event in the past  Pt is now following with Dr Brayden Valente (endo)  Reviewed note 2/5/21: 1) DM > His A1C in December 2020 was 6.1%. Pt encouraged to keep up the excellent work. Pt to continue the Metformin 500mg BID. 2) Hypogonadism > He is doing well clinically, his Testosterone is in a good range, his PSA was 2.1 and his CBC is looking ok. Pt denies issues of LUTS, CP, SOB or HAs. Pto continue the Testosterone 150mg weekly. 3) HTN > Pt is on an  ARB for renal protection. Will not make any changes at this time  4) HLD > Pt is tolerating the Rosuvastatin 10mg daily.  His lipid panel in August 2020 was at goal.    Pt follows with Dr. Jamison Thomas (cardio) for CAD and CHF  No signs sx/cad/chf  Last visit was 10/19  Discussed f/u      Pt follows with Dr. Irene Zaidi (pulm) annually  Most recently saw  Dr Delma Hyde 10/19   Pt is compliant with CPAP qhs 4/21  Sleeps well with this         Pt follows with Dr. Nancy Walker (GI)  Last visit was 2/6/18     Pt now follows with Dr. Esther Phipps (innfectious disease)  Last visist 4/28/20  Pt is now on descovy -- got prior authorization  Reviewed infec disease labs      He is taking pepcid  daily  to help with reflux and this works well 4/21  Recall pt states that aciphex improved his sx in the past. However, his insurance no longer covers this medication.      Pt continues on crestor 10mg daily for cholesterol      Continues on effexor 150mg daily for depression, which works well, happy with dose 4/21  Not sad or down      Pt takes a multivitamin      Recall his partner is HIV positive  However, pt was HIV negative on 4/21 labs     PREVENTIVE:  Colonoscopy: Dr. Keegan Rothman, 2/6/18, nl, 10 year repeat, he will verify  PSA: 1/17 1.4, 1/18 1.4, 4/19 1.7  3/20 1.8 8/20 1.7 1/21 2.1  AAA screen: not needed, non smoker   Tdap: 8/29/2016  Pneumovax: 4/2015  Shingrix: not yet needed, will get closer to 60   Flu shot: 09/20  HIV screen: 12/20 negative  Microalbumin: 3/20   Foot exam: 9/23/20  A1c:  4/19 6.1, 8/19 5.9 11/19 5.9 3/20 6.3  6/20 5.9 9/20 6.2 12/20 6.1 4/21 6.0  Eye exam: [address] 9/24/20  Retinal Scan: 10/16/17, mild diabetic retinopathy in L eye  Lipids: 9/20  LDL 37  Covid: both complete (moderna)    Patient Active Problem List    Diagnosis Date Noted    Severe obesity (BMI 35.0-39. 9) with comorbidity ([facility] 04/30/2018    Type 2 diabetes mellitus with nephropathy ([facility] 01/22/2018    Essential hypertension with goal blood pressure less than 140/90 05/23/2016    Coronary artery disease involving native coronary artery of native heart without angina pectoris 05/23/2016    Mild episode of recurrent major depressive disorder ([address] 05/23/2016    SCOTTY on CPAP 05/23/2016    Chronic systolic congestive heart failure (HCC) 05/23/2016    Low testosterone 05/23/2016    Gastroesophageal reflux disease without esophagitis 05/23/2016     Current Outpatient Medications   Medication Sig Dispense Refill    losartan (COZAAR) 50 mg tablet TAKE 1 TABLET BY MOUTH  DAILY 90 Tab 0    metFORMIN (GLUCOPHAGE) 500 mg tablet TAKE 1 TABLET BY MOUTH  TWICE DAILY WITH MEALS 180 Tab 1    venlafaxine-SR (EFFEXOR-XR) 150 mg capsule TAKE 1 CAPSULE BY MOUTH  DAILY 90 Cap 3    emtricitabine-tenofovir alafen (Descovy) tablet Take 1 Tab by mouth daily. 90 Tab 1    hydroCHLOROthiazide (HYDRODIURIL) 12.5 mg tablet TAKE 1 TABLET BY MOUTH  DAILY FOR HIGH BLOOD  PRESSURE 90 Tab 3    testosterone cypionate (DEPOTESTOTERONE CYPIONATE) 200 mg/mL injection INJECT INTRAMUSCULARLY  0.75ML WEEKLY (DISCARD  UNUSED PORTION AFTER FIRST  USE) 12 mL 3    Needle, Disp, 16 G (Disposable Needles) 16 gauge x 1\" ndle USE FOR DRAWING UP YOUR  TESTOSTERONE EACH WEEK 13 Each 3    rosuvastatin (CRESTOR) 10 mg tablet TAKE 1 TABLET BY MOUTH AT  NIGHT 90 Tab 3    melatonin 10 mg tab Take  by mouth.  Syringe with Needle, Disp, 1 mL 20 gauge x 1\" syrg For injecting Testosterone once every week 100 Each 11    carvediloL (COREG) 6.25 mg tablet TAKE 1 TABLET BY MOUTH TWO  TIMES DAILY 180 Tab 3    famotidine (PEPCID) 20 mg tablet Take 20 mg by mouth daily.  glucose blood VI test strips (ONETOUCH VERIO) strip Check blood sugar once daily. 50 Strip 5    mometasone (NASONEX) 50 mcg/actuation nasal spray 2 Sprays daily.  aspirin 81 mg chewable tablet Take 81 mg by mouth nightly.        Past Surgical History:   Procedure Laterality Date    HX COLONOSCOPY  11/2016    HX GI      colonoscopy    HX OTHER SURGICAL      anal fissure      Lab Results   Component Value Date/Time    WBC 8.9 04/09/2021 08:06 AM    HGB 18.1 (H) 04/09/2021 08:06 AM    Hemoglobin (POC) 15.6 02/06/2017 11:40 AM    HCT 52.6 (H) 04/09/2021 08:06 AM    Hematocrit (POC) 50 04/02/2018 10:59 AM    PLATELET 634 61/74/6121 08:06 AM    MCV 87 04/09/2021 08:06 AM     Lab Results   Component Value Date/Time    Cholesterol, total 127 09/17/2020 08:21 AM    HDL Cholesterol 32 (L) 09/17/2020 08:21 AM    LDL, calculated 37 09/17/2020 08:21 AM    LDL, calculated 29 03/04/2020 09:19 AM    LDL-C, External 34 07/16/2018    Triglyceride 397 (H) 09/17/2020 08:21 AM     Lab Results   Component Value Date/Time    GFR est non-AA 84 04/09/2021 08:06 AM    GFRNA, POC >60 04/02/2018 10:59 AM    GFR est AA 97 04/09/2021 08:06 AM    GFRAA, POC >60 04/02/2018 10:59 AM    Creatinine 1.01 04/09/2021 08:06 AM    Creatinine (POC) 0.9 04/02/2018 10:59 AM    BUN 14 04/09/2021 08:06 AM    BUN (POC) 14 04/02/2018 10:59 AM    Sodium 141 04/09/2021 08:06 AM    Sodium (POC) 139 04/02/2018 10:59 AM    Potassium 3.8 04/09/2021 08:06 AM    Potassium (POC) 3.9 04/02/2018 10:59 AM    Chloride 99 04/09/2021 08:06 AM    Chloride (POC) 100 04/02/2018 10:59 AM    CO2 25 04/09/2021 08:06 AM        Review of Systems   Respiratory: Negative for shortness of breath. Cardiovascular: Negative for chest pain. Physical Exam  Constitutional:       General: He is not in acute distress. Appearance: Normal appearance. He is not ill-appearing, toxic-appearing or diaphoretic. HENT:      Head: Normocephalic and atraumatic. Eyes:      General:         Right eye: No discharge. Left eye: No discharge. Conjunctiva/sclera: Conjunctivae normal.   Pulmonary:      Effort: No respiratory distress. Neurological:      Mental Status: He is alert and oriented to person, place, and time. Mental status is at baseline. Gait: Gait normal.   Psychiatric:         Mood and Affect: Mood normal.         Behavior: Behavior normal.         ASSESSMENT and PLAN    ICD-10-CM ICD-9-CM    1. Type 2 diabetes mellitus with nephropathy (HCC)       Well-controlled on Metformin 500 mg twice daily Home readings good A1c at goal continue no change dose         E11.21 250.40      583.81    2. SCOTTY on CPAP  G47.33 327.23        Compliant with CPAP continue     Z99.89 V46.8    3.  Coronary artery disease involving native coronary artery of native heart without angina pectoris    Overdue to see his cardiologist last saw him October 2019 we discussed this he has no active signs or symptoms of CAD does need to follow-up with cardiologist he will call and schedule the appointment     I25.10 414.01    4. Low testosterone    Follows with endocrinology routinely    Continues on testosterone supplementation     R79.89 790.99    5. Chronic systolic congestive heart failure (HCC)  I50.22 428.22        Medically managed no active signs or symptoms of CHF    Due to see cardiology    We will schedule follow-up      428.0    6. Essential hypertension with goal blood pressure less than 140/90    Controlled on losartan 50 Coreg 6.25 twice daily and hydrochlorothiazide 25 mg daily    Continue to change dose     I10 401.9    7. Severe obesity (BMI 35.0-39. 9) with comorbidity ([address]    Weight stable since last office visit    Work on portions diabetic only diet     E66.01 278.01    8. Mild episode of recurrent major depressive disorder (HCC)    Controlled on Effexor    Continue to change dose     F33.0 296.31    9. Erythrocytosis    Related to testosterone endocrinology is aware this is being monitored     D75.1 289.0       10 HIV prevention continues on [place] getting labs every 3 months with his routine labs I have ordered this         Scribed by Sade Llanos of [address] [place] [address] [address], as dictated by Dr. Allen Zaragoza. Current diagnosis and concerns discussed with pt at length. Pt understands risks and benefits or current treatment plan and medications, and accepts the treatment and medication with any possible risks. Pt asks appropriate questions, which were answered. Pt was instructed to call with any concerns or problems. I have reviewed the note documented by the scribe. The services provided are my own. The documentation is accurate      Reva Del Valle, was evaluated through a synchronous (real-time) audio-video encounter. The patient (or guardian if applicable) is aware that this is a billable service. Verbal consent to proceed has been obtained within the past 12 months.  The visit was conducted pursuant to the emergency declaration under the [facility], [address] [place] [address] authority and the [employer] and [employer] Act. Patient identification was verified, and a caregiver was present when appropriate. The patient was located in a state where the provider was credentialed to provide care.

## 2021-04-19 ENCOUNTER — VIRTUAL VISIT (OUTPATIENT)
Dept: INTERNAL MEDICINE CLINIC | Age: 56
End: 2021-04-19
Payer: COMMERCIAL

## 2021-04-19 DIAGNOSIS — D75.1 ERYTHROCYTOSIS: ICD-10-CM

## 2021-04-19 DIAGNOSIS — G47.33 OSA ON CPAP: ICD-10-CM

## 2021-04-19 DIAGNOSIS — I10 ESSENTIAL HYPERTENSION WITH GOAL BLOOD PRESSURE LESS THAN 140/90: ICD-10-CM

## 2021-04-19 DIAGNOSIS — Z99.89 OSA ON CPAP: ICD-10-CM

## 2021-04-19 DIAGNOSIS — I25.10 CORONARY ARTERY DISEASE INVOLVING NATIVE CORONARY ARTERY OF NATIVE HEART WITHOUT ANGINA PECTORIS: ICD-10-CM

## 2021-04-19 DIAGNOSIS — R79.89 LOW TESTOSTERONE: ICD-10-CM

## 2021-04-19 DIAGNOSIS — F33.0 MILD EPISODE OF RECURRENT MAJOR DEPRESSIVE DISORDER (HCC): ICD-10-CM

## 2021-04-19 DIAGNOSIS — I50.22 CHRONIC SYSTOLIC CONGESTIVE HEART FAILURE (HCC): ICD-10-CM

## 2021-04-19 DIAGNOSIS — E66.01 SEVERE OBESITY (BMI 35.0-39.9) WITH COMORBIDITY (HCC): ICD-10-CM

## 2021-04-19 DIAGNOSIS — E11.21 TYPE 2 DIABETES MELLITUS WITH NEPHROPATHY (HCC): Primary | ICD-10-CM

## 2021-04-19 PROCEDURE — 99214 OFFICE O/P EST MOD 30 MIN: CPT | Performed by: INTERNAL MEDICINE

## 2021-05-04 RX ORDER — SYRINGE W-NEEDLE,DISPOSAB,3 ML 25GX5/8"
SYRINGE, EMPTY DISPOSABLE MISCELLANEOUS
Qty: 100 SYRINGE | Refills: 3 | Status: SHIPPED | OUTPATIENT
Start: 2021-05-04 | End: 2021-05-07 | Stop reason: SDUPTHER

## 2021-05-07 RX ORDER — SYRINGE W-NEEDLE,DISPOSAB,3 ML 25GX5/8"
SYRINGE, EMPTY DISPOSABLE MISCELLANEOUS
Qty: 100 SYRINGE | Refills: 3 | Status: SHIPPED | OUTPATIENT
Start: 2021-05-07 | End: 2021-11-29 | Stop reason: SDUPTHER

## 2021-05-07 NOTE — TELEPHONE ENCOUNTER
----- Message from Nedra Miles sent at 5/7/2021  8:51 AM EDT -----  Regarding: RE: Prescription Question  Contact: [phone]  Good morning. [facility] has said they are out of syringes. Can you send a refill order to my local pharmacy? [facility] on [address]  [phone]    Thank you!     ----- Message -----  From: Stephen Ennis LPN  Sent: 6/8/03, 2:57 PM  To: Nedra Miles  Subject: RE: Prescription Question    Israel Reis will send an order for a 3 ml 20 gauge 1 inch syringe/needle today to [facility]. Hopefully they will have those.        ----- Message -----       From:Luis Stevenson       Sent:5/4/2021  1:03 PM EDT         Christopher Washington MD    Subject:Prescription Question    I recently tried to refill my subscription for syringes for testosterone injections and got a message back from [facility] saying they were out of stock and wanted me to contact my doctor. So can you check on this for me?     Thanks

## 2021-05-11 NOTE — TELEPHONE ENCOUNTER
----- Message from Rolando Portillo sent at 5/10/2021  4:57 PM EDT -----  Regarding: RE: Prescription Question  Contact: [phone]  The 23 gauge would be fine from now on, yes.

## 2021-05-19 RX ORDER — TADALAFIL 10 MG/1
10 TABLET ORAL AS NEEDED
Qty: 30 TABLET | Refills: 0 | Status: SHIPPED | OUTPATIENT
Start: 2021-05-19 | End: 2021-06-15

## 2021-05-19 NOTE — TELEPHONE ENCOUNTER
----- Message from Maged Zaragoza sent at 5/18/2021  7:09 PM EDT -----  Regarding: Prescription Question  Contact: [phone]  I would like to request a prescription for Cialis 10mg tablets.    Can that be sent in?

## 2021-05-19 NOTE — TELEPHONE ENCOUNTER
Future Appointments:  Future Appointments   Date Time Provider Maria Teresa Stark   8/6/2021  9:30 AM Theodore Billy MD [facility] BS AMB   8/9/2021 10:15 AM Raquel Farmer MD [facility] AMB        Last Appointment With Me:  4/19/2021     Requested Prescriptions     Pending Prescriptions Disp Refills    tadalafiL (Cialis) 10 mg tablet 30 Tablet 0     Sig: Take 1 Tablet by mouth as needed for Erectile Dysfunction.

## 2021-06-15 RX ORDER — TADALAFIL 10 MG/1
TABLET ORAL
Qty: 30 TABLET | Refills: 0 | Status: SHIPPED | OUTPATIENT
Start: 2021-06-15 | End: 2021-07-14

## 2021-07-06 ENCOUNTER — PATIENT MESSAGE (OUTPATIENT)
Dept: FAMILY MEDICINE CLINIC | Age: 56
End: 2021-07-06

## 2021-07-06 RX ORDER — EMTRICITABINE AND TENOFOVIR ALAFENAMIDE 200; 25 MG/1; MG/1
1 TABLET ORAL DAILY
Qty: 90 TABLET | Refills: 0 | Status: SHIPPED | OUTPATIENT
Start: 2021-07-06 | End: 2021-08-24 | Stop reason: SDUPTHER

## 2021-07-14 RX ORDER — TADALAFIL 10 MG/1
TABLET ORAL
Qty: 30 TABLET | Refills: 0 | Status: SHIPPED | OUTPATIENT
Start: 2021-07-14 | End: 2022-03-09

## 2021-07-29 LAB
ALBUMIN SERPL-MCNC: 4.6 G/DL (ref 3.8–4.9)
ALBUMIN/CREAT UR: 441 MG/G CREAT (ref 0–29)
ALBUMIN/GLOB SERPL: 1.5 {RATIO} (ref 1.2–2.2)
ALP SERPL-CCNC: 73 IU/L (ref 48–121)
ALT SERPL-CCNC: 27 IU/L (ref 0–44)
AST SERPL-CCNC: 25 IU/L (ref 0–40)
BILIRUB SERPL-MCNC: 0.8 MG/DL (ref 0–1.2)
BUN SERPL-MCNC: 14 MG/DL (ref 6–24)
BUN/CREAT SERPL: 16 (ref 9–20)
C TRACH RRNA SPEC QL NAA+PROBE: NEGATIVE
CALCIUM SERPL-MCNC: 10 MG/DL (ref 8.7–10.2)
CHLORIDE SERPL-SCNC: 97 MMOL/L (ref 96–106)
CHOLEST SERPL-MCNC: 120 MG/DL (ref 100–199)
CO2 SERPL-SCNC: 23 MMOL/L (ref 20–29)
CREAT SERPL-MCNC: 0.89 MG/DL (ref 0.76–1.27)
CREAT UR-MCNC: 153.6 MG/DL
ERYTHROCYTE [DISTWIDTH] IN BLOOD BY AUTOMATED COUNT: 15.1 % (ref 11.6–15.4)
EST. AVERAGE GLUCOSE BLD GHB EST-MCNC: 120 MG/DL
GLOBULIN SER CALC-MCNC: 3.1 G/DL (ref 1.5–4.5)
GLUCOSE SERPL-MCNC: 133 MG/DL (ref 65–99)
HAV IGM SERPL QL IA: NEGATIVE
HBA1C MFR BLD: 5.8 % (ref 4.8–5.6)
HBV CORE IGM SERPL QL IA: NEGATIVE
HBV SURFACE AG SERPL QL IA: NEGATIVE
HCT VFR BLD AUTO: 55.3 % (ref 37.5–51)
HCV AB S/CO SERPL IA: <0.1 S/CO RATIO (ref 0–0.9)
HDLC SERPL-MCNC: 26 MG/DL
HGB BLD-MCNC: 18.7 G/DL (ref 13–17.7)
HIV 1+2 AB+HIV1 P24 AG SERPL QL IA: NON REACTIVE
LDLC SERPL CALC-MCNC: 41 MG/DL (ref 0–99)
MCH RBC QN AUTO: 30.2 PG (ref 26.6–33)
MCHC RBC AUTO-ENTMCNC: 33.8 G/DL (ref 31.5–35.7)
MCV RBC AUTO: 89 FL (ref 79–97)
MICROALBUMIN UR-MCNC: 677 UG/ML
N GONORRHOEA RRNA SPEC QL NAA+PROBE: NEGATIVE
PLATELET # BLD AUTO: 250 X10E3/UL (ref 150–450)
POTASSIUM SERPL-SCNC: 3.8 MMOL/L (ref 3.5–5.2)
PROT SERPL-MCNC: 7.7 G/DL (ref 6–8.5)
RBC # BLD AUTO: 6.19 X10E6/UL (ref 4.14–5.8)
RPR SER QL: NON REACTIVE
SODIUM SERPL-SCNC: 137 MMOL/L (ref 134–144)
TRIGL SERPL-MCNC: 358 MG/DL (ref 0–149)
TSH SERPL DL<=0.005 MIU/L-ACNC: 1.36 UIU/ML (ref 0.45–4.5)
VLDLC SERPL CALC-MCNC: 53 MG/DL (ref 5–40)
WBC # BLD AUTO: 7.1 X10E3/UL (ref 3.4–10.8)

## 2021-07-30 DIAGNOSIS — I10 ESSENTIAL HYPERTENSION: ICD-10-CM

## 2021-07-30 DIAGNOSIS — E78.2 MIXED HYPERLIPIDEMIA: ICD-10-CM

## 2021-07-30 DIAGNOSIS — E11.21 TYPE 2 DIABETES MELLITUS WITH NEPHROPATHY (HCC): ICD-10-CM

## 2021-07-30 DIAGNOSIS — E29.1 HYPOGONADISM IN MALE: ICD-10-CM

## 2021-07-31 RX ORDER — EMTRICITABINE AND TENOFOVIR ALAFENAMIDE 200; 25 MG/1; MG/1
1 TABLET ORAL DAILY
Qty: 90 TABLET | Refills: 0 | Status: CANCELLED | OUTPATIENT
Start: 2021-07-31

## 2021-08-01 LAB
ALBUMIN SERPL-MCNC: 4.6 G/DL (ref 3.8–4.9)
ALBUMIN/CREAT UR: 434 MG/G CREAT (ref 0–29)
ALBUMIN/GLOB SERPL: 1.5 {RATIO} (ref 1.2–2.2)
ALP SERPL-CCNC: 76 IU/L (ref 48–121)
ALT SERPL-CCNC: 29 IU/L (ref 0–44)
AST SERPL-CCNC: 24 IU/L (ref 0–40)
BILIRUB SERPL-MCNC: 0.8 MG/DL (ref 0–1.2)
BUN SERPL-MCNC: 14 MG/DL (ref 6–24)
BUN/CREAT SERPL: 16 (ref 9–20)
CALCIUM SERPL-MCNC: 10.1 MG/DL (ref 8.7–10.2)
CHLORIDE SERPL-SCNC: 97 MMOL/L (ref 96–106)
CHOLEST SERPL-MCNC: 120 MG/DL (ref 100–199)
CO2 SERPL-SCNC: 23 MMOL/L (ref 20–29)
CREAT SERPL-MCNC: 0.89 MG/DL (ref 0.76–1.27)
CREAT UR-MCNC: 154 MG/DL
ERYTHROCYTE [DISTWIDTH] IN BLOOD BY AUTOMATED COUNT: 15.2 % (ref 11.6–15.4)
EST. AVERAGE GLUCOSE BLD GHB EST-MCNC: 123 MG/DL
GLOBULIN SER CALC-MCNC: 3.1 G/DL (ref 1.5–4.5)
GLUCOSE SERPL-MCNC: 133 MG/DL (ref 65–99)
HBA1C MFR BLD: 5.9 % (ref 4.8–5.6)
HCT VFR BLD AUTO: 55.2 % (ref 37.5–51)
HDLC SERPL-MCNC: 27 MG/DL
HGB BLD-MCNC: 18.5 G/DL (ref 13–17.7)
IMP & REVIEW OF LAB RESULTS: NORMAL
LDLC SERPL CALC-MCNC: 39 MG/DL (ref 0–99)
MCH RBC QN AUTO: 29.4 PG (ref 26.6–33)
MCHC RBC AUTO-ENTMCNC: 33.5 G/DL (ref 31.5–35.7)
MCV RBC AUTO: 88 FL (ref 79–97)
MICROALBUMIN UR-MCNC: 669 UG/ML
PLATELET # BLD AUTO: 240 X10E3/UL (ref 150–450)
POTASSIUM SERPL-SCNC: 3.8 MMOL/L (ref 3.5–5.2)
PROT SERPL-MCNC: 7.7 G/DL (ref 6–8.5)
RBC # BLD AUTO: 6.29 X10E6/UL (ref 4.14–5.8)
SODIUM SERPL-SCNC: 137 MMOL/L (ref 134–144)
TESTOST FREE SERPL-MCNC: 18 PG/ML (ref 7.2–24)
TESTOST SERPL-MCNC: 585 NG/DL (ref 264–916)
TRIGL SERPL-MCNC: 367 MG/DL (ref 0–149)
VLDLC SERPL CALC-MCNC: 54 MG/DL (ref 5–40)
WBC # BLD AUTO: 7.2 X10E3/UL (ref 3.4–10.8)

## 2021-08-03 NOTE — TELEPHONE ENCOUNTER
Spoke w/ patient; two patient identifiers confirmed. Informed patient that refill for descovy was sent in on 7/6/2021 for #90 to [facility]. Patient states that he was not given a 90 day supply by his pharmacy, but confirmed that refills were available per his medication bottle.     Polly Bynum LPN

## 2021-08-05 ENCOUNTER — VIRTUAL VISIT (OUTPATIENT)
Dept: ENDOCRINOLOGY | Age: 56
End: 2021-08-05
Payer: COMMERCIAL

## 2021-08-05 DIAGNOSIS — I10 ESSENTIAL HYPERTENSION: ICD-10-CM

## 2021-08-05 DIAGNOSIS — E29.1 HYPOGONADISM IN MALE: Primary | ICD-10-CM

## 2021-08-05 DIAGNOSIS — E78.2 MIXED HYPERLIPIDEMIA: ICD-10-CM

## 2021-08-05 DIAGNOSIS — E11.21 TYPE 2 DIABETES MELLITUS WITH NEPHROPATHY (HCC): ICD-10-CM

## 2021-08-05 PROCEDURE — 99214 OFFICE O/P EST MOD 30 MIN: CPT | Performed by: INTERNAL MEDICINE

## 2021-08-05 NOTE — PROGRESS NOTES
Chief Complaint   Patient presents with    Diabetes     pcp and pharmacy verified     [phone] (M)     Hypogonadism   Records from last visit reviewed. **THIS IS A VIRTUAL VISIT VIA A VIDEO ENCOUNTER. PATIENT AGREED TO HAVE THEIR CARE DELIVERED OVER VIDEO IN PLACE OF THEIR REGULARLY SCHEDULED OFFICE VISIT**      History of Present Illness: Stephan Shabazz is a 48 y.o. male here for follow up of diabetes and hypogonadism. Pt is still taking the Metformin 500mg BID. His A1C last week was 5.8%    He checks his BGs 1-2 times per week, fasting. It will run in the 100-120's range. He denies issues of hypoglycemia. He denies issues of illnesses, injuries, or hospitalizations. Pt has received both COVID vaccinations ([employer]). Exercise consists of house work and chores. Pt works from home. Pt had a \"minor MI\" in 2014, Pt follows with Dr. Madelaine Raymundo (cardio) annually. Pt has hx of elevated Urine MA/Cr. He notes he will occasionally get tingling and itching in his left foot. Last eye exam was November 2020, no retinopathy. Pt was first diagnosed with hypogonadism around 2015. He was having issues of feeling very sluggish. He was tested for his testosterone and \"it was pretty low\". He is currently taking a weekly injection of Testosterone Cypionate (75ml/150mg). He takes his injection every Sunday. He denies issues of CP, SOB, palpitations, HAs, or LUTS symptoms. He denies issues of mood swings or irritability. He gives himself his injection in the muscle of the thigh. He does not have any injection site issues. Pt follows with Dr. Lenin Hussein (pulm) annually, for SCOTTY.   Pt follows with Dr. Monica Wilder (GI)    Current Outpatient Medications   Medication Sig    testosterone cypionate (DEPOTESTOTERONE CYPIONATE) 200 mg/mL injection INJECT INTRAMUSCULARLY  0.75ML WEEKLY (DISCARD  UNUSED PORTION AFTER FIRST  USE)    tadalafiL (CIALIS) 10 mg tablet TAKE ONE TABLET BY MOUTH AS NEEDED FOR ERECTILE DYSFUNCTION    emtricitabine-tenofovir alafen (Descovy) tablet Take 1 Tablet by mouth daily.  carvediloL (COREG) 6.25 mg tablet TAKE 1 TABLET BY MOUTH  TWICE DAILY    losartan (COZAAR) 50 mg tablet TAKE 1 TABLET BY MOUTH  DAILY    Syringe with Needle, Disp, 3 mL 23 x 1\" syrg Use to draw up testosterone once weekly    Syringe with Needle, Disp, (Syringe 3cc/20Gx1\") 3 mL 20 gauge x 1\" syrg Use to draw up testosterone once weekly    metFORMIN (GLUCOPHAGE) 500 mg tablet TAKE 1 TABLET BY MOUTH  TWICE DAILY WITH MEALS    venlafaxine-SR (EFFEXOR-XR) 150 mg capsule TAKE 1 CAPSULE BY MOUTH  DAILY    hydroCHLOROthiazide (HYDRODIURIL) 12.5 mg tablet TAKE 1 TABLET BY MOUTH  DAILY FOR HIGH BLOOD  PRESSURE    Needle, Disp, 16 G (Disposable Needles) 16 gauge x 1\" ndle USE FOR DRAWING UP YOUR  TESTOSTERONE EACH WEEK    rosuvastatin (CRESTOR) 10 mg tablet TAKE 1 TABLET BY MOUTH AT  NIGHT    melatonin 10 mg tab Take  by mouth.  Syringe with Needle, Disp, 1 mL 20 gauge x 1\" syrg For injecting Testosterone once every week    famotidine (PEPCID) 20 mg tablet Take 20 mg by mouth daily.  glucose blood VI test strips (ONETOUCH VERIO) strip Check blood sugar once daily.  mometasone (NASONEX) 50 mcg/actuation nasal spray 2 Sprays daily.  aspirin 81 mg chewable tablet Take 81 mg by mouth nightly. No current facility-administered medications for this visit. No Known Allergies  Review of Systems:  - Eyes: no blurry vision or double vision  - Cardiovascular: no chest pain  - Respiratory: no shortness of breath  - Musculoskeletal: no myalgias  - Neurological: no numbness/tingling in extremities    Physical Examination:  There were no vitals taken for this visit.   - GENERAL: NCAT, Appears well nourished   - EYES: EOMI, non-icteric, no proptosis   - Ear/Nose/Throat: NCAT, no visible inflammation or masses   - CARDIOVASCULAR: no cyanosis, no visible JVD   - RESPIRATORY: respiratory effort normal without any distress or labored breathing   - MUSCULOSKELETAL: Normal ROM of neck and upper extremities observed   - SKIN: No rash on face   - NEUROLOGIC:  No facial asymmetry (Cranial nerve 7 motor function), No gaze palsy   - PSYCHIATRIC: Normal affect, Normal insight and judgement     Data Reviewed:   Component      Latest Ref Rng & Units 7/28/2021 7/28/2021 7/28/2021 7/28/2021           8:32 AM  8:32 AM  8:32 AM  8:32 AM   Glucose      65 - 99 mg/dL   133 (H)    BUN      6 - 24 mg/dL   14    Creatinine      0.76 - 1.27 mg/dL   0.89    GFR est non-AA      >59 mL/min/1.73   97    GFR est AA      >59 mL/min/1.73   112    BUN/Creatinine ratio      9 - 20   16    Sodium      134 - 144 mmol/L   137    Potassium      3.5 - 5.2 mmol/L   3.8    Chloride      96 - 106 mmol/L   97    CO2      20 - 29 mmol/L   23    Calcium      8.7 - 10.2 mg/dL   10.0    Protein, total      6.0 - 8.5 g/dL   7.7    Albumin      3.8 - 4.9 g/dL   4.6    GLOBULIN, TOTAL      1.5 - 4.5 g/dL   3.1    A-G Ratio      1.2 - 2.2   1.5    Bilirubin, total      0.0 - 1.2 mg/dL   0.8    Alk.  phosphatase      48 - 121 IU/L   73    AST      0 - 40 IU/L   25    ALT      0 - 44 IU/L   27    Cholesterol, total      100 - 199 mg/dL    120   Triglyceride      0 - 149 mg/dL    358 (H)   HDL Cholesterol      >39 mg/dL    26 (L)   VLDL, calculated      5 - 40 mg/dL    53 (H)   LDL, calculated      0 - 99 mg/dL    41   Hemoglobin A1c, (calculated)      4.8 - 5.6 %  5.8 (H)     Estimated average glucose      mg/dL  120     TSH      0.450 - 4.500 uIU/mL 1.360        Component      Latest Ref Rng & Units 7/28/2021 7/28/2021           8:32 AM  8:04 AM   WBC      3.4 - 10.8 x10E3/uL  7.1   RBC      4.14 - 5.80 x10E6/uL  6.19 (H)   HGB      13.0 - 17.7 g/dL  18.7 (H)   HCT      37.5 - 51.0 %  55.3 (H)   MCV      79 - 97 fL  89   MCH      26.6 - 33.0 pg  30.2   MCHC      31.5 - 35.7 g/dL  33.8   RDW      11.6 - 15.4 %  15.1   PLATELET      148 - 214 x10E3/uL  250   Creatinine, urine      Not Estab. mg/dL 153.6    Microalbumin, urine      Not Estab. ug/mL 677.0    Microalbumin/Creat. Ratio      0 - 29 mg/g creat 441 (H)      Component      Latest Ref Rng & Units 7/28/2021           8:29 AM   Testosterone      264 - 916 ng/dL 585   Free testosterone (Direct)      7.2 - 24.0 pg/mL 18.0       Assessment/Plan:   1) DM > His A1C in July 2021 was 5.8%. Pt encouraged to keep up the excellent work. Pt to continue the Metformin 500mg BID. 2) Hypogonadism > He is doing well clinically, his Testosterone level was 585 and has consistently been in a good range and his CBC is looking ok. Pt denies issues of LUTS, CP, SOB or HAs. Pt to continue the Testosterone 150mg weekly. 3) HTN > Pt is on an  ARB for renal protection. Will not make any changes at this time    4) HLD > Pt is tolerating the Rosuvastatin 10mg daily. His lipid panel in July 2021 was at goal.    Pt voices understanding and agreement with the plan.     Copy sent to:  Dr. Natalio Poon

## 2021-08-06 NOTE — PROGRESS NOTES
HISTORY OF PRESENT ILLNESS  Ashlyn De La Paz is a 48 y.o. male. HPI     Last here 4/19/21. Pt is here to f/u on chronic conditions.     He c/o frequent runny nose sinus congestion for a month blowing nose a lot no fever  Will start nasonex and zyrtec daily      He c/o L knee ache at rest, this does not bother him when walking  He is wearing a brace, this helps no injury  Will order xray of L knee and diclofenac gel    BP is 108/76 today  BP at home 138/87  Continues on losartan 50mg  coreg 6.25mg BID and  HCTZ  25 mg      He is diabetic  , 114, rarely over 130 or 140  Continues on metformin 500mg BID   He also takes ASA 81mg daily      Wt is 252 lbs, down 9 lbs x lov  Discussed diet and wt loss   Recall saxenda 3mg caused him to feel bloated and uncomfortable      Reviewed labs    Ordered labs      He follows with endocrinology  Dr Carla Sosa ([facility]) for PSA and testosterone level checks   Last visit was 8/04/21;  1) DM > His A1C in July 2021 was 5.8%. Pt encouraged to keep up the excellent work. Pt to continue the Metformin 500mg BID.     2) Hypogonadism > He is doing well clinically, his Testosterone level was 585 and has consistently been in a good range and his CBC is looking ok. Pt denies issues of LUTS, CP, SOB or HAs. Pt to continue the Testosterone 150mg weekly.    3) HTN > Pt is on an  ARB for renal protection. Will not make any changes at this time     4) HLD > Pt is tolerating the Rosuvastatin 10mg daily.  His lipid panel in July 2021 was at goal.     Of note, he is on a lower dose of testosterone d/t syncopal event in the past       Pt follows with Dr. Alex Novak (cardio) for CAD and CHF  No signs sx/cad/chf  Last visit was 10/19  Discussed scheduling f/u he is quite overdue he is aware and will schedule     Pt follows with Dr. Martha Perez (pulm) annually  Reviewed note 10/02/20:  About CPAP, doing well f/u in one year  Pt is compliant with CPAP qhs 8/21     Pt follows with Dr. Kelley Smalls (GI)  Last visit was 2/6/18     Pt now follows with Dr. Sydnee Aldana (infectious disease) for HIV prevention  Last visist 4/28/20  Pt is now on descovy -- got prior authorization to prevent HIV     He is taking pepcid daily to help with reflux, this has been working well  Recall pt states that aciphex improved his sx in the past. However, his insurance no longer covers this medication.      Pt continues on crestor 10mg daily for cholesterol      Continues on effexor 150mg daily for depression, this has been helping     Pt takes a multivitamin      Recall his partner is HIV positive  However, pt was HIV negative on 4/21 labs     PREVENTIVE:  Colonoscopy: Dr. Cristiana Gaines, 2/6/18, nl, 10 year repeat  PSA: 1/17 1.4, 1/18 1.4, 4/19 1.7  3/20 1.8 8/20 1.7 1/21 2.1  AAA screen: not needed, non smoker   Tdap: 8/29/2016  Pneumovax: 4/2015  Shingrix: not yet needed, will get closer to 60   Flu shot: 09/20  HIV screen: 7/21 negative  Microalbumin: 7/21 441  Foot exam: 08/09/21  A1c: 8/19 5.9 11/19 5.9 3/20 6.3  6/20 5.9 9/20 6.2 12/20 6.1 4/21 6.0 7/21 5.8   Eye exam: [facility] 9/24/20  Retinal Scan: 10/16/17, mild diabetic retinopathy in L eye  Lipids: 7/21 LDL 41  Covid: both complete (moderna)    Patient Active Problem List    Diagnosis Date Noted    Severe obesity (BMI 35.0-39. 9) with comorbidity ([facility] [address] 04/30/2018    Type 2 diabetes mellitus with nephropathy ([facility] [address] 01/22/2018    Essential hypertension with goal blood pressure less than 140/90 05/23/2016    Coronary artery disease involving native coronary artery of native heart without angina pectoris 05/23/2016    Mild episode of recurrent major depressive disorder ([facility] [address] 05/23/2016    SCOTTY on CPAP 05/23/2016    Chronic systolic congestive heart failure (HCC) 05/23/2016    Low testosterone 05/23/2016    Gastroesophageal reflux disease without esophagitis 05/23/2016     Current Outpatient Medications   Medication Sig Dispense Refill    diclofenac (VOLTAREN) 1 % gel Apply  to affected area four (4) times daily. 1 Each 1    testosterone cypionate (DEPOTESTOTERONE CYPIONATE) 200 mg/mL injection INJECT INTRAMUSCULARLY  0.75ML WEEKLY (DISCARD  UNUSED PORTION AFTER FIRST  USE) 12 mL 3    tadalafiL (CIALIS) 10 mg tablet TAKE ONE TABLET BY MOUTH AS NEEDED FOR ERECTILE DYSFUNCTION 30 Tablet 0    emtricitabine-tenofovir alafen (Descovy) tablet Take 1 Tablet by mouth daily. 90 Tablet 0    carvediloL (COREG) 6.25 mg tablet TAKE 1 TABLET BY MOUTH  TWICE DAILY 180 Tablet 1    losartan (COZAAR) 50 mg tablet TAKE 1 TABLET BY MOUTH  DAILY 90 Tablet 0    Syringe with Needle, Disp, 3 mL 23 x 1\" syrg Use to draw up testosterone once weekly 50 Syringe 1    Syringe with Needle, Disp, (Syringe 3cc/20Gx1\") 3 mL 20 gauge x 1\" syrg Use to draw up testosterone once weekly 100 Syringe 3    metFORMIN (GLUCOPHAGE) 500 mg tablet TAKE 1 TABLET BY MOUTH  TWICE DAILY WITH MEALS 180 Tab 1    venlafaxine-SR (EFFEXOR-XR) 150 mg capsule TAKE 1 CAPSULE BY MOUTH  DAILY 90 Cap 3    hydroCHLOROthiazide (HYDRODIURIL) 12.5 mg tablet TAKE 1 TABLET BY MOUTH  DAILY FOR HIGH BLOOD  PRESSURE 90 Tab 3    Needle, Disp, 16 G (Disposable Needles) 16 gauge x 1\" ndle USE FOR DRAWING UP YOUR  TESTOSTERONE EACH WEEK 13 Each 3    rosuvastatin (CRESTOR) 10 mg tablet TAKE 1 TABLET BY MOUTH AT  NIGHT 90 Tab 3    melatonin 10 mg tab Take  by mouth.  Syringe with Needle, Disp, 1 mL 20 gauge x 1\" syrg For injecting Testosterone once every week 100 Each 11    famotidine (PEPCID) 20 mg tablet Take 20 mg by mouth daily.  glucose blood VI test strips (ONETOUCH VERIO) strip Check blood sugar once daily. 50 Strip 5    mometasone (NASONEX) 50 mcg/actuation nasal spray 2 Sprays daily.  aspirin 81 mg chewable tablet Take 81 mg by mouth nightly.        Past Surgical History:   Procedure Laterality Date    HX COLONOSCOPY  11/2016    HX GI      colonoscopy    HX OTHER SURGICAL      anal fissure      Lab Results   Component Value Date/Time    WBC 7.2 07/28/2021 08:29 AM    HGB 18.5 (H) 07/28/2021 08:29 AM    Hemoglobin (POC) 15.6 02/06/2017 11:40 AM    HCT 55.2 (H) 07/28/2021 08:29 AM    Hematocrit (POC) 50 04/02/2018 10:59 AM    PLATELET 425 92/99/0478 08:29 AM    MCV 88 07/28/2021 08:29 AM     Lab Results   Component Value Date/Time    Cholesterol, total 120 07/28/2021 08:32 AM    HDL Cholesterol 26 (L) 07/28/2021 08:32 AM    LDL, calculated 41 07/28/2021 08:32 AM    LDL, calculated 29 03/04/2020 09:19 AM    LDL-C, External 34 07/16/2018 12:00 AM    Triglyceride 358 (H) 07/28/2021 08:32 AM     Lab Results   Component Value Date/Time    GFR est non-AA 97 07/28/2021 08:32 AM    GFRNA, POC >60 04/02/2018 10:59 AM    GFR est  07/28/2021 08:32 AM    GFRAA, POC >60 04/02/2018 10:59 AM    Creatinine 0.89 07/28/2021 08:32 AM    Creatinine (POC) 0.9 04/02/2018 10:59 AM    BUN 14 07/28/2021 08:32 AM    BUN (POC) 14 04/02/2018 10:59 AM    Sodium 137 07/28/2021 08:32 AM    Sodium (POC) 139 04/02/2018 10:59 AM    Potassium 3.8 07/28/2021 08:32 AM    Potassium (POC) 3.9 04/02/2018 10:59 AM    Chloride 97 07/28/2021 08:32 AM    Chloride (POC) 100 04/02/2018 10:59 AM    CO2 23 07/28/2021 08:32 AM        Review of Systems   Respiratory: Negative for shortness of breath. Cardiovascular: Negative for chest pain. Physical Exam  Constitutional:       General: He is not in acute distress. Appearance: Normal appearance. He is not ill-appearing, toxic-appearing or diaphoretic. HENT:      Head: Normocephalic and atraumatic. Right Ear: External ear normal.      Left Ear: External ear normal.   Eyes:      General:         Right eye: No discharge. Left eye: No discharge. Conjunctiva/sclera: Conjunctivae normal.      Pupils: Pupils are equal, round, and reactive to light. Cardiovascular:      Rate and Rhythm: Normal rate and regular rhythm. Heart sounds: No murmur heard. No friction rub. No gallop.     Pulmonary: Effort: No respiratory distress. Breath sounds: Normal breath sounds. No wheezing or rales. Chest:      Chest wall: No tenderness. Musculoskeletal:         General: Normal range of motion. Cervical back: Normal range of motion and neck supple. Skin:     General: Skin is warm and dry. Neurological:      Mental Status: He is alert and oriented to person, place, and time. Mental status is at baseline. Coordination: Coordination normal.      Gait: Gait normal.      Comments: Sensory exam of the foot is normal, tested with the monofilament. Good pulses, no lesions or ulcers, good peripheral pulses. Psychiatric:         Mood and Affect: Mood normal.         Behavior: Behavior normal.         ASSESSMENT and PLAN    ICD-10-CM ICD-9-CM    1. Type 2 diabetes mellitus with nephropathy (HCC)      E11.21 250.40  DIABETES FOOT EXAM     209.17 METABOLIC PANEL, COMPREHENSIVE      HEMOGLOBIN A1C WITH EAG      RPR   Well-controlled on Metformin twice daily continue   HIV 1/2 AG/AB, 4TH GENERATION,W RFLX CONFIRM      HEPATITIS PANEL, ACUTE      CHLAMYDIA / GC-AMPLIFIED   2. Chronic systolic congestive heart failure (HCC)  I13.58 690.44 METABOLIC PANEL, COMPREHENSIVE     428.0 HEMOGLOBIN A1C WITH EAG      RPR   Medically managed no active signs or symptoms of CAD or CHF overdue to see his cardiologist he will call and schedule   HIV 1/2 AG/AB, 4TH GENERATION,W RFLX CONFIRM      HEPATITIS PANEL, ACUTE      CHLAMYDIA / GC-AMPLIFIED   3. Severe obesity (BMI 35.0-39. 9) with comorbidity ([facility] [address]  A81.01 454.80 METABOLIC PANEL, COMPREHENSIVE      HEMOGLOBIN A1C WITH EAG      RPR   Working on [facility] uses [employer] when he can   HIV 1/2 AG/AB, 4TH GENERATION,W RFLX CONFIRM      HEPATITIS PANEL, ACUTE      CHLAMYDIA / GC-AMPLIFIED   4.  SCOTTY on CPAP  J86.71 918.91 METABOLIC PANEL, COMPREHENSIVE   Compliant with CPAP up-to-date with specialist Z99.89 V46.8 HEMOGLOBIN A1C WITH EAG      RPR      HIV 1/2 AG/AB, 4TH GENERATION,W RFLX CONFIRM      HEPATITIS PANEL, ACUTE      CHLAMYDIA / GC-AMPLIFIED   5. Gastroesophageal reflux disease without esophagitis  U44.6 941.88 METABOLIC PANEL, COMPREHENSIVE      HEMOGLOBIN A1C WITH EAG      RPR   Controlled with Pepcid as needed   HIV 1/2 AG/AB, 4TH GENERATION,W RFLX CONFIRM      HEPATITIS PANEL, ACUTE      CHLAMYDIA / GC-AMPLIFIED   6. Essential hypertension with goal blood pressure less than 140/90  D57 276.9 METABOLIC PANEL, COMPREHENSIVE      HEMOGLOBIN A1C WITH EAG      RPR   Controlled on current regimen of losartan Coreg and hydrochlorothiazide   HIV 1/2 AG/AB, 4TH GENERATION,W RFLX CONFIRM      HEPATITIS PANEL, ACUTE      CHLAMYDIA / GC-AMPLIFIED   7. Coronary artery disease involving native coronary artery of native heart without angina pectoris  T46.22 641.70 METABOLIC PANEL, COMPREHENSIVE      HEMOGLOBIN A1C WITH EAG      RPR   Medically managed   HIV 1/2 AG/AB, 4TH GENERATION,W RFLX CONFIRM      HEPATITIS PANEL, ACUTE      CHLAMYDIA / GC-AMPLIFIED   8. Low testosterone  P77.31 017.29 METABOLIC PANEL, COMPREHENSIVE      HEMOGLOBIN A1C WITH EAG   Gets testosterone injections follows with endocrinology    Has erythrocytosis from this which is overall stable   RPR      HIV 1/2 AG/AB, 4TH GENERATION,W RFLX CONFIRM      HEPATITIS PANEL, ACUTE      CHLAMYDIA / GC-AMPLIFIED   9. Mild episode of recurrent major depressive disorder (HCC)  V82.7 148.38 METABOLIC PANEL, COMPREHENSIVE      HEMOGLOBIN A1C WITH EAG      RPR   Controlled on Effexor   HIV 1/2 AG/AB, 4TH GENERATION,W RFLX CONFIRM      HEPATITIS PANEL, ACUTE      CHLAMYDIA / GC-AMPLIFIED   10. Chronic pain of left knee  M25.562 719.46 XR KNEE LT MAX 2 VWS    J82.70 662.10 METABOLIC PANEL, COMPREHENSIVE      HEMOGLOBIN A1C WITH EAG      RPR   Improved with brace we will give diclofenac gel we will get plain film   HIV 1/2 AG/AB, 4TH GENERATION,W RFLX CONFIRM      HEPATITIS PANEL, ACUTE      CHLAMYDIA / GC-AMPLIFIED   11. Seasonal allergic rhinitis due to pollen  M31.4 830.9 METABOLIC PANEL, COMPREHENSIVE      HEMOGLOBIN A1C WITH EAG   Discussed Zyrtec and Nasonex daily   RPR      HIV 1/2 AG/AB, 4TH GENERATION,W RFLX CONFIRM      HEPATITIS PANEL, ACUTE      CHLAMYDIA / GC-AMPLIFIED   HIV prevention continues on [place] will get labs which are done every 3 months    Scribed by Len Silva [facility], as dictated by Dr. Jackelin Linn. Current diagnosis and concerns discussed with pt at length. Pt understands risks and benefits or current treatment plan and medications, and accepts the treatment and medication with any possible risks. Pt asks appropriate questions, which were answered. Pt was instructed to call with any concerns or problems. I have reviewed the note documented by the scribe. The services provided are my own.   The documentation is accurate

## 2021-08-09 ENCOUNTER — OFFICE VISIT (OUTPATIENT)
Dept: INTERNAL MEDICINE CLINIC | Age: 56
End: 2021-08-09

## 2021-08-09 ENCOUNTER — TELEPHONE (OUTPATIENT)
Dept: INTERNAL MEDICINE CLINIC | Age: 56
End: 2021-08-09

## 2021-08-09 VITALS
OXYGEN SATURATION: 95 % | SYSTOLIC BLOOD PRESSURE: 108 MMHG | DIASTOLIC BLOOD PRESSURE: 76 MMHG | RESPIRATION RATE: 16 BRPM | TEMPERATURE: 98.5 F | HEART RATE: 100 BPM | WEIGHT: 252.4 LBS | HEIGHT: 68 IN | BODY MASS INDEX: 38.25 KG/M2

## 2021-08-09 DIAGNOSIS — M25.562 CHRONIC PAIN OF LEFT KNEE: Primary | ICD-10-CM

## 2021-08-09 DIAGNOSIS — I10 ESSENTIAL HYPERTENSION WITH GOAL BLOOD PRESSURE LESS THAN 140/90: ICD-10-CM

## 2021-08-09 DIAGNOSIS — J30.1 SEASONAL ALLERGIC RHINITIS DUE TO POLLEN: ICD-10-CM

## 2021-08-09 DIAGNOSIS — K21.9 GASTROESOPHAGEAL REFLUX DISEASE WITHOUT ESOPHAGITIS: ICD-10-CM

## 2021-08-09 DIAGNOSIS — R79.89 LOW TESTOSTERONE: ICD-10-CM

## 2021-08-09 DIAGNOSIS — I25.10 CORONARY ARTERY DISEASE INVOLVING NATIVE CORONARY ARTERY OF NATIVE HEART WITHOUT ANGINA PECTORIS: ICD-10-CM

## 2021-08-09 DIAGNOSIS — I50.22 CHRONIC SYSTOLIC CONGESTIVE HEART FAILURE (HCC): ICD-10-CM

## 2021-08-09 DIAGNOSIS — M25.562 CHRONIC PAIN OF LEFT KNEE: ICD-10-CM

## 2021-08-09 DIAGNOSIS — E11.21 TYPE 2 DIABETES MELLITUS WITH NEPHROPATHY (HCC): Primary | ICD-10-CM

## 2021-08-09 DIAGNOSIS — G89.29 CHRONIC PAIN OF LEFT KNEE: ICD-10-CM

## 2021-08-09 DIAGNOSIS — F33.0 MILD EPISODE OF RECURRENT MAJOR DEPRESSIVE DISORDER (HCC): ICD-10-CM

## 2021-08-09 DIAGNOSIS — Z99.89 OSA ON CPAP: ICD-10-CM

## 2021-08-09 DIAGNOSIS — G47.33 OSA ON CPAP: ICD-10-CM

## 2021-08-09 DIAGNOSIS — G89.29 CHRONIC PAIN OF LEFT KNEE: Primary | ICD-10-CM

## 2021-08-09 DIAGNOSIS — E66.01 SEVERE OBESITY (BMI 35.0-39.9) WITH COMORBIDITY (HCC): ICD-10-CM

## 2021-08-09 PROCEDURE — 99214 OFFICE O/P EST MOD 30 MIN: CPT | Performed by: INTERNAL MEDICINE

## 2021-08-09 RX ORDER — DICLOFENAC SODIUM 10 MG/G
GEL TOPICAL 4 TIMES DAILY
Qty: 1 EACH | Refills: 1 | Status: SHIPPED | OUTPATIENT
Start: 2021-08-09 | End: 2021-08-09 | Stop reason: SDUPTHER

## 2021-08-09 RX ORDER — DICLOFENAC SODIUM 10 MG/G
2 GEL TOPICAL 4 TIMES DAILY
Qty: 1 EACH | Refills: 1 | Status: SHIPPED | OUTPATIENT
Start: 2021-08-09 | End: 2022-06-03

## 2021-08-09 NOTE — TELEPHONE ENCOUNTER
Albino//Heidi states she needs a call back to get Clarification on patient's prescription for Diclofenac (VOLTAREN) 1 % gel. Please call to discuss & advise.  Thank you

## 2021-08-10 DIAGNOSIS — E29.1 HYPOGONADISM IN MALE: Primary | ICD-10-CM

## 2021-08-10 DIAGNOSIS — E11.21 TYPE 2 DIABETES MELLITUS WITH NEPHROPATHY (HCC): ICD-10-CM

## 2021-08-19 RX ORDER — METFORMIN HYDROCHLORIDE 500 MG/1
TABLET ORAL
Qty: 180 TABLET | Refills: 3 | Status: SHIPPED | OUTPATIENT
Start: 2021-08-19 | End: 2022-06-03 | Stop reason: SDUPTHER

## 2021-08-21 ENCOUNTER — PATIENT MESSAGE (OUTPATIENT)
Dept: FAMILY MEDICINE CLINIC | Age: 56
End: 2021-08-21

## 2021-08-24 RX ORDER — EMTRICITABINE AND TENOFOVIR ALAFENAMIDE 200; 25 MG/1; MG/1
1 TABLET ORAL DAILY
Qty: 90 TABLET | Refills: 0 | Status: SHIPPED | OUTPATIENT
Start: 2021-08-24 | End: 2021-08-30 | Stop reason: SDUPTHER

## 2021-08-24 NOTE — TELEPHONE ENCOUNTER
Requested Prescriptions     Pending Prescriptions Disp Refills    emtricitabine-tenofovir alafen (Descovy) tablet 90 Tablet 0     Sig: Take 1 Tablet by mouth daily.      Last fill 7/6/2021 for #90 w/ no addtnl refills  Last OV 4/28/2020  No appts scheduled at this time    Laurence Dinero LPN

## 2021-08-30 RX ORDER — EMTRICITABINE AND TENOFOVIR ALAFENAMIDE 200; 25 MG/1; MG/1
1 TABLET ORAL DAILY
Qty: 90 TABLET | Refills: 0 | Status: SHIPPED | OUTPATIENT
Start: 2021-08-30 | End: 2021-11-07

## 2021-09-29 RX ORDER — ROSUVASTATIN CALCIUM 10 MG/1
TABLET, COATED ORAL
Qty: 90 TABLET | Refills: 3 | Status: SHIPPED | OUTPATIENT
Start: 2021-09-29 | End: 2022-09-28

## 2021-11-05 LAB
ALBUMIN SERPL-MCNC: 4.5 G/DL (ref 3.8–4.9)
ALBUMIN/GLOB SERPL: 1.5 {RATIO} (ref 1.2–2.2)
ALP SERPL-CCNC: 75 IU/L (ref 44–121)
ALT SERPL-CCNC: 26 IU/L (ref 0–44)
AST SERPL-CCNC: 23 IU/L (ref 0–40)
BILIRUB SERPL-MCNC: 0.4 MG/DL (ref 0–1.2)
BUN SERPL-MCNC: 15 MG/DL (ref 6–24)
BUN/CREAT SERPL: 15 (ref 9–20)
C TRACH RRNA SPEC QL NAA+PROBE: NEGATIVE
CALCIUM SERPL-MCNC: 9.9 MG/DL (ref 8.7–10.2)
CHLORIDE SERPL-SCNC: 99 MMOL/L (ref 96–106)
CO2 SERPL-SCNC: 23 MMOL/L (ref 20–29)
CREAT SERPL-MCNC: 1 MG/DL (ref 0.76–1.27)
EST. AVERAGE GLUCOSE BLD GHB EST-MCNC: 131 MG/DL
GLOBULIN SER CALC-MCNC: 3.1 G/DL (ref 1.5–4.5)
GLUCOSE SERPL-MCNC: 146 MG/DL (ref 65–99)
HAV IGM SERPL QL IA: NEGATIVE
HBA1C MFR BLD: 6.2 % (ref 4.8–5.6)
HBV CORE IGM SERPL QL IA: NEGATIVE
HBV SURFACE AG SERPL QL IA: NEGATIVE
HCV AB S/CO SERPL IA: <0.1 S/CO RATIO (ref 0–0.9)
HIV 1+2 AB+HIV1 P24 AG SERPL QL IA: NON REACTIVE
N GONORRHOEA RRNA SPEC QL NAA+PROBE: NEGATIVE
POTASSIUM SERPL-SCNC: 4.2 MMOL/L (ref 3.5–5.2)
PROT SERPL-MCNC: 7.6 G/DL (ref 6–8.5)
RPR SER QL: NON REACTIVE
SODIUM SERPL-SCNC: 138 MMOL/L (ref 134–144)

## 2021-11-09 NOTE — PROGRESS NOTES
HISTORY OF PRESENT ILLNESS  Meenakshi Kennedy is a 55 y.o. male. HPI     Last here 8/09/21. Pt is here to f/u on chronic conditions.            BP today is 136/83  BP at 120-130/70s-low 80s  Continues on losartan 50mg coreg 6.25mg BID and  HCTZ  25 mg      He is diabetic  -140 before eating  Continues on metformin 500mg BID   He also takes ASA 81mg daily      Wt today is 256 lbs, down 4 lbs x lov   Discussed diet and wt/l  Recall saxenda 3mg caused him to feel bloated and uncomfortable      Reviewed labs    Ordered labs        Lov he c/o L knee ache at rest  Reviewed xr L knee 8/09/21:  1. No fracture. 2. Minimal osteoarthritis. He is now seeing orthopedics and had an injection        He saw Dr. Eliel Gibbs  for R knee pain  Reviewed note 11/01/21: We had a lengthy discussion regarding osteoarthritis, the nature of the disorder and the potential long term outcome. We also reviewed all of the conservative and surgical treatment options along with the risks and benefits of each. Conservative options include weight loss, the use of NSAIDs or Tylenol Arthritis as tolerated, non impact activity including cycling, swimming, and the elliptical , bracing, physical therapy and injections. Injection: The patient elected to proceed with an injection as detailed in the procedure note above. Weight loss: We had a lengthy discussion with the patient about the importance of weight loss and the significant impacts on orthopedic conditions. We presented options including nutrition counseling, safely increasing regular activity level with use of an exercise program, and weight loss surgery. The patient was given a handout with available resources in the community. We will postpone surgical intervention until the patient is medically optimized.      He follows with endocrinology  Dr Sayda Macdonald (endo) for PSA and testosterone level checks   Last visit was 8/04/21   Of note, he is on a lower dose of testosterone d/t syncopal event in the past     Pt follows with Dr. Antoinette Diallo (cardio) for CAD and CHF  No signs sx/cad/chf  Last visit was 10/19 he has called and schedule follow-up is scheduled for February 2022     Pt follows with Dr. Timothy Kumar (pulm) annually  Last visit 10/21 had a virtual visit with him was fine  Pt is compliant with CPAP qhs 11/21     Pt follows with Dr. Vida Harris (GI)  Last visit was 2/6/18     Pt now follows with Dr. Jenaro Gay (infectious disease) for HIV prevention  Last visist 4/28/20  Pt is now on descovy -- got prior authorization to prevent HIV ordering labs acute hepatitis panel gonorrhea chlamydia screen HIV every 3 months         He is taking pepcid daily to help with reflux, this has been working well  Recall pt states that aciphex improved his sx in the past. However, his insurance no longer covers this medication.      Pt continues on crestor 10mg daily for cholesterol      Continues on effexor 150mg daily for depression, this has been helping 11/21     Pt takes a multivitamin      Recall his partner is HIV positive  However, pt was HIV negative on 11/21 labs     PREVENTIVE:  Colonoscopy: Dr. Vida Harris, 2/6/18, nl, 10 year repeat  PSA: 1/17 1.4, 1/18 1.4, 4/19 1.7  3/20 1.8 8/20 1.7 1/21 2.1  AAA screen: not needed, non smoker   Tdap: 8/29/2016  Pneumovax: 4/2015  Shingrix: discussed will hold off on this for now  Flu shot: 09/21  HIV screen: 11/21 negative  Microalbumin: 7/21 441  Foot exam: 08/09/21  A1c: 11/19 5.9 3/20 6.3  6/20 5.9 9/20 6.2 12/20 6.1 4/21 6.0 7/21 5.8 11/21 6.2  Eye exam: [facility] 9/24/20, due reminded  Alfonso Elizalde  Hep C screen: 11/21 negative  Covid: both + booster (Suresh Daunt)    Patient Active Problem List    Diagnosis Date Noted    Severe obesity (BMI 35.0-39. 9) with comorbidity ([facility] [address] 04/30/2018    Type 2 diabetes mellitus with nephropathy ([facility] [address] 01/22/2018    Essential hypertension with goal blood pressure less than 140/90 05/23/2016    Coronary artery disease involving native coronary artery of native heart without angina pectoris 05/23/2016    Mild episode of recurrent major depressive disorder ([facility] [address] 05/23/2016    SCOTTY on CPAP 05/23/2016    Chronic systolic congestive heart failure (HCC) 05/23/2016    Low testosterone 05/23/2016    Gastroesophageal reflux disease without esophagitis 05/23/2016     Current Outpatient Medications   Medication Sig Dispense Refill    Descovy tablet TAKE 1 TABLET BY MOUTH  DAILY 90 Tablet 0    rosuvastatin (CRESTOR) 10 mg tablet TAKE 1 TABLET BY MOUTH AT  NIGHT 90 Tablet 3    losartan (COZAAR) 50 mg tablet TAKE 1 TABLET BY MOUTH  DAILY 90 Tablet 1    metFORMIN (GLUCOPHAGE) 500 mg tablet TAKE 1 TABLET BY MOUTH  TWICE DAILY WITH MEALS 180 Tablet 3    diclofenac (VOLTAREN) 1 % gel Apply 2 g to affected area four (4) times daily. 1 Each 1    testosterone cypionate (DEPOTESTOTERONE CYPIONATE) 200 mg/mL injection INJECT INTRAMUSCULARLY  0.75ML WEEKLY (DISCARD  UNUSED PORTION AFTER FIRST  USE) 12 mL 3    tadalafiL (CIALIS) 10 mg tablet TAKE ONE TABLET BY MOUTH AS NEEDED FOR ERECTILE DYSFUNCTION 30 Tablet 0    carvediloL (COREG) 6.25 mg tablet TAKE 1 TABLET BY MOUTH  TWICE DAILY 180 Tablet 1    Syringe with Needle, Disp, 3 mL 23 x 1\" syrg Use to draw up testosterone once weekly 50 Syringe 1    Syringe with Needle, Disp, (Syringe 3cc/20Gx1\") 3 mL 20 gauge x 1\" syrg Use to draw up testosterone once weekly 100 Syringe 3    venlafaxine-SR (EFFEXOR-XR) 150 mg capsule TAKE 1 CAPSULE BY MOUTH  DAILY 90 Cap 3    hydroCHLOROthiazide (HYDRODIURIL) 12.5 mg tablet TAKE 1 TABLET BY MOUTH  DAILY FOR HIGH BLOOD  PRESSURE 90 Tab 3    Needle, Disp, 16 G (Disposable Needles) 16 gauge x 1\" ndle USE FOR DRAWING UP YOUR  TESTOSTERONE EACH WEEK 13 Each 3    melatonin 10 mg tab Take  by mouth.       Syringe with Needle, Disp, 1 mL 20 gauge x 1\" syrg For injecting Testosterone once every week 100 Each 11    famotidine (PEPCID) 20 mg tablet Take 20 mg by mouth daily.  glucose blood VI test strips (ONETOUCH VERIO) strip Check blood sugar once daily. 50 Strip 5    mometasone (NASONEX) 50 mcg/actuation nasal spray 2 Sprays daily.  aspirin 81 mg chewable tablet Take 81 mg by mouth nightly. Lab Results   Component Value Date/Time    WBC 7.2 07/28/2021 08:29 AM    HGB 18.5 (H) 07/28/2021 08:29 AM    Hemoglobin (POC) 15.6 02/06/2017 11:40 AM    HCT 55.2 (H) 07/28/2021 08:29 AM    Hematocrit (POC) 50 04/02/2018 10:59 AM    PLATELET 937 23/55/5167 08:29 AM    MCV 88 07/28/2021 08:29 AM     Lab Results   Component Value Date/Time    Cholesterol, total 120 07/28/2021 08:32 AM    HDL Cholesterol 26 (L) 07/28/2021 08:32 AM    LDL, calculated 41 07/28/2021 08:32 AM    LDL, calculated 29 03/04/2020 09:19 AM    LDL-C, External 34 07/16/2018 12:00 AM    Triglyceride 358 (H) 07/28/2021 08:32 AM     Lab Results   Component Value Date/Time    GFR est non-AA 84 11/03/2021 08:36 AM    GFRNA, POC >60 04/02/2018 10:59 AM    GFR est AA 98 11/03/2021 08:36 AM    GFRAA, POC >60 04/02/2018 10:59 AM    Creatinine 1.00 11/03/2021 08:36 AM    Creatinine (POC) 0.9 04/02/2018 10:59 AM    BUN 15 11/03/2021 08:36 AM    BUN (POC) 14 04/02/2018 10:59 AM    Sodium 138 11/03/2021 08:36 AM    Sodium (POC) 139 04/02/2018 10:59 AM    Potassium 4.2 11/03/2021 08:36 AM    Potassium (POC) 3.9 04/02/2018 10:59 AM    Chloride 99 11/03/2021 08:36 AM    Chloride (POC) 100 04/02/2018 10:59 AM    CO2 23 11/03/2021 08:36 AM        Review of Systems   Respiratory: Negative for shortness of breath. Cardiovascular: Negative for chest pain. Physical Exam  Constitutional:       General: He is not in acute distress. Appearance: Normal appearance. He is not ill-appearing, toxic-appearing or diaphoretic. HENT:      Head: Normocephalic and atraumatic. Right Ear: External ear normal.      Left Ear: External ear normal.   Eyes:      General:         Right eye: No discharge.          Left eye: No discharge. Conjunctiva/sclera: Conjunctivae normal.      Pupils: Pupils are equal, round, and reactive to light. Cardiovascular:      Rate and Rhythm: Normal rate and regular rhythm. Heart sounds: No murmur heard. No friction rub. No gallop. Pulmonary:      Effort: No respiratory distress. Breath sounds: Normal breath sounds. No wheezing or rales. Chest:      Chest wall: No tenderness. Musculoskeletal:         General: Normal range of motion. Cervical back: Normal range of motion and neck supple. Skin:     General: Skin is warm and dry. Neurological:      Mental Status: He is alert and oriented to person, place, and time. Mental status is at baseline. Coordination: Coordination normal.      Gait: Gait normal.   Psychiatric:         Mood and Affect: Mood normal.         Behavior: Behavior normal.         ASSESSMENT and PLAN    ICD-10-CM ICD-9-CM    1. Type 2 diabetes mellitus with nephropathy (HCC)      T73.84 367.45 METABOLIC PANEL, COMPREHENSIVE     583.81 HEMOGLOBIN A1C WITH EAG   Controlled metformin twice daily continue no change dose A1c at goal repeat labs prior to follow-up   HEPATITIS PANEL, ACUTE      HIV 1/2 AG/AB, 4TH GENERATION,W RFLX CONFIRM      CHLAMYDIA / GC-AMPLIFIED   2. Severe obesity (BMI 35.0-39. 9) with comorbidity ([facility] [address]  C33.03 794.80 METABOLIC PANEL, COMPREHENSIVE      HEMOGLOBIN A1C WITH EAG      HEPATITIS PANEL, ACUTE   Weight relatively stable working aggressively on weight watchers and portions   HIV 1/2 AG/AB, 4TH GENERATION,W RFLX CONFIRM      CHLAMYDIA / GC-AMPLIFIED   3.  Chronic systolic congestive heart failure (HCC)  T80.32 101.83 METABOLIC PANEL, COMPREHENSIVE     428.0 HEMOGLOBIN A1C WITH EAG      HEPATITIS PANEL, ACUTE   Medically managed stable no active signs or symptoms of CAD or CHF overdue to see his cardiologist he does have an appointment scheduled for February   HIV 1/2 AG/AB, 4TH GENERATION,W RFLX Yared Ferro / GC-AMPLIFIED   4. Essential hypertension with goal blood pressure less than 140/90  D93 738.3 METABOLIC PANEL, COMPREHENSIVE      HEMOGLOBIN A1C WITH EAG      HEPATITIS PANEL, ACUTE   Well-controlled on losartan Coreg and hydrochlorothiazide continue   HIV 1/2 AG/AB, 4TH GENERATION,W RFLX CONFIRM      CHLAMYDIA / GC-AMPLIFIED   5. SCOTTY on CPAP  F48.67 362.95 METABOLIC PANEL, COMPREHENSIVE    Z99.89 V46.8 HEMOGLOBIN A1C WITH EAG      HEPATITIS PANEL, ACUTE   Compliant with CPAP up-to-date with sleep specialist   HIV 1/2 AG/AB, 4TH GENERATION,W RFLX CONFIRM      CHLAMYDIA / GC-AMPLIFIED   6. Gastroesophageal reflux disease without esophagitis  E09.1 083.42 METABOLIC PANEL, COMPREHENSIVE   Controlled with Pepcid   HEMOGLOBIN A1C WITH EAG      HEPATITIS PANEL, ACUTE      HIV 1/2 AG/AB, 4TH GENERATION,W RFLX CONFIRM      CHLAMYDIA / GC-AMPLIFIED   7. Coronary artery disease involving native coronary artery of native heart without angina pectoris  P04.49 721.96 METABOLIC PANEL, COMPREHENSIVE      HEMOGLOBIN A1C WITH EAG      HEPATITIS PANEL, ACUTE   Medically managed see above   HIV 1/2 AG/AB, 4TH GENERATION,W RFLX CONFIRM      CHLAMYDIA / GC-AMPLIFIED   8. Mild episode of recurrent major depressive disorder (HCC)  U57.2 304.06 METABOLIC PANEL, COMPREHENSIVE      HEMOGLOBIN A1C WITH EAG      HEPATITIS PANEL, ACUTE   Controlled on Effexor no changes today   HIV 1/2 AG/AB, 4TH GENERATION,W RFLX CONFIRM      CHLAMYDIA / GC-AMPLIFIED   9. Low testosterone  C05.65 413.01 METABOLIC PANEL, COMPREHENSIVE      HEMOGLOBIN A1C WITH EAG   Continues on testosterone per endocrinology   HEPATITIS PANEL, ACUTE      HIV 1/2 AG/AB, 4TH GENERATION,W RFLX CONFIRM      CHLAMYDIA / GC-AMPLIFIED   HIV prevention continues on Descovy labs up-to-date    Scribed by [facility] of [address], as dictated by Dr. Cheryle London. Current diagnosis and concerns discussed with pt at length.  Pt understands risks and benefits or current treatment plan and medications, and accepts the treatment and medication with any possible risks. Pt asks appropriate questions, which were answered. Pt was instructed to call with any concerns or problems. I have reviewed the note documented by the scribe. The services provided are my own.   The documentation is accurate

## 2021-11-10 ENCOUNTER — OFFICE VISIT (OUTPATIENT)
Dept: INTERNAL MEDICINE CLINIC | Age: 56
End: 2021-11-10
Payer: COMMERCIAL

## 2021-11-10 VITALS
BODY MASS INDEX: 38.8 KG/M2 | HEART RATE: 98 BPM | HEIGHT: 68 IN | RESPIRATION RATE: 16 BRPM | TEMPERATURE: 97.1 F | SYSTOLIC BLOOD PRESSURE: 136 MMHG | WEIGHT: 256 LBS | DIASTOLIC BLOOD PRESSURE: 83 MMHG | OXYGEN SATURATION: 97 %

## 2021-11-10 DIAGNOSIS — F33.0 MILD EPISODE OF RECURRENT MAJOR DEPRESSIVE DISORDER (HCC): ICD-10-CM

## 2021-11-10 DIAGNOSIS — E11.21 TYPE 2 DIABETES MELLITUS WITH NEPHROPATHY (HCC): Primary | ICD-10-CM

## 2021-11-10 DIAGNOSIS — Z99.89 OSA ON CPAP: ICD-10-CM

## 2021-11-10 DIAGNOSIS — G47.33 OSA ON CPAP: ICD-10-CM

## 2021-11-10 DIAGNOSIS — K21.9 GASTROESOPHAGEAL REFLUX DISEASE WITHOUT ESOPHAGITIS: ICD-10-CM

## 2021-11-10 DIAGNOSIS — E66.01 SEVERE OBESITY (BMI 35.0-39.9) WITH COMORBIDITY (HCC): ICD-10-CM

## 2021-11-10 DIAGNOSIS — I50.22 CHRONIC SYSTOLIC CONGESTIVE HEART FAILURE (HCC): ICD-10-CM

## 2021-11-10 DIAGNOSIS — I25.10 CORONARY ARTERY DISEASE INVOLVING NATIVE CORONARY ARTERY OF NATIVE HEART WITHOUT ANGINA PECTORIS: ICD-10-CM

## 2021-11-10 DIAGNOSIS — I10 ESSENTIAL HYPERTENSION WITH GOAL BLOOD PRESSURE LESS THAN 140/90: ICD-10-CM

## 2021-11-10 DIAGNOSIS — R79.89 LOW TESTOSTERONE: ICD-10-CM

## 2021-11-10 PROCEDURE — 99214 OFFICE O/P EST MOD 30 MIN: CPT | Performed by: INTERNAL MEDICINE

## 2021-11-29 RX ORDER — SYRINGE W-NEEDLE,DISPOSAB,3 ML 25GX5/8"
SYRINGE, EMPTY DISPOSABLE MISCELLANEOUS
Qty: 12 EACH | Refills: 3 | Status: SHIPPED | OUTPATIENT
Start: 2021-11-29 | End: 2022-06-03

## 2021-11-29 RX ORDER — NEEDLES, DISPOSABLE 27GX1/2"
NEEDLE, DISPOSABLE MISCELLANEOUS
Qty: 13 EACH | Refills: 3 | Status: SHIPPED | OUTPATIENT
Start: 2021-11-29

## 2021-11-29 NOTE — TELEPHONE ENCOUNTER
----- Message from Luis Bland sent at 11/27/2021 11:08 AM EST -----  Regarding: Refill of draw needles  Can you please send a refill notice to my mail order company,  [employer], for my draw needles that are used for my testostorone injection? Thank you!

## 2021-12-01 DIAGNOSIS — E11.21 TYPE 2 DIABETES MELLITUS WITH NEPHROPATHY (HCC): ICD-10-CM

## 2021-12-01 DIAGNOSIS — E29.1 HYPOGONADISM IN MALE: ICD-10-CM

## 2021-12-10 ENCOUNTER — OFFICE VISIT (OUTPATIENT)
Dept: ENDOCRINOLOGY | Age: 56
End: 2021-12-10
Payer: COMMERCIAL

## 2021-12-10 VITALS
SYSTOLIC BLOOD PRESSURE: 124 MMHG | HEART RATE: 97 BPM | BODY MASS INDEX: 38.98 KG/M2 | DIASTOLIC BLOOD PRESSURE: 88 MMHG | HEIGHT: 68 IN | WEIGHT: 257.2 LBS

## 2021-12-10 DIAGNOSIS — E11.21 TYPE 2 DIABETES MELLITUS WITH NEPHROPATHY (HCC): Primary | ICD-10-CM

## 2021-12-10 DIAGNOSIS — I10 ESSENTIAL HYPERTENSION: ICD-10-CM

## 2021-12-10 DIAGNOSIS — E29.1 HYPOGONADISM IN MALE: ICD-10-CM

## 2021-12-10 DIAGNOSIS — E78.2 MIXED HYPERLIPIDEMIA: ICD-10-CM

## 2021-12-10 PROCEDURE — 99214 OFFICE O/P EST MOD 30 MIN: CPT | Performed by: INTERNAL MEDICINE

## 2021-12-10 NOTE — LETTER
12/10/2021    Patient: Tamy Alcantar   YOB: 1965   Date of Visit: 12/10/2021     Jaya Vu MD  [address]  [facility]  [address] [facility] [address]    Dear Jaya Vu MD,      Thank you for referring Mr. Preethi Aragon to [address] [place] [address] for evaluation. My notes for this consultation are attached. If you have questions, please do not hesitate to call me. I look forward to following your patient along with you.       Sincerely,    Krista Saini MD

## 2021-12-10 NOTE — PROGRESS NOTES
Chief Complaint   Patient presents with    Hypogonadism     pcp and pharmacy verified   Records from last visit reviewed. History of Present Illness: Harry Robles is a 55 y.o. male here for follow up of diabetes and hypogonadism. Pt is still taking the Metformin 500mg BID. His A1C in November 2021 was 6.2%. He checks his BGs 1-2 times per week, fasting. It will run in the 100-120's range. He denies issues of hypoglycemia. In October 2021 her hurt his knee and received a cortisone injection. Pt has received both COVID vaccinations (Elaine Veronica). Exercise consists of house work and chores. Pt works from home. Pt had a \"minor MI\" in 2014, Pt follows with Dr. Brigette Zaman (cardio) annually. Pt has hx of elevated Urine MA/Cr. He notes he will occasionally get tingling and itching in his left foot. Last eye exam was November 2020, no retinopathy. \"I have an appointment in January 2022. \"    Pt was first diagnosed with hypogonadism around 2015. He was having issues of feeling very sluggish. He was tested for his testosterone and \"it was pretty low\". He is currently taking a weekly injection of Testosterone Cypionate (75ml/150mg). He takes his injection every Sunday. He denies issues of CP, SOB, palpitations, HAs, or LUTS symptoms. He denies issues of mood swings or irritability. He gives himself his injection in the muscle of the thigh. He does not have any injection site issues. Pt follows with Dr. Cherry Tidwell (pulm) annually, for SCOTTY.   Pt follows with Dr. Nithya Kilpatrick (GI)    Current Outpatient Medications   Medication Sig    carvediloL (COREG) 6.25 mg tablet TAKE 1 TABLET BY MOUTH  TWICE DAILY    Needle, Disp, 16 G (Disposable Needles) 16 gauge x 1\" ndle USE FOR DRAWING UP YOUR  TESTOSTERONE EACH WEEK    Syringe with Needle, Disp, (Syringe 3cc/20Gx1\") 3 mL 20 gauge x 1\" syrg Use to draw up testosterone once weekly    venlafaxine-SR (EFFEXOR-XR) 150 mg capsule TAKE 1 CAPSULE BY MOUTH  DAILY  hydroCHLOROthiazide (HYDRODIURIL) 12.5 mg tablet TAKE 1 TABLET BY MOUTH  DAILY FOR HIGH BLOOD  PRESSURE    Descovy tablet TAKE 1 TABLET BY MOUTH  DAILY    rosuvastatin (CRESTOR) 10 mg tablet TAKE 1 TABLET BY MOUTH AT  NIGHT    losartan (COZAAR) 50 mg tablet TAKE 1 TABLET BY MOUTH  DAILY    metFORMIN (GLUCOPHAGE) 500 mg tablet TAKE 1 TABLET BY MOUTH  TWICE DAILY WITH MEALS    diclofenac (VOLTAREN) 1 % gel Apply 2 g to affected area four (4) times daily.  testosterone cypionate (DEPOTESTOTERONE CYPIONATE) 200 mg/mL injection INJECT INTRAMUSCULARLY  0.75ML WEEKLY (DISCARD  UNUSED PORTION AFTER FIRST  USE)    tadalafiL (CIALIS) 10 mg tablet TAKE ONE TABLET BY MOUTH AS NEEDED FOR ERECTILE DYSFUNCTION    Syringe with Needle, Disp, 3 mL 23 x 1\" syrg Use to draw up testosterone once weekly    melatonin 10 mg tab Take  by mouth.  Syringe with Needle, Disp, 1 mL 20 gauge x 1\" syrg For injecting Testosterone once every week    famotidine (PEPCID) 20 mg tablet Take 20 mg by mouth daily.  glucose blood VI test strips (ONETOUCH VERIO) strip Check blood sugar once daily.  mometasone (NASONEX) 50 mcg/actuation nasal spray 2 Sprays daily.  aspirin 81 mg chewable tablet Take 81 mg by mouth nightly. No current facility-administered medications for this visit. No Known Allergies  Review of Systems:  - Eyes: no blurry vision or double vision  - Cardiovascular: no chest pain  - Respiratory: no shortness of breath  - Musculoskeletal: no myalgias  - Neurological: no numbness/tingling in extremities    Physical Examination:  Blood pressure 124/88, pulse 97, height 5' 8\" (1.727 m), weight 257 lb 3.2 oz (116.7 kg).   - General: pleasant, no distress, good eye contact   - Neck: no carotid bruits  - Cardiovascular: regular, normal rate, nl s1 and s2, no m/r/g, 2+ DP pulses   - Respiratory: clear bilaterally  - Integumentary: no edema, no foot ulcers  - Psychiatric: normal mood and affect    Diabetic foot exam: Left Foot:   Visual Exam: callous - present   Pulse DP: 2+ (normal)   Filament test: normal sensation    Vibratory sensation: normal      Right Foot:   Visual Exam: callous - present   Pulse DP: 2+ (normal)   Filament test: normal sensation    Vibratory sensation: normal        Data Reviewed:   Component      Latest Ref Rng & Units 11/3/2021   Glucose      65 - 99 mg/dL 146 (H)   BUN      6 - 24 mg/dL 15   Creatinine      0.76 - 1.27 mg/dL 1.00   GFR est non-AA      >59 mL/min/1.73 84   GFR est AA      >59 mL/min/1.73 98   BUN/Creatinine ratio      9 - 20 15   Sodium      134 - 144 mmol/L 138   Potassium      3.5 - 5.2 mmol/L 4.2   Chloride      96 - 106 mmol/L 99   CO2      20 - 29 mmol/L 23   Calcium      8.7 - 10.2 mg/dL 9.9   Protein, total      6.0 - 8.5 g/dL 7.6   Albumin      3.8 - 4.9 g/dL 4.5   GLOBULIN, TOTAL      1.5 - 4.5 g/dL 3.1   A-G Ratio      1.2 - 2.2 1.5   Bilirubin, total      0.0 - 1.2 mg/dL 0.4   Alk. phosphatase      44 - 121 IU/L 75   AST      0 - 40 IU/L 23   ALT      0 - 44 IU/L 26   Hemoglobin A1c, (calculated)      4.8 - 5.6 % 6.2 (H)   Estimated average glucose      mg/dL 131     His Testosterone and CBC have been drawn and still pending. Assessment/Plan:   1) DM > His A1C in November 2021 was 6.2%. Pt encouraged to keep up the excellent work. Pt to continue the Metformin 500mg BID. With his hx of neuropathy and calluses he would benefit from DM shoes and inserts. 2) Hypogonadism > He is doing well clinically, on Testosterone 150mg weekly. Pt denies issues of LUTS, CP, SOB or HAs. Pt to continue the Testosterone 150mg weekly. 3) HTN > His BP today was 124/88. Pt is on an  ARB for renal protection. Will not make any changes at this time    4) HLD > Pt is tolerating the Rosuvastatin 10mg daily. His lipid panel in July 2021 was at goal.    Pt voices understanding and agreement with the plan.     RTC 6 months  Follow-up and Dispositions    · Return in about 6 months (around 6/10/2022).        Copy sent to:  Dr. Renard Yu

## 2021-12-11 LAB
ERYTHROCYTE [DISTWIDTH] IN BLOOD BY AUTOMATED COUNT: 15 % (ref 11.6–15.4)
EST. AVERAGE GLUCOSE BLD GHB EST-MCNC: 131 MG/DL
HBA1C MFR BLD: 6.2 % (ref 4.8–5.6)
HCT VFR BLD AUTO: 55.4 % (ref 37.5–51)
HGB BLD-MCNC: 18.8 G/DL (ref 13–17.7)
MCH RBC QN AUTO: 29.9 PG (ref 26.6–33)
MCHC RBC AUTO-ENTMCNC: 33.9 G/DL (ref 31.5–35.7)
MCV RBC AUTO: 88 FL (ref 79–97)
PLATELET # BLD AUTO: 267 X10E3/UL (ref 150–450)
RBC # BLD AUTO: 6.28 X10E6/UL (ref 4.14–5.8)
TESTOST FREE SERPL-MCNC: 22.9 PG/ML (ref 7.2–24)
TESTOST SERPL-MCNC: 787 NG/DL (ref 264–916)
WBC # BLD AUTO: 9 X10E3/UL (ref 3.4–10.8)

## 2021-12-13 ENCOUNTER — PATIENT MESSAGE (OUTPATIENT)
Dept: ENDOCRINOLOGY | Age: 56
End: 2021-12-13

## 2021-12-25 LAB — SARS-COV-2, NAA: NOT DETECTED

## 2022-02-02 RX ORDER — EMTRICITABINE AND TENOFOVIR ALAFENAMIDE 200; 25 MG/1; MG/1
1 TABLET ORAL DAILY
Qty: 90 TABLET | Refills: 3 | Status: SHIPPED | OUTPATIENT
Start: 2022-02-02 | End: 2022-06-03

## 2022-02-03 ENCOUNTER — TELEPHONE (OUTPATIENT)
Dept: INTERNAL MEDICINE CLINIC | Age: 57
End: 2022-02-03

## 2022-02-03 NOTE — TELEPHONE ENCOUNTER
----- Message from [facility] sent at 2/3/2022  2:02 PM EST -----  Subject: Message to Provider    QUESTIONS  Information for Provider? Pt. Missed a call, requesting office to return   his call. We tried to contact office but no answer   ---------------------------------------------------------------------------  --------------  CALL BACK INFO  What is the best way for the office to contact you? OK to leave message on   voicemail  Preferred Call Back Phone Number? [phone]  ---------------------------------------------------------------------------  --------------  SCRIPT ANSWERS  Relationship to Patient?  Self

## 2022-02-03 NOTE — TELEPHONE ENCOUNTER
Called, spoke with Pt. Two pt identifiers confirmed. Pt informed I did not see where anyone called was a message left. Pt stated yes for Dr. Rosa Alvarez office. Pt informed he called the wrong practice. Pt verbalized understanding of information discussed w/ no further questions at this time.

## 2022-02-03 NOTE — TELEPHONE ENCOUNTER
----- Message from [facility] sent at 2/3/2022  2:02 PM EST -----  Subject: Message to Provider    QUESTIONS  Information for Provider? Pt. Missed a call, requesting office to return   his call. We tried to contact office but no answer   ---------------------------------------------------------------------------  --------------  CALL BACK INFO  What is the best way for the office to contact you? OK to leave message on   voicemail  Preferred Call Back Phone Number? [phone]  ---------------------------------------------------------------------------  --------------  SCRIPT ANSWERS  Relationship to Patient?  Self

## 2022-03-02 DIAGNOSIS — Z20.6 HIV EXPOSURE: ICD-10-CM

## 2022-03-02 DIAGNOSIS — Z72.52 HIGH RISK HOMOSEXUAL BEHAVIOR: Primary | ICD-10-CM

## 2022-03-03 RX ORDER — LOSARTAN POTASSIUM 50 MG/1
TABLET ORAL
Qty: 90 TABLET | Refills: 0 | Status: SHIPPED | OUTPATIENT
Start: 2022-03-03 | End: 2022-05-25

## 2022-03-03 RX ORDER — EMTRICITABINE AND TENOFOVIR ALAFENAMIDE 200; 25 MG/1; MG/1
1 TABLET ORAL DAILY
Qty: 30 TABLET | Refills: 2 | Status: SHIPPED | OUTPATIENT
Start: 2022-03-03

## 2022-03-04 ENCOUNTER — TELEPHONE (OUTPATIENT)
Dept: INFECTIOUS DISEASES | Age: 57
End: 2022-03-04

## 2022-03-07 NOTE — TELEPHONE ENCOUNTER
----- Message from Michael Mulligan sent at 3/7/2022 10:48 AM EST -----  Regarding: Refill of syringes  Can you please put in a refiill for syringes for my weekly testtostorone injection? And can you send that to my mail order medication company [facility]? My last prescription had 3ml syringes 20g x 1, which were fine. Thank you!

## 2022-03-09 RX ORDER — TADALAFIL 10 MG/1
TABLET ORAL
Qty: 30 TABLET | Refills: 0 | Status: SHIPPED | OUTPATIENT
Start: 2022-03-09 | End: 2022-04-13

## 2022-03-16 LAB
ALBUMIN SERPL-MCNC: 4.6 G/DL (ref 3.8–4.9)
ALBUMIN/GLOB SERPL: 1.4 {RATIO} (ref 1.2–2.2)
ALP SERPL-CCNC: 71 IU/L (ref 44–121)
ALT SERPL-CCNC: 34 IU/L (ref 0–44)
AST SERPL-CCNC: 27 IU/L (ref 0–40)
BILIRUB SERPL-MCNC: 0.8 MG/DL (ref 0–1.2)
BUN SERPL-MCNC: 13 MG/DL (ref 6–24)
BUN/CREAT SERPL: 14 (ref 9–20)
C TRACH RRNA SPEC QL NAA+PROBE: NEGATIVE
CALCIUM SERPL-MCNC: 10.2 MG/DL (ref 8.7–10.2)
CHLORIDE SERPL-SCNC: 96 MMOL/L (ref 96–106)
CO2 SERPL-SCNC: 20 MMOL/L (ref 20–29)
CREAT SERPL-MCNC: 0.91 MG/DL (ref 0.76–1.27)
EGFR: 100 ML/MIN/1.73
EST. AVERAGE GLUCOSE BLD GHB EST-MCNC: 137 MG/DL
GLOBULIN SER CALC-MCNC: 3.2 G/DL (ref 1.5–4.5)
GLUCOSE SERPL-MCNC: 141 MG/DL (ref 65–99)
HAV IGM SERPL QL IA: NEGATIVE
HBA1C MFR BLD: 6.4 % (ref 4.8–5.6)
HBV CORE IGM SERPL QL IA: NEGATIVE
HBV SURFACE AG SERPL QL IA: NEGATIVE
HCV AB S/CO SERPL IA: <0.1 S/CO RATIO (ref 0–0.9)
HCV AB SERPL QL IA: NORMAL
HIV 1+2 AB+HIV1 P24 AG SERPL QL IA: NON REACTIVE
N GONORRHOEA RRNA SPEC QL NAA+PROBE: NEGATIVE
POTASSIUM SERPL-SCNC: 3.9 MMOL/L (ref 3.5–5.2)
PROT SERPL-MCNC: 7.8 G/DL (ref 6–8.5)
SODIUM SERPL-SCNC: 138 MMOL/L (ref 134–144)

## 2022-03-19 PROBLEM — E11.21 TYPE 2 DIABETES MELLITUS WITH NEPHROPATHY (HCC): Status: ACTIVE | Noted: 2018-01-22

## 2022-03-19 PROBLEM — E66.01 SEVERE OBESITY (BMI 35.0-39.9) WITH COMORBIDITY (HCC): Status: ACTIVE | Noted: 2018-04-30

## 2022-03-21 NOTE — PROGRESS NOTES
HISTORY OF PRESENT ILLNESS  Bo Drake is a 55 y.o. male. HPI     Last here 8/09/21. Pt is here to f/u on chronic conditions.         Has a history of hypertension  BP today is 124/85  BP at home 138/90   Continues on losartan 50mg coreg 6.25mg BID and  HCTZ  25 mg      He is diabetic  -130, 178 this AM after breakfast  Continues on metformin 500mg BID   He also takes ASA 81mg daily      Wt today is 260 lbs, up 4 lbs x lov   Discussed diet and wt/l  Recall saxenda 3mg caused him to feel bloated and uncomfortable      Reviewed labs        He saw Dr. Ramin William  for R knee pain  Last visit 11/01/21    Reviewed xr L knee 8/09/21:  1. No fracture. 2. Minimal osteoarthritis.     He follows with endocrinology  Dr adkins (endo) for PSA and testosterone level checks   Reviewed note with Dr. Kelley Pichardo (endo) 12/10/21:  1) DM > His A1C in November 2021 was 6.2%. Pt encouraged to keep up the excellent work. Pt to continue the Metformin 500mg BID. With his hx of neuropathy and calluses he would benefit from DM shoes and inserts. 2) Hypogonadism > He is doing well clinically, on Testosterone 150mg weekly. Pt denies issues of LUTS, CP, SOB or HAs. Pt to continue the Testosterone 150mg weekly. 3) HTN > His BP today was 124/88. Pt is on an  ARB for renal protection. Will not make any changes at this time  4) HLD > Pt is tolerating the Rosuvastatin 10mg daily. His lipid panel in July 2021 was at goal.   Reminded pt to complete labs  Of note, he is on a lower dose of testosterone d/t syncopal event in the past     Pt follows with Dr. Charlie Smalls (cardio) for CAD and CHF  No signs sx/cad/chf  Reviewed note 1/28/22:  01. Chronic systolic (congestive) heart failure (I50.22): His ejection fraction had normalized subsequently on follow-up visit  in the office. He has no symptoms of congestive heart failure at this point in time. Will repeat echocardiogram before  next visit. ECG done 2-D w/CFD Echo to be done.   02. Essential (primary) hypertension (I10): His blood pressure is fairly well controlled on the current medications. Will  continue current medications. He will follow up with his primary care physician now onward. 03. Obesity, unspecified (E66.9)  04. Body mass index [BMI] 37.0-37.9, adult (R07.74): The patient was instructed on AHA diet and regular exercise.     Pt follows with Dr. Jose Chavis (pulm) annually  Last visit 10/21 had a virtual visit with him was fine  Pt is compliant with CPAP qhs 3/22     Pt follows with Dr. Jesus Sal (GI)  Last visit was 2/6/18     Pt now follows with Dr. June Vital (infectious disease) for HIV prevention  Last visist 4/28/20  Pt is now on descovy -- got prior authorization to prevent HIV ordering labs acute hepatitis panel gonorrhea chlamydia screen HIV every 3 months  He needs to make a follow-up appointment with her         He is taking pepcid daily to help with reflux, this has been working well  Recall pt states that aciphex improved his sx in the past. However, his insurance no longer covers this medication.      Pt continues on crestor 10mg daily for cholesterol      Continues on effexor 150mg daily for depression, this has been helping 3/22     Pt takes a multivitamin      Recall his partner is HIV positive  However, pt was HIV negative on 3/22 labs     PREVENTIVE:  Colonoscopy: Dr. Jesus Sal, 2/6/18, nl, 10 year repeat  PSA: 1/17 1.4, 1/18 1.4, 4/19 1.7  3/20 1.8 8/20 1.7 1/21 2.1  AAA screen: not needed, non smoker   Tdap: 8/29/2016  Pneumovax: 4/2015  Shingrix: discussed will get 1st dose today 03/22/22  Flu shot: 09/21  HIV screen: 3/22 negative  Microalbumin: 7/21 441  Foot exam: 08/09/21  A1c:  9/20 6.2 12/20 6.1 4/21 6.0 7/21 5.8 11/21 6.2 3/22 6.4  Eye exam: [facility] 11/21  Lipids: 7/21 LDL 41  Hep C screen: 11/21 negative  Covid: both + booster ([facility])    Patient Active Problem List    Diagnosis Date Noted    Severe obesity (BMI 35.0-39. 9) with comorbidity ([address] 04/30/2018    Type 2 diabetes mellitus with nephropathy ([facility] 01/22/2018    Essential hypertension with goal blood pressure less than 140/90 05/23/2016    Coronary artery disease involving native coronary artery of native heart without angina pectoris 05/23/2016    Mild episode of recurrent major depressive disorder ([facility] 05/23/2016    SCOTTY on CPAP 05/23/2016    Chronic systolic congestive heart failure (HCC) 05/23/2016    Low testosterone 05/23/2016    Gastroesophageal reflux disease without esophagitis 05/23/2016     Current Outpatient Medications   Medication Sig Dispense Refill    multivitamin (ONE A DAY) tablet Take 1 Tablet by mouth daily.  tadalafiL (CIALIS) 10 mg tablet TAKE ONE TABLET BY MOUTH DAILY AS NEEDED FOR ERECTILE DYSFUNCTION 30 Tablet 0    Syringe with Needle, Disp, 1 mL 20 gauge x 1\" syrg For injecting Testosterone once every week 100 Each 3    losartan (COZAAR) 50 mg tablet TAKE 1 TABLET BY MOUTH  DAILY 90 Tablet 0    emtricitabine-tenofovir alafen (Descovy) tablet Take 1 Tablet by mouth daily.  30 Tablet 2    Descovy tablet TAKE 1 TABLET BY MOUTH  DAILY 90 Tablet 3    carvediloL (COREG) 6.25 mg tablet TAKE 1 TABLET BY MOUTH  TWICE DAILY 180 Tablet 1    Needle, Disp, 16 G (Disposable Needles) 16 gauge x 1\" ndle USE FOR DRAWING UP YOUR  TESTOSTERONE EACH WEEK 13 Each 3    Syringe with Needle, Disp, (Syringe 3cc/20Gx1\") 3 mL 20 gauge x 1\" syrg Use to draw up testosterone once weekly 12 Each 3    venlafaxine-SR (EFFEXOR-XR) 150 mg capsule TAKE 1 CAPSULE BY MOUTH  DAILY 90 Capsule 1    hydroCHLOROthiazide (HYDRODIURIL) 12.5 mg tablet TAKE 1 TABLET BY MOUTH  DAILY FOR HIGH BLOOD  PRESSURE 90 Tablet 1    rosuvastatin (CRESTOR) 10 mg tablet TAKE 1 TABLET BY MOUTH AT  NIGHT 90 Tablet 3    metFORMIN (GLUCOPHAGE) 500 mg tablet TAKE 1 TABLET BY MOUTH  TWICE DAILY WITH MEALS 180 Tablet 3    testosterone cypionate (DEPOTESTOTERONE CYPIONATE) 200 mg/mL injection INJECT INTRAMUSCULARLY 0. 75ML WEEKLY (DISCARD  UNUSED PORTION AFTER FIRST  USE) 12 mL 3    Syringe with Needle, Disp, 3 mL 23 x 1\" syrg Use to draw up testosterone once weekly 50 Syringe 1    melatonin 10 mg tab Take  by mouth.  famotidine (PEPCID) 20 mg tablet Take 20 mg by mouth daily.  glucose blood VI test strips (ONETOUCH VERIO) strip Check blood sugar once daily. 50 Strip 5    mometasone (NASONEX) 50 mcg/actuation nasal spray 2 Sprays daily.  aspirin 81 mg chewable tablet Take 81 mg by mouth nightly.  diclofenac (VOLTAREN) 1 % gel Apply 2 g to affected area four (4) times daily.  (Patient not taking: Reported on 3/22/2022) 1 Each 1     Past Surgical History:   Procedure Laterality Date    HX COLONOSCOPY  11/2016    HX GI      colonoscopy    HX OTHER SURGICAL      anal fissure      Lab Results   Component Value Date/Time    WBC 9.0 12/07/2021 01:57 PM    HGB 18.8 (H) 12/07/2021 01:57 PM    Hemoglobin (POC) 15.6 02/06/2017 11:40 AM    HCT 55.4 (H) 12/07/2021 01:57 PM    Hematocrit (POC) 50 04/02/2018 10:59 AM    PLATELET 253 63/32/3377 01:57 PM    MCV 88 12/07/2021 01:57 PM     Lab Results   Component Value Date/Time    Cholesterol, total 120 07/28/2021 08:32 AM    HDL Cholesterol 26 (L) 07/28/2021 08:32 AM    LDL, calculated 41 07/28/2021 08:32 AM    LDL, calculated 29 03/04/2020 09:19 AM    LDL-C, External 34 07/16/2018 12:00 AM    Triglyceride 358 (H) 07/28/2021 08:32 AM     Lab Results   Component Value Date/Time    GFR est non-AA 84 11/03/2021 08:36 AM    GFRNA, POC >60 04/02/2018 10:59 AM    GFR est AA 98 11/03/2021 08:36 AM    GFRAA, POC >60 04/02/2018 10:59 AM    Creatinine 0.91 03/15/2022 08:23 AM    Creatinine (POC) 0.9 04/02/2018 10:59 AM    BUN 13 03/15/2022 08:23 AM    BUN (POC) 14 04/02/2018 10:59 AM    Sodium 138 03/15/2022 08:23 AM    Sodium (POC) 139 04/02/2018 10:59 AM    Potassium 3.9 03/15/2022 08:23 AM    Potassium (POC) 3.9 04/02/2018 10:59 AM    Chloride 96 03/15/2022 08:23 AM Chloride (POC) 100 04/02/2018 10:59 AM    CO2 20 03/15/2022 08:23 AM        Review of Systems   Respiratory: Negative for shortness of breath. Cardiovascular: Negative for chest pain. Physical Exam  Constitutional:       General: He is not in acute distress. Appearance: Normal appearance. He is not ill-appearing, toxic-appearing or diaphoretic. HENT:      Head: Normocephalic and atraumatic. Right Ear: External ear normal.      Left Ear: External ear normal.   Eyes:      General:         Right eye: No discharge. Left eye: No discharge. Conjunctiva/sclera: Conjunctivae normal.      Pupils: Pupils are equal, round, and reactive to light. Cardiovascular:      Rate and Rhythm: Normal rate and regular rhythm. Heart sounds: No murmur heard. No friction rub. No gallop. Pulmonary:      Effort: No respiratory distress. Breath sounds: Normal breath sounds. No wheezing or rales. Chest:      Chest wall: No tenderness. Abdominal:      General: Abdomen is flat. There is no distension. Palpations: Abdomen is soft. There is no mass. Tenderness: There is no abdominal tenderness. There is no guarding or rebound. Hernia: No hernia is present. Musculoskeletal:         General: Normal range of motion. Cervical back: Normal range of motion and neck supple. Right lower leg: No edema. Left lower leg: No edema. Skin:     General: Skin is warm and dry. Neurological:      Mental Status: He is alert and oriented to person, place, and time. Mental status is at baseline. Coordination: Coordination normal.      Gait: Gait normal.   Psychiatric:         Mood and Affect: Mood normal.         Behavior: Behavior normal.         ASSESSMENT and PLAN    ICD-10-CM ICD-9-CM    1. Essential hypertension with goal blood pressure less than 140/90     Well-controlled on losartan Coreg and hydrochlorothiazide continue    Monitor metabolic panel     Z55 226.1    2. Severe obesity (BMI 35.0-39. 9) with comorbidity ([address]       Weight stable    Work more aggressively on weight loss consider weight watchers     E66.01 278.01    3. Type 2 diabetes mellitus with nephropathy (HCC)  E11.21 250.40        Controlled on Metformin continue  583.81    4. Mild episode of recurrent major depressive disorder ([facility]       Controlled with Effexor continue F33.0 296.31    5. Chronic systolic congestive heart failure (HCC)  I50.22 428.22    Medically managed up-to-date with cardiology continue ARB and beta-blocker  428.0    6. SCOTTY on CPAP  G47.33 327.23    Compliant with CPAP       Z99.89 V46.8    7. Low testosterone       on testosterone therapy with endocrinology defecate get the blood work           R79.89 790.99    8. Gastroesophageal reflux disease without esophagitis       Controlled with Pepcid     K21.9 530.81    9. Coronary artery disease involving native coronary artery of native heart without angina pectoris         Medically managed up-to-date with cardiology I25.10 414.01         Depression screen reviewed and negative     Scribed by Carlitos Dejesus of [facility] Laird, as dictated by Dr. Douglas Clayton. Current diagnosis and concerns discussed with pt at length. Pt understands risks and benefits or current treatment plan and medications, and accepts the treatment and medication with any possible risks. Pt asks appropriate questions, which were answered. Pt was instructed to call with any concerns or problems. I have reviewed the note documented by the scribe. The services provided are my own.   The documentation is accurate

## 2022-03-22 ENCOUNTER — OFFICE VISIT (OUTPATIENT)
Dept: INTERNAL MEDICINE CLINIC | Age: 57
End: 2022-03-22
Payer: COMMERCIAL

## 2022-03-22 VITALS
HEIGHT: 68 IN | SYSTOLIC BLOOD PRESSURE: 124 MMHG | RESPIRATION RATE: 16 BRPM | OXYGEN SATURATION: 97 % | WEIGHT: 260.8 LBS | HEART RATE: 99 BPM | BODY MASS INDEX: 39.53 KG/M2 | DIASTOLIC BLOOD PRESSURE: 85 MMHG | TEMPERATURE: 97.2 F

## 2022-03-22 DIAGNOSIS — E66.01 SEVERE OBESITY (BMI 35.0-39.9) WITH COMORBIDITY (HCC): ICD-10-CM

## 2022-03-22 DIAGNOSIS — Z23 ENCOUNTER FOR IMMUNIZATION: ICD-10-CM

## 2022-03-22 DIAGNOSIS — R79.89 LOW TESTOSTERONE: ICD-10-CM

## 2022-03-22 DIAGNOSIS — I10 ESSENTIAL HYPERTENSION WITH GOAL BLOOD PRESSURE LESS THAN 140/90: Primary | ICD-10-CM

## 2022-03-22 DIAGNOSIS — E11.21 TYPE 2 DIABETES MELLITUS WITH NEPHROPATHY (HCC): ICD-10-CM

## 2022-03-22 DIAGNOSIS — I25.10 CORONARY ARTERY DISEASE INVOLVING NATIVE CORONARY ARTERY OF NATIVE HEART WITHOUT ANGINA PECTORIS: ICD-10-CM

## 2022-03-22 DIAGNOSIS — G47.33 OSA ON CPAP: ICD-10-CM

## 2022-03-22 DIAGNOSIS — Z99.89 OSA ON CPAP: ICD-10-CM

## 2022-03-22 DIAGNOSIS — I50.22 CHRONIC SYSTOLIC CONGESTIVE HEART FAILURE (HCC): ICD-10-CM

## 2022-03-22 DIAGNOSIS — F33.0 MILD EPISODE OF RECURRENT MAJOR DEPRESSIVE DISORDER (HCC): ICD-10-CM

## 2022-03-22 DIAGNOSIS — K21.9 GASTROESOPHAGEAL REFLUX DISEASE WITHOUT ESOPHAGITIS: ICD-10-CM

## 2022-03-22 PROCEDURE — 90471 IMMUNIZATION ADMIN: CPT | Performed by: INTERNAL MEDICINE

## 2022-03-22 PROCEDURE — 90750 HZV VACC RECOMBINANT IM: CPT | Performed by: INTERNAL MEDICINE

## 2022-03-22 PROCEDURE — 99214 OFFICE O/P EST MOD 30 MIN: CPT | Performed by: INTERNAL MEDICINE

## 2022-03-22 RX ORDER — BISMUTH SUBSALICYLATE 262 MG
1 TABLET,CHEWABLE ORAL DAILY
COMMUNITY

## 2022-04-13 RX ORDER — TADALAFIL 10 MG/1
TABLET ORAL
Qty: 30 TABLET | Refills: 0 | Status: SHIPPED | OUTPATIENT
Start: 2022-04-13 | End: 2022-05-11

## 2022-05-11 RX ORDER — TADALAFIL 10 MG/1
TABLET ORAL
Qty: 30 TABLET | Refills: 0 | Status: SHIPPED | OUTPATIENT
Start: 2022-05-11

## 2022-05-12 ENCOUNTER — TELEPHONE (OUTPATIENT)
Dept: INFECTIOUS DISEASES | Age: 57
End: 2022-05-12

## 2022-05-12 DIAGNOSIS — Z20.6 HIV EXPOSURE: Primary | ICD-10-CM

## 2022-05-12 DIAGNOSIS — I10 PRIMARY HYPERTENSION: ICD-10-CM

## 2022-05-12 DIAGNOSIS — Z20.2 STD EXPOSURE: ICD-10-CM

## 2022-05-12 DIAGNOSIS — E11.8 CONTROLLED DIABETES MELLITUS TYPE 2 WITH COMPLICATIONS, UNSPECIFIED WHETHER LONG TERM INSULIN USE (HCC): ICD-10-CM

## 2022-05-12 NOTE — TELEPHONE ENCOUNTER
Contact patient on 05/12/2022 at 1:37 est patient stated appoint was scheduled received a phone call prior to me contacting patient

## 2022-05-12 NOTE — TELEPHONE ENCOUNTER
Please call and give patient appointment for 8/25 at 1130-in person or virtual.  He would require labs to be done prior to appointment-ordering now.   Thanks

## 2022-05-27 LAB
ALBUMIN SERPL-MCNC: 4.5 G/DL (ref 3.8–4.9)
ALBUMIN/CREAT UR: 353 MG/G CREAT (ref 0–29)
ALBUMIN/GLOB SERPL: 1.4 {RATIO} (ref 1.2–2.2)
ALP SERPL-CCNC: 74 IU/L (ref 44–121)
ALT SERPL-CCNC: 34 IU/L (ref 0–44)
AST SERPL-CCNC: 30 IU/L (ref 0–40)
BILIRUB SERPL-MCNC: 0.5 MG/DL (ref 0–1.2)
BUN SERPL-MCNC: 14 MG/DL (ref 6–24)
BUN/CREAT SERPL: 16 (ref 9–20)
CALCIUM SERPL-MCNC: 10.2 MG/DL (ref 8.7–10.2)
CHLORIDE SERPL-SCNC: 96 MMOL/L (ref 96–106)
CHOLEST SERPL-MCNC: 129 MG/DL (ref 100–199)
CO2 SERPL-SCNC: 26 MMOL/L (ref 20–29)
CREAT SERPL-MCNC: 0.88 MG/DL (ref 0.76–1.27)
CREAT UR-MCNC: 250.9 MG/DL
EGFR: 102 ML/MIN/1.73
ERYTHROCYTE [DISTWIDTH] IN BLOOD BY AUTOMATED COUNT: 13.5 % (ref 11.6–15.4)
EST. AVERAGE GLUCOSE BLD GHB EST-MCNC: 134 MG/DL
GLOBULIN SER CALC-MCNC: 3.3 G/DL (ref 1.5–4.5)
GLUCOSE SERPL-MCNC: 109 MG/DL (ref 65–99)
HBA1C MFR BLD: 6.3 % (ref 4.8–5.6)
HCT VFR BLD AUTO: 51.3 % (ref 37.5–51)
HDLC SERPL-MCNC: 31 MG/DL
HGB BLD-MCNC: 17.5 G/DL (ref 13–17.7)
IMP & REVIEW OF LAB RESULTS: NORMAL
LDLC SERPL CALC-MCNC: 53 MG/DL (ref 0–99)
MCH RBC QN AUTO: 30.2 PG (ref 26.6–33)
MCHC RBC AUTO-ENTMCNC: 34.1 G/DL (ref 31.5–35.7)
MCV RBC AUTO: 88 FL (ref 79–97)
MICROALBUMIN UR-MCNC: 884.5 UG/ML
PLATELET # BLD AUTO: 289 X10E3/UL (ref 150–450)
POTASSIUM SERPL-SCNC: 4.3 MMOL/L (ref 3.5–5.2)
PROT SERPL-MCNC: 7.8 G/DL (ref 6–8.5)
PSA SERPL-MCNC: 1.5 NG/ML (ref 0–4)
RBC # BLD AUTO: 5.8 X10E6/UL (ref 4.14–5.8)
SODIUM SERPL-SCNC: 137 MMOL/L (ref 134–144)
TESTOST FREE SERPL-MCNC: 21.7 PG/ML (ref 7.2–24)
TESTOST SERPL-MCNC: 781 NG/DL (ref 264–916)
TRIGL SERPL-MCNC: 291 MG/DL (ref 0–149)
VLDLC SERPL CALC-MCNC: 45 MG/DL (ref 5–40)
WBC # BLD AUTO: 8.7 X10E3/UL (ref 3.4–10.8)

## 2022-06-01 DIAGNOSIS — I10 ESSENTIAL HYPERTENSION: ICD-10-CM

## 2022-06-01 DIAGNOSIS — E29.1 HYPOGONADISM IN MALE: ICD-10-CM

## 2022-06-01 DIAGNOSIS — E11.21 TYPE 2 DIABETES MELLITUS WITH NEPHROPATHY (HCC): ICD-10-CM

## 2022-06-01 DIAGNOSIS — E78.2 MIXED HYPERLIPIDEMIA: ICD-10-CM

## 2022-06-03 ENCOUNTER — OFFICE VISIT (OUTPATIENT)
Dept: ENDOCRINOLOGY | Age: 57
End: 2022-06-03
Payer: COMMERCIAL

## 2022-06-03 VITALS
HEIGHT: 68 IN | WEIGHT: 259.1 LBS | DIASTOLIC BLOOD PRESSURE: 75 MMHG | HEART RATE: 89 BPM | SYSTOLIC BLOOD PRESSURE: 114 MMHG | BODY MASS INDEX: 39.27 KG/M2

## 2022-06-03 DIAGNOSIS — E78.2 MIXED HYPERLIPIDEMIA: ICD-10-CM

## 2022-06-03 DIAGNOSIS — E11.21 TYPE 2 DIABETES MELLITUS WITH NEPHROPATHY (HCC): Primary | ICD-10-CM

## 2022-06-03 DIAGNOSIS — I10 ESSENTIAL HYPERTENSION: ICD-10-CM

## 2022-06-03 DIAGNOSIS — E29.1 HYPOGONADISM IN MALE: ICD-10-CM

## 2022-06-03 PROCEDURE — 99214 OFFICE O/P EST MOD 30 MIN: CPT | Performed by: INTERNAL MEDICINE

## 2022-06-03 PROCEDURE — 3044F HG A1C LEVEL LT 7.0%: CPT | Performed by: INTERNAL MEDICINE

## 2022-06-03 RX ORDER — GABAPENTIN 300 MG/1
CAPSULE ORAL
Qty: 30 CAPSULE | Refills: 3 | Status: SHIPPED | OUTPATIENT
Start: 2022-06-03 | End: 2022-06-27 | Stop reason: SDUPTHER

## 2022-06-03 RX ORDER — METFORMIN HYDROCHLORIDE 500 MG/1
1000 TABLET ORAL 2 TIMES DAILY WITH MEALS
Qty: 360 TABLET | Refills: 3 | Status: SHIPPED | OUTPATIENT
Start: 2022-06-03

## 2022-06-03 NOTE — LETTER
6/3/2022    Patient: Yolis Bautista   YOB: 1965   Date of Visit: 6/3/2022     Chelle Simon, [address]  [address]  [place]    Dear Chelle Simon MD,      Thank you for referring Mr. Pedro Luis Arnett to [address] [place] [address] for evaluation. My notes for this consultation are attached. If you have questions, please do not hesitate to call me. I look forward to following your patient along with you.       Sincerely,    Letty Cespedes MD

## 2022-06-03 NOTE — PROGRESS NOTES
Chief Complaint   Patient presents with    Hypogonadism     pcp and pharmacy verified   Records from last visit reviewed. History of Present Illness: Isrrael Hawthorne is a 55 y.o. male here for follow up of diabetes and hypogonadism. Pt is still taking the Metformin 500mg BID. His A1C last week was 6.3%    He checks his BGs 1-2 times per week, fasting. It will run in the 100-120's range. He denies issues of hypoglycemia. Pt has received both COVID vaccinations ([facility]on Andino). Exercise consists of house work and chores. Pt works from home. Pt had a \"minor MI\" in 2014, Pt follows with Dr. Umair Babb (cardio) annually. Pt is eating 3-4 meals per day and an occasional snack. He has breakfast around 9AM, this AM he had cereal and milk. He has lunch around Noon-1PM, yesterday he had two hot dogs, fries and diet soda. He has dinner around 630PM, last night he had grilled pork chops, brussel sprouts, carrots and crystal lite. Pt has hx of elevated Urine MA/Cr. He notes he will occasionally get tingling and itching in his left foot. Last eye exam was November 2020, no retinopathy. \"I have an appointment in January 2022. \"    Pt was first diagnosed with hypogonadism around 2015. He was having issues of feeling very sluggish. He was tested for his testosterone and \"it was pretty low\". He is currently taking a weekly injection of Testosterone Cypionate (75ml/150mg). He takes his injection every Sunday. He denies issues of CP, SOB, palpitations, HAs, or LUTS symptoms. He denies issues of mood swings or irritability. He gives himself his injection in the muscle of the thigh. He does not have any injection site issues. Pt follows with Dr. Gifford Fabry (pulm) annually, for SCOTTY.   Pt follows with Dr. Singh Rodrigues (GI)    Current Outpatient Medications   Medication Sig    losartan (COZAAR) 50 mg tablet TAKE 1 TABLET BY MOUTH  DAILY    carvediloL (COREG) 6.25 mg tablet TAKE 1 TABLET BY MOUTH  TWICE DAILY    tadalafiL (CIALIS) 10 mg tablet TAKE 1 TABLET BY MOUTH DAILY AS NEEDED FOR ERECTILE DYSFUNCTION    testosterone cypionate (DEPOTESTOTERONE CYPIONATE) 200 mg/mL injection INJECT INTRAMUSCULARLY 0.75 ML WEEKLY (DISCARD UNUSED  PORTION AFTER FIRST USE)    multivitamin (ONE A DAY) tablet Take 1 Tablet by mouth daily.  Syringe with Needle, Disp, 1 mL 20 gauge x 1\" syrg For injecting Testosterone once every week    emtricitabine-tenofovir alafen (Descovy) tablet Take 1 Tablet by mouth daily.  Needle, Disp, 16 G (Disposable Needles) 16 gauge x 1\" ndle USE FOR DRAWING UP YOUR  TESTOSTERONE EACH WEEK    hydroCHLOROthiazide (HYDRODIURIL) 12.5 mg tablet TAKE 1 TABLET BY MOUTH  DAILY FOR HIGH BLOOD  PRESSURE    rosuvastatin (CRESTOR) 10 mg tablet TAKE 1 TABLET BY MOUTH AT  NIGHT    metFORMIN (GLUCOPHAGE) 500 mg tablet TAKE 1 TABLET BY MOUTH  TWICE DAILY WITH MEALS    melatonin 10 mg tab Take  by mouth.  famotidine (PEPCID) 20 mg tablet Take 20 mg by mouth daily.  glucose blood VI test strips (ONETOUCH VERIO) strip Check blood sugar once daily.  mometasone (NASONEX) 50 mcg/actuation nasal spray 2 Sprays daily.  aspirin 81 mg chewable tablet Take 81 mg by mouth nightly. No current facility-administered medications for this visit. No Known Allergies  Review of Systems:  - Eyes: no blurry vision or double vision  - Cardiovascular: no chest pain  - Respiratory: no shortness of breath  - Musculoskeletal: no myalgias  - Neurological: no numbness/tingling in extremities    Physical Examination:  Blood pressure 114/75, pulse 89, height 5' 8\" (1.727 m), weight 259 lb 1.6 oz (117.5 kg).   - General: pleasant, no distress, good eye contact   - Neck: no carotid bruits  - Cardiovascular: regular, normal rate, nl s1 and s2, no m/r/g, 2+ DP pulses   - Respiratory: clear bilaterally  - Integumentary: no edema, no foot ulcers  - Psychiatric: normal mood and affect    Diabetic foot exam:     Left Foot:   Visual Exam: callous - present   Pulse DP: 2+ (normal)   Filament test: normal sensation    Vibratory sensation: normal      Right Foot:   Visual Exam: callous - present   Pulse DP: 2+ (normal)   Filament test: normal sensation    Vibratory sensation: normal        Data Reviewed:   Component      Latest Ref Rng & Units 5/25/2022   Glucose      65 - 99 mg/dL 109 (H)   BUN      6 - 24 mg/dL 14   Creatinine      0.76 - 1.27 mg/dL 0.88   eGFR      >59 mL/min/1.73 102   BUN/Creatinine ratio      9 - 20 16   Sodium      134 - 144 mmol/L 137   Potassium      3.5 - 5.2 mmol/L 4.3   Chloride      96 - 106 mmol/L 96   CO2      20 - 29 mmol/L 26   Calcium      8.7 - 10.2 mg/dL 10.2   Protein, total      6.0 - 8.5 g/dL 7.8   Albumin      3.8 - 4.9 g/dL 4.5   GLOBULIN, TOTAL      1.5 - 4.5 g/dL 3.3   A-G Ratio      1.2 - 2.2 1.4   Bilirubin, total      0.0 - 1.2 mg/dL 0.5   Alk. phosphatase      44 - 121 IU/L 74   AST      0 - 40 IU/L 30   ALT      0 - 44 IU/L 34   WBC      3.4 - 10.8 x10E3/uL 8.7   RBC      4.14 - 5.80 x10E6/uL 5.80   HGB      13.0 - 17.7 g/dL 17.5   HCT      37.5 - 51.0 % 51.3 (H)   MCV      79 - 97 fL 88   MCH      26.6 - 33.0 pg 30.2   MCHC      31.5 - 35.7 g/dL 34.1   RDW      11.6 - 15.4 % 13.5   PLATELET      539 - 075 x10E3/uL 289   Cholesterol, total      100 - 199 mg/dL 129   Triglyceride      0 - 149 mg/dL 291 (H)   HDL Cholesterol      >39 mg/dL 31 (L)   VLDL, calculated      5 - 40 mg/dL 45 (H)   LDL, calculated      0 - 99 mg/dL 53   Creatinine, urine      Not Estab. mg/dL 250.9   Microalbumin, urine      Not Estab. ug/mL 884.5   Microalbumin/Creat. Ratio      0 - 29 mg/g creat 353 (H)   Hemoglobin A1c, (calculated)      4.8 - 5.6 % 6.3 (H)   Estimated average glucose      mg/dL 134   Testosterone      264 - 916 ng/dL 781   Free testosterone (Direct)      7.2 - 24.0 pg/mL 21.7   Prostate Specific Ag      0.0 - 4.0 ng/mL 1.5       Assessment/Plan:   1) DM > His A1C last week was 6.3%.  Pt to increase the Metformin 1000mg BID. With his hx of neuropathy and calluses he would benefit from DM shoes and inserts. 2) Hypogonadism > He is doing well clinically, on Testosterone 150mg weekly. Pt denies issues of LUTS, CP, SOB or HAs. Pt to continue the Testosterone 150mg weekly. His Testosterone in May 2022 was 781, his CBC was unremarkable and his PSA was down from 2.1 to 1.5.    3) HTN > His BP today was 114/75. Pt is on an  ARB for renal protection. Will not make any changes at this time    4) HLD > Pt is tolerating the Rosuvastatin 10mg daily. His lipid panel in May 2022 was at goal.    Pt voices understanding and agreement with the plan.     RTC 6 months    Copy sent to:  Dr. Kojo Reza

## 2022-06-06 DIAGNOSIS — E78.2 MIXED HYPERLIPIDEMIA: ICD-10-CM

## 2022-06-06 DIAGNOSIS — E11.21 TYPE 2 DIABETES MELLITUS WITH NEPHROPATHY (HCC): Primary | ICD-10-CM

## 2022-06-06 DIAGNOSIS — I10 ESSENTIAL HYPERTENSION: ICD-10-CM

## 2022-06-06 DIAGNOSIS — E29.1 HYPOGONADISM IN MALE: ICD-10-CM

## 2022-06-22 ENCOUNTER — TELEPHONE (OUTPATIENT)
Dept: FAMILY MEDICINE CLINIC | Age: 57
End: 2022-06-22

## 2022-06-22 NOTE — TELEPHONE ENCOUNTER
Patient called, stating he needs to reschedule appointment on 8/25/22    When can you see him again    Patient's phone [phone]

## 2022-06-27 DIAGNOSIS — E11.21 TYPE 2 DIABETES MELLITUS WITH NEPHROPATHY (HCC): ICD-10-CM

## 2022-06-27 RX ORDER — GABAPENTIN 300 MG/1
CAPSULE ORAL
Qty: 90 CAPSULE | Refills: 1 | Status: SHIPPED | OUTPATIENT
Start: 2022-06-27

## 2022-06-27 NOTE — TELEPHONE ENCOUNTER
----- Message from Radha Dsouza sent at 6/27/2022  8:52 AM EDT -----  Regarding: Gabapentin  I have almost finshed the first month of Gabapentin. It seems to be helping. Can you send my mail order company, [employer], a full prescription for this mediciation? Gabapentin 300mg once a day. Thank you!

## 2022-07-07 LAB
ALBUMIN SERPL-MCNC: 4.8 G/DL (ref 3.8–4.9)
ALBUMIN/CREAT UR: 643 MG/G CREAT (ref 0–29)
ALBUMIN/GLOB SERPL: 1.5 {RATIO} (ref 1.2–2.2)
ALP SERPL-CCNC: 81 IU/L (ref 44–121)
ALT SERPL-CCNC: 29 IU/L (ref 0–44)
AST SERPL-CCNC: 28 IU/L (ref 0–40)
BILIRUB SERPL-MCNC: 0.5 MG/DL (ref 0–1.2)
BUN SERPL-MCNC: 14 MG/DL (ref 6–24)
BUN/CREAT SERPL: 14 (ref 9–20)
CALCIUM SERPL-MCNC: 10.4 MG/DL (ref 8.7–10.2)
CHLORIDE SERPL-SCNC: 97 MMOL/L (ref 96–106)
CHOLEST SERPL-MCNC: 139 MG/DL (ref 100–199)
CO2 SERPL-SCNC: 22 MMOL/L (ref 20–29)
CREAT SERPL-MCNC: 1.02 MG/DL (ref 0.76–1.27)
CREAT UR-MCNC: 184.8 MG/DL
EGFR: 87 ML/MIN/1.73
EST. AVERAGE GLUCOSE BLD GHB EST-MCNC: 131 MG/DL
GLOBULIN SER CALC-MCNC: 3.3 G/DL (ref 1.5–4.5)
GLUCOSE SERPL-MCNC: 133 MG/DL (ref 65–99)
HBA1C MFR BLD: 6.2 % (ref 4.8–5.6)
HDLC SERPL-MCNC: 31 MG/DL
LDLC SERPL CALC-MCNC: 33 MG/DL (ref 0–99)
MICROALBUMIN UR-MCNC: 1188.8 UG/ML
POTASSIUM SERPL-SCNC: 4.2 MMOL/L (ref 3.5–5.2)
PROT SERPL-MCNC: 8.1 G/DL (ref 6–8.5)
PSA SERPL-MCNC: 1.7 NG/ML (ref 0–4)
SODIUM SERPL-SCNC: 138 MMOL/L (ref 134–144)
TRIGL SERPL-MCNC: 541 MG/DL (ref 0–149)
VLDLC SERPL CALC-MCNC: 75 MG/DL (ref 5–40)

## 2022-07-08 NOTE — PROGRESS NOTES
HISTORY OF PRESENT ILLNESS  Jessica Mott is a 55 y.o. male. HPI    Patient Active Problem List    Diagnosis Date Noted    Severe obesity (BMI 35.0-39. 9) with comorbidity ([facility] 04/30/2018    Type 2 diabetes mellitus with nephropathy ([facility] 01/22/2018    Essential hypertension with goal blood pressure less than 140/90 05/23/2016    Coronary artery disease involving native coronary artery of native heart without angina pectoris 05/23/2016    Mild episode of recurrent major depressive disorder ([facility] 05/23/2016    SCOTTY on CPAP 05/23/2016    Chronic systolic congestive heart failure (HCC) 05/23/2016    Low testosterone 05/23/2016    Gastroesophageal reflux disease without esophagitis 05/23/2016     Current Outpatient Medications   Medication Sig Dispense Refill    hydroCHLOROthiazide (HYDRODIURIL) 12.5 mg tablet TAKE 1 TABLET BY MOUTH  DAILY FOR HIGH BLOOD  PRESSURE 90 Tablet 0    gabapentin (NEURONTIN) 300 mg capsule Take one pill in the evening 90 Capsule 1    metFORMIN (GLUCOPHAGE) 500 mg tablet Take 2 Tablets by mouth two (2) times daily (with meals). 360 Tablet 3    losartan (COZAAR) 50 mg tablet TAKE 1 TABLET BY MOUTH  DAILY 90 Tablet 0    carvediloL (COREG) 6.25 mg tablet TAKE 1 TABLET BY MOUTH  TWICE DAILY 180 Tablet 1    tadalafiL (CIALIS) 10 mg tablet TAKE 1 TABLET BY MOUTH DAILY AS NEEDED FOR ERECTILE DYSFUNCTION 30 Tablet 0    testosterone cypionate (DEPOTESTOTERONE CYPIONATE) 200 mg/mL injection INJECT INTRAMUSCULARLY 0.75 ML WEEKLY (DISCARD UNUSED  PORTION AFTER FIRST USE) 12 mL 2    multivitamin (ONE A DAY) tablet Take 1 Tablet by mouth daily.  Syringe with Needle, Disp, 1 mL 20 gauge x 1\" syrg For injecting Testosterone once every week 100 Each 3    emtricitabine-tenofovir alafen (Descovy) tablet Take 1 Tablet by mouth daily.  30 Tablet 2    Needle, Disp, 16 G (Disposable Needles) 16 gauge x 1\" ndle USE FOR DRAWING UP YOUR  TESTOSTERONE EACH WEEK 13 Each 3    rosuvastatin (CRESTOR) 10 mg tablet TAKE 1 TABLET BY MOUTH AT  NIGHT 90 Tablet 3    melatonin 10 mg tab Take  by mouth.  famotidine (PEPCID) 20 mg tablet Take 20 mg by mouth daily.  glucose blood VI test strips (ONETOUCH VERIO) strip Check blood sugar once daily. 50 Strip 5    mometasone (NASONEX) 50 mcg/actuation nasal spray 2 Sprays daily.  aspirin 81 mg chewable tablet Take 81 mg by mouth nightly.        Past Medical History:   Diagnosis Date    Depression     GERD (gastroesophageal reflux disease)     Headache     Heart attack ([facility] 04/2015    Hypertension     Prediabetes     Tachycardia       Lab Results   Component Value Date/Time    WBC 8.7 05/25/2022 12:01 PM    HGB 17.5 05/25/2022 12:01 PM    Hemoglobin (POC) 15.6 02/06/2017 11:40 AM    HCT 51.3 (H) 05/25/2022 12:01 PM    Hematocrit (POC) 50 04/02/2018 10:59 AM    PLATELET 568 00/77/5631 12:01 PM    MCV 88 05/25/2022 12:01 PM     Lab Results   Component Value Date/Time    Cholesterol, total 139 07/05/2022 08:05 AM    HDL Cholesterol 31 (L) 07/05/2022 08:05 AM    LDL, calculated 33 07/05/2022 08:05 AM    LDL, calculated 29 03/04/2020 09:19 AM    LDL-C, External 34 07/16/2018 12:00 AM    Triglyceride 541 (H) 07/05/2022 08:05 AM     Lab Results   Component Value Date/Time    GFR est non-AA 84 11/03/2021 08:36 AM    GFRNA, POC >60 04/02/2018 10:59 AM    GFR est AA 98 11/03/2021 08:36 AM    GFRAA, POC >60 04/02/2018 10:59 AM    Creatinine 1.02 07/05/2022 08:05 AM    Creatinine (POC) 0.9 04/02/2018 10:59 AM    BUN 14 07/05/2022 08:05 AM    BUN (POC) 14 04/02/2018 10:59 AM    Sodium 138 07/05/2022 08:05 AM    Sodium (POC) 139 04/02/2018 10:59 AM    Potassium 4.2 07/05/2022 08:05 AM    Potassium (POC) 3.9 04/02/2018 10:59 AM    Chloride 97 07/05/2022 08:05 AM    Chloride (POC) 100 04/02/2018 10:59 AM    CO2 22 07/05/2022 08:05 AM        ROS    Physical Exam    ASSESSMENT and PLAN  {No Diagnosis Found}

## 2022-07-08 NOTE — PROGRESS NOTES
HISTORY OF PRESENT ILLNESS  Joss Wilson is a 55 y.o. male. HPI   Last here 3/22/22. Pt is here to f/u on chronic conditions.       Has a history of hypertension  BP today is 123/86. BP at home 130s/80s  Continues on losartan 50mg, coreg 6.25mg BID and  HCTZ  25 mg      He is diabetic  -130 range  Metformin increased to 2 tablets 500mg BID per Dr. Martin Apgar  He also takes ASA 81mg daily      Wt today is 260, stable since lov. Discussed diet and wt/l  Recall saxenda 3mg caused him to feel bloated and uncomfortable      Reviewed labs        Discussed seeing Dr. Brent Ramirez (neph) to w/u kidney fx to ensure elevated microalbumin is due to diabetes     He saw Dr. Kevin Mendez  for R knee pain, osteoarthritis   Last visit 21     He follows with endocrinology  Dr adkins (endo) for PSA and testosterone level checks   Lov 22  Assessment/Plan:   1) DM > His A1C last week was 6.3%. Pt to increase the Metformin 1000mg BID. With his hx of neuropathy and calluses he would benefit from DM shoes and inserts. 2) Hypogonadism > He is doing well clinically, on Testosterone 150mg weekly. Pt denies issues of LUTS, CP, SOB or HAs. Pt to continue the Testosterone 150mg weekly. His Testosterone in May 2022 was 781, his CBC was unremarkable and his PSA was down from 2.1 to 1.5.  3) HTN > His BP today was 114/75. Pt is on an  ARB for renal protection. Will not make any changes at this time   4) HLD > Pt is tolerating the Rosuvastatin 10mg daily.  His lipid panel in May 2022 was at goal.   Increased metformin to 2 tablets 2x/day  Of note, he is on a lower dose of testosterone d/t syncopal event in the past      Rx'd gabapentin 300 mg daily for foot neuropathy  This is improving his symptoms happy with dose was having shooting pains into his feet     Pt follows with Dr. Nasim Xie (cardio) for CAD and CHF  No signs sx/cad/chf  Lov 22, no med changes, will f/u in 1 yr       Pt follows with Dr. Jerry Luu (pulm) annually  Last visit 10/21 had a virtual visit with him was fine  Pt is compliant with CPAP qhs 7/22     Pt follows with Dr. Luiz Salinas (GI)  Last visit was 2/6/18     Pt now follows with Dr. Beatrice Mccarty (infectious disease) for HIV prevention  Last visit vv 4/28/20, will f/u 9/22  Pt is on descovy -- got prior authorization to prevent HIV ordering labs acute hepatitis panel gonorrhea chlamydia screen HIV every 3 months  Recall his partner is HIV positive  However, pt was HIV negative on 3/22 labs     He is taking pepcid daily to help with reflux, heartburn  Recall pt states that aciphex improved his sx in the past. However, his insurance no longer covers this medication.        Discussed high triglycerides, can work on diet or start fenofibrate  Will start fenofibrate  Pt continues on crestor 10mg daily for cholesterol      Continues on effexor 150mg daily for depression     Pt takes a multivitamin        PREVENTIVE:  Colonoscopy: Dr. Luiz Salinas, 2/6/18, nl, 10 year repeat  PSA:  7/22 1.7  AAA screen: not needed, non smoker   Tdap: 8/29/2016  Pneumovax: 4/2015  Prevnar 20: will do next visit  Shingrix: 1st dose 03/22/22, will get 2nd dose today 7/12/22  Flu shot: 09/21  HIV screen: 3/22 negative  Microalbumin: 7/22-- 643--climbing  Foot exam: 08/09/21  A1c:   11/21 6.2 3/22 6.4 7/22 6.2  Eye exam: [facility] 11/21  Lipids: 7/22 LDL 33  Hep C screen: 11/21 negative  Covid: both + booster (Moderna)      Patient Active Problem List    Diagnosis Date Noted    Severe obesity (BMI 35.0-39. 9) with comorbidity ([facility] [address] 04/30/2018    Type 2 diabetes mellitus with nephropathy ([address] 01/22/2018    Essential hypertension with goal blood pressure less than 140/90 05/23/2016    Coronary artery disease involving native coronary artery of native heart without angina pectoris 05/23/2016    Mild episode of recurrent major depressive disorder ([address] 05/23/2016    SCOTTY on CPAP 05/23/2016    Chronic systolic congestive heart failure ([address] 05/23/2016    Low testosterone 05/23/2016    Gastroesophageal reflux disease without esophagitis 05/23/2016     Current Outpatient Medications   Medication Sig Dispense Refill    hydroCHLOROthiazide (HYDRODIURIL) 12.5 mg tablet TAKE 1 TABLET BY MOUTH  DAILY FOR HIGH BLOOD  PRESSURE 90 Tablet 0    gabapentin (NEURONTIN) 300 mg capsule Take one pill in the evening 90 Capsule 1    metFORMIN (GLUCOPHAGE) 500 mg tablet Take 2 Tablets by mouth two (2) times daily (with meals). 360 Tablet 3    losartan (COZAAR) 50 mg tablet TAKE 1 TABLET BY MOUTH  DAILY 90 Tablet 0    carvediloL (COREG) 6.25 mg tablet TAKE 1 TABLET BY MOUTH  TWICE DAILY 180 Tablet 1    tadalafiL (CIALIS) 10 mg tablet TAKE 1 TABLET BY MOUTH DAILY AS NEEDED FOR ERECTILE DYSFUNCTION 30 Tablet 0    testosterone cypionate (DEPOTESTOTERONE CYPIONATE) 200 mg/mL injection INJECT INTRAMUSCULARLY 0.75 ML WEEKLY (DISCARD UNUSED  PORTION AFTER FIRST USE) 12 mL 2    multivitamin (ONE A DAY) tablet Take 1 Tablet by mouth daily.  Syringe with Needle, Disp, 1 mL 20 gauge x 1\" syrg For injecting Testosterone once every week 100 Each 3    emtricitabine-tenofovir alafen (Descovy) tablet Take 1 Tablet by mouth daily. 30 Tablet 2    Needle, Disp, 16 G (Disposable Needles) 16 gauge x 1\" ndle USE FOR DRAWING UP YOUR  TESTOSTERONE EACH WEEK 13 Each 3    rosuvastatin (CRESTOR) 10 mg tablet TAKE 1 TABLET BY MOUTH AT  NIGHT 90 Tablet 3    melatonin 10 mg tab Take  by mouth.  famotidine (PEPCID) 20 mg tablet Take 20 mg by mouth daily.  glucose blood VI test strips (ONETOUCH VERIO) strip Check blood sugar once daily. 50 Strip 5    mometasone (NASONEX) 50 mcg/actuation nasal spray 2 Sprays daily.  aspirin 81 mg chewable tablet Take 81 mg by mouth nightly.        Past Surgical History:   Procedure Laterality Date    HX COLONOSCOPY  11/2016    HX GI      colonoscopy    HX OTHER SURGICAL      anal fissure      Lab Results   Component Value Date/Time WBC 8.7 05/25/2022 12:01 PM    HGB 17.5 05/25/2022 12:01 PM    Hemoglobin (POC) 15.6 02/06/2017 11:40 AM    HCT 51.3 (H) 05/25/2022 12:01 PM    Hematocrit (POC) 50 04/02/2018 10:59 AM    PLATELET 758 47/34/0448 12:01 PM    MCV 88 05/25/2022 12:01 PM     Lab Results   Component Value Date/Time    Cholesterol, total 139 07/05/2022 08:05 AM    HDL Cholesterol 31 (L) 07/05/2022 08:05 AM    LDL, calculated 33 07/05/2022 08:05 AM    LDL, calculated 29 03/04/2020 09:19 AM    LDL-C, External 34 07/16/2018 12:00 AM    Triglyceride 541 (H) 07/05/2022 08:05 AM     Lab Results   Component Value Date/Time    GFR est non-AA 84 11/03/2021 08:36 AM    GFRNA, POC >60 04/02/2018 10:59 AM    GFR est AA 98 11/03/2021 08:36 AM    GFRAA, POC >60 04/02/2018 10:59 AM    Creatinine 1.02 07/05/2022 08:05 AM    Creatinine (POC) 0.9 04/02/2018 10:59 AM    BUN 14 07/05/2022 08:05 AM    BUN (POC) 14 04/02/2018 10:59 AM    Sodium 138 07/05/2022 08:05 AM    Sodium (POC) 139 04/02/2018 10:59 AM    Potassium 4.2 07/05/2022 08:05 AM    Potassium (POC) 3.9 04/02/2018 10:59 AM    Chloride 97 07/05/2022 08:05 AM    Chloride (POC) 100 04/02/2018 10:59 AM    CO2 22 07/05/2022 08:05 AM        Review of Systems   Respiratory: Negative for shortness of breath. Cardiovascular: Negative for chest pain. Physical Exam  Constitutional:       General: He is not in acute distress. Appearance: Normal appearance. He is not ill-appearing, toxic-appearing or diaphoretic. HENT:      Head: Normocephalic and atraumatic. Right Ear: External ear normal.      Left Ear: External ear normal.   Eyes:      General:         Right eye: No discharge. Left eye: No discharge. Conjunctiva/sclera: Conjunctivae normal.      Pupils: Pupils are equal, round, and reactive to light. Cardiovascular:      Rate and Rhythm: Normal rate and regular rhythm. Heart sounds: No murmur heard. No friction rub. No gallop.     Pulmonary:      Effort: No respiratory distress. Breath sounds: Normal breath sounds. No wheezing or rales. Chest:      Chest wall: No tenderness. Musculoskeletal:         General: Normal range of motion. Cervical back: Normal.      Right lower leg: No edema. Left lower leg: No edema. Skin:     General: Skin is warm and dry. Neurological:      General: No focal deficit present. Mental Status: He is oriented to person, place, and time. Gait: Gait normal.   Psychiatric:         Mood and Affect: Mood normal.         Behavior: Behavior normal.         ASSESSMENT and PLAN    ICD-10-CM ICD-9-CM    1. Type 2 diabetes mellitus with nephropathy (HCC)  E11.21 250.40 LIPID PANEL     583.81 HEMOGLOBIN A1C WITH EAG   Controlled on metformin 500 mg 4 of these per day   METABOLIC PANEL, BASIC   2. Chronic systolic congestive heart failure (HCC)  I50.22 428.22 LIPID PANEL     428.0 HEMOGLOBIN A1C WITH EAG   Medically managed on Coreg and losartan up-to-date with cardiology   METABOLIC PANEL, BASIC   3. SCOTTY on CPAP  G47.33 327.23 LIPID PANEL    Z99.89 V46.8 HEMOGLOBIN A1C WITH EAG   Compliant with CPAP   METABOLIC PANEL, BASIC   4. Microalbuminuria  R80.9 791.0 REFERRAL TO NEPHROLOGY      LIPID PANEL   Progressive microalbuminuria despite very well controlled diabetes we will have nephrology evaluate him further to ensure no other process going on contributing to protein leak from kidneys   HEMOGLOBIN A1C WITH EAG      METABOLIC PANEL, BASIC   5. Essential hypertension with goal blood pressure less than 140/90  I10 401.9 LIPID PANEL      HEMOGLOBIN A1C WITH EAG   Controlled on losartan Coreg hydrochlorothiazide    Check a creatinine levels sodium levels to ensure all stable no change to dose today   METABOLIC PANEL, BASIC   6.  Coronary artery disease involving native coronary artery of native heart without angina pectoris  I25.10 414.01 LIPID PANEL      HEMOGLOBIN A1C WITH EAG   Medically managed no signs or symptoms of CAD   METABOLIC PANEL, BASIC   7. Gastroesophageal reflux disease without esophagitis  K21.9 530.81 LIPID PANEL      HEMOGLOBIN A1C WITH EAG   Control with Pepcid   METABOLIC PANEL, BASIC   8. Mild episode of recurrent major depressive disorder (HCC)  F33.0 296.31 LIPID PANEL      HEMOGLOBIN A1C WITH EAG   Control with Effexor   METABOLIC PANEL, BASIC   9. Severe obesity (BMI 35.0-39. 9) with comorbidity ([facility] [address]  E66.01 278.01 LIPID PANEL      HEMOGLOBIN A1C WITH EAG   Stressed need for weight loss   METABOLIC PANEL, BASIC   10. Low testosterone     Gets weekly testosterone from endocrinology     R79.89 790.99    11. Neuropathy now gabapentin working well     G62.9 355.9     12 hyperlipidemia continue Crestor add fenofibrate for significantly elevated triglycerides which have been ranging from the 3  range  Depression screen reviewed and negative. Scribed by Victor Manuel Haro, as dictated by Dr. Ti Wylie. Current diagnosis and concerns discussed with pt at length. Pt understands risks and benefits or current treatment plan and medications, and accepts the treatment and medication with any possible risks. Pt asks appropriate questions, which were answered. Pt was instructed to call with any concerns or problems. I have reviewed the note documented by the scribe. The services provided are my own. The documentation is accurate.

## 2022-07-12 ENCOUNTER — OFFICE VISIT (OUTPATIENT)
Dept: INTERNAL MEDICINE CLINIC | Age: 57
End: 2022-07-12
Payer: COMMERCIAL

## 2022-07-12 VITALS
TEMPERATURE: 98.1 F | BODY MASS INDEX: 38.57 KG/M2 | WEIGHT: 260.4 LBS | HEIGHT: 69 IN | HEART RATE: 89 BPM | DIASTOLIC BLOOD PRESSURE: 86 MMHG | RESPIRATION RATE: 18 BRPM | SYSTOLIC BLOOD PRESSURE: 123 MMHG | OXYGEN SATURATION: 99 %

## 2022-07-12 DIAGNOSIS — E11.21 TYPE 2 DIABETES MELLITUS WITH NEPHROPATHY (HCC): Primary | ICD-10-CM

## 2022-07-12 DIAGNOSIS — Z23 ENCOUNTER FOR IMMUNIZATION: ICD-10-CM

## 2022-07-12 DIAGNOSIS — R79.89 LOW TESTOSTERONE: ICD-10-CM

## 2022-07-12 DIAGNOSIS — I50.22 CHRONIC SYSTOLIC CONGESTIVE HEART FAILURE (HCC): ICD-10-CM

## 2022-07-12 DIAGNOSIS — G47.33 OSA ON CPAP: ICD-10-CM

## 2022-07-12 DIAGNOSIS — I25.10 CORONARY ARTERY DISEASE INVOLVING NATIVE CORONARY ARTERY OF NATIVE HEART WITHOUT ANGINA PECTORIS: ICD-10-CM

## 2022-07-12 DIAGNOSIS — F33.0 MILD EPISODE OF RECURRENT MAJOR DEPRESSIVE DISORDER (HCC): ICD-10-CM

## 2022-07-12 DIAGNOSIS — G62.9 NEUROPATHY: ICD-10-CM

## 2022-07-12 DIAGNOSIS — Z99.89 OSA ON CPAP: ICD-10-CM

## 2022-07-12 DIAGNOSIS — E66.01 SEVERE OBESITY (BMI 35.0-39.9) WITH COMORBIDITY (HCC): ICD-10-CM

## 2022-07-12 DIAGNOSIS — R80.9 MICROALBUMINURIA: ICD-10-CM

## 2022-07-12 DIAGNOSIS — K21.9 GASTROESOPHAGEAL REFLUX DISEASE WITHOUT ESOPHAGITIS: ICD-10-CM

## 2022-07-12 DIAGNOSIS — I10 ESSENTIAL HYPERTENSION WITH GOAL BLOOD PRESSURE LESS THAN 140/90: ICD-10-CM

## 2022-07-12 PROCEDURE — 99214 OFFICE O/P EST MOD 30 MIN: CPT | Performed by: INTERNAL MEDICINE

## 2022-07-12 PROCEDURE — 90750 HZV VACC RECOMBINANT IM: CPT | Performed by: INTERNAL MEDICINE

## 2022-07-12 PROCEDURE — 90471 IMMUNIZATION ADMIN: CPT | Performed by: INTERNAL MEDICINE

## 2022-07-12 RX ORDER — FENOFIBRATE 145 MG/1
145 TABLET, COATED ORAL DAILY
Qty: 90 TABLET | Refills: 0 | Status: SHIPPED | OUTPATIENT
Start: 2022-07-12 | End: 2022-09-17

## 2022-07-12 NOTE — PROGRESS NOTES
Reviewed record in preparation for visit and have obtained necessary documentation. Identified pt with two pt identifiers(name and ). Chief Complaint   Patient presents with    Follow-up    Hypertension       Visit Vitals  /86 (BP 1 Location: Left lower arm, BP Patient Position: Sitting, BP Cuff Size: Adult long)   Pulse 89   Temp 98.1 °F (36.7 °C) (Temporal)   Resp 18   Ht 5' 9\" (1.753 m)   Wt 260 lb 6.4 oz (118.1 kg)   SpO2 99%   BMI 38.45 kg/m²       Health Maintenance Due   Topic Date Due    Pneumococcal Vaccine (2 - PCV) 2016    Shingles Vaccine (2 of 2) 2022       Med Reconciliation: Completed        Coordination of Care Questionnaire:  :     1. \"Have you been to the ER, urgent care clinic since your last visit? Hospitalized since your last visit? \" No    2. \"Have you seen or consulted any other health care providers outside of the [address] [place] [address] since your last visit? \" Cardiologist Dr. Dina Howell      3. For patients aged 39-70: Has the patient had a colonoscopy / FIT/ Cologuard? Yes - no Care Gap present      If the patient is female:    4. For patients aged 41-77: Has the patient had a mammogram within the past 2 years? NA - based on age or sex      11. For patients aged 21-65: Has the patient had a pap smear?  NA - based on age or sex

## 2022-07-20 RX ORDER — VENLAFAXINE HYDROCHLORIDE 150 MG/1
CAPSULE, EXTENDED RELEASE ORAL
Qty: 90 CAPSULE | Refills: 3 | Status: SHIPPED | OUTPATIENT
Start: 2022-07-20

## 2022-08-23 LAB
ALBUMIN SERPL-MCNC: 4.8 G/DL (ref 3.8–4.9)
ALBUMIN/GLOB SERPL: 1.6 {RATIO} (ref 1.2–2.2)
ALP SERPL-CCNC: 47 IU/L (ref 44–121)
ALT SERPL-CCNC: 23 IU/L (ref 0–44)
AST SERPL-CCNC: 18 IU/L (ref 0–40)
BASOPHILS # BLD AUTO: 0.1 X10E3/UL (ref 0–0.2)
BASOPHILS NFR BLD AUTO: 1 %
BILIRUB SERPL-MCNC: 0.6 MG/DL (ref 0–1.2)
BUN SERPL-MCNC: 12 MG/DL (ref 6–24)
BUN/CREAT SERPL: 12 (ref 9–20)
CALCIUM SERPL-MCNC: 10 MG/DL (ref 8.7–10.2)
CHLORIDE SERPL-SCNC: 98 MMOL/L (ref 96–106)
CO2 SERPL-SCNC: 25 MMOL/L (ref 20–29)
CREAT SERPL-MCNC: 1.03 MG/DL (ref 0.76–1.27)
EGFR: 86 ML/MIN/1.73
EOSINOPHIL # BLD AUTO: 0.2 X10E3/UL (ref 0–0.4)
EOSINOPHIL NFR BLD AUTO: 2 %
ERYTHROCYTE [DISTWIDTH] IN BLOOD BY AUTOMATED COUNT: 13.3 % (ref 11.6–15.4)
GLOBULIN SER CALC-MCNC: 3 G/DL (ref 1.5–4.5)
GLUCOSE SERPL-MCNC: 110 MG/DL (ref 65–99)
HAV IGM SERPL QL IA: NEGATIVE
HBV CORE AB SERPL QL IA: POSITIVE
HBV CORE IGM SERPL QL IA: NEGATIVE
HBV SURFACE AG SERPL QL IA: NEGATIVE
HCT VFR BLD AUTO: 52.7 % (ref 37.5–51)
HCV AB S/CO SERPL IA: <0.1 S/CO RATIO (ref 0–0.9)
HCV AB SERPL QL IA: NORMAL
HGB BLD-MCNC: 17.4 G/DL (ref 13–17.7)
HIV 1+2 AB+HIV1 P24 AG SERPL QL IA: NON REACTIVE
IMM GRANULOCYTES # BLD AUTO: 0 X10E3/UL (ref 0–0.1)
IMM GRANULOCYTES NFR BLD AUTO: 0 %
LYMPHOCYTES # BLD AUTO: 3.2 X10E3/UL (ref 0.7–3.1)
LYMPHOCYTES NFR BLD AUTO: 40 %
MCH RBC QN AUTO: 29.4 PG (ref 26.6–33)
MCHC RBC AUTO-ENTMCNC: 33 G/DL (ref 31.5–35.7)
MCV RBC AUTO: 89 FL (ref 79–97)
MONOCYTES # BLD AUTO: 0.4 X10E3/UL (ref 0.1–0.9)
MONOCYTES NFR BLD AUTO: 5 %
NEUTROPHILS # BLD AUTO: 4.1 X10E3/UL (ref 1.4–7)
NEUTROPHILS NFR BLD AUTO: 52 %
PLATELET # BLD AUTO: 299 X10E3/UL (ref 150–450)
POTASSIUM SERPL-SCNC: 4.3 MMOL/L (ref 3.5–5.2)
PROT SERPL-MCNC: 7.8 G/DL (ref 6–8.5)
RBC # BLD AUTO: 5.91 X10E6/UL (ref 4.14–5.8)
RPR SER QL: NON REACTIVE
SODIUM SERPL-SCNC: 139 MMOL/L (ref 134–144)
WBC # BLD AUTO: 8 X10E3/UL (ref 3.4–10.8)

## 2022-09-01 ENCOUNTER — VIRTUAL VISIT (OUTPATIENT)
Dept: INFECTIOUS DISEASES | Age: 57
End: 2022-09-01
Payer: COMMERCIAL

## 2022-09-01 DIAGNOSIS — M25.562 ACUTE PAIN OF BOTH KNEES: ICD-10-CM

## 2022-09-01 DIAGNOSIS — R79.89 LOW VITAMIN D LEVEL: ICD-10-CM

## 2022-09-01 DIAGNOSIS — M25.561 ACUTE PAIN OF BOTH KNEES: ICD-10-CM

## 2022-09-01 DIAGNOSIS — Z20.2 STD EXPOSURE: ICD-10-CM

## 2022-09-01 DIAGNOSIS — T50.905A ADVERSE EFFECT OF DRUG, INITIAL ENCOUNTER: ICD-10-CM

## 2022-09-01 DIAGNOSIS — M25.561 ACUTE BILATERAL KNEE PAIN: ICD-10-CM

## 2022-09-01 DIAGNOSIS — Z13.820 SCREENING FOR OSTEOPOROSIS: ICD-10-CM

## 2022-09-01 DIAGNOSIS — M25.562 ACUTE BILATERAL KNEE PAIN: ICD-10-CM

## 2022-09-01 DIAGNOSIS — Z20.6 HIV EXPOSURE: Primary | ICD-10-CM

## 2022-09-01 PROCEDURE — 99214 OFFICE O/P EST MOD 30 MIN: CPT | Performed by: INTERNAL MEDICINE

## 2022-09-01 NOTE — PROGRESS NOTES
Chief Complaint   Patient presents with    Follow-up     1. Have you been to the ER, urgent care clinic since your last visit? Hospitalized since your last visit? No    2. Have you seen or consulted any other health care providers outside of the [address] [place] [address] since your last visit? Include any pap smears or colon screening.  No

## 2022-09-01 NOTE — PATIENT INSTRUCTIONS
ShoutWire Activation    Thank you for requesting access to ShoutWire. Please follow the instructions below to securely access and download your online medical record. ShoutWire allows you to send messages to your doctor, view your test results, renew your prescriptions, schedule appointments, and more. How Do I Sign Up? In your internet browser, go to [email or web address]. Click on the First Time User? Click Here link in the Sign In box. You will see the New Member Sign Up page. Enter your ShoutWire Access Code exactly as it appears below. You will not need to use this code after youve completed the sign-up process. If you do not sign up before the expiration date, you must request a new code. ShoutWire Access Code: Activation code not generated  Current ShoutWire Status: Active (This is the date your ShoutWire access code will )    Enter the last four digits of your Social Security Number (xxxx) and Date of Birth (mm/dd/yyyy) as indicated and click Submit. You will be taken to the next sign-up page. Create a ShoutWire ID. This will be your ShoutWire login ID and cannot be changed, so think of one that is secure and easy to remember. Create a ShoutWire password. You can change your password at any time. Enter your Password Reset Question and Answer. This can be used at a later time if you forget your password. Enter your e-mail address. You will receive e-mail notification when new information is available in [address] Click Sign Up. You can now view and download portions of your medical record. Click the [place] link to download a portable copy of your medical information. Additional Information    If you have questions, please visit the Frequently Asked Questions section of the ShoutWire website at [email or web address] Remember, ShoutWire is NOT to be used for urgent needs. For medical emergencies, dial 911.

## 2022-09-01 NOTE — PROGRESS NOTES
Infectious Disease Clinic      Subjective:     Patient is a 55 y.o. male who is being seen for- PrEP. [employer]    Pursuant to the emergency declaration under the [facility], [facility] waiver authority and the [employer] Act, this Virtual Visit was conducted, with patient's consent, to reduce the patient's risk of exposure to COVID-19 and provide continuity of care for an established patient. Services were provided through a video synchronous discussion virtually to substitute for in-person visit. Avinash French is a 49 yr old male with history of diabetes, coronary disease, hypertension and hyperlipidemia   who is seen on follow-up for PrEP. He has been seeing his PCP since initial visit with ID  Patient states that he  and  are in a polygamous relationships, he needs PrEP even though spouses undetectable. STD- Gonorhea - 35 yrs ago , no recent STD  H/o Hep B core ab +. No known H/o Hep B   HBV negative   Diabetes well controlled. Pt advised to drink lots of water , exercise , get base line bone density testing . Patient has not had bone density tempting done yet  Patient also advised Vit D testing & take Vit D daily. Patient to do labs every 4 months, next visit with PCP. Patient Active Problem List   Diagnosis Code    Essential hypertension with goal blood pressure less than 140/90 I10    Coronary artery disease involving native coronary artery of native heart without angina pectoris I25.10    Mild episode of recurrent major depressive disorder (HCC) F33.0    SCOTTY on CPAP G47.33, Z99.89    Chronic systolic congestive heart failure (HCC) I50.22    Low testosterone R79.89    Gastroesophageal reflux disease without esophagitis K21.9    Type 2 diabetes mellitus with nephropathy (HCC) E11.21    Severe obesity (BMI 35.0-39. 9) with comorbidity ([facility] [address] E66.01     Past Medical History:   Diagnosis Date Depression     GERD (gastroesophageal reflux disease)     Headache     Heart attack ([facility] [address] 04/2015    Hypertension     Prediabetes     Tachycardia       Family History   Problem Relation Age of Onset    Cancer Mother         stomach and liver    Hypertension Father     Diabetes Maternal Grandmother     Kidney Disease Maternal Grandmother     Hypertension Maternal Grandfather     No Known Problems Sister     Drug Abuse Brother       Social History     Tobacco Use    Smoking status: Never    Smokeless tobacco: Never   Substance Use Topics    Alcohol use: Yes     Alcohol/week: 0.0 standard drinks     Comment: rarely     Past Surgical History:   Procedure Laterality Date    HX COLONOSCOPY  11/2016    HX GI      colonoscopy    HX OTHER SURGICAL      anal fissure      Prior to Admission medications    Medication Sig Start Date End Date Taking? Authorizing Provider   losartan (COZAAR) 50 mg tablet TAKE 1 TABLET BY MOUTH  DAILY 8/18/22  Yes Deejay Shay MD   venlafaxine-SR [facility] 150 mg capsule TAKE 1 CAPSULE BY MOUTH  DAILY 7/20/22  Yes Deejay Shay MD   fenofibrate nanocrystallized (TRICOR) 145 mg tablet Take 1 Tablet by mouth daily. 7/12/22  Yes Deejay Shay MD   hydroCHLOROthiazide (HYDRODIURIL) 12.5 mg tablet TAKE 1 TABLET BY MOUTH  DAILY FOR HIGH BLOOD  PRESSURE 7/5/22  Yes Deejay Shay MD   gabapentin (NEURONTIN) 300 mg capsule Take one pill in the evening 6/27/22  Yes Anamaria Lazcano MD   metFORMIN (GLUCOPHAGE) 500 mg tablet Take 2 Tablets by mouth two (2) times daily (with meals).  6/3/22  Yes Anamaria Lazcano MD   carvediloL (COREG) 6.25 mg tablet TAKE 1 TABLET BY MOUTH  TWICE DAILY 5/19/22  Yes Deejay Shay MD   tadalafiL (CIALIS) 10 mg tablet TAKE 1 TABLET BY MOUTH DAILY AS NEEDED FOR ERECTILE DYSFUNCTION 5/11/22  Yes Deejay Shay MD   testosterone cypionate (DEPOTESTOTERONE CYPIONATE) 200 mg/mL injection INJECT INTRAMUSCULARLY 0.75 ML WEEKLY (DISCARD UNUSED  PORTION AFTER FIRST USE) 4/25/22  Yes Courtney Watkins MD   multivitamin (ONE A DAY) tablet Take 1 Tablet by mouth daily. Yes Provider, Historical   Syringe with Needle, Disp, 1 mL 20 gauge x 1\" syrg For injecting Testosterone once every week 3/7/22  Yes Courtney Watkins MD   emtricitabine-tenofovir alafen (Descovy) tablet Take 1 Tablet by mouth daily. 3/3/22  Yes Yani Saenz MD   Needle, Disp, 16 G (Disposable Needles) 16 gauge x 1\" ndle USE FOR DRAWING UP YOUR  TESTOSTERONE EACH WEEK 11/29/21  Yes Courtney Watkins MD   rosuvastatin (CRESTOR) 10 mg tablet TAKE 1 TABLET BY MOUTH AT  NIGHT 9/29/21  Yes Alejandro Frye MD   melatonin 10 mg tab Take  by mouth. Yes Provider, Historical   famotidine (PEPCID) 20 mg tablet Take 20 mg by mouth daily. Yes Provider, Historical   glucose blood VI test strips (ONETOUCH VERIO) strip Check blood sugar once daily. 5/8/19  Yes Courtney Watkins MD   mometasone (NASONEX) 50 mcg/actuation nasal spray 2 Sprays daily. Yes Other, MD Lottie   aspirin 81 mg chewable tablet Take 81 mg by mouth nightly. Yes Provider, Historical     No Known Allergies     Review of Systems:  A comprehensive review of systems was negative except for that written in the History of Present Illness. 10 point ROS obtained. All other systems negative    Objective: There were no vitals taken for this visit. @IBIJ(25)@  Current Outpatient Medications   Medication Sig    losartan (COZAAR) 50 mg tablet TAKE 1 TABLET BY MOUTH  DAILY    venlafaxine-SR (EFFEXOR-XR) 150 mg capsule TAKE 1 CAPSULE BY MOUTH  DAILY    fenofibrate nanocrystallized (TRICOR) 145 mg tablet Take 1 Tablet by mouth daily. hydroCHLOROthiazide (HYDRODIURIL) 12.5 mg tablet TAKE 1 TABLET BY MOUTH  DAILY FOR HIGH BLOOD  PRESSURE    gabapentin (NEURONTIN) 300 mg capsule Take one pill in the evening    metFORMIN (GLUCOPHAGE) 500 mg tablet Take 2 Tablets by mouth two (2) times daily (with meals).     carvediloL (COREG) 6.25 mg tablet TAKE 1 TABLET BY MOUTH  TWICE DAILY    tadalafiL (CIALIS) 10 mg tablet TAKE 1 TABLET BY MOUTH DAILY AS NEEDED FOR ERECTILE DYSFUNCTION    testosterone cypionate (DEPOTESTOTERONE CYPIONATE) 200 mg/mL injection INJECT INTRAMUSCULARLY 0.75 ML WEEKLY (DISCARD UNUSED  PORTION AFTER FIRST USE)    multivitamin (ONE A DAY) tablet Take 1 Tablet by mouth daily. Syringe with Needle, Disp, 1 mL 20 gauge x 1\" syrg For injecting Testosterone once every week    emtricitabine-tenofovir alafen (Descovy) tablet Take 1 Tablet by mouth daily. Needle, Disp, 16 G (Disposable Needles) 16 gauge x 1\" ndle USE FOR DRAWING UP YOUR  TESTOSTERONE EACH WEEK    rosuvastatin (CRESTOR) 10 mg tablet TAKE 1 TABLET BY MOUTH AT  NIGHT    melatonin 10 mg tab Take  by mouth. famotidine (PEPCID) 20 mg tablet Take 20 mg by mouth daily. glucose blood VI test strips (ONETOUCH VERIO) strip Check blood sugar once daily. mometasone (NASONEX) 50 mcg/actuation nasal spray 2 Sprays daily. aspirin 81 mg chewable tablet Take 81 mg by mouth nightly. No current facility-administered medications for this visit. Physical exam:  Limited   GEN: NAD  NECK- supple  RESP: no distress  NEURO:non focal  No LE edema     Data Review:    Lab Results   Component Value Date/Time    Culture result: NO GROWTH 1 DAY 08/13/2017 09:11 PM    Culture result: NO GROWTH 6 DAYS 08/13/2017 07:35 PM    Culture result: NO GROWTH 6 DAYS 08/13/2017 07:34 PM          XR Results (most recent):  Results from Appointment encounter on 08/09/21    XR KNEE LT MAX 2 VWS    Narrative  EXAM: XR KNEE LT MAX 2 VWS    INDICATION: Left knee pain is chronic, possible arthritis. COMPARISON: None. FINDINGS: 3 images of 2 views of the left knee demonstrate no acute fracture or  dislocation. There is no effusion. Mild narrowing of the patellofemoral and  medial compartments. Tiny marginal osteophytes. No erosion or chondrocalcinosis. Mild osteopenia. Impression  1.  No fracture. 2. Minimal osteoarthritis. IMPRESSION/PLAN:   Pt is on  PrEP, risk factor spouse is HIV +, other partners, on Descovy  Pts \" \"is on [place] , undetectable for many years , intermittent blips  H/o DM, HTN  Pt has core ab + , HBV negative   Vitamin D level, bone density testing  Pt for HIV testing every 3-4 months , advisd pt he could follow up with PCP   Pt advised to exercise , do resistance work, take Vit D supplement , drink lots of water, do a bone      density test         I have discussed the diagnosis with the patient and the intended plan as seen in the above orders. The patient has received an after-visit summary and questions were answered concerning future plans. I have discussed medication side effects and warnings with the patient as well.     Reviewed test results at length with patient    Signed By: Chaya Montague MD FACP    September 1, 2022

## 2022-09-02 ENCOUNTER — TELEPHONE (OUTPATIENT)
Dept: INFECTIOUS DISEASES | Age: 57
End: 2022-09-02

## 2022-09-02 NOTE — TELEPHONE ENCOUNTER
Please mail labs to patient. STD labs to be done  December 22. Or after  Vitamin D can be done before. Bone density testing also ordered. He may follow-up with PCP. If he needs appointment please call or message.

## 2022-09-17 RX ORDER — FENOFIBRATE 145 MG/1
145 TABLET, COATED ORAL DAILY
Qty: 90 TABLET | Refills: 3 | Status: SHIPPED | OUTPATIENT
Start: 2022-09-17

## 2022-09-26 ENCOUNTER — CLINICAL SUPPORT (OUTPATIENT)
Dept: INTERNAL MEDICINE CLINIC | Age: 57
End: 2022-09-26
Payer: COMMERCIAL

## 2022-09-26 DIAGNOSIS — Z23 NEEDS FLU SHOT: Primary | ICD-10-CM

## 2022-09-26 PROCEDURE — 90471 IMMUNIZATION ADMIN: CPT | Performed by: INTERNAL MEDICINE

## 2022-09-26 PROCEDURE — 90686 IIV4 VACC NO PRSV 0.5 ML IM: CPT | Performed by: INTERNAL MEDICINE

## 2022-09-26 NOTE — PROGRESS NOTES
After obtaining consent, and per orders of Dr. Luis Gayle, injection of FLU VACCINE given by Amee White. Patient instructed to remain in clinic for 20 minutes afterwards, and to report any adverse reaction to me immediately.

## 2022-11-02 LAB
BUN SERPL-MCNC: 15 MG/DL (ref 6–24)
BUN/CREAT SERPL: 13 (ref 9–20)
CALCIUM SERPL-MCNC: 10.6 MG/DL (ref 8.7–10.2)
CHLORIDE SERPL-SCNC: 100 MMOL/L (ref 96–106)
CHOLEST SERPL-MCNC: 128 MG/DL (ref 100–199)
CO2 SERPL-SCNC: 25 MMOL/L (ref 20–29)
CREAT SERPL-MCNC: 1.15 MG/DL (ref 0.76–1.27)
EGFR: 75 ML/MIN/1.73
EST. AVERAGE GLUCOSE BLD GHB EST-MCNC: 126 MG/DL
GLUCOSE SERPL-MCNC: 127 MG/DL (ref 70–99)
HBA1C MFR BLD: 6 % (ref 4.8–5.6)
HDLC SERPL-MCNC: 30 MG/DL
LDLC SERPL CALC-MCNC: 58 MG/DL (ref 0–99)
POTASSIUM SERPL-SCNC: 4.4 MMOL/L (ref 3.5–5.2)
SODIUM SERPL-SCNC: 140 MMOL/L (ref 134–144)
SPECIMEN STATUS REPORT, ROLRST: NORMAL
TRIGL SERPL-MCNC: 248 MG/DL (ref 0–149)
VLDLC SERPL CALC-MCNC: 40 MG/DL (ref 5–40)

## 2022-11-04 NOTE — PROGRESS NOTES
HISTORY OF PRESENT ILLNESS  Radha Cooper is a 65 y.o. male. HPI  Last here 7/12/22. Pt is here to f/u on chronic conditions. Has a history of hypertension  BP today is 120/81  BP at home 120s/80s  Continues on losartan 50mg, coreg 6.25mg BID and  HCTZ  25 mg      He is diabetic   120  Taking Metformin 2 tablets 500mg BID   He also takes ASA 81mg daily      Wt today is 254 lbs, down 6 lbs since lov   Discussed diet and wt/l, he has been working on portions  Recall saxenda 3mg caused him to feel bloated and uncomfortable      Reviewed labs    Ordered labs   Discussed slightly elevated Ca levels on last labs, discouraged pt from taking Ca supplement     He is scheduled to see Dr. Home Disla (neph) in 12/22 to w/u kidney fx due to progressively elevating microalbuminuria     He saw Dr. Beckie Daniels  for R knee pain, osteoarthritis   Last visit w/ PA Vandana Hidalgo 9/1/22, had an injection  Will f/U 1/23  Reviewed note:  Plan: We had a lengthy discussion regarding osteoarthritis, the nature of the disorder and the potential long term outcome. We also reviewed all of the conservative and surgical treatment options along with the risks and benefits of each. Conservative options include weight loss, the use of NSAIDs or Tylenol Arthritis as tolerated, non impact activity including cycling, swimming, and the elliptical , bracing, physical therapy and injections. Pt notes his pain has increased, he has been taking ibuprofen  Advised against NSAID's due to CKD, recommended Tylenol and glycosamine supplement. Discussed we cannot give controlled substances for arthritic pain. Rx'd diclofenac gel.      He follows with endocrinology Dr. Griffith Cockayne (endo) for PSA and testosterone level checks   Lov 6/2/22, will f/U in 12/22  Taking metformin 2 tablets 2x/day  Of note, he is on a lower dose of testosterone d/t syncopal event in the past     Taking gabapentin 300 mg daily for foot neuropathy  This is improving his symptoms happy with dose      Pt follows with Dr. Gianni Scott (cardio) for CAD and CHF  No signs sx/cad/chf  Lvv 10/22 , no med changes, will f/u in 1 yr     Pt follows with Dr. Tashi Jackson (pulm) annually  Last visit 10/22   Pt is compliant with CPAP qhs 11/22     Pt follows with Dr. Elysia Jensen (GI)  Last visit was 2/6/18     Pt now follows with Dr. Jessica Burleson (infectious disease) for HIV prevention  Lvv 9/1/22   She ordered DEXA  Reviewed note:  IMPRESSION/PLAN:   Pt is on  PrEP, risk factor spouse is HIV +, other partners, on Descovy  Pts \" \"is on Odefsey , undetectable for many years , intermittent blips  H/o DM, HTN  Pt has core ab + , HBV negative   Vitamin D level, bone density testing  Pt for HIV testing every 3-4 months , advisd pt he could follow up with PCP   Pt advised to exercise , do resistance work, take Vit D supplement , drink lots of water, do a bone      density test     Pt is on descovy -- got prior authorization to prevent HIV, ordering acute hepatitis panel, gonorrhea, chlamydia, HIV screen every 3 months  Recall his partner is HIV positive  However, pt was HIV negative on 8/22 labs  Bone density was ordered by infectious disease     He is taking pepcid daily to help with reflux, heartburn  Recall pt states that aciphex improved his sx in the past. However, his insurance no longer covers this medication.      Pt continues on crestor 10mg daily for cholesterol  Started finofibrate last visit for high triglycerides triglycerides improved      Continues on effexor 150mg daily for depression  Happy with dose 11/22      Pt takes a multivitamin     PREVENTIVE:  Colonoscopy: Dr. Elysia Jensen, 2/6/18, nl, 10 year repeat  DEXA: ordered by Dr. Jessica Burleson   PSA:  7/22 1.7  AAA screen: not needed, non smoker   Tdap: 8/29/2016  Pneumovax: 4/2015  Prevnar 20: due discussed local pharmacy   Shingrix: 1st dose 03/22/22, 2nd dose 7/12/22  Flu shot: 10/22   HIV screen: 3/22 negative  Microalbumin: 7/22-- 643--climbing  Foot exam: 11/08/22  A1c: 11/21 6.2 3/22 6.4 7/22 6.2 11/22 6.0  Eye exam: [facility] 11/21, due reminded   Lipids: 11/22 LDL 58  Hep C screen: 11/21 negative  Covid: both + booster Sebastian Primus)    Patient Active Problem List    Diagnosis Date Noted    Severe obesity (BMI 35.0-39. 9) with comorbidity ([facility] 04/30/2018    Type 2 diabetes mellitus with nephropathy ([facility] 01/22/2018    Essential hypertension with goal blood pressure less than 140/90 05/23/2016    Coronary artery disease involving native coronary artery of native heart without angina pectoris 05/23/2016    Mild episode of recurrent major depressive disorder ([facility] 05/23/2016    SCOTTY on CPAP 05/23/2016    Chronic systolic congestive heart failure ([facility] 05/23/2016    Low testosterone 05/23/2016    Gastroesophageal reflux disease without esophagitis 05/23/2016     Current Outpatient Medications   Medication Sig Dispense Refill    carvediloL (COREG) 6.25 mg tablet TAKE 1 TABLET BY MOUTH  TWICE DAILY 180 Tablet 0    rosuvastatin (CRESTOR) 10 mg tablet TAKE 1 TABLET BY MOUTH AT  NIGHT 90 Tablet 2    hydroCHLOROthiazide (HYDRODIURIL) 12.5 mg tablet TAKE 1 TABLET BY MOUTH  DAILY FOR HIGH BLOOD  PRESSURE 90 Tablet 0    fenofibrate nanocrystallized (TRICOR) 145 mg tablet TAKE 1 TABLET BY MOUTH  DAILY 90 Tablet 3    losartan (COZAAR) 50 mg tablet TAKE 1 TABLET BY MOUTH  DAILY 90 Tablet 1    venlafaxine-SR (EFFEXOR-XR) 150 mg capsule TAKE 1 CAPSULE BY MOUTH  DAILY 90 Capsule 3    gabapentin (NEURONTIN) 300 mg capsule Take one pill in the evening 90 Capsule 1    metFORMIN (GLUCOPHAGE) 500 mg tablet Take 2 Tablets by mouth two (2) times daily (with meals).  360 Tablet 3    tadalafiL (CIALIS) 10 mg tablet TAKE 1 TABLET BY MOUTH DAILY AS NEEDED FOR ERECTILE DYSFUNCTION 30 Tablet 0    testosterone cypionate (DEPOTESTOTERONE CYPIONATE) 200 mg/mL injection INJECT INTRAMUSCULARLY 0.75 ML WEEKLY (DISCARD UNUSED  PORTION AFTER FIRST USE) 12 mL 2    multivitamin (ONE A DAY) tablet Take 1 Tablet by mouth daily. Syringe with Needle, Disp, 1 mL 20 gauge x 1\" syrg For injecting Testosterone once every week 100 Each 3    emtricitabine-tenofovir alafen (Descovy) tablet Take 1 Tablet by mouth daily. 30 Tablet 2    Needle, Disp, 16 G (Disposable Needles) 16 gauge x 1\" ndle USE FOR DRAWING UP YOUR  TESTOSTERONE EACH WEEK 13 Each 3    melatonin 10 mg tab Take  by mouth. famotidine (PEPCID) 20 mg tablet Take 20 mg by mouth daily. glucose blood VI test strips (ONETOUCH VERIO) strip Check blood sugar once daily. 50 Strip 5    mometasone (NASONEX) 50 mcg/actuation nasal spray 2 Sprays daily. aspirin 81 mg chewable tablet Take 81 mg by mouth nightly.        Past Surgical History:   Procedure Laterality Date    HX COLONOSCOPY  11/2016    HX GI      colonoscopy    HX OTHER SURGICAL      anal fissure      Lab Results   Component Value Date/Time    WBC 8.0 08/22/2022 09:14 AM    HGB 17.4 08/22/2022 09:14 AM    Hemoglobin (POC) 15.6 02/06/2017 11:40 AM    HCT 52.7 (H) 08/22/2022 09:14 AM    Hematocrit (POC) 50 04/02/2018 10:59 AM    PLATELET 238 07/92/7121 09:14 AM    MCV 89 08/22/2022 09:14 AM     Lab Results   Component Value Date/Time    Cholesterol, total 128 11/01/2022 08:01 AM    HDL Cholesterol 30 (L) 11/01/2022 08:01 AM    LDL, calculated 58 11/01/2022 08:01 AM    LDL, calculated 29 03/04/2020 09:19 AM    LDL-C, External 34 07/16/2018 12:00 AM    Triglyceride 248 (H) 11/01/2022 08:01 AM     Lab Results   Component Value Date/Time    GFR est non-AA 84 11/03/2021 08:36 AM    GFRNA, POC >60 04/02/2018 10:59 AM    GFR est AA 98 11/03/2021 08:36 AM    GFRAA, POC >60 04/02/2018 10:59 AM    Creatinine 1.15 11/01/2022 08:01 AM    Creatinine (POC) 0.9 04/02/2018 10:59 AM    BUN 15 11/01/2022 08:01 AM    BUN (POC) 14 04/02/2018 10:59 AM    Sodium 140 11/01/2022 08:01 AM    Sodium (POC) 139 04/02/2018 10:59 AM    Potassium 4.4 11/01/2022 08:01 AM    Potassium (POC) 3.9 04/02/2018 10:59 AM Chloride 100 11/01/2022 08:01 AM    Chloride (POC) 100 04/02/2018 10:59 AM    CO2 25 11/01/2022 08:01 AM        Review of Systems   Respiratory:  Negative for shortness of breath. Cardiovascular:  Negative for chest pain. Physical Exam  Constitutional:       General: He is not in acute distress. Appearance: Normal appearance. He is not ill-appearing, toxic-appearing or diaphoretic. HENT:      Head: Normocephalic and atraumatic. Right Ear: External ear normal.      Left Ear: External ear normal.   Eyes:      General:         Right eye: No discharge. Left eye: No discharge. Conjunctiva/sclera: Conjunctivae normal.      Pupils: Pupils are equal, round, and reactive to light. Cardiovascular:      Rate and Rhythm: Normal rate and regular rhythm. Heart sounds: No murmur heard. No friction rub. No gallop. Pulmonary:      Effort: No respiratory distress. Breath sounds: Normal breath sounds. No wheezing or rales. Chest:      Chest wall: No tenderness. Musculoskeletal:         General: Normal range of motion. Cervical back: Normal.   Feet:      Comments: Sensory exam of the foot is normal, tested with the monofilament. Good pulses, no lesions or ulcers, good peripheral pulses. Skin:     General: Skin is warm and dry. Neurological:      General: No focal deficit present. Mental Status: He is oriented to person, place, and time. Gait: Gait normal.   Psychiatric:         Mood and Affect: Mood normal.         Behavior: Behavior normal.       ASSESSMENT and PLAN    ICD-10-CM ICD-9-CM    1. Type 2 diabetes mellitus with nephropathy (HCC)  E11.21 250.40 PTH INTACT     129.36 METABOLIC PANEL, COMPREHENSIVE   Diabetes well controlled on metformin takes 2 of these twice per day    500 mg each    A1c at goal    Repeat labs prior to follow-up        DIABETES FOOT EXAM      HEMOGLOBIN A1C WITH EAG      2.  Essential hypertension with goal blood pressure less than 140/90  I10 401.9 PTH INTACT      METABOLIC PANEL, COMPREHENSIVE   Controlled on current regimen of losartan Coreg and hydrochlorothiazide continue    DIABETES FOOT EXAM      HEMOGLOBIN A1C WITH EAG   Metabolic panel stable repeat prior to follow-up   3. Mild episode of recurrent major depressive disorder (HCC)  F33.0 296.31 PTH INTACT      METABOLIC PANEL, COMPREHENSIVE   Control with Effexor continue    DIABETES FOOT EXAM      HEMOGLOBIN A1C WITH EAG      4. Severe obesity (BMI 35.0-39. 9) with comorbidity ([address]  E66.01 278.01 PTH INTACT      METABOLIC PANEL, COMPREHENSIVE       DIABETES FOOT EXAM   Work on portions had made gains with weight watchers in the past   HEMOGLOBIN A1C WITH EAG      5. SCOTTY on CPAP  G47.33 327.23 PTH INTACT    W11.99 G34.1 METABOLIC PANEL, COMPREHENSIVE   Compliant with CPAP up-to-date with sleep specialist    DIABETES FOOT EXAM      HEMOGLOBIN A1C WITH EAG      6. Coronary artery disease involving native coronary artery of native heart without angina pectoris  I25.10 414.01 PTH INTACT      METABOLIC PANEL, COMPREHENSIVE   Medically managed no active signs or symptoms of CAD has follow-up scheduled with cardiology for January    DIABETES FOOT EXAM      HEMOGLOBIN A1C WITH EAG      7. Gastroesophageal reflux disease without esophagitis  K21.9 530.81 PTH INTACT      METABOLIC PANEL, COMPREHENSIVE       DIABETES FOOT EXAM   Control with Pepcid   HEMOGLOBIN A1C WITH EAG      8. Chronic systolic congestive heart failure (HCC)  I50.22 428.22 PTH INTACT     539.8 METABOLIC PANEL, COMPREHENSIVE   Medically managed stable no active symptoms will be seeing cardiology in January    DIABETES FOOT EXAM      HEMOGLOBIN A1C WITH EAG      9. Low testosterone  R79.89 790.99 PTH INTACT      METABOLIC PANEL, COMPREHENSIVE   Follows with endocrinology for testosterone which he takes weekly    DIABETES FOOT EXAM      HEMOGLOBIN A1C WITH EAG      10.  Neuropathy  G62.9 355.9 PTH INTACT METABOLIC PANEL, COMPREHENSIVE   Improved with gabapentin continue    DIABETES FOOT EXAM      HEMOGLOBIN A1C WITH EAG      11. Hypercalcemia  E83.52 275.42    Mild elevation on last labs was normal previously we will repeat calcium and intact PTH at follow-up he is not taking any calcium supplements    Of note hydrochlorothiazide can contribute to this elevation may also been related to lab error or hemolysis on lab     Depression screen reviewed and negative. Scribed by Cong Combs, as dictated by Dr. Shauna Jeans. Current diagnosis and concerns discussed with pt at length. Pt understands risks and benefits or current treatment plan and medications, and accepts the treatment and medication with any possible risks. Pt asks appropriate questions, which were answered. Pt was instructed to call with any concerns or problems. I have reviewed the note documented by the scribe. The services provided are my own. The documentation is accurate.

## 2022-11-08 ENCOUNTER — OFFICE VISIT (OUTPATIENT)
Dept: INTERNAL MEDICINE CLINIC | Age: 57
End: 2022-11-08
Payer: COMMERCIAL

## 2022-11-08 VITALS
OXYGEN SATURATION: 96 % | SYSTOLIC BLOOD PRESSURE: 120 MMHG | HEIGHT: 69 IN | RESPIRATION RATE: 16 BRPM | HEART RATE: 95 BPM | BODY MASS INDEX: 37.62 KG/M2 | DIASTOLIC BLOOD PRESSURE: 81 MMHG | WEIGHT: 254 LBS | TEMPERATURE: 97.7 F

## 2022-11-08 DIAGNOSIS — K21.9 GASTROESOPHAGEAL REFLUX DISEASE WITHOUT ESOPHAGITIS: ICD-10-CM

## 2022-11-08 DIAGNOSIS — I25.10 CORONARY ARTERY DISEASE INVOLVING NATIVE CORONARY ARTERY OF NATIVE HEART WITHOUT ANGINA PECTORIS: ICD-10-CM

## 2022-11-08 DIAGNOSIS — F33.0 MILD EPISODE OF RECURRENT MAJOR DEPRESSIVE DISORDER (HCC): ICD-10-CM

## 2022-11-08 DIAGNOSIS — I50.22 CHRONIC SYSTOLIC CONGESTIVE HEART FAILURE (HCC): ICD-10-CM

## 2022-11-08 DIAGNOSIS — E83.52 HYPERCALCEMIA: ICD-10-CM

## 2022-11-08 DIAGNOSIS — R79.89 LOW TESTOSTERONE: ICD-10-CM

## 2022-11-08 DIAGNOSIS — E66.01 SEVERE OBESITY (BMI 35.0-39.9) WITH COMORBIDITY (HCC): ICD-10-CM

## 2022-11-08 DIAGNOSIS — I10 ESSENTIAL HYPERTENSION WITH GOAL BLOOD PRESSURE LESS THAN 140/90: ICD-10-CM

## 2022-11-08 DIAGNOSIS — Z99.89 OSA ON CPAP: ICD-10-CM

## 2022-11-08 DIAGNOSIS — G47.33 OSA ON CPAP: ICD-10-CM

## 2022-11-08 DIAGNOSIS — E11.21 TYPE 2 DIABETES MELLITUS WITH NEPHROPATHY (HCC): Primary | ICD-10-CM

## 2022-11-08 DIAGNOSIS — G62.9 NEUROPATHY: ICD-10-CM

## 2022-11-08 PROCEDURE — 99214 OFFICE O/P EST MOD 30 MIN: CPT | Performed by: INTERNAL MEDICINE

## 2022-11-08 RX ORDER — DICLOFENAC SODIUM 10 MG/G
GEL TOPICAL 4 TIMES DAILY
Qty: 2 G | Refills: 1 | Status: SHIPPED | OUTPATIENT
Start: 2022-11-08

## 2022-11-08 NOTE — PROGRESS NOTES
1. \"Have you been to the ER, urgent care clinic since your last visit? Hospitalized since your last visit? \" No    2. \"Have you seen or consulted any other health care providers outside of the [address] [place] [address] since your last visit? \" No     3. For patients aged 39-70: Has the patient had a colonoscopy / FIT/ Cologuard? Yes - no Care Gap present      If the patient is female:    4. For patients aged 41-77: Has the patient had a mammogram within the past 2 years? NA - based on age or sex      11. For patients aged 21-65: Has the patient had a pap smear?  NA - based on age or sex

## 2022-11-10 DIAGNOSIS — E11.21 TYPE 2 DIABETES MELLITUS WITH NEPHROPATHY (HCC): ICD-10-CM

## 2022-11-11 RX ORDER — GABAPENTIN 300 MG/1
CAPSULE ORAL
Qty: 90 CAPSULE | Refills: 0 | Status: SHIPPED | OUTPATIENT
Start: 2022-11-11

## 2022-11-28 RX ORDER — NEEDLES, DISPOSABLE 27GX1/2"
NEEDLE, DISPOSABLE MISCELLANEOUS
Qty: 13 EACH | Refills: 3 | Status: SHIPPED | OUTPATIENT
Start: 2022-11-28

## 2022-12-01 DIAGNOSIS — E29.1 HYPOGONADISM IN MALE: ICD-10-CM

## 2022-12-01 DIAGNOSIS — E78.2 MIXED HYPERLIPIDEMIA: ICD-10-CM

## 2022-12-01 DIAGNOSIS — E11.21 TYPE 2 DIABETES MELLITUS WITH NEPHROPATHY (HCC): ICD-10-CM

## 2022-12-01 DIAGNOSIS — I10 ESSENTIAL HYPERTENSION: ICD-10-CM

## 2022-12-06 ENCOUNTER — OFFICE VISIT (OUTPATIENT)
Dept: ENDOCRINOLOGY | Age: 57
End: 2022-12-06
Payer: COMMERCIAL

## 2022-12-06 VITALS
SYSTOLIC BLOOD PRESSURE: 115 MMHG | DIASTOLIC BLOOD PRESSURE: 80 MMHG | WEIGHT: 253.8 LBS | BODY MASS INDEX: 37.59 KG/M2 | HEIGHT: 69 IN | HEART RATE: 94 BPM

## 2022-12-06 DIAGNOSIS — E78.2 MIXED HYPERLIPIDEMIA: ICD-10-CM

## 2022-12-06 DIAGNOSIS — E29.1 HYPOGONADISM IN MALE: ICD-10-CM

## 2022-12-06 DIAGNOSIS — E11.21 TYPE 2 DIABETES MELLITUS WITH NEPHROPATHY (HCC): Primary | ICD-10-CM

## 2022-12-06 DIAGNOSIS — I10 ESSENTIAL HYPERTENSION: ICD-10-CM

## 2022-12-06 PROCEDURE — 3044F HG A1C LEVEL LT 7.0%: CPT | Performed by: INTERNAL MEDICINE

## 2022-12-06 PROCEDURE — 3074F SYST BP LT 130 MM HG: CPT | Performed by: INTERNAL MEDICINE

## 2022-12-06 PROCEDURE — 3078F DIAST BP <80 MM HG: CPT | Performed by: INTERNAL MEDICINE

## 2022-12-06 PROCEDURE — 99214 OFFICE O/P EST MOD 30 MIN: CPT | Performed by: INTERNAL MEDICINE

## 2022-12-06 NOTE — LETTER
12/6/2022    Patient: Tennille Bazzi   YOB: 1965   Date of Visit: 12/6/2022     Evan Ward MD  [address]  [facility] [place]    Dear Evan Ward MD,      Thank you for referring Mr. Geraldine Deal to [address] [place] [address] for evaluation. My notes for this consultation are attached. If you have questions, please do not hesitate to call me. I look forward to following your patient along with you.       Sincerely,    Jas Plasencia MD

## 2022-12-06 NOTE — PROGRESS NOTES
Chief Complaint   Patient presents with    Diabetes    Hypogonadism     Pcp and pharmacy verified     Records from last visit reviewed. History of Present Illness: Gerald Ch is a 65 y.o. male here for follow up of diabetes and hypogonadism. \"I started having issues of OA in my knees and I am now walking with a cane. I saw Paula Almeida and had a cortisone shot, which did help, but it has started to bother me again. The second cortisone did not help, but they did X-Rays showed OA. \"    He denies issues of CP, SOB, palpitations, tremors, HA, vision changes. Pt is still taking the Metformin 1000mg BID. His A1C last week was 6.1%    He checks his BGs 1-2 times per week, fasting. It will run in the 100-120's range. He denies issues of hypoglycemia. Pt has received both COVID vaccinations (Valentino Flattery). Exercise consists of house work and chores. Pt works from home. Pt had a \"minor MI\" in 2014, Pt follows with Dr. King Calvo (cardio) annually. Pt is eating 3-4 meals per day and an occasional snack. He has breakfast around 9AM, this AM he had cereal and milk. He has lunch around Noon-1PM, yesterday he had a slice of pizza, a green salad and regular soda. He has dinner around 630PM, last night he had stuffed chicken, corn and crystal lite. Pt has hx of elevated Urine MA/Cr. He notes he will occasionally get tingling and itching in his left foot. Last eye exam was January 2022. \"Everything was fine\". Pt was first diagnosed with hypogonadism around 2015. He was having issues of feeling very sluggish. He was tested for his testosterone and \"it was pretty low\". He is currently taking a weekly injection of Testosterone Cypionate (75ml/150mg). He takes his injection every Sunday. He denies issues of CP, SOB, palpitations, HAs, or LUTS symptoms. He denies issues of mood swings or irritability. He gives himself his injection in the muscle of the thigh.   He does not have any injection site issues. Pt follows with Dr. Lizeth Flores (pulm) annually, for SCOTTY. Pt follows with Dr. Heide Hudson (GI)    Current Outpatient Medications   Medication Sig    Needle, Disp, 16 G (Disposable Needles) 16 gauge x 1\" ndle USE TO DRAW UP TESTOSTERONE ONCE WEEKLY    gabapentin (NEURONTIN) 300 mg capsule TAKE 1 CAPSULE BY MOUTH IN  THE EVENING    diclofenac (VOLTAREN) 1 % gel Apply  to affected area four (4) times daily. carvediloL (COREG) 6.25 mg tablet TAKE 1 TABLET BY MOUTH  TWICE DAILY    rosuvastatin (CRESTOR) 10 mg tablet TAKE 1 TABLET BY MOUTH AT  NIGHT    hydroCHLOROthiazide (HYDRODIURIL) 12.5 mg tablet TAKE 1 TABLET BY MOUTH  DAILY FOR HIGH BLOOD  PRESSURE    fenofibrate nanocrystallized (TRICOR) 145 mg tablet TAKE 1 TABLET BY MOUTH  DAILY    losartan (COZAAR) 50 mg tablet TAKE 1 TABLET BY MOUTH  DAILY    venlafaxine-SR (EFFEXOR-XR) 150 mg capsule TAKE 1 CAPSULE BY MOUTH  DAILY    metFORMIN (GLUCOPHAGE) 500 mg tablet Take 2 Tablets by mouth two (2) times daily (with meals). tadalafiL (CIALIS) 10 mg tablet TAKE 1 TABLET BY MOUTH DAILY AS NEEDED FOR ERECTILE DYSFUNCTION    testosterone cypionate (DEPOTESTOTERONE CYPIONATE) 200 mg/mL injection INJECT INTRAMUSCULARLY 0.75 ML WEEKLY (DISCARD UNUSED  PORTION AFTER FIRST USE)    multivitamin (ONE A DAY) tablet Take 1 Tablet by mouth daily. Syringe with Needle, Disp, 1 mL 20 gauge x 1\" syrg For injecting Testosterone once every week    emtricitabine-tenofovir alafen (Descovy) tablet Take 1 Tablet by mouth daily. melatonin 10 mg tab Take  by mouth. famotidine (PEPCID) 20 mg tablet Take 20 mg by mouth daily. glucose blood VI test strips (ONETOUCH VERIO) strip Check blood sugar once daily. mometasone (NASONEX) 50 mcg/actuation nasal spray 2 Sprays daily. aspirin 81 mg chewable tablet Take 81 mg by mouth nightly. No current facility-administered medications for this visit.      No Known Allergies  Review of Systems:  - Eyes: no blurry vision or double vision  - Cardiovascular: no chest pain  - Respiratory: no shortness of breath  - Musculoskeletal: no myalgias  - Neurological: no numbness/tingling in extremities    Physical Examination:  Blood pressure 115/80, pulse 94, height 5' 9\" (1.753 m), weight 253 lb 12.8 oz (115.1 kg). General: pleasant, no distress, good eye contact   Neck: no carotid bruits  Cardiovascular: regular, normal rate, nl s1 and s2, no m/r/g, 2+ DP pulses   Respiratory: clear bilaterally  Integumentary: no edema, no foot ulcers  Psychiatric: normal mood and affect    Diabetic foot exam:     Left Foot:   Visual Exam: callous - present   Pulse DP: 2+ (normal)   Filament test: normal sensation    Vibratory sensation: normal      Right Foot:   Visual Exam: callous - present   Pulse DP: 2+ (normal)   Filament test: normal sensation    Vibratory sensation: normal        Data Reviewed:   Component      Latest Ref Rng & Units 11/30/2022   Glucose      70 - 99 mg/dL 131 (H)   BUN      6 - 24 mg/dL 12   Creatinine      0.76 - 1.27 mg/dL 1.06   eGFR      >59 mL/min/1.73 82   BUN/Creatinine ratio      9 - 20 11   Sodium      134 - 144 mmol/L 139   Potassium      3.5 - 5.2 mmol/L 3.9   Chloride      96 - 106 mmol/L 98   CO2      20 - 29 mmol/L 25   Calcium      8.7 - 10.2 mg/dL 10.3 (H)   Protein, total      6.0 - 8.5 g/dL 7.2   Albumin      3.8 - 4.9 g/dL 4.6   GLOBULIN, TOTAL      1.5 - 4.5 g/dL 2.6   A-G Ratio      1.2 - 2.2 1.8   Bilirubin, total      0.0 - 1.2 mg/dL 0.4   Alk.  phosphatase      44 - 121 IU/L 69   AST      0 - 40 IU/L 25   ALT      0 - 44 IU/L 24   WBC      3.4 - 10.8 x10E3/uL 7.3   RBC      4.14 - 5.80 x10E6/uL 6.12 (H)   HGB      13.0 - 17.7 g/dL 18.3 (H)   HCT      37.5 - 51.0 % 54.1 (H)   MCV      79 - 97 fL 88   MCH      26.6 - 33.0 pg 29.9   MCHC      31.5 - 35.7 g/dL 33.8   RDW      11.6 - 15.4 % 14.2   PLATELET      548 - 094 x10E3/uL 285   Cholesterol, total      100 - 199 mg/dL 125   Triglyceride      0 - 149 mg/dL 251 (H) HDL Cholesterol      >39 mg/dL 27 (L)   VLDL, calculated      5 - 40 mg/dL 40   LDL, calculated      0 - 99 mg/dL 58   Creatinine, urine random      Not Estab. mg/dL 184.9   Microalbumin, urine      Not Estab. ug/mL 274.2   Microalbumin/Creat. Ratio      0 - 29 mg/g creat 148 (H)   Testosterone      264 - 916 ng/dL 775   Free testosterone (Direct)      7.2 - 24.0 pg/mL 23.6   Hemoglobin A1c, (calculated)      4.8 - 5.6 % 6.1 (H)   Estimated average glucose      mg/dL 128       Assessment/Plan:   1) DM > His A1C last week was 6.1%. Pt to continue the Metformin 1000mg BID. With his hx of neuropathy and calluses he would benefit from DM shoes and inserts. 2) Hypogonadism > He is doing well clinically, on Testosterone 150mg weekly. Pt denies issues of LUTS, CP, SOB or HAs. Pt to continue the Testosterone 150mg weekly. His Testosterone last week was 775, his HgB was 18.3 and his HCT was 54.1. 3) HTN > His BP today was 115/80. Pt is on an  ARB for renal protection. Will not make any changes at this time    4) HLD > Pt is tolerating the Rosuvastatin 10mg daily. His lipid panel in November 2022 was at goal.    Pt voices understanding and agreement with the plan. RTC 6 months  Follow-up and Dispositions    Return in about 6 months (around 6/6/2023).        Copy sent to:  Dr. Jagruti Corrigan

## 2022-12-12 RX ORDER — SYRINGE WITH NEEDLE, 1 ML 25GX5/8"
SYRINGE, EMPTY DISPOSABLE MISCELLANEOUS
Qty: 13 EACH | Refills: 3 | Status: SHIPPED | OUTPATIENT
Start: 2022-12-12

## 2023-01-05 DIAGNOSIS — Z20.6 HIV EXPOSURE: ICD-10-CM

## 2023-01-05 DIAGNOSIS — Z72.52 HIGH RISK HOMOSEXUAL BEHAVIOR: ICD-10-CM

## 2023-01-05 RX ORDER — EMTRICITABINE AND TENOFOVIR ALAFENAMIDE 200; 25 MG/1; MG/1
1 TABLET ORAL DAILY
Qty: 90 TABLET | Refills: 3 | Status: SHIPPED | OUTPATIENT
Start: 2023-01-05

## 2023-01-31 DIAGNOSIS — E11.21 TYPE 2 DIABETES MELLITUS WITH NEPHROPATHY (HCC): ICD-10-CM

## 2023-02-01 RX ORDER — GABAPENTIN 300 MG/1
CAPSULE ORAL
Qty: 90 CAPSULE | Refills: 1 | Status: SHIPPED | OUTPATIENT
Start: 2023-02-01

## 2023-02-01 NOTE — TELEPHONE ENCOUNTER
Received a fax request for refill of Gabapentin. I previously filled this in November for Dr. Griffith Cockayne when he was away so I forwarded the request to him to fill going forward.

## 2023-02-02 LAB
ALBUMIN SERPL-MCNC: 4.6 G/DL (ref 3.8–4.9)
ALBUMIN/GLOB SERPL: 1.8 {RATIO} (ref 1.2–2.2)
ALP SERPL-CCNC: 48 IU/L (ref 44–121)
ALT SERPL-CCNC: 25 IU/L (ref 0–44)
AST SERPL-CCNC: 25 IU/L (ref 0–40)
BILIRUB SERPL-MCNC: 0.5 MG/DL (ref 0–1.2)
BUN SERPL-MCNC: 14 MG/DL (ref 6–24)
BUN/CREAT SERPL: 12 (ref 9–20)
CALCIUM SERPL-MCNC: 10 MG/DL (ref 8.7–10.2)
CHLORIDE SERPL-SCNC: 97 MMOL/L (ref 96–106)
CO2 SERPL-SCNC: 26 MMOL/L (ref 20–29)
CREAT SERPL-MCNC: 1.14 MG/DL (ref 0.76–1.27)
EGFRCR SERPLBLD CKD-EPI 2021: 75 ML/MIN/1.73
EST. AVERAGE GLUCOSE BLD GHB EST-MCNC: 128 MG/DL
GLOBULIN SER CALC-MCNC: 2.6 G/DL (ref 1.5–4.5)
GLUCOSE SERPL-MCNC: 113 MG/DL (ref 70–99)
HBA1C MFR BLD: 6.1 % (ref 4.8–5.6)
POTASSIUM SERPL-SCNC: 4.2 MMOL/L (ref 3.5–5.2)
PROT SERPL-MCNC: 7.2 G/DL (ref 6–8.5)
PTH-INTACT SERPL-MCNC: 11 PG/ML (ref 15–65)
SODIUM SERPL-SCNC: 137 MMOL/L (ref 134–144)

## 2023-02-03 LAB
1,25(OH)2D SERPL-MCNC: 32 PG/ML (ref 24.8–81.5)
SPECIMEN STATUS REPORT, ROLRST: NORMAL

## 2023-02-06 NOTE — PROGRESS NOTES
HISTORY OF PRESENT ILLNESS  Frances Hyman is a 65 y.o. male. HPI  Last here 11/8/22. Pt is here to f/u on chronic conditions. Has a history of hypertension  BP today is 124/77  BP at home 140/80, one low of 116/55   Continues on losartan 50mg, coreg 6.25mg BID and  HCTZ  25 mg      He is diabetic   121 120   Taking Metformin 2 tablets 500mg BID   He also takes ASA 81mg daily      Wt today is 249 lbs, down 5 lbs since [employer] pt on wt/l, he thinks metformin has been helping  Discussed diet and wt/l, he has been working on portions  Recall saxenda 3mg caused him to feel bloated and uncomfortable      Reviewed labs    Ordered labs   Previously discussed slightly elevated Ca levels on last labs, discouraged pt from taking Ca supplement  I emailed about this w/ Dr. Javon Heller, recommendation was to check vitamin D --his vit D levels are normal  Discussed w/ patient that his PTH was low though Ca was nl on last labs, advised him to discuss this w/ Dr. Javon Heller at next visit     He follows with endocrinology Dr. Javon Heller ([facility]) for PSA and testosterone level checks   Lov 12/6/22  Reviewed note:  Assessment/Plan:   1) DM > His A1C last week was 6.1%. Pt to continue the Metformin 1000mg BID. With his hx of neuropathy and calluses he would benefit from DM shoes and inserts. 2) Hypogonadism > He is doing well clinically, on Testosterone 150mg weekly. Pt denies issues of LUTS, CP, SOB or HAs. Pt to continue the Testosterone 150mg weekly. His Testosterone last week was 775, his HgB was 18.3 and his HCT was 54.1. 3) HTN > His BP today was 115/80. Pt is on an  ARB for renal protection. Will not make any changes at this time  4) HLD > Pt is tolerating the Rosuvastatin 10mg daily. His lipid panel in November 2022 was at goal.  Pt voices understanding and agreement with the plan.   RTC 6 months  Discussed testosterone can cause erythrocytosis     Taking metformin 2 tablets 2x/day  Of note, he is on a lower dose of testosterone d/t syncopal event in the past    He saw Dr. Gunnar Tubbs (neph) in 12/22 to w/u kidney fx due to progressively elevating microalbuminuria  No med changes, will f/U in 1 year      He saw Dr. Jeffrey Jimenez  for R knee pain, osteoarthritis   Last visit w/ CORETTA Vieyra 1/4/23, had another injection  Reviewed note:  Plan: We had a lengthy discussion regarding osteoarthritis, the nature of the disorder and the potential long term outcome. We also reviewed all of the conservative and surgical treatment options along with the risks and benefits of each. Conservative options include weight loss, the use of NSAIDs or Tylenol Arthritis as tolerated, non impact activity including cycling, swimming, and the elliptical , bracing, physical therapy and injections. Taking gabapentin 300 mg daily for foot neuropathy  This is improving his symptoms happy with dose      Pt follows with Dr. Citlaly Gonzalez (cardio) for CAD and CHF  No signs sx/cad/chf  Lvv 10/22, no med changes, will f/u in 1 yr     Pt follows with Dr. Ruby Baxter (pulm) annually  Last visit 10/22, has a new CPAP   Pt is compliant with CPAP 2/23      Pt follows with Dr. Patsy Villela (GI)  Last visit was 2/6/18     Pt now follows with Dr. Heide Oleary (infectious disease) for HIV prevention  Lvv 9/1/22   Pt is on descovy -- got prior authorization to prevent HIV, ordering acute hepatitis panel, gonorrhea, chlamydia, HIV screen every 3 months  Recall his partner is HIV positive  However, pt was HIV negative on 8/22 labs  Bone density was ordered by infectious disease, he has not completed this yet      He is taking pepcid daily to help with reflux, heartburn  Recall pt states that aciphex improved his sx in the past. However, his insurance no longer covers this medication.      Pt continues on crestor 10mg daily for cholesterol  Started finofibrate last visit for high triglycerides triglycerides improved      Continues on effexor 150mg daily for depression  Happy with dose 2/23 Pt takes a multivitamin    He is taking vit D 1000U every other day per another physician     PREVENTIVE:  Colonoscopy: Dr. Fabian Cooper, 2/6/18, nl, 10 year repeat  DEXA: ordered by Dr. Kelley Potts, reminded pt to complete   PSA:  7/22 1.7  AAA screen: not needed, non smoker   Tdap: 8/29/2016  Pneumovax: 4/2015  Prevnar 20: due discussed local pharmacy   Shingrix: 1st dose 03/22/22, 2nd dose 7/12/22  Flu shot: 10/22    HIV screen: 3/22 negative  Microalbumin: 11/22 148  Foot exam: 11/08/22  A1c: 11/21 6.2 3/22 6.4 7/22 6.2 11/22 6.0 2/23 6.1  Eye exam: [address] 1/23   Lipids: 11/22 LDL 58  Hep C screen: 11/21 negative  Covid: 4 doses ([facility]ridanitza)      Patient Active Problem List    Diagnosis Date Noted    Severe obesity (BMI 35.0-39. 9) with comorbidity ([facility] 04/30/2018    Type 2 diabetes mellitus with nephropathy ([facility] 01/22/2018    Essential hypertension with goal blood pressure less than 140/90 05/23/2016    Coronary artery disease involving native coronary artery of native heart without angina pectoris 05/23/2016    Mild episode of recurrent major depressive disorder ([facility] 05/23/2016    SCOTTY on CPAP 05/23/2016    Chronic systolic congestive heart failure ([facility] 05/23/2016    Low testosterone 05/23/2016    Gastroesophageal reflux disease without esophagitis 05/23/2016     Current Outpatient Medications   Medication Sig Dispense Refill    losartan (COZAAR) 50 mg tablet TAKE 1 TABLET BY MOUTH  DAILY 90 Tablet 0    gabapentin (NEURONTIN) 300 mg capsule TAKE 1 CAPSULE BY MOUTH IN  THE EVENING 90 Capsule 1    carvediloL (COREG) 6.25 mg tablet TAKE 1 TABLET BY MOUTH TWICE  DAILY 180 Tablet 0    testosterone cypionate (DEPOTESTOTERONE CYPIONATE) 200 mg/mL injection Inject 150mg (0.75ml) every week 12 mL 2    Descovy tablet TAKE 1 TABLET BY MOUTH  DAILY 90 Tablet 3    hydroCHLOROthiazide (HYDRODIURIL) 12.5 mg tablet TAKE 1 TABLET BY MOUTH  DAILY FOR HIGH BLOOD  PRESSURE 90 Tablet 0    BD Luer-Kg Syringe 3 mL 21 gauge x 1\" syrg USE TO DRAW UP TESTOSTERONE ONCE WEEKLY 13 Each 3    Needle, Disp, 16 G (Disposable Needles) 16 gauge x 1\" ndle USE TO DRAW UP TESTOSTERONE ONCE WEEKLY 13 Each 3    diclofenac (VOLTAREN) 1 % gel Apply  to affected area four (4) times daily. 2 g 1    rosuvastatin (CRESTOR) 10 mg tablet TAKE 1 TABLET BY MOUTH AT  NIGHT 90 Tablet 2    fenofibrate nanocrystallized (TRICOR) 145 mg tablet TAKE 1 TABLET BY MOUTH  DAILY 90 Tablet 3    venlafaxine-SR (EFFEXOR-XR) 150 mg capsule TAKE 1 CAPSULE BY MOUTH  DAILY 90 Capsule 3    metFORMIN (GLUCOPHAGE) 500 mg tablet Take 2 Tablets by mouth two (2) times daily (with meals). 360 Tablet 3    tadalafiL (CIALIS) 10 mg tablet TAKE 1 TABLET BY MOUTH DAILY AS NEEDED FOR ERECTILE DYSFUNCTION 30 Tablet 0    multivitamin (ONE A DAY) tablet Take 1 Tablet by mouth daily. Syringe with Needle, Disp, 1 mL 20 gauge x 1\" syrg For injecting Testosterone once every week 100 Each 3    melatonin 10 mg tab Take  by mouth. famotidine (PEPCID) 20 mg tablet Take 20 mg by mouth daily. glucose blood VI test strips (ONETOUCH VERIO) strip Check blood sugar once daily. 50 Strip 5    mometasone (NASONEX) 50 mcg/actuation nasal spray 2 Sprays daily. aspirin 81 mg chewable tablet Take 81 mg by mouth nightly.        Past Surgical History:   Procedure Laterality Date    HX COLONOSCOPY  11/2016    HX GI      colonoscopy    HX OTHER SURGICAL      anal fissure      Lab Results   Component Value Date/Time    WBC 7.3 11/30/2022 07:57 AM    HGB 18.3 (H) 11/30/2022 07:57 AM    Hemoglobin (POC) 15.6 02/06/2017 11:40 AM    HCT 54.1 (H) 11/30/2022 07:57 AM    Hematocrit (POC) 50 04/02/2018 10:59 AM    PLATELET 200 43/52/0277 07:57 AM    MCV 88 11/30/2022 07:57 AM     Lab Results   Component Value Date/Time    Cholesterol, total 125 11/30/2022 07:57 AM    HDL Cholesterol 27 (L) 11/30/2022 07:57 AM    LDL, calculated 58 11/30/2022 07:57 AM    LDL, calculated 29 03/04/2020 09:19 AM    LDL-C, External 34 07/16/2018 12:00 AM    Triglyceride 251 (H) 11/30/2022 07:57 AM     Lab Results   Component Value Date/Time    GFR est non-AA 84 11/03/2021 08:36 AM    GFRNA, POC >60 04/02/2018 10:59 AM    GFR est AA 98 11/03/2021 08:36 AM    GFRAA, POC >60 04/02/2018 10:59 AM    Creatinine 1.14 02/01/2023 08:03 AM    Creatinine (POC) 0.9 04/02/2018 10:59 AM    BUN 14 02/01/2023 08:03 AM    BUN (POC) 14 04/02/2018 10:59 AM    Sodium 137 02/01/2023 08:03 AM    Sodium (POC) 139 04/02/2018 10:59 AM    Potassium 4.2 02/01/2023 08:03 AM    Potassium (POC) 3.9 04/02/2018 10:59 AM    Chloride 97 02/01/2023 08:03 AM    Chloride (POC) 100 04/02/2018 10:59 AM    CO2 26 02/01/2023 08:03 AM    PTH, Intact 11 (L) 02/01/2023 08:03 AM        Review of Systems   Respiratory:  Negative for shortness of breath. Cardiovascular:  Negative for chest pain. Physical Exam  Constitutional:       General: He is not in acute distress. Appearance: Normal appearance. He is not ill-appearing, toxic-appearing or diaphoretic. HENT:      Head: Normocephalic and atraumatic. Right Ear: External ear normal.      Left Ear: External ear normal.   Eyes:      General:         Right eye: No discharge. Left eye: No discharge. Conjunctiva/sclera: Conjunctivae normal.      Pupils: Pupils are equal, round, and reactive to light. Cardiovascular:      Rate and Rhythm: Normal rate and regular rhythm. Heart sounds: No murmur heard. No friction rub. No gallop. Pulmonary:      Effort: No respiratory distress. Breath sounds: Normal breath sounds. No wheezing or rales. Chest:      Chest wall: No tenderness. Musculoskeletal:         General: Normal range of motion. Cervical back: Normal.      Right lower leg: No edema. Left lower leg: No edema. Skin:     General: Skin is warm and dry. Neurological:      General: No focal deficit present. Mental Status: He is oriented to person, place, and time. Gait: Gait normal.   Psychiatric:         Mood and Affect: Mood normal.         Behavior: Behavior normal.       ASSESSMENT and PLAN    ICD-10-CM ICD-9-CM    1. Essential hypertension with goal blood pressure less than 140/90  Q38 746.9 METABOLIC PANEL, COMPREHENSIVE      HEMOGLOBIN A1C WITH EAG      PTH INTACT   Controlled losartan Coreg and hydrochlorothiazide continue check metabolic panel prior to follow-up recent labs stable   VITAMIN D, 25 HYDROXY      TSH 3RD GENERATION      2. Type 2 diabetes mellitus with nephropathy (HCC)  G29.04 731.38 METABOLIC PANEL, COMPREHENSIVE     583.81 HEMOGLOBIN A1C WITH EAG   Controlled on metformin continue   PTH INTACT      VITAMIN D, 25 HYDROXY      3. Hypercalcemia  X29.51 158.39 METABOLIC PANEL, COMPREHENSIVE      HEMOGLOBIN A1C WITH EAG   Patient with mild hypercalcemia actually normal last check but PTH was suppressed emailed endocrinology about this vitamin D was checked need an additional vitamin D order which I will repeat with next labs    Patient will also follow-up with endocrine about these levels no additional intervention for now   PTH INTACT      VITAMIN D, 25 HYDROXY      4. SCOTTY on CPAP  Y49.59 880.42 METABOLIC PANEL, COMPREHENSIVE    Z99.89 V46.8 HEMOGLOBIN A1C WITH EAG   Compliant with CPAP   PTH INTACT      VITAMIN D, 25 HYDROXY      5. Low testosterone  C96.47 710.77 METABOLIC PANEL, COMPREHENSIVE      HEMOGLOBIN A1C WITH EAG   On testosterone has some mild erythrocytosis from this discussed this   PTH INTACT      VITAMIN D, 25 HYDROXY      6. Chronic systolic congestive heart failure (HCC)  C08.84 207.09 METABOLIC PANEL, COMPREHENSIVE     428.0 HEMOGLOBIN A1C WITH EAG      PTH INTACT   Medically managed up-to-date with cardiology follows with him annually clinically euvolemic   VITAMIN D, 25 HYDROXY      7.  Coronary artery disease involving native coronary artery of native heart without angina pectoris  E52.71 848.57 METABOLIC PANEL, COMPREHENSIVE HEMOGLOBIN A1C WITH EAG      PTH INTACT   No active signs or symptoms of CAD medically managed   VITAMIN D, 25 HYDROXY      8. Gastroesophageal reflux disease without esophagitis  J13.8 449.51 METABOLIC PANEL, COMPREHENSIVE      HEMOGLOBIN A1C WITH EAG      PTH INTACT   Control with Pepcid   VITAMIN D, 25 HYDROXY      9. Mild episode of recurrent major depressive disorder (HCC)  K10.4 671.65 METABOLIC PANEL, COMPREHENSIVE      HEMOGLOBIN A1C WITH EAG      PTH INTACT   Controlled with Effexor   VITAMIN D, 25 HYDROXY      10. Severe obesity (BMI 35.0-39. 9) with comorbidity ([address]  O18.41 470.42 METABOLIC PANEL, COMPREHENSIVE      HEMOGLOBIN A1C WITH EAG   Weight improved working on portions metformin assisting   PTH INTACT      VITAMIN D, 25 HYDROXY      11. Erythrocytosis  V92.6 814.1 METABOLIC PANEL, COMPREHENSIVE      HEMOGLOBIN A1C WITH EAG   Related to testosterone use monitoring CBCs   PTH INTACT      VITAMIN D, 25 HYDROXY      12. Neuropathy  K43.7 301.6 METABOLIC PANEL, COMPREHENSIVE      HEMOGLOBIN A1C WITH EAG      PTH INTACT   Controlled gabapentin   VITAMIN D, 25 HYDROXY      13. Vitamin D deficiency  M29.7 983.9 METABOLIC PANEL, COMPREHENSIVE      HEMOGLOBIN A1C WITH EAG      PTH INTACT   Repeat levels prior to follow-up   VITAMIN D, 25 HYDROXY        Depression screen reviewed and negative. Scribed by Jair Silva, as dictated by Dr. Jimenez Randolph. Current diagnosis and concerns discussed with pt at length. Pt understands risks and benefits or current treatment plan and medications, and accepts the treatment and medication with any possible risks. Pt asks appropriate questions, which were answered. Pt was instructed to call with any concerns or problems. I have reviewed the note documented by the scribe. The services provided are my own. The documentation is accurate.

## 2023-02-08 ENCOUNTER — OFFICE VISIT (OUTPATIENT)
Dept: INTERNAL MEDICINE CLINIC | Age: 58
End: 2023-02-08
Payer: COMMERCIAL

## 2023-02-08 VITALS
RESPIRATION RATE: 14 BRPM | OXYGEN SATURATION: 98 % | TEMPERATURE: 98.2 F | BODY MASS INDEX: 36.88 KG/M2 | WEIGHT: 249 LBS | HEART RATE: 94 BPM | DIASTOLIC BLOOD PRESSURE: 77 MMHG | HEIGHT: 69 IN | SYSTOLIC BLOOD PRESSURE: 124 MMHG

## 2023-02-08 DIAGNOSIS — E55.9 VITAMIN D DEFICIENCY: ICD-10-CM

## 2023-02-08 DIAGNOSIS — E66.01 SEVERE OBESITY (BMI 35.0-39.9) WITH COMORBIDITY (HCC): ICD-10-CM

## 2023-02-08 DIAGNOSIS — I50.22 CHRONIC SYSTOLIC CONGESTIVE HEART FAILURE (HCC): ICD-10-CM

## 2023-02-08 DIAGNOSIS — E11.21 TYPE 2 DIABETES MELLITUS WITH NEPHROPATHY (HCC): ICD-10-CM

## 2023-02-08 DIAGNOSIS — R79.89 LOW TESTOSTERONE: ICD-10-CM

## 2023-02-08 DIAGNOSIS — F33.0 MILD EPISODE OF RECURRENT MAJOR DEPRESSIVE DISORDER (HCC): ICD-10-CM

## 2023-02-08 DIAGNOSIS — E83.52 HYPERCALCEMIA: ICD-10-CM

## 2023-02-08 DIAGNOSIS — D75.1 ERYTHROCYTOSIS: ICD-10-CM

## 2023-02-08 DIAGNOSIS — G47.33 OSA ON CPAP: ICD-10-CM

## 2023-02-08 DIAGNOSIS — G62.9 NEUROPATHY: ICD-10-CM

## 2023-02-08 DIAGNOSIS — K21.9 GASTROESOPHAGEAL REFLUX DISEASE WITHOUT ESOPHAGITIS: ICD-10-CM

## 2023-02-08 DIAGNOSIS — Z99.89 OSA ON CPAP: ICD-10-CM

## 2023-02-08 DIAGNOSIS — I25.10 CORONARY ARTERY DISEASE INVOLVING NATIVE CORONARY ARTERY OF NATIVE HEART WITHOUT ANGINA PECTORIS: ICD-10-CM

## 2023-02-08 DIAGNOSIS — I10 ESSENTIAL HYPERTENSION WITH GOAL BLOOD PRESSURE LESS THAN 140/90: Primary | ICD-10-CM

## 2023-02-08 PROCEDURE — 99214 OFFICE O/P EST MOD 30 MIN: CPT | Performed by: INTERNAL MEDICINE

## 2023-02-08 NOTE — PROGRESS NOTES
1. \"Have you been to the ER, urgent care clinic since your last visit? Hospitalized since your last visit? \" No    2. \"Have you seen or consulted any other health care providers outside of the [address] [place] [address] since your last visit? \" No     3. For patients aged 39-70: Has the patient had a colonoscopy / FIT/ Cologuard? Yes - Care Gap present.  Most recent result on file

## 2023-02-09 DIAGNOSIS — Z20.2 STD EXPOSURE: ICD-10-CM

## 2023-02-09 DIAGNOSIS — Z20.6 HIV EXPOSURE: Primary | ICD-10-CM

## 2023-02-18 ENCOUNTER — PATIENT MESSAGE (OUTPATIENT)
Dept: INTERNAL MEDICINE CLINIC | Age: 58
End: 2023-02-18

## 2023-02-21 RX ORDER — VENLAFAXINE 75 MG/1
75 TABLET ORAL 3 TIMES DAILY
Status: CANCELLED | OUTPATIENT
Start: 2023-02-21

## 2023-02-21 RX ORDER — VENLAFAXINE HYDROCHLORIDE 75 MG/1
75 CAPSULE, EXTENDED RELEASE ORAL DAILY
Qty: 30 CAPSULE | Refills: 1 | Status: SHIPPED | OUTPATIENT
Start: 2023-02-21

## 2023-02-21 NOTE — TELEPHONE ENCOUNTER
Future Appointments:  Future Appointments   Date Time Provider Maria Teresa Stark   3/7/2023  2:00 PM [facility] DEXA 1 [facility]   3/28/2023  1:30 PM Tena Cat MD LMF BS AMB   5/31/2023 10:15 AM Trace Sargent MD [facility] BS AMB   6/6/2023 11:50 AM Cameron Plasencia MD R[facility] [address] [facility] AMB        Last Appointment With Me:  2/8/2023     Requested Prescriptions     Pending Prescriptions Disp Refills    venlafaxine (EFFEXOR) 75 mg tablet       Sig: Take 1 Tablet by mouth three (3) times daily.

## 2023-02-21 NOTE — TELEPHONE ENCOUNTER
Tony Frank MD 2/20/2023 3:49 PM EST    You are on a relatively higher dose of Effexor I would start by decreasing this to 75 mg before adding anything else to slowly wean you off of it    Take the 75 mg alternating with 150 mg for 2 weeks then decrease to 75 mg daily for 4 weeks    We will then order the next lower dose when we get to that point and discuss other alternate medications at your next follow-up    Alan--pend the 75mg dose  ----- Message -----  From: Consuelo Main: 2/20/2023 9:34 AM EST  To: Ezequiel Reddy MD  Subject: Chris Sitter: Off of Effexor       ----- Message -----  From: Luci Fragoso  Sent: 2/20/2023 7:34 AM EST  To: Blayne Freitas  Subject: FW: Off of Effexor       ----- Message -----  From: Isela Lopez  Sent: 2/18/2023 6:17 PM EST  To: [id number] [facility]  Subject: Off of Effexor     I would like to try another anti depressant. I take Effexor but am starting to have sexual side effects. What would you recommend?

## 2023-03-15 RX ORDER — VENLAFAXINE HYDROCHLORIDE 75 MG/1
75 CAPSULE, EXTENDED RELEASE ORAL DAILY
Qty: 30 CAPSULE | Refills: 11 | Status: SHIPPED | OUTPATIENT
Start: 2023-03-15

## 2023-03-16 ENCOUNTER — HOSPITAL ENCOUNTER (OUTPATIENT)
Dept: MAMMOGRAPHY | Age: 58
Discharge: HOME | End: 2023-03-16
Attending: INTERNAL MEDICINE
Payer: COMMERCIAL

## 2023-03-16 DIAGNOSIS — M25.562 ACUTE PAIN OF BOTH KNEES: ICD-10-CM

## 2023-03-16 DIAGNOSIS — T50.905A ADVERSE EFFECT OF DRUG, INITIAL ENCOUNTER: ICD-10-CM

## 2023-03-16 DIAGNOSIS — M25.561 ACUTE PAIN OF BOTH KNEES: ICD-10-CM

## 2023-03-16 DIAGNOSIS — Z13.820 SCREENING FOR OSTEOPOROSIS: ICD-10-CM

## 2023-03-16 PROCEDURE — 77080 DXA BONE DENSITY AXIAL: CPT

## 2023-03-21 DIAGNOSIS — E78.2 MIXED HYPERLIPIDEMIA: ICD-10-CM

## 2023-03-21 DIAGNOSIS — I10 ESSENTIAL HYPERTENSION: ICD-10-CM

## 2023-03-21 DIAGNOSIS — E11.21 TYPE 2 DIABETES MELLITUS WITH NEPHROPATHY (HCC): Primary | ICD-10-CM

## 2023-03-21 DIAGNOSIS — E29.1 HYPOGONADISM IN MALE: ICD-10-CM

## 2023-03-21 RX ORDER — METFORMIN HYDROCHLORIDE 500 MG/1
TABLET ORAL
Qty: 360 TABLET | Refills: 3 | Status: SHIPPED | OUTPATIENT
Start: 2023-03-21

## 2023-03-22 LAB
25(OH)D3+25(OH)D2 SERPL-MCNC: 37.1 NG/ML (ref 30–100)
ALBUMIN SERPL-MCNC: 4.8 G/DL (ref 3.8–4.9)
ALBUMIN/GLOB SERPL: 1.8 {RATIO} (ref 1.2–2.2)
ALP SERPL-CCNC: 55 IU/L (ref 44–121)
ALT SERPL-CCNC: 26 IU/L (ref 0–44)
AST SERPL-CCNC: 26 IU/L (ref 0–40)
BASOPHILS # BLD AUTO: 0.1 X10E3/UL (ref 0–0.2)
BASOPHILS NFR BLD AUTO: 1 %
BILIRUB SERPL-MCNC: 0.5 MG/DL (ref 0–1.2)
BUN SERPL-MCNC: 12 MG/DL (ref 6–24)
BUN/CREAT SERPL: 13 (ref 9–20)
CALCIUM SERPL-MCNC: 10.2 MG/DL (ref 8.7–10.2)
CHLORIDE SERPL-SCNC: 98 MMOL/L (ref 96–106)
CO2 SERPL-SCNC: 23 MMOL/L (ref 20–29)
CREAT SERPL-MCNC: 0.96 MG/DL (ref 0.76–1.27)
EGFRCR SERPLBLD CKD-EPI 2021: 93 ML/MIN/1.73
EOSINOPHIL # BLD AUTO: 0.1 X10E3/UL (ref 0–0.4)
EOSINOPHIL NFR BLD AUTO: 2 %
ERYTHROCYTE [DISTWIDTH] IN BLOOD BY AUTOMATED COUNT: 13.5 % (ref 11.6–15.4)
GLOBULIN SER CALC-MCNC: 2.7 G/DL (ref 1.5–4.5)
GLUCOSE SERPL-MCNC: 134 MG/DL (ref 70–99)
HAV IGM SERPL QL IA: NEGATIVE
HBV CORE AB SERPL QL IA: POSITIVE
HBV CORE IGM SERPL QL IA: NEGATIVE
HBV SURFACE AG SERPL QL IA: NEGATIVE
HCT VFR BLD AUTO: 52 % (ref 37.5–51)
HCV AB SERPL QL IA: NORMAL
HCV IGG SERPL QL IA: NON REACTIVE
HGB BLD-MCNC: 17.8 G/DL (ref 13–17.7)
HIV 1+2 AB+HIV1 P24 AG SERPL QL IA: NON REACTIVE
IMM GRANULOCYTES # BLD AUTO: 0 X10E3/UL (ref 0–0.1)
IMM GRANULOCYTES NFR BLD AUTO: 0 %
LYMPHOCYTES # BLD AUTO: 2.6 X10E3/UL (ref 0.7–3.1)
LYMPHOCYTES NFR BLD AUTO: 36 %
MCH RBC QN AUTO: 30.5 PG (ref 26.6–33)
MCHC RBC AUTO-ENTMCNC: 34.2 G/DL (ref 31.5–35.7)
MCV RBC AUTO: 89 FL (ref 79–97)
MONOCYTES # BLD AUTO: 0.4 X10E3/UL (ref 0.1–0.9)
MONOCYTES NFR BLD AUTO: 6 %
NEUTROPHILS # BLD AUTO: 3.9 X10E3/UL (ref 1.4–7)
NEUTROPHILS NFR BLD AUTO: 55 %
PLATELET # BLD AUTO: 270 X10E3/UL (ref 150–450)
POTASSIUM SERPL-SCNC: 4.2 MMOL/L (ref 3.5–5.2)
PROT SERPL-MCNC: 7.5 G/DL (ref 6–8.5)
RBC # BLD AUTO: 5.84 X10E6/UL (ref 4.14–5.8)
RPR SER QL: NON REACTIVE
SODIUM SERPL-SCNC: 137 MMOL/L (ref 134–144)
WBC # BLD AUTO: 7.2 X10E3/UL (ref 3.4–10.8)

## 2023-03-28 ENCOUNTER — OFFICE VISIT (OUTPATIENT)
Dept: INFECTIOUS DISEASES | Age: 58
End: 2023-03-28
Payer: COMMERCIAL

## 2023-03-28 VITALS
HEART RATE: 81 BPM | SYSTOLIC BLOOD PRESSURE: 128 MMHG | TEMPERATURE: 97.7 F | OXYGEN SATURATION: 96 % | DIASTOLIC BLOOD PRESSURE: 91 MMHG | RESPIRATION RATE: 18 BRPM | BODY MASS INDEX: 36.56 KG/M2 | WEIGHT: 246.8 LBS | HEIGHT: 69 IN

## 2023-03-28 DIAGNOSIS — Z20.6 HIV EXPOSURE: Primary | ICD-10-CM

## 2023-03-28 DIAGNOSIS — Z20.2 STD EXPOSURE: ICD-10-CM

## 2023-03-28 PROCEDURE — 3074F SYST BP LT 130 MM HG: CPT | Performed by: INTERNAL MEDICINE

## 2023-03-28 PROCEDURE — 99214 OFFICE O/P EST MOD 30 MIN: CPT | Performed by: INTERNAL MEDICINE

## 2023-03-28 PROCEDURE — 3080F DIAST BP >= 90 MM HG: CPT | Performed by: INTERNAL MEDICINE

## 2023-03-28 RX ORDER — PETROLATUM,WHITE/LANOLIN
500 OINTMENT (GRAM) TOPICAL DAILY
COMMUNITY

## 2023-03-28 NOTE — PROGRESS NOTES
Chief Complaint   Patient presents with    HIV    Follow-up     1. \"Have you been to the ER, urgent care clinic since your last visit? Hospitalized since your last visit? \" No    2. \"Have you seen or consulted any other health care providers outside of the [address] [place] [address] since your last visit? \"  No    3. For patients aged 39-70: Has the patient had a colonoscopy / FIT/ Cologuard? Yes       If the patient is female:    4. For patients aged 41-77: Has the patient had a mammogram within the past 2 years? N/A      5. For patients aged 21-65: Has the patient had a pap smear? N/A    Pt identified by 2 identifiers. Pt have no concerns at this time.

## 2023-03-28 NOTE — PATIENT INSTRUCTIONS
[email or web address] Activation    Thank you for requesting access to [email or web address]. Please follow the instructions below to securely access and download your online medical record. [email or web address] allows you to send messages to your doctor, view your test results, renew your prescriptions, schedule appointments, and more. How Do I Sign Up? In your internet browser, go to [email or web address]. Click on the First Time User? Click Here link in the Sign In box. You will see the New Member Sign Up page. Enter your [email or web address] Access Code exactly as it appears below. You will not need to use this code after youve completed the sign-up process. If you do not sign up before the expiration date, you must request a new code. [email or web address] Access Code: Activation code not generated  Current [email or web address] Status: Active (This is the date your [email or web address] access code will )    Enter the last four digits of your Social Security Number (xxxx) and Date of Birth (mm/dd/yyyy) as indicated and click Submit. You will be taken to the next sign-up page. Create a [email or web address] ID. This will be your [email or web address] login ID and cannot be changed, so think of one that is secure and easy to remember. Create a [email or web address] password. You can change your password at any time. Enter your Password Reset Question and Answer. This can be used at a later time if you forget your password. Enter your e-mail address. You will receive e-mail notification when new information is available in [address] Click Sign Up. You can now view and download portions of your medical record. Click the [place] link to download a portable copy of your medical information. Additional Information    If you have questions, please visit the Frequently Asked Questions section of the [email or web address] website at [email or web address] Remember, [email or web address] is NOT to be used for urgent needs. For medical emergencies, dial 911.

## 2023-03-28 NOTE — PROGRESS NOTES
Infectious Disease Clinic      Subjective:     Patient is a 65 y.o. male who is being seen for- PrEP. Ewaaniya Berry Patient is seen in the office. Taking Descovy daily. No concerns for STD. Labs reviewed with patient stable. HB core antibody+  DEXA scan-normal.  Diabetes controlled      Geovany Canchola is a 49 yr old male with history of diabetes, coronary disease, hypertension and hyperlipidemia   who is seen on follow-up for PrEP. He was seeing his PCP since initial visit with ID  Patient states that he  and  are in a polygamous relationships, he needs PrEP even though spouses undetectable. STD- Gonorhea - 35 yrs ago , no recent STD  H/o Hep B core ab +. No known H/o Hep B   HBV negative. Patient Active Problem List   Diagnosis Code    Essential hypertension with goal blood pressure less than 140/90 I10    Coronary artery disease involving native coronary artery of native heart without angina pectoris I25.10    Mild episode of recurrent major depressive disorder (HCC) F33.0    SCOTTY on CPAP G47.33, Z99.89    Chronic systolic congestive heart failure (HCC) I50.22    Low testosterone R79.89    Gastroesophageal reflux disease without esophagitis K21.9    Type 2 diabetes mellitus with nephropathy (HCC) E11.21    Severe obesity (BMI 35.0-39. 9) with comorbidity ([address] E66.01     Past Medical History:   Diagnosis Date    Depression     GERD (gastroesophageal reflux disease)     Headache     Heart attack ([address] 04/2015    Hypertension     OA (osteoarthritis) of knee 09/01/2022    Prediabetes     Tachycardia       Family History   Problem Relation Age of Onset    Cancer Mother         stomach and liver    Hypertension Father     Diabetes Maternal Grandmother     Kidney Disease Maternal Grandmother     Hypertension Maternal Grandfather     No Known Problems Sister     Drug Abuse Brother       Social History     Tobacco Use    Smoking status: Never    Smokeless tobacco: Never   Substance Use Topics    Alcohol use:  Yes Alcohol/week: 0.0 standard drinks     Comment: rarely     Past Surgical History:   Procedure Laterality Date    HX COLONOSCOPY  11/2016    HX GI      colonoscopy    HX OTHER SURGICAL      anal fissure      Prior to Admission medications    Medication Sig Start Date End Date Taking? Authorizing Provider   glucosamine sulfate 500 mg capsule Take 500 mg by mouth daily. Yes Provider, Historical   venlafaxine-SR (EFFEXOR-XR) 75 mg capsule Take 1 Capsule by mouth daily.  3/25/23  Yes Cameron Hannah MD   metFORMIN (GLUCOPHAGE) 500 mg tablet TAKE 2 TABLETS BY MOUTH  TWICE DAILY WITH MEALS 3/21/23  Yes Danae Worthy MD   hydroCHLOROthiazide (HYDRODIURIL) 12.5 mg tablet TAKE 1 TABLET BY MOUTH DAILY FOR HIGH BLOOD PRESSURE 3/14/23  Yes Cameron Hannah MD   losartan (COZAAR) 50 mg tablet TAKE 1 TABLET BY MOUTH  DAILY 2/4/23  Yes Cameron Hannah MD   gabapentin (NEURONTIN) 300 mg capsule TAKE 1 CAPSULE BY MOUTH IN  THE EVENING 2/1/23  Yes Danae Worthy MD   carvediloL (COREG) 6.25 mg tablet TAKE 1 TABLET BY MOUTH TWICE  DAILY 1/31/23  Yes Cameron Hannah MD   testosterone cypionate (DEPOTESTOTERONE CYPIONATE) 200 mg/mL injection Inject 150mg (0.75ml) every week 1/6/23  Yes Danae Worthy MD   Descovy tablet TAKE 1 TABLET BY MOUTH  DAILY 1/5/23  Yes Sinnatamby, Renato Schaumann, MD   BD Luer-Kg Syringe 3 mL 21 gauge x 1\" syrg USE TO DRAW UP TESTOSTERONE ONCE WEEKLY 12/12/22  Yes Danae Worthy MD   Needle, Disp, 16 G (Disposable Needles) 16 gauge x 1\" ndle USE TO DRAW UP TESTOSTERONE ONCE WEEKLY 11/28/22  Yes Danae Worthy MD   rosuvastatin (CRESTOR) 10 mg tablet TAKE 1 TABLET BY MOUTH AT  NIGHT 9/28/22  Yes Cameron Hannah MD   fenofibrate nanocrystallized (TRICOR) 145 mg tablet TAKE 1 TABLET BY MOUTH  DAILY 9/17/22  Yes Cameron Hannah MD   tadalafiL (CIALIS) 10 mg tablet TAKE 1 TABLET BY MOUTH DAILY AS NEEDED FOR ERECTILE DYSFUNCTION 5/11/22  Yes Cameron Hannah MD   multivitamin (ONE A DAY) tablet Take 1 Tablet by mouth daily. Yes Provider, Historical   Syringe with Needle, Disp, 1 mL 20 gauge x 1\" syrg For injecting Testosterone once every week 3/7/22  Yes Marii Orr MD   melatonin 10 mg tab Take  by mouth. Yes Provider, Historical   famotidine (PEPCID) 20 mg tablet Take 20 mg by mouth daily. Yes Provider, Historical   glucose blood VI test strips (ONETOUCH VERIO) strip Check blood sugar once daily. 5/8/19  Yes Marii Orr MD   mometasone (NASONEX) 50 mcg/actuation nasal spray 2 Sprays daily. Yes Other, MD Lottie   aspirin 81 mg chewable tablet Take 81 mg by mouth nightly. Yes Provider, Historical   diclofenac (VOLTAREN) 1 % gel Apply  to affected area four (4) times daily. Patient not taking: Reported on 3/28/2023 11/8/22   Zehra Glover MD     No Known Allergies     Review of Systems:  A comprehensive review of systems was negative except for that written in the History of Present Illness. 10 point ROS obtained. All other systems negative    Objective:   Blood pressure (!) 128/91, pulse 81, temperature 97.7 °F (36.5 °C), temperature source Temporal, resp. rate 18, height 5' 9\" (1.753 m), weight 246 lb 12.8 oz (111.9 kg), SpO2 96 %. @YSDM(79)@  Current Outpatient Medications   Medication Sig    glucosamine sulfate 500 mg capsule Take 500 mg by mouth daily. venlafaxine-SR (EFFEXOR-XR) 75 mg capsule Take 1 Capsule by mouth daily.     metFORMIN (GLUCOPHAGE) 500 mg tablet TAKE 2 TABLETS BY MOUTH  TWICE DAILY WITH MEALS    hydroCHLOROthiazide (HYDRODIURIL) 12.5 mg tablet TAKE 1 TABLET BY MOUTH DAILY FOR HIGH BLOOD PRESSURE    losartan (COZAAR) 50 mg tablet TAKE 1 TABLET BY MOUTH  DAILY    gabapentin (NEURONTIN) 300 mg capsule TAKE 1 CAPSULE BY MOUTH IN  THE EVENING    carvediloL (COREG) 6.25 mg tablet TAKE 1 TABLET BY MOUTH TWICE  DAILY    testosterone cypionate (DEPOTESTOTERONE CYPIONATE) 200 mg/mL injection Inject 150mg (0.75ml) every week    Descovy tablet TAKE 1 TABLET BY MOUTH  DAILY    BD Luer-Kg Syringe 3 mL 21 gauge x 1\" syrg USE TO DRAW UP TESTOSTERONE ONCE WEEKLY    Needle, Disp, 16 G (Disposable Needles) 16 gauge x 1\" ndle USE TO DRAW UP TESTOSTERONE ONCE WEEKLY    rosuvastatin (CRESTOR) 10 mg tablet TAKE 1 TABLET BY MOUTH AT  NIGHT    fenofibrate nanocrystallized (TRICOR) 145 mg tablet TAKE 1 TABLET BY MOUTH  DAILY    tadalafiL (CIALIS) 10 mg tablet TAKE 1 TABLET BY MOUTH DAILY AS NEEDED FOR ERECTILE DYSFUNCTION    multivitamin (ONE A DAY) tablet Take 1 Tablet by mouth daily. Syringe with Needle, Disp, 1 mL 20 gauge x 1\" syrg For injecting Testosterone once every week    melatonin 10 mg tab Take  by mouth. famotidine (PEPCID) 20 mg tablet Take 20 mg by mouth daily. glucose blood VI test strips (ONETOUCH VERIO) strip Check blood sugar once daily. mometasone (NASONEX) 50 mcg/actuation nasal spray 2 Sprays daily. aspirin 81 mg chewable tablet Take 81 mg by mouth nightly. diclofenac (VOLTAREN) 1 % gel Apply  to affected area four (4) times daily. (Patient not taking: Reported on 3/28/2023)     No current facility-administered medications for this visit. PHYSICAL EXAM:  General:          WD, WN. Alert, cooperative, no acute distress    EENT:              EOMI. Anicteric sclerae. MMM  Resp:               CTA bilaterally, no wheezing or rales. No accessory muscle use  CV:                  Regular  rhythm,  No edema  GI:                   Soft, Non distended, Non tender. +Bowel sounds  Neurologic:      Alert and oriented X 3, normal speech,   Psych:             Good insight. Not anxious nor agitated  Skin:                No rashes. No jaundice.     Data Review:    Lab Results   Component Value Date/Time    Culture result: NO GROWTH 1 DAY 08/13/2017 09:11 PM    Culture result: NO GROWTH 6 DAYS 08/13/2017 07:35 PM    Culture result: NO GROWTH 6 DAYS 08/13/2017 07:34 PM          XR Results (most recent):  Results from Appointment encounter on 08/09/21    XR KNEE LT MAX 2 VWS    Narrative  EXAM: XR KNEE LT MAX 2 VWS    INDICATION: Left knee pain is chronic, possible arthritis. COMPARISON: None. FINDINGS: 3 images of 2 views of the left knee demonstrate no acute fracture or  dislocation. There is no effusion. Mild narrowing of the patellofemoral and  medial compartments. Tiny marginal osteophytes. No erosion or chondrocalcinosis. Mild osteopenia. Impression  1. No fracture. 2. Minimal osteoarthritis. IMPRESSION/PLAN:   Pt is on  PrEP, risk factor spouse is HIV +, other partners, on Descovy  Pts \" \"is on Odefsey , undetectable for many years , intermittent blips  H/o DM, HTN  Pt has core ab + , HBV negative   Vitamin D level, bone density normal  Pt advised to exercise , do resistance work, take Vit D supplement , drink lots of water, do a bone      density test  Pt for HIV testing every 3-4 months  , next follow-up 6 months. I have discussed the diagnosis with the patient and the intended plan as seen in the above orders. The patient has received an after-visit summary and questions were answered concerning future plans. I have discussed medication side effects and warnings with the patient as well.     Reviewed test results at length with patient    Signed By: Adonay Rausch MD FACP    March 28, 2023

## 2023-04-21 DIAGNOSIS — Z20.2 STD EXPOSURE: ICD-10-CM

## 2023-04-21 DIAGNOSIS — Z20.6 HIV EXPOSURE: Primary | ICD-10-CM

## 2023-04-21 RX ORDER — TADALAFIL 10 MG/1
10 TABLET ORAL AS NEEDED
Qty: 30 TABLET | Refills: 0 | Status: SHIPPED | OUTPATIENT
Start: 2023-04-21

## 2023-04-21 NOTE — TELEPHONE ENCOUNTER
Future Appointments:  Future Appointments   Date Time Provider [facility] Teresa Stark   5/31/2023 10:15 AM Ynes Ch MD [facility] BS AMB   6/6/2023 11:50 AM Janice Piper MD [facility] BS AMB   9/28/2023  2:30 PM Olvin Saenz MD [facility] BS AMB        Last Appointment With Me:  2/8/2023     Requested Prescriptions     Pending Prescriptions Disp Refills    tadalafiL (CIALIS) 10 mg tablet 30 Tablet 0     Sig: Take 1 Tablet by mouth as needed for Erectile Dysfunction.

## 2023-04-23 DIAGNOSIS — E11.21 TYPE 2 DIABETES MELLITUS WITH NEPHROPATHY (HCC): Primary | ICD-10-CM

## 2023-04-23 DIAGNOSIS — E29.1 HYPOGONADISM IN MALE: Primary | ICD-10-CM

## 2023-04-23 DIAGNOSIS — E78.2 MIXED HYPERLIPIDEMIA: ICD-10-CM

## 2023-04-24 DIAGNOSIS — E29.1 HYPOGONADISM IN MALE: Primary | ICD-10-CM

## 2023-04-24 DIAGNOSIS — E11.21 TYPE 2 DIABETES MELLITUS WITH NEPHROPATHY (HCC): Primary | ICD-10-CM

## 2023-05-20 RX ORDER — FAMOTIDINE 20 MG/1
20 TABLET, FILM COATED ORAL DAILY
COMMUNITY

## 2023-05-20 RX ORDER — LOSARTAN POTASSIUM 50 MG/1
1 TABLET ORAL DAILY
COMMUNITY
Start: 2023-04-25

## 2023-05-20 RX ORDER — ASPIRIN 81 MG/1
81 TABLET, CHEWABLE ORAL
COMMUNITY

## 2023-05-20 RX ORDER — CARVEDILOL 6.25 MG/1
1 TABLET ORAL 2 TIMES DAILY
COMMUNITY
Start: 2023-04-25

## 2023-05-20 RX ORDER — FENOFIBRATE 145 MG/1
145 TABLET, COATED ORAL DAILY
COMMUNITY
Start: 2022-09-17

## 2023-05-20 RX ORDER — TADALAFIL 10 MG/1
10 TABLET ORAL PRN
COMMUNITY
Start: 2023-04-21

## 2023-05-20 RX ORDER — VENLAFAXINE HYDROCHLORIDE 75 MG/1
75 CAPSULE, EXTENDED RELEASE ORAL DAILY
COMMUNITY
Start: 2023-03-25

## 2023-05-20 RX ORDER — GLUCOSAMINE SULFATE 500 MG
500 CAPSULE ORAL DAILY
COMMUNITY

## 2023-05-20 RX ORDER — ROSUVASTATIN CALCIUM 10 MG/1
1 TABLET, COATED ORAL NIGHTLY
COMMUNITY
Start: 2022-09-28 | End: 2023-06-15

## 2023-05-20 RX ORDER — EMTRICITABINE AND TENOFOVIR ALAFENAMIDE 200; 25 MG/1; MG/1
1 TABLET ORAL DAILY
COMMUNITY
Start: 2023-01-05

## 2023-05-20 RX ORDER — HYDROCHLOROTHIAZIDE 12.5 MG/1
TABLET ORAL
COMMUNITY
Start: 2023-03-14 | End: 2023-06-15

## 2023-05-20 RX ORDER — GABAPENTIN 300 MG/1
CAPSULE ORAL
COMMUNITY
Start: 2023-02-01 | End: 2023-07-19

## 2023-05-20 RX ORDER — PHENOL 1.4 %
AEROSOL, SPRAY (ML) MUCOUS MEMBRANE
COMMUNITY

## 2023-05-20 RX ORDER — TESTOSTERONE CYPIONATE 200 MG/ML
INJECTION, SOLUTION INTRAMUSCULAR
COMMUNITY
Start: 2023-01-06

## 2023-05-20 RX ORDER — MOMETASONE FUROATE 50 UG/1
2 SPRAY, METERED NASAL DAILY
COMMUNITY

## 2023-05-24 LAB
ERYTHROCYTE [DISTWIDTH] IN BLOOD BY AUTOMATED COUNT: 13.8 % (ref 11.6–15.4)
HCT VFR BLD AUTO: 49.5 % (ref 37.5–51)
HGB BLD-MCNC: 16.9 G/DL (ref 13–17.7)
MCH RBC QN AUTO: 30.3 PG (ref 26.6–33)
MCHC RBC AUTO-ENTMCNC: 34.1 G/DL (ref 31.5–35.7)
MCV RBC AUTO: 89 FL (ref 79–97)
PLATELET # BLD AUTO: 305 X10E3/UL (ref 150–450)
RBC # BLD AUTO: 5.57 X10E6/UL (ref 4.14–5.8)
WBC # BLD AUTO: 6.1 X10E3/UL (ref 3.4–10.8)

## 2023-05-24 ASSESSMENT — ENCOUNTER SYMPTOMS: SHORTNESS OF BREATH: 0

## 2023-05-25 LAB
25(OH)D3+25(OH)D2 SERPL-MCNC: 36.5 NG/ML (ref 30–100)
ALBUMIN SERPL-MCNC: 4.5 G/DL (ref 3.8–4.9)
ALBUMIN/CREAT UR: 73 MG/G CREAT (ref 0–29)
ALBUMIN/GLOB SERPL: 1.7 {RATIO} (ref 1.2–2.2)
ALP SERPL-CCNC: 49 IU/L (ref 44–121)
ALT SERPL-CCNC: 24 IU/L (ref 0–44)
AST SERPL-CCNC: 26 IU/L (ref 0–40)
BILIRUB SERPL-MCNC: 0.5 MG/DL (ref 0–1.2)
BUN SERPL-MCNC: 13 MG/DL (ref 6–24)
BUN/CREAT SERPL: 14 (ref 9–20)
CALCIUM SERPL-MCNC: 9.8 MG/DL (ref 8.7–10.2)
CHLORIDE SERPL-SCNC: 98 MMOL/L (ref 96–106)
CHOLEST SERPL-MCNC: 116 MG/DL (ref 100–199)
CO2 SERPL-SCNC: 25 MMOL/L (ref 20–29)
CREAT SERPL-MCNC: 0.9 MG/DL (ref 0.76–1.27)
CREAT UR-MCNC: 194.4 MG/DL
EGFRCR SERPLBLD CKD-EPI 2021: 100 ML/MIN/1.73
EST. AVERAGE GLUCOSE BLD GHB EST-MCNC: 123 MG/DL
EST. AVERAGE GLUCOSE BLD GHB EST-MCNC: 123 MG/DL
GLOBULIN SER CALC-MCNC: 2.6 G/DL (ref 1.5–4.5)
GLUCOSE SERPL-MCNC: 116 MG/DL (ref 70–99)
HBA1C MFR BLD: 5.9 % (ref 4.8–5.6)
HBA1C MFR BLD: 5.9 % (ref 4.8–5.6)
HDLC SERPL-MCNC: 31 MG/DL
IMP & REVIEW OF LAB RESULTS: NORMAL
LDLC SERPL CALC-MCNC: 65 MG/DL (ref 0–99)
MICROALBUMIN UR-MCNC: 141.4 UG/ML
POTASSIUM SERPL-SCNC: 4.1 MMOL/L (ref 3.5–5.2)
PROT SERPL-MCNC: 7.1 G/DL (ref 6–8.5)
PSA SERPL-MCNC: 1.4 NG/ML (ref 0–4)
PTH-INTACT SERPL-MCNC: 14 PG/ML (ref 15–65)
REFLEX CRITERIA: NORMAL
SODIUM SERPL-SCNC: 137 MMOL/L (ref 134–144)
TRIGL SERPL-MCNC: 109 MG/DL (ref 0–149)
VLDLC SERPL CALC-MCNC: 20 MG/DL (ref 5–40)

## 2023-05-26 LAB
TESTOST FREE SERPL-MCNC: 21.6 PG/ML (ref 7.2–24)
TESTOST SERPL-MCNC: 865 NG/DL (ref 264–916)

## 2023-05-30 SDOH — ECONOMIC STABILITY: TRANSPORTATION INSECURITY
IN THE PAST 12 MONTHS, HAS LACK OF TRANSPORTATION KEPT YOU FROM MEETINGS, WORK, OR FROM GETTING THINGS NEEDED FOR DAILY LIVING?: NO

## 2023-05-30 SDOH — ECONOMIC STABILITY: HOUSING INSECURITY
IN THE LAST 12 MONTHS, WAS THERE A TIME WHEN YOU DID NOT HAVE A STEADY PLACE TO SLEEP OR SLEPT IN A SHELTER (INCLUDING NOW)?: NO

## 2023-05-30 SDOH — ECONOMIC STABILITY: FOOD INSECURITY: WITHIN THE PAST 12 MONTHS, YOU WORRIED THAT YOUR FOOD WOULD RUN OUT BEFORE YOU GOT MONEY TO BUY MORE.: NEVER TRUE

## 2023-05-30 SDOH — ECONOMIC STABILITY: INCOME INSECURITY: HOW HARD IS IT FOR YOU TO PAY FOR THE VERY BASICS LIKE FOOD, HOUSING, MEDICAL CARE, AND HEATING?: NOT HARD AT ALL

## 2023-05-30 SDOH — ECONOMIC STABILITY: FOOD INSECURITY: WITHIN THE PAST 12 MONTHS, THE FOOD YOU BOUGHT JUST DIDN'T LAST AND YOU DIDN'T HAVE MONEY TO GET MORE.: NEVER TRUE

## 2023-05-31 ENCOUNTER — OFFICE VISIT (OUTPATIENT)
Age: 58
End: 2023-05-31
Payer: COMMERCIAL

## 2023-05-31 VITALS
BODY MASS INDEX: 35.25 KG/M2 | HEIGHT: 69 IN | RESPIRATION RATE: 18 BRPM | SYSTOLIC BLOOD PRESSURE: 116 MMHG | TEMPERATURE: 98.1 F | DIASTOLIC BLOOD PRESSURE: 74 MMHG | OXYGEN SATURATION: 99 % | WEIGHT: 238 LBS | HEART RATE: 68 BPM

## 2023-05-31 DIAGNOSIS — E11.21 TYPE 2 DIABETES MELLITUS WITH NEPHROPATHY (HCC): Primary | ICD-10-CM

## 2023-05-31 DIAGNOSIS — E66.01 SEVERE OBESITY (BMI 35.0-39.9) WITH COMORBIDITY (HCC): ICD-10-CM

## 2023-05-31 DIAGNOSIS — F33.0 MILD EPISODE OF RECURRENT MAJOR DEPRESSIVE DISORDER (HCC): ICD-10-CM

## 2023-05-31 DIAGNOSIS — I25.10 CORONARY ARTERY DISEASE INVOLVING NATIVE CORONARY ARTERY OF NATIVE HEART WITHOUT ANGINA PECTORIS: ICD-10-CM

## 2023-05-31 DIAGNOSIS — E83.52 HYPERCALCEMIA: ICD-10-CM

## 2023-05-31 DIAGNOSIS — D75.1 ERYTHROCYTOSIS: ICD-10-CM

## 2023-05-31 DIAGNOSIS — Z99.89 OSA ON CPAP: ICD-10-CM

## 2023-05-31 DIAGNOSIS — I10 ESSENTIAL HYPERTENSION WITH GOAL BLOOD PRESSURE LESS THAN 140/90: ICD-10-CM

## 2023-05-31 DIAGNOSIS — R79.89 LOW TESTOSTERONE: ICD-10-CM

## 2023-05-31 DIAGNOSIS — G62.9 NEUROPATHY: ICD-10-CM

## 2023-05-31 DIAGNOSIS — I50.22 CHRONIC SYSTOLIC CONGESTIVE HEART FAILURE (HCC): ICD-10-CM

## 2023-05-31 DIAGNOSIS — G47.33 OSA ON CPAP: ICD-10-CM

## 2023-05-31 DIAGNOSIS — K21.9 GASTROESOPHAGEAL REFLUX DISEASE WITHOUT ESOPHAGITIS: ICD-10-CM

## 2023-05-31 PROCEDURE — 4004F PT TOBACCO SCREEN RCVD TLK: CPT | Performed by: INTERNAL MEDICINE

## 2023-05-31 PROCEDURE — 3017F COLORECTAL CA SCREEN DOC REV: CPT | Performed by: INTERNAL MEDICINE

## 2023-05-31 PROCEDURE — G8417 CALC BMI ABV UP PARAM F/U: HCPCS | Performed by: INTERNAL MEDICINE

## 2023-05-31 PROCEDURE — 2022F DILAT RTA XM EVC RTNOPTHY: CPT | Performed by: INTERNAL MEDICINE

## 2023-05-31 PROCEDURE — 3044F HG A1C LEVEL LT 7.0%: CPT | Performed by: INTERNAL MEDICINE

## 2023-05-31 PROCEDURE — 3078F DIAST BP <80 MM HG: CPT | Performed by: INTERNAL MEDICINE

## 2023-05-31 PROCEDURE — 99214 OFFICE O/P EST MOD 30 MIN: CPT | Performed by: INTERNAL MEDICINE

## 2023-05-31 PROCEDURE — 3074F SYST BP LT 130 MM HG: CPT | Performed by: INTERNAL MEDICINE

## 2023-05-31 PROCEDURE — G8427 DOCREV CUR MEDS BY ELIG CLIN: HCPCS | Performed by: INTERNAL MEDICINE

## 2023-05-31 ASSESSMENT — PATIENT HEALTH QUESTIONNAIRE - PHQ9
3. TROUBLE FALLING OR STAYING ASLEEP: 0
6. FEELING BAD ABOUT YOURSELF - OR THAT YOU ARE A FAILURE OR HAVE LET YOURSELF OR YOUR FAMILY DOWN: 0
SUM OF ALL RESPONSES TO PHQ QUESTIONS 1-9: 0
4. FEELING TIRED OR HAVING LITTLE ENERGY: 0
7. TROUBLE CONCENTRATING ON THINGS, SUCH AS READING THE NEWSPAPER OR WATCHING TELEVISION: 0
1. LITTLE INTEREST OR PLEASURE IN DOING THINGS: 0
9. THOUGHTS THAT YOU WOULD BE BETTER OFF DEAD, OR OF HURTING YOURSELF: 0
8. MOVING OR SPEAKING SO SLOWLY THAT OTHER PEOPLE COULD HAVE NOTICED. OR THE OPPOSITE, BEING SO FIGETY OR RESTLESS THAT YOU HAVE BEEN MOVING AROUND A LOT MORE THAN USUAL: 0
SUM OF ALL RESPONSES TO PHQ9 QUESTIONS 1 & 2: 0
5. POOR APPETITE OR OVEREATING: 0
10. IF YOU CHECKED OFF ANY PROBLEMS, HOW DIFFICULT HAVE THESE PROBLEMS MADE IT FOR YOU TO DO YOUR WORK, TAKE CARE OF THINGS AT HOME, OR GET ALONG WITH OTHER PEOPLE: 0
2. FEELING DOWN, DEPRESSED OR HOPELESS: 0
SUM OF ALL RESPONSES TO PHQ QUESTIONS 1-9: 0

## 2023-05-31 NOTE — PROGRESS NOTES
1. \"Have you been to the ER, urgent care clinic since your last visit? Hospitalized since your last visit? \" No    2. \"Have you seen or consulted any other health care providers outside of the [address] [place] [address] since your last visit? \" No     3. For patients aged 39-70: Has the patient had a colonoscopy / FIT/ Cologuard?  Yes no care gap present

## 2023-06-01 DIAGNOSIS — E29.1 HYPOGONADISM IN MALE: ICD-10-CM

## 2023-06-01 DIAGNOSIS — E78.2 MIXED HYPERLIPIDEMIA: ICD-10-CM

## 2023-06-01 DIAGNOSIS — E11.21 TYPE 2 DIABETES MELLITUS WITH NEPHROPATHY (HCC): ICD-10-CM

## 2023-06-01 DIAGNOSIS — I10 ESSENTIAL HYPERTENSION: ICD-10-CM

## 2023-06-01 LAB
ALBUMIN SERPL-MCNC: 4.8 G/DL (ref 3.8–4.9)
ALBUMIN/GLOB SERPL: 1.6 {RATIO} (ref 1.2–2.2)
ALP SERPL-CCNC: 54 IU/L (ref 44–121)
ALT SERPL-CCNC: 15 IU/L (ref 0–44)
AST SERPL-CCNC: 22 IU/L (ref 0–40)
BILIRUB SERPL-MCNC: <0.2 MG/DL (ref 0–1.2)
BUN SERPL-MCNC: 15 MG/DL (ref 6–24)
BUN/CREAT SERPL: 13 (ref 9–20)
CALCIUM SERPL-MCNC: 9.8 MG/DL (ref 8.7–10.2)
CHLORIDE SERPL-SCNC: 108 MMOL/L (ref 96–106)
CO2 SERPL-SCNC: 20 MMOL/L (ref 20–29)
CREAT SERPL-MCNC: 1.16 MG/DL (ref 0.76–1.27)
EGFRCR SERPLBLD CKD-EPI 2021: 74 ML/MIN/1.73
GLOBULIN SER CALC-MCNC: 3 G/DL (ref 1.5–4.5)
GLUCOSE SERPL-MCNC: 122 MG/DL (ref 70–99)
POTASSIUM SERPL-SCNC: 4.4 MMOL/L (ref 3.5–5.2)
PROT SERPL-MCNC: 7.8 G/DL (ref 6–8.5)
SODIUM SERPL-SCNC: 151 MMOL/L (ref 134–144)

## 2023-06-02 LAB — TSH SERPL DL<=0.005 MIU/L-ACNC: 0.85 UIU/ML (ref 0.45–4.5)

## 2023-06-06 ENCOUNTER — TELEMEDICINE (OUTPATIENT)
Age: 58
End: 2023-06-06
Payer: COMMERCIAL

## 2023-06-06 DIAGNOSIS — I10 ESSENTIAL (PRIMARY) HYPERTENSION: ICD-10-CM

## 2023-06-06 DIAGNOSIS — E78.2 MIXED HYPERLIPIDEMIA: ICD-10-CM

## 2023-06-06 DIAGNOSIS — E29.1 TESTICULAR HYPOFUNCTION: ICD-10-CM

## 2023-06-06 DIAGNOSIS — E11.21 TYPE 2 DIABETES MELLITUS WITH DIABETIC NEPHROPATHY, WITHOUT LONG-TERM CURRENT USE OF INSULIN (HCC): Primary | ICD-10-CM

## 2023-06-06 PROCEDURE — G8427 DOCREV CUR MEDS BY ELIG CLIN: HCPCS | Performed by: INTERNAL MEDICINE

## 2023-06-06 PROCEDURE — G8417 CALC BMI ABV UP PARAM F/U: HCPCS | Performed by: INTERNAL MEDICINE

## 2023-06-06 PROCEDURE — 1036F TOBACCO NON-USER: CPT | Performed by: INTERNAL MEDICINE

## 2023-06-06 PROCEDURE — 3044F HG A1C LEVEL LT 7.0%: CPT | Performed by: INTERNAL MEDICINE

## 2023-06-06 PROCEDURE — 2022F DILAT RTA XM EVC RTNOPTHY: CPT | Performed by: INTERNAL MEDICINE

## 2023-06-06 PROCEDURE — 99214 OFFICE O/P EST MOD 30 MIN: CPT | Performed by: INTERNAL MEDICINE

## 2023-06-06 PROCEDURE — 3017F COLORECTAL CA SCREEN DOC REV: CPT | Performed by: INTERNAL MEDICINE

## 2023-06-06 NOTE — PROGRESS NOTES
lunch around Noon-1PM, yesterday he had a hamburger, no side items and crystal lite. - He has dinner around 630PM, last night he had chicken, lima beans and crystal lite. Pt has hx of elevated Urine MA/Cr. He notes he will occasionally get tingling and itching in his left foot. Last eye exam was January 2023. \"Everything was fine\". Pt was first diagnosed with hypogonadism around 2015. He was having issues of feeling very sluggish. He was tested for his testosterone and \"it was pretty low\". He is currently taking a weekly injection of Testosterone Cypionate (75ml/150mg). He takes his injection every Sunday. He denies issues of CP, SOB, palpitations, HAs, or LUTS symptoms. He denies issues of mood swings or irritability. He gives himself his injection in the muscle of the thigh. He does not have any injection site issues. Pt follows with Dr. Darryl Polanco (pulm) annually, for JANEY.   Pt follows with Dr. Merlyn Dos Santos (GI)    Current Outpatient Medications   Medication Sig    Multiple Vitamin (MULTI-VITAMIN DAILY PO) Take by mouth daily    FIBER PO Take 2 tablets by mouth daily    aspirin 81 MG chewable tablet Take 1 tablet by mouth    carvedilol (COREG) 6.25 MG tablet Take 1 tablet by mouth 2 times daily    diclofenac sodium (VOLTAREN) 1 % GEL Apply topically 4 times daily    emtricitabine-tenofovir alafenamide (DESCOVY) 200-25 MG TABS tablet Take 1 tablet by mouth daily    famotidine (PEPCID) 20 MG tablet Take 1 tablet by mouth daily    fenofibrate (TRICOR) 145 MG tablet Take 1 tablet by mouth daily    gabapentin (NEURONTIN) 300 MG capsule TAKE 1 CAPSULE BY MOUTH IN  THE EVENING    Glucosamine 500 MG CAPS Take 500 mg by mouth daily    hydroCHLOROthiazide (HYDRODIURIL) 12.5 MG tablet TAKE 1 TABLET BY MOUTH DAILY FOR HIGH BLOOD PRESSURE    losartan (COZAAR) 50 MG tablet Take 1 tablet by mouth daily    Melatonin 10 MG TABS Take by mouth    metFORMIN (GLUCOPHAGE) 500 MG tablet Take 2 tablets by mouth 2 times

## 2023-06-15 RX ORDER — HYDROCHLOROTHIAZIDE 12.5 MG/1
TABLET ORAL
Qty: 90 TABLET | Refills: 0 | Status: SHIPPED | OUTPATIENT
Start: 2023-06-15 | End: 2023-06-16

## 2023-06-15 RX ORDER — ROSUVASTATIN CALCIUM 10 MG/1
TABLET, COATED ORAL
Qty: 90 TABLET | Refills: 0 | Status: SHIPPED | OUTPATIENT
Start: 2023-06-15 | End: 2023-06-16

## 2023-06-16 RX ORDER — ROSUVASTATIN CALCIUM 10 MG/1
TABLET, COATED ORAL
Qty: 90 TABLET | Refills: 3 | Status: SHIPPED | OUTPATIENT
Start: 2023-06-16

## 2023-06-16 RX ORDER — HYDROCHLOROTHIAZIDE 12.5 MG/1
TABLET ORAL
Qty: 90 TABLET | Refills: 1 | Status: SHIPPED | OUTPATIENT
Start: 2023-06-16

## 2023-07-18 DIAGNOSIS — Z79.4 TYPE 2 DIABETES MELLITUS WITH DIABETIC POLYNEUROPATHY, WITH LONG-TERM CURRENT USE OF INSULIN (HCC): Primary | ICD-10-CM

## 2023-07-18 DIAGNOSIS — E11.42 TYPE 2 DIABETES MELLITUS WITH DIABETIC POLYNEUROPATHY, WITH LONG-TERM CURRENT USE OF INSULIN (HCC): Primary | ICD-10-CM

## 2023-07-19 RX ORDER — GABAPENTIN 300 MG/1
CAPSULE ORAL
Qty: 90 CAPSULE | Refills: 3 | Status: SHIPPED | OUTPATIENT
Start: 2023-07-19 | End: 2024-07-19

## 2023-07-24 ENCOUNTER — PATIENT MESSAGE (OUTPATIENT)
Age: 58
End: 2023-07-24

## 2023-07-24 RX ORDER — VENLAFAXINE HYDROCHLORIDE 150 MG/1
150 CAPSULE, EXTENDED RELEASE ORAL DAILY
Qty: 90 CAPSULE | Refills: 0 | Status: SHIPPED | OUTPATIENT
Start: 2023-07-24

## 2023-07-24 NOTE — TELEPHONE ENCOUNTER
Future Appointments:  Future Appointments   Date Time Provider [address]   9/11/2023 10:00 AM Vikas Jacobs MD [facility] BS AMB   9/28/2023  2:30 PM Bernis Oppenheim, MD [facility] [place] BS AMB   12/5/2023 11:30 AM Jose Juan Prescott MD [facility] BS AMB        Last Appointment With Me:  5/31/2023     Requested Prescriptions     Pending Prescriptions Disp Refills    venlafaxine (EFFEXOR XR) 150 MG extended release capsule 90 capsule 0     Sig: Take 1 capsule by mouth daily

## 2023-07-24 NOTE — TELEPHONE ENCOUNTER
Radha Lariosing 7/24/2023 2:04 PM EDT      ----- Message -----  From: Merline Lima  Sent: 7/24/2023 1:11 PM EDT  To: , *  Subject: Venlafaxine (effexor)     I had tried to lower my prescription for effexor from 150mg to 75 mg a day. But after a few months I did not like the feeling I got from the lower prescription. So I have switched back to the 150mg dose of effexor (Venlafaxine.) I you cold put through a prescription refil to my mail order prescription company, I would appreicate it.

## 2023-08-04 RX ORDER — VENLAFAXINE HYDROCHLORIDE 150 MG/1
150 CAPSULE, EXTENDED RELEASE ORAL DAILY
Qty: 90 CAPSULE | Refills: 0 | Status: SHIPPED | OUTPATIENT
Start: 2023-08-04

## 2023-08-05 RX ORDER — LOSARTAN POTASSIUM 50 MG/1
TABLET ORAL DAILY
Qty: 90 TABLET | Refills: 0 | Status: SHIPPED | OUTPATIENT
Start: 2023-08-05 | End: 2023-10-01

## 2023-08-15 DIAGNOSIS — Z20.2 STD EXPOSURE: ICD-10-CM

## 2023-08-15 DIAGNOSIS — E11.21 TYPE 2 DIABETES MELLITUS WITH NEPHROPATHY (HCC): Primary | ICD-10-CM

## 2023-08-15 DIAGNOSIS — T50.905D ADVERSE EFFECT OF DRUG, SUBSEQUENT ENCOUNTER: ICD-10-CM

## 2023-08-15 DIAGNOSIS — Z20.6 HIV EXPOSURE: ICD-10-CM

## 2023-08-20 DIAGNOSIS — E29.1 HYPOGONADISM IN MALE: Primary | ICD-10-CM

## 2023-08-21 RX ORDER — TESTOSTERONE CYPIONATE 200 MG/ML
INJECTION, SOLUTION INTRAMUSCULAR
Qty: 12 ML | Refills: 3 | Status: SHIPPED | OUTPATIENT
Start: 2023-08-21 | End: 2024-08-21

## 2023-08-27 RX ORDER — HYDROCHLOROTHIAZIDE 12.5 MG/1
TABLET ORAL
Qty: 90 TABLET | Refills: 0 | Status: SHIPPED | OUTPATIENT
Start: 2023-08-27

## 2023-08-27 RX ORDER — FENOFIBRATE 145 MG/1
145 TABLET, COATED ORAL DAILY
Qty: 90 TABLET | Refills: 3 | Status: SHIPPED | OUTPATIENT
Start: 2023-08-27

## 2023-08-27 RX ORDER — ROSUVASTATIN CALCIUM 10 MG/1
TABLET, COATED ORAL
Qty: 90 TABLET | Refills: 3 | Status: SHIPPED | OUTPATIENT
Start: 2023-08-27

## 2023-08-30 ASSESSMENT — ENCOUNTER SYMPTOMS: SHORTNESS OF BREATH: 0

## 2023-08-30 NOTE — PROGRESS NOTES
HISTORY OF PRESENT ILLNESS  Garry Santos is a 65 y.o. male. HPI    Last here 5/31/23. Pt is here to f/u on chronic conditions. Has a history of hypertension  BP today is 119/76  BP at home 120s/70s  Continues on losartan 50mg, coreg 6.25mg BID and HCTZ  25 mg      He is diabetic   125  Taking Metformin 2 tablets 500mg BID   He also takes ASA 81mg daily      Wt today is 235 lbs, down 3 lbs since lov   Discussed diet and wt/l  Recall saxenda 3mg caused him to feel bloated and uncomfortable      Reviewed labs    Ordered pre-labs      He follows with endocrinology Dr. [facility] (endo) for PSA and testosterone level checks   Lov 6/6/23,  Reviewed note:  Assessment/Plan:   1) DM > His A1C last week was 5.9%. Pt to continue the Metformin 1000mg BID. With his hx of neuropathy and calluses he would benefit from DM shoes and inserts. 2) Hypogonadism > He is doing well clinically, on Testosterone 150mg weekly. Pt denies issues of LUTS, CP, SOB or HAs. Pt to continue the Testosterone 150mg weekly. His Testosterone last week was 865, his HgB was 116.9 and his HCT was 49.5. 3) HTN >  Pt is on an  ARB for renal protection. Will not make any changes at this time     4) HLD > Pt is tolerating the Rosuvastatin 10mg daily. His lipid panel in May 2023 was at goal.     Pt voices understanding and agreement with the plan.      RTC 6 months  Lov We have discussed slightly elevated Ca levels on previous labs   Discussed w/ patient that his PTH was low though Ca was nl on recent labs, advised him to discuss this w/ Dr. [facility] at next visit patient is aware and states he will  Discussed testosterone can cause erythrocytosis        Of note, he is on a lower dose of testosterone d/t syncopal event in the past     He saw Dr. Evelina Steiner (neph) in 12/22 to w/u kidney fx due to progressively elevating microalbuminuria  No med changes, will f/U in 1 year      He saw Dr. Tom Bergman  for BL knee pain, osteoarthritis   Last visit w/ PA

## 2023-09-11 ENCOUNTER — OFFICE VISIT (OUTPATIENT)
Age: 58
End: 2023-09-11
Payer: COMMERCIAL

## 2023-09-11 VITALS
OXYGEN SATURATION: 99 % | WEIGHT: 235 LBS | DIASTOLIC BLOOD PRESSURE: 76 MMHG | BODY MASS INDEX: 34.8 KG/M2 | TEMPERATURE: 98.3 F | HEIGHT: 69 IN | SYSTOLIC BLOOD PRESSURE: 119 MMHG | HEART RATE: 91 BPM | RESPIRATION RATE: 18 BRPM

## 2023-09-11 DIAGNOSIS — Z00.00 PHYSICAL EXAM: Primary | ICD-10-CM

## 2023-09-11 DIAGNOSIS — Z23 NEEDS FLU SHOT: ICD-10-CM

## 2023-09-11 DIAGNOSIS — G47.33 OSA ON CPAP: ICD-10-CM

## 2023-09-11 DIAGNOSIS — F33.0 MILD EPISODE OF RECURRENT MAJOR DEPRESSIVE DISORDER (HCC): ICD-10-CM

## 2023-09-11 DIAGNOSIS — K21.9 GASTROESOPHAGEAL REFLUX DISEASE WITHOUT ESOPHAGITIS: ICD-10-CM

## 2023-09-11 DIAGNOSIS — E66.01 SEVERE OBESITY (BMI 35.0-39.9) WITH COMORBIDITY (HCC): ICD-10-CM

## 2023-09-11 DIAGNOSIS — Z99.89 OSA ON CPAP: ICD-10-CM

## 2023-09-11 DIAGNOSIS — E11.21 TYPE 2 DIABETES MELLITUS WITH NEPHROPATHY (HCC): ICD-10-CM

## 2023-09-11 DIAGNOSIS — I10 ESSENTIAL HYPERTENSION WITH GOAL BLOOD PRESSURE LESS THAN 140/90: ICD-10-CM

## 2023-09-11 DIAGNOSIS — I25.10 CORONARY ARTERY DISEASE INVOLVING NATIVE CORONARY ARTERY OF NATIVE HEART WITHOUT ANGINA PECTORIS: ICD-10-CM

## 2023-09-11 DIAGNOSIS — I50.22 CHRONIC SYSTOLIC CONGESTIVE HEART FAILURE (HCC): ICD-10-CM

## 2023-09-11 DIAGNOSIS — R79.89 LOW TESTOSTERONE: ICD-10-CM

## 2023-09-11 DIAGNOSIS — G62.9 NEUROPATHY: ICD-10-CM

## 2023-09-11 PROCEDURE — 99396 PREV VISIT EST AGE 40-64: CPT | Performed by: INTERNAL MEDICINE

## 2023-09-11 PROCEDURE — 90674 CCIIV4 VAC NO PRSV 0.5 ML IM: CPT | Performed by: INTERNAL MEDICINE

## 2023-09-11 PROCEDURE — 3078F DIAST BP <80 MM HG: CPT | Performed by: INTERNAL MEDICINE

## 2023-09-11 PROCEDURE — 90471 IMMUNIZATION ADMIN: CPT | Performed by: INTERNAL MEDICINE

## 2023-09-11 PROCEDURE — 3074F SYST BP LT 130 MM HG: CPT | Performed by: INTERNAL MEDICINE

## 2023-09-11 ASSESSMENT — PATIENT HEALTH QUESTIONNAIRE - PHQ9
8. MOVING OR SPEAKING SO SLOWLY THAT OTHER PEOPLE COULD HAVE NOTICED. OR THE OPPOSITE, BEING SO FIGETY OR RESTLESS THAT YOU HAVE BEEN MOVING AROUND A LOT MORE THAN USUAL: 0
5. POOR APPETITE OR OVEREATING: 0
7. TROUBLE CONCENTRATING ON THINGS, SUCH AS READING THE NEWSPAPER OR WATCHING TELEVISION: 0
4. FEELING TIRED OR HAVING LITTLE ENERGY: 0
3. TROUBLE FALLING OR STAYING ASLEEP: 0
SUM OF ALL RESPONSES TO PHQ QUESTIONS 1-9: 0
SUM OF ALL RESPONSES TO PHQ9 QUESTIONS 1 & 2: 0
1. LITTLE INTEREST OR PLEASURE IN DOING THINGS: 0
6. FEELING BAD ABOUT YOURSELF - OR THAT YOU ARE A FAILURE OR HAVE LET YOURSELF OR YOUR FAMILY DOWN: 0
SUM OF ALL RESPONSES TO PHQ QUESTIONS 1-9: 0
9. THOUGHTS THAT YOU WOULD BE BETTER OFF DEAD, OR OF HURTING YOURSELF: 0
SUM OF ALL RESPONSES TO PHQ QUESTIONS 1-9: 0
2. FEELING DOWN, DEPRESSED OR HOPELESS: 0
SUM OF ALL RESPONSES TO PHQ QUESTIONS 1-9: 0
10. IF YOU CHECKED OFF ANY PROBLEMS, HOW DIFFICULT HAVE THESE PROBLEMS MADE IT FOR YOU TO DO YOUR WORK, TAKE CARE OF THINGS AT HOME, OR GET ALONG WITH OTHER PEOPLE: 0

## 2023-09-11 NOTE — PROGRESS NOTES
1. \"Have you been to the ER, urgent care clinic since your last visit? Hospitalized since your last visit? \" No    2. \"Have you seen or consulted any other health care providers outside of the [address] [place] [address] since your last visit? \" No     3. For patients aged 43-73: Has the patient had a colonoscopy / FIT/ Cologuard? Yes - Care Gap present.  Most recent result on file

## 2023-09-27 ENCOUNTER — TELEPHONE (OUTPATIENT)
Age: 58
End: 2023-09-27

## 2023-09-28 NOTE — TELEPHONE ENCOUNTER
Spoke with patient HIPAA verified .  Appointment has been cancel advised will call back with new appointment

## 2023-10-01 RX ORDER — LOSARTAN POTASSIUM 50 MG/1
TABLET ORAL DAILY
Qty: 90 TABLET | Refills: 1 | Status: SHIPPED | OUTPATIENT
Start: 2023-10-01

## 2023-10-10 RX ORDER — VENLAFAXINE HYDROCHLORIDE 150 MG/1
150 CAPSULE, EXTENDED RELEASE ORAL DAILY
Qty: 90 CAPSULE | Refills: 0 | Status: SHIPPED | OUTPATIENT
Start: 2023-10-10

## 2023-10-12 RX ORDER — CARVEDILOL 6.25 MG/1
6.25 TABLET ORAL 2 TIMES DAILY
Qty: 180 TABLET | Refills: 0 | Status: SHIPPED | OUTPATIENT
Start: 2023-10-12

## 2023-10-19 ENCOUNTER — TELEMEDICINE (OUTPATIENT)
Age: 58
End: 2023-10-19

## 2023-10-19 ENCOUNTER — TELEPHONE (OUTPATIENT)
Age: 58
End: 2023-10-19

## 2023-10-19 DIAGNOSIS — R76.8 HEPATITIS B CORE ANTIBODY POSITIVE: ICD-10-CM

## 2023-10-19 DIAGNOSIS — T50.905D ADVERSE EFFECT OF DRUG, SUBSEQUENT ENCOUNTER: ICD-10-CM

## 2023-10-19 DIAGNOSIS — Z20.2 STD EXPOSURE: ICD-10-CM

## 2023-10-19 DIAGNOSIS — E66.9 OBESITY (BMI 30.0-34.9): ICD-10-CM

## 2023-10-19 DIAGNOSIS — R79.89 LOW TESTOSTERONE: ICD-10-CM

## 2023-10-19 DIAGNOSIS — I10 PRIMARY HYPERTENSION: ICD-10-CM

## 2023-10-19 DIAGNOSIS — Z20.6 EXPOSURE TO HIV: Primary | ICD-10-CM

## 2023-10-19 DIAGNOSIS — E11.21 TYPE 2 DIABETES MELLITUS WITH NEPHROPATHY (HCC): ICD-10-CM

## 2023-10-19 DIAGNOSIS — K21.9 GASTROESOPHAGEAL REFLUX DISEASE WITHOUT ESOPHAGITIS: ICD-10-CM

## 2023-10-19 DIAGNOSIS — G47.33 OSA ON CPAP: ICD-10-CM

## 2023-10-19 RX ORDER — TADALAFIL 10 MG/1
10 TABLET ORAL DAILY PRN
Qty: 30 TABLET | Refills: 0 | Status: SHIPPED | OUTPATIENT
Start: 2023-10-19

## 2023-10-19 ASSESSMENT — PATIENT HEALTH QUESTIONNAIRE - PHQ9
SUM OF ALL RESPONSES TO PHQ9 QUESTIONS 1 & 2: 0
SUM OF ALL RESPONSES TO PHQ QUESTIONS 1-9: 0
SUM OF ALL RESPONSES TO PHQ QUESTIONS 1-9: 0
2. FEELING DOWN, DEPRESSED OR HOPELESS: 0
SUM OF ALL RESPONSES TO PHQ QUESTIONS 1-9: 0
SUM OF ALL RESPONSES TO PHQ QUESTIONS 1-9: 0
1. LITTLE INTEREST OR PLEASURE IN DOING THINGS: 0

## 2023-10-19 NOTE — TELEPHONE ENCOUNTER
Please mail lab slip and appointment date for patient  Labs to be done 2 to 3 weeks prior to next appointment- next appointment end of February in person or virtual.  Thanks

## 2023-10-19 NOTE — PROGRESS NOTES
Chief Complaint   Patient presents with    Follow-up     1. \"Have you been to the ER, urgent care clinic since your last visit? Hospitalized since your last visit? \" no    2. \"Have you seen or consulted any other health care providers outside of the [address] [place] [address] since your last visit? \" yes     3. For patients aged 43-73: Has the patient had a colonoscopy / FIT/ Cologuard?  Yes    Patient identified by two identifiers    Patient states no refill needed at this time

## 2023-10-19 NOTE — PROGRESS NOTES
Infectious Disease Progress        Impression / Plan    Below is the assessment and plan developed based on review of pertinent history, physical exam, labs, studies, and medications. IMPRESSION/PLAN:   Pt is on  PrEP, risk factor spouse is HIV +, other partners, on Descovy  Pts \" \"is on Odefsey , undetectable for many years , intermittent blips  H/o DM, HTN  Pt has core ab + , HBV negative   Vitamin D level, bone density normal  Pt advised to exercise , do resistance work, take Vit D supplement , drink lots of water, do a bone      density test  Pt for HIV testing every 3-4 months  , next follow-up 6 months.        Chlamydia trachomatis, BRUNO Negative Negative    Neisseria Gonorrhoeae, BRUNO Negative Negative      Component Ref Range & Units 9/27/23 1122 3/21/23 0818 8/22/22 0914 11/3/21 0836 7/28/21 0804 4/9/21 0806 12/14/20 0912   RPR Non Reactive Non Reactive  Non Reactive  Non Reactive  Non Reactive  Non Reactive  Non Reactive  Non        Component Ref Range & Units    Hep A IgM Negative Negative    Hepatitis B Surface Ag Negative Negative    Hep B Core Ab, IgM Negative Negative    Hepatitis C Ab Non Reactive Non Reactive             Component Ref Range & Units 9/27/23 1122 5/24/23 0814 5/24/23 0813 3/21/23 0818 2/1/23 0803 11/30/22 0757 11/1/22 0801   Glucose 70 - 99 mg/dL 101 High   116 High   122 High   134 High   113 High   131 High   127 High     BUN 6 - 24 mg/dL 12  13  15  12  14  12  15    Creatinine 0.76 - 1.27 mg/dL 0.96  0.90  1.16  0.96  1.14  1.06         WBC 3.4 - 10.8 x10E3/uL 7.5  6.1  7.2  7.3  8.0  8.7  9.0    RBC 4.14 - 5.80 x10E6/uL 5.54  5.57  5.84 High   6.12 High   5.91 High   5.80  6.28 High     Hemoglobin 13.0 - 17.7 g/dL 17.1  16.9  17.8 High   18.3 High   17.4  17.5           Extensive review of chart notes, labs, imaging, cultures done  Additionally review of done: Recent reports-Labs, cultures, imaging  D/w -hospitalist, NIRMAL Rivera, was evaluated through a

## 2023-10-20 NOTE — TELEPHONE ENCOUNTER
Patient is aware and verbalized understanding. Also verified patient mailing address to send lab slip.

## 2023-10-30 ENCOUNTER — TELEMEDICINE (OUTPATIENT)
Age: 58
End: 2023-10-30
Payer: COMMERCIAL

## 2023-10-30 DIAGNOSIS — B86 SCABIES: Primary | ICD-10-CM

## 2023-10-30 DIAGNOSIS — B85.2 LICE: ICD-10-CM

## 2023-10-30 PROCEDURE — 3017F COLORECTAL CA SCREEN DOC REV: CPT | Performed by: INTERNAL MEDICINE

## 2023-10-30 PROCEDURE — 99213 OFFICE O/P EST LOW 20 MIN: CPT | Performed by: INTERNAL MEDICINE

## 2023-10-30 PROCEDURE — G8427 DOCREV CUR MEDS BY ELIG CLIN: HCPCS | Performed by: INTERNAL MEDICINE

## 2023-10-30 RX ORDER — IVERMECTIN 3 MG/1
200 TABLET ORAL ONCE
Qty: 7 TABLET | Refills: 1 | Status: SHIPPED | OUTPATIENT
Start: 2023-10-30 | End: 2023-10-30

## 2023-10-30 RX ORDER — PERMETHRIN 50 MG/G
CREAM TOPICAL
Qty: 60 G | Refills: 0 | Status: SHIPPED | OUTPATIENT
Start: 2023-10-30

## 2023-10-30 ASSESSMENT — PATIENT HEALTH QUESTIONNAIRE - PHQ9
SUM OF ALL RESPONSES TO PHQ QUESTIONS 1-9: 0
SUM OF ALL RESPONSES TO PHQ QUESTIONS 1-9: 0
2. FEELING DOWN, DEPRESSED OR HOPELESS: 0
SUM OF ALL RESPONSES TO PHQ9 QUESTIONS 1 & 2: 0
SUM OF ALL RESPONSES TO PHQ QUESTIONS 1-9: 0
1. LITTLE INTEREST OR PLEASURE IN DOING THINGS: 0
SUM OF ALL RESPONSES TO PHQ QUESTIONS 1-9: 0

## 2023-10-30 ASSESSMENT — ENCOUNTER SYMPTOMS: SHORTNESS OF BREATH: 0

## 2023-10-30 NOTE — PROGRESS NOTES
TRIG 109 05/24/2023    HDL 31 (L) 05/24/2023    LDLCALC 65 05/24/2023     Lab Results   Component Value Date    CREATININE 0.96 09/27/2023    BUN 12 09/27/2023     09/27/2023    K 4.5 09/27/2023    CL 98 09/27/2023    CO2 26 09/27/2023       Review of Systems   Respiratory:  Negative for shortness of breath. Cardiovascular:  Negative for chest pain. Physical Exam  Constitutional:       General: He is not in acute distress. Appearance: Normal appearance. HENT:      Head: Normocephalic and atraumatic. Eyes:      General:         Right eye: No discharge. Left eye: No discharge. Pulmonary:      Effort: Pulmonary effort is normal. No respiratory distress. Neurological:      Mental Status: He is alert and oriented to person, place, and time. Mental status is at baseline. Psychiatric:         Mood and Affect: Mood normal.     Large beard  Patient has no insect to show currently      ASSESSMENT and PLAN   Diagnosis Orders   1. Scabies       Patient has been dealing with an insect infestation for several months per his report    Unclear that it is actual body lice    He has not noticed any nits in his clothing or seen nits per se he says the insects stays on his legs so he cannot see any currently    He has a lot of body hair in his back and on his legs and has a very large beard    The insects are more embedded in his skin rather than moving around they are itchy may be more likely scabies than lice    Either way we will treat him with permethrin from the neck down needs to keep the solution on for 8 to 14 hours then wash off    We will also give him a dose of oral ivermectin to help clear the infestation    Gave him instructions to wash all of his clothing and bedding under hot water to look for evidence of bedbugs gave him directions in regards to body lice what they look like cleaning of the clothing to get rid of them etc. answered all questions   2.  Lice     See above

## 2023-11-16 RX ORDER — HYDROCHLOROTHIAZIDE 12.5 MG/1
TABLET ORAL
Qty: 90 TABLET | Refills: 1 | Status: SHIPPED | OUTPATIENT
Start: 2023-11-16

## 2023-11-28 LAB
ALBUMIN SERPL-MCNC: 4.7 G/DL (ref 3.8–4.9)
ALBUMIN/GLOB SERPL: 1.9 {RATIO} (ref 1.2–2.2)
ALP SERPL-CCNC: 45 IU/L (ref 44–121)
ALT SERPL-CCNC: 24 IU/L (ref 0–44)
AST SERPL-CCNC: 21 IU/L (ref 0–40)
BILIRUB SERPL-MCNC: 0.5 MG/DL (ref 0–1.2)
BUN SERPL-MCNC: 16 MG/DL (ref 6–24)
BUN/CREAT SERPL: 15 (ref 9–20)
CALCIUM SERPL-MCNC: 10 MG/DL (ref 8.7–10.2)
CHLORIDE SERPL-SCNC: 97 MMOL/L (ref 96–106)
CO2 SERPL-SCNC: 25 MMOL/L (ref 20–29)
CREAT SERPL-MCNC: 1.1 MG/DL (ref 0.76–1.27)
EGFRCR SERPLBLD CKD-EPI 2021: 78 ML/MIN/1.73
ERYTHROCYTE [DISTWIDTH] IN BLOOD BY AUTOMATED COUNT: 13.4 % (ref 11.6–15.4)
GLOBULIN SER CALC-MCNC: 2.5 G/DL (ref 1.5–4.5)
GLUCOSE SERPL-MCNC: 118 MG/DL (ref 70–99)
HBA1C MFR BLD: 5.7 % (ref 4.8–5.6)
HBA1C MFR BLD: 5.7 % (ref 4.8–5.6)
HCT VFR BLD AUTO: 50.3 % (ref 37.5–51)
HGB BLD-MCNC: 17.4 G/DL (ref 13–17.7)
MCH RBC QN AUTO: 31.2 PG (ref 26.6–33)
MCHC RBC AUTO-ENTMCNC: 34.6 G/DL (ref 31.5–35.7)
MCV RBC AUTO: 90 FL (ref 79–97)
NRBC BLD AUTO-RTO: 1 % (ref 0–0)
PLATELET # BLD AUTO: 266 X10E3/UL (ref 150–450)
POTASSIUM SERPL-SCNC: 4.3 MMOL/L (ref 3.5–5.2)
PROT SERPL-MCNC: 7.2 G/DL (ref 6–8.5)
PSA SERPL-MCNC: 1.5 NG/ML (ref 0–4)
RBC # BLD AUTO: 5.57 X10E6/UL (ref 4.14–5.8)
SODIUM SERPL-SCNC: 137 MMOL/L (ref 134–144)
TSH SERPL DL<=0.005 MIU/L-ACNC: 1.56 UIU/ML (ref 0.45–4.5)
WBC # BLD AUTO: 6.3 X10E3/UL (ref 3.4–10.8)

## 2023-11-29 LAB
TESTOST FREE SERPL-MCNC: 19.9 PG/ML (ref 7.2–24)
TESTOST SERPL-MCNC: 749.8 NG/DL (ref 264–916)

## 2023-12-01 DIAGNOSIS — E11.21 TYPE 2 DIABETES MELLITUS WITH DIABETIC NEPHROPATHY, WITHOUT LONG-TERM CURRENT USE OF INSULIN (HCC): ICD-10-CM

## 2023-12-01 DIAGNOSIS — E29.1 TESTICULAR HYPOFUNCTION: ICD-10-CM

## 2023-12-04 RX ORDER — NEEDLES, DISPOSABLE 25GX5/8"
NEEDLE, DISPOSABLE MISCELLANEOUS
Qty: 13 EACH | Refills: 1 | Status: SHIPPED | OUTPATIENT
Start: 2023-12-04 | End: 2023-12-05 | Stop reason: SDUPTHER

## 2023-12-05 ENCOUNTER — OFFICE VISIT (OUTPATIENT)
Age: 58
End: 2023-12-05
Payer: COMMERCIAL

## 2023-12-05 VITALS
HEIGHT: 69 IN | SYSTOLIC BLOOD PRESSURE: 118 MMHG | HEART RATE: 87 BPM | BODY MASS INDEX: 36.64 KG/M2 | WEIGHT: 247.4 LBS | DIASTOLIC BLOOD PRESSURE: 79 MMHG

## 2023-12-05 DIAGNOSIS — Z79.4 TYPE 2 DIABETES MELLITUS WITH DIABETIC POLYNEUROPATHY, WITH LONG-TERM CURRENT USE OF INSULIN (HCC): Primary | ICD-10-CM

## 2023-12-05 DIAGNOSIS — E78.2 MIXED HYPERLIPIDEMIA: ICD-10-CM

## 2023-12-05 DIAGNOSIS — E11.42 TYPE 2 DIABETES MELLITUS WITH DIABETIC POLYNEUROPATHY, WITH LONG-TERM CURRENT USE OF INSULIN (HCC): Primary | ICD-10-CM

## 2023-12-05 DIAGNOSIS — E29.1 HYPOGONADISM IN MALE: ICD-10-CM

## 2023-12-05 DIAGNOSIS — I10 ESSENTIAL (PRIMARY) HYPERTENSION: ICD-10-CM

## 2023-12-05 PROCEDURE — 2022F DILAT RTA XM EVC RTNOPTHY: CPT | Performed by: INTERNAL MEDICINE

## 2023-12-05 PROCEDURE — G8417 CALC BMI ABV UP PARAM F/U: HCPCS | Performed by: INTERNAL MEDICINE

## 2023-12-05 PROCEDURE — G8482 FLU IMMUNIZE ORDER/ADMIN: HCPCS | Performed by: INTERNAL MEDICINE

## 2023-12-05 PROCEDURE — 3044F HG A1C LEVEL LT 7.0%: CPT | Performed by: INTERNAL MEDICINE

## 2023-12-05 PROCEDURE — 3078F DIAST BP <80 MM HG: CPT | Performed by: INTERNAL MEDICINE

## 2023-12-05 PROCEDURE — 3074F SYST BP LT 130 MM HG: CPT | Performed by: INTERNAL MEDICINE

## 2023-12-05 PROCEDURE — 99214 OFFICE O/P EST MOD 30 MIN: CPT | Performed by: INTERNAL MEDICINE

## 2023-12-05 PROCEDURE — 1036F TOBACCO NON-USER: CPT | Performed by: INTERNAL MEDICINE

## 2023-12-05 PROCEDURE — G8427 DOCREV CUR MEDS BY ELIG CLIN: HCPCS | Performed by: INTERNAL MEDICINE

## 2023-12-05 PROCEDURE — 3017F COLORECTAL CA SCREEN DOC REV: CPT | Performed by: INTERNAL MEDICINE

## 2023-12-05 RX ORDER — SYRINGE WITH NEEDLE, 1 ML 25GX5/8"
SYRINGE, EMPTY DISPOSABLE MISCELLANEOUS
Qty: 13 EACH | Refills: 2 | Status: SHIPPED | OUTPATIENT
Start: 2023-12-05

## 2023-12-05 RX ORDER — TESTOSTERONE CYPIONATE 200 MG/ML
VIAL (ML) INTRAMUSCULAR
Qty: 12 ML | Refills: 2 | Status: SHIPPED | OUTPATIENT
Start: 2023-12-05

## 2023-12-05 RX ORDER — NEEDLES, DISPOSABLE 25GX5/8"
NEEDLE, DISPOSABLE MISCELLANEOUS
Qty: 13 EACH | Refills: 2 | Status: SHIPPED | OUTPATIENT
Start: 2023-12-05

## 2023-12-05 NOTE — PROGRESS NOTES
Chief Complaint   Patient presents with    Diabetes     PCP and pharmacy confirmed      History of Present Illness: Vin Negro is a 63 y.o. male here for follow up of diabetes and hypogonadism. \"We had COVID in September\"    \"I started having issues of OA in my knees and I am now walking with a cane. I saw Brian Hernandez and had a cortisone shot, which did help, but it has started to bother me again. The second cortisone did not help, but they did X-Rays showed OA. I am getting cortisone injections every 4 months. \"    He denies issues of CP, SOB, palpitations, tremors, HA, vision changes. Pt is still taking the Metformin 1000mg BID. His A1C last week was 5.7%    He checks his BGs 1-2 times per week, fasting. It will run in the 100-120's range. He denies issues of hypoglycemia. Exercise consists of house work and chores. Pt works from home. Pt had a \"minor MI\" in 2014, Pt follows with Dr. Angi Perry (cardio) annually. Pt is eating 3-4 meals per day and an occasional snack. Pt wakes around 630AM.  - He has breakfast around 9AM, this AM he had cereal and milk. - He has lunch around Noon-1PM, yesterday he had a roast beef sandwich, fries and regular soda. - He has dinner around 630PM, last night he had a pork chop, peas, carrots and crystal lite. Pt has hx of elevated Urine MA/Cr. He notes he will occasionally get tingling and itching in his left foot. Last eye exam was January 2023. \"Everything was fine\". Pt was first diagnosed with hypogonadism around 2015. He was having issues of feeling very sluggish. He was tested for his testosterone and \"it was pretty low\". He is currently taking a weekly injection of Testosterone Cypionate (75ml/150mg). He takes his injection every Sunday. He denies issues of CP, SOB, palpitations, HAs, or LUTS symptoms. He denies issues of mood swings or irritability. He gives himself his injection in the muscle of the thigh.   He does not have any injection

## 2023-12-20 RX ORDER — VENLAFAXINE HYDROCHLORIDE 150 MG/1
150 CAPSULE, EXTENDED RELEASE ORAL DAILY
Qty: 90 CAPSULE | Refills: 2 | Status: SHIPPED | OUTPATIENT
Start: 2023-12-20 | End: 2024-02-07 | Stop reason: SDUPTHER

## 2024-01-04 DIAGNOSIS — E11.42 TYPE 2 DIABETES MELLITUS WITH DIABETIC POLYNEUROPATHY, WITH LONG-TERM CURRENT USE OF INSULIN (HCC): ICD-10-CM

## 2024-01-04 DIAGNOSIS — Z79.4 TYPE 2 DIABETES MELLITUS WITH DIABETIC POLYNEUROPATHY, WITH LONG-TERM CURRENT USE OF INSULIN (HCC): ICD-10-CM

## 2024-01-05 RX ORDER — GABAPENTIN 300 MG/1
CAPSULE ORAL
Qty: 90 CAPSULE | Refills: 1 | Status: SHIPPED | OUTPATIENT
Start: 2024-01-05 | End: 2025-01-05

## 2024-01-06 RX ORDER — CARVEDILOL 6.25 MG/1
6.25 TABLET ORAL 2 TIMES DAILY
Qty: 180 TABLET | Refills: 1 | Status: SHIPPED | OUTPATIENT
Start: 2024-01-06

## 2024-01-23 ENCOUNTER — OFFICE VISIT (OUTPATIENT)
Age: 59
End: 2024-01-23
Payer: COMMERCIAL

## 2024-01-23 VITALS
HEART RATE: 88 BPM | BODY MASS INDEX: 35.84 KG/M2 | RESPIRATION RATE: 20 BRPM | SYSTOLIC BLOOD PRESSURE: 112 MMHG | WEIGHT: 242 LBS | TEMPERATURE: 98.2 F | HEIGHT: 69 IN | OXYGEN SATURATION: 97 % | DIASTOLIC BLOOD PRESSURE: 75 MMHG

## 2024-01-23 DIAGNOSIS — F33.0 MILD EPISODE OF RECURRENT MAJOR DEPRESSIVE DISORDER (HCC): ICD-10-CM

## 2024-01-23 DIAGNOSIS — I25.10 CORONARY ARTERY DISEASE INVOLVING NATIVE CORONARY ARTERY OF NATIVE HEART WITHOUT ANGINA PECTORIS: ICD-10-CM

## 2024-01-23 DIAGNOSIS — E11.21 TYPE 2 DIABETES MELLITUS WITH NEPHROPATHY (HCC): ICD-10-CM

## 2024-01-23 DIAGNOSIS — I50.22 CHRONIC SYSTOLIC CONGESTIVE HEART FAILURE (HCC): ICD-10-CM

## 2024-01-23 DIAGNOSIS — E66.01 SEVERE OBESITY (BMI 35.0-39.9) WITH COMORBIDITY (HCC): ICD-10-CM

## 2024-01-23 DIAGNOSIS — G62.9 NEUROPATHY: ICD-10-CM

## 2024-01-23 DIAGNOSIS — R79.89 LOW TESTOSTERONE: ICD-10-CM

## 2024-01-23 DIAGNOSIS — I10 ESSENTIAL HYPERTENSION WITH GOAL BLOOD PRESSURE LESS THAN 140/90: Primary | ICD-10-CM

## 2024-01-23 DIAGNOSIS — I10 ESSENTIAL HYPERTENSION WITH GOAL BLOOD PRESSURE LESS THAN 140/90: ICD-10-CM

## 2024-01-23 DIAGNOSIS — K21.9 GASTROESOPHAGEAL REFLUX DISEASE WITHOUT ESOPHAGITIS: ICD-10-CM

## 2024-01-23 DIAGNOSIS — G47.33 OSA ON CPAP: ICD-10-CM

## 2024-01-23 LAB — HBA1C MFR BLD: 6 %

## 2024-01-23 PROCEDURE — 99214 OFFICE O/P EST MOD 30 MIN: CPT | Performed by: INTERNAL MEDICINE

## 2024-01-23 PROCEDURE — 3074F SYST BP LT 130 MM HG: CPT | Performed by: INTERNAL MEDICINE

## 2024-01-23 PROCEDURE — 83036 HEMOGLOBIN GLYCOSYLATED A1C: CPT | Performed by: INTERNAL MEDICINE

## 2024-01-23 PROCEDURE — 3078F DIAST BP <80 MM HG: CPT | Performed by: INTERNAL MEDICINE

## 2024-01-23 RX ORDER — TIRZEPATIDE 2.5 MG/.5ML
2.5 INJECTION, SOLUTION SUBCUTANEOUS WEEKLY
Qty: 4 EACH | Refills: 0 | Status: SHIPPED | OUTPATIENT
Start: 2024-01-23

## 2024-01-23 RX ORDER — TADALAFIL 10 MG/1
10 TABLET ORAL DAILY PRN
Qty: 30 TABLET | Refills: 0 | Status: SHIPPED | OUTPATIENT
Start: 2024-01-23

## 2024-01-23 ASSESSMENT — PATIENT HEALTH QUESTIONNAIRE - PHQ9
5. POOR APPETITE OR OVEREATING: 0
6. FEELING BAD ABOUT YOURSELF - OR THAT YOU ARE A FAILURE OR HAVE LET YOURSELF OR YOUR FAMILY DOWN: 0
4. FEELING TIRED OR HAVING LITTLE ENERGY: 0
7. TROUBLE CONCENTRATING ON THINGS, SUCH AS READING THE NEWSPAPER OR WATCHING TELEVISION: 0
SUM OF ALL RESPONSES TO PHQ QUESTIONS 1-9: 0
3. TROUBLE FALLING OR STAYING ASLEEP: 0
SUM OF ALL RESPONSES TO PHQ QUESTIONS 1-9: 0
SUM OF ALL RESPONSES TO PHQ9 QUESTIONS 1 & 2: 0
8. MOVING OR SPEAKING SO SLOWLY THAT OTHER PEOPLE COULD HAVE NOTICED. OR THE OPPOSITE, BEING SO FIGETY OR RESTLESS THAT YOU HAVE BEEN MOVING AROUND A LOT MORE THAN USUAL: 0
10. IF YOU CHECKED OFF ANY PROBLEMS, HOW DIFFICULT HAVE THESE PROBLEMS MADE IT FOR YOU TO DO YOUR WORK, TAKE CARE OF THINGS AT HOME, OR GET ALONG WITH OTHER PEOPLE: 0
SUM OF ALL RESPONSES TO PHQ QUESTIONS 1-9: 0
SUM OF ALL RESPONSES TO PHQ QUESTIONS 1-9: 0
9. THOUGHTS THAT YOU WOULD BE BETTER OFF DEAD, OR OF HURTING YOURSELF: 0
2. FEELING DOWN, DEPRESSED OR HOPELESS: 0
1. LITTLE INTEREST OR PLEASURE IN DOING THINGS: 0

## 2024-01-23 NOTE — PROGRESS NOTES
\"Have you been to the ER, urgent care clinic since your last visit?  Hospitalized since your last visit?\"    NO    “Have you seen or consulted any other health care providers outside of [facility] since your last visit?”    NO         
General: Skin is warm and dry.      Findings: No erythema.   Neurological:      General: No focal deficit present.      Mental Status: He is alert and oriented to person, place, and time. Mental status is at baseline.      Gait: Gait normal.   Psychiatric:         Mood and Affect: Mood normal.           ASSESSMENT and PLAN   Diagnosis Orders   1. Essential hypertension with goal blood pressure less than 140/90  Basic Metabolic Panel    Hemoglobin A1C   Continues on losartan 50mg, coreg 6.25mg BID and HCTZ  25 mg     Well-controlled no changes check metabolic panel prior to follow-up       2. Coronary artery disease involving native coronary artery of native heart without angina pectoris           Medically managed no active signs or symptoms of CAD overdue to see cardiology   3. Chronic systolic congestive heart failure (HCC)     Overdue to see cardiology however no active signs or symptoms of CHF   4. Gastroesophageal reflux disease without esophagitis     Controlled Pepcid   5. Type 2 diabetes mellitus with nephropathy (HCC)  Tirzepatide (MOUNJARO) 2.5 MG/0.5ML SOPN SC injection    Basic Metabolic Panel   Has been well-controlled on metformin takes 2 of these per day he is a candidate for GLP-1 agonist which has more benefits we will start Mounjaro which is a GLP-1 agonist and GIP agonist    No family or personal history of thyroid cancer    Start 2.5 mg weekly    Of note he tried Saxenda in the past which caused some bloating and uncomfortable feelings    Discussed listening to hunger cues that nausea is more pronounced when eating past fullness    Discussed will increase to 5 mg in 1 month Hemoglobin A1C    AMB POC HEMOGLOBIN A1C      6. Neuropathy     Improved with gabapentin continue   7. Severe obesity (BMI 35.0-39.9) with comorbidity (HCC)     Continues to struggle with weight loss discussed this will be starting Mounjaro for diabetes which should help with weight loss as well see above   8. JANEY on CPAP

## 2024-01-30 RX ORDER — TADALAFIL 10 MG/1
10 TABLET ORAL DAILY PRN
Qty: 30 TABLET | Refills: 0 | Status: SHIPPED | OUTPATIENT
Start: 2024-01-30

## 2024-02-06 RX ORDER — EMTRICITABINE AND TENOFOVIR ALAFENAMIDE 200; 25 MG/1; MG/1
1 TABLET ORAL DAILY
Qty: 90 TABLET | Refills: 1 | Status: ACTIVE | OUTPATIENT
Start: 2024-02-06

## 2024-02-07 RX ORDER — VENLAFAXINE HYDROCHLORIDE 150 MG/1
150 CAPSULE, EXTENDED RELEASE ORAL DAILY
Qty: 90 CAPSULE | Refills: 2 | Status: SHIPPED | OUTPATIENT
Start: 2024-02-07

## 2024-02-07 NOTE — TELEPHONE ENCOUNTER
PCP: Radha Avitia MD    Last appt: 1/23/2024  Future Appointments   Date Time Provider Department Center   2/14/2024  3:00 PM Tameka Mehta MD [facility]   4/22/2024  1:45 PM Radha Avitia MD [facility]   6/4/2024 11:50 AM César Parikh MD [facility]       Requested Prescriptions     Pending Prescriptions Disp Refills    venlafaxine (EFFEXOR XR) 150 MG extended release capsule 90 capsule 2     Sig: Take 1 capsule by mouth daily

## 2024-02-09 ENCOUNTER — TELEPHONE (OUTPATIENT)
Age: 59
End: 2024-02-09

## 2024-02-09 NOTE — TELEPHONE ENCOUNTER
Spoke with patient HIPAA verified. Advised patient that the appointment for 2/13/2024 has been cancel and will call back to reschedule. Patient verbalized understanding

## 2024-02-15 LAB — HCV AB SERPL QL IA: NORMAL

## 2024-02-18 LAB
C TRACH RRNA SPEC QL NAA+PROBE: NEGATIVE
N GONORRHOEA RRNA SPEC QL NAA+PROBE: NEGATIVE

## 2024-03-04 DIAGNOSIS — E11.21 TYPE 2 DIABETES MELLITUS WITH NEPHROPATHY (HCC): ICD-10-CM

## 2024-03-04 RX ORDER — TIRZEPATIDE 5 MG/.5ML
5 INJECTION, SOLUTION SUBCUTANEOUS WEEKLY
Qty: 3 ML | Refills: 0 | Status: SHIPPED | OUTPATIENT
Start: 2024-03-04 | End: 2024-03-13 | Stop reason: SDUPTHER

## 2024-03-04 RX ORDER — TIRZEPATIDE 2.5 MG/.5ML
2.5 INJECTION, SOLUTION SUBCUTANEOUS WEEKLY
Qty: 4 EACH | Refills: 0 | Status: CANCELLED | OUTPATIENT
Start: 2024-03-04

## 2024-03-14 RX ORDER — TIRZEPATIDE 5 MG/.5ML
5 INJECTION, SOLUTION SUBCUTANEOUS WEEKLY
Qty: 3 ML | Refills: 0 | Status: SHIPPED | OUTPATIENT
Start: 2024-03-14

## 2024-03-14 NOTE — TELEPHONE ENCOUNTER
PCP: Radha Avitia MD    Last appt: 1/23/2024  Future Appointments   Date Time Provider Department Center   4/22/2024  1:45 PM Radha Avitia MD [facility]   6/4/2024 11:50 AM César Parikh MD [facility]       Requested Prescriptions     Pending Prescriptions Disp Refills    Tirzepatide (MOUNJARO) 5 MG/0.5ML SOPN SC injection 3 mL 0     Sig: Inject 0.5 mLs into the skin once a week

## 2024-03-19 DIAGNOSIS — Z79.4 TYPE 2 DIABETES MELLITUS WITH DIABETIC POLYNEUROPATHY, WITH LONG-TERM CURRENT USE OF INSULIN (HCC): ICD-10-CM

## 2024-03-19 DIAGNOSIS — E11.42 TYPE 2 DIABETES MELLITUS WITH DIABETIC POLYNEUROPATHY, WITH LONG-TERM CURRENT USE OF INSULIN (HCC): ICD-10-CM

## 2024-03-20 RX ORDER — GABAPENTIN 300 MG/1
300 CAPSULE ORAL EVERY EVENING
Qty: 90 CAPSULE | Refills: 0 | Status: SHIPPED | OUTPATIENT
Start: 2024-03-20 | End: 2025-03-21

## 2024-03-29 ENCOUNTER — TELEPHONE (OUTPATIENT)
Age: 59
End: 2024-03-29

## 2024-03-29 NOTE — TELEPHONE ENCOUNTER
----- Message from LISHA Armijo NP sent at 3/27/2024  3:02 PM EDT -----  Please call her for appointment with DR. Mehta

## 2024-04-08 RX ORDER — TIRZEPATIDE 5 MG/.5ML
INJECTION, SOLUTION SUBCUTANEOUS
Qty: 2 ML | Refills: 0 | Status: SHIPPED | OUTPATIENT
Start: 2024-04-08

## 2024-04-13 LAB
BUN SERPL-MCNC: 15 MG/DL (ref 6–24)
BUN/CREAT SERPL: 14 (ref 9–20)
CALCIUM SERPL-MCNC: 10.6 MG/DL (ref 8.7–10.2)
CHLORIDE SERPL-SCNC: 98 MMOL/L (ref 96–106)
CO2 SERPL-SCNC: 25 MMOL/L (ref 20–29)
CREAT SERPL-MCNC: 1.05 MG/DL (ref 0.76–1.27)
EGFRCR SERPLBLD CKD-EPI 2021: 83 ML/MIN/1.73
GLUCOSE SERPL-MCNC: 101 MG/DL (ref 70–99)
HBA1C MFR BLD: 5.6 % (ref 4.8–5.6)
POTASSIUM SERPL-SCNC: 4.3 MMOL/L (ref 3.5–5.2)
SODIUM SERPL-SCNC: 137 MMOL/L (ref 134–144)

## 2024-04-30 ENCOUNTER — OFFICE VISIT (OUTPATIENT)
Age: 59
End: 2024-04-30
Payer: COMMERCIAL

## 2024-04-30 VITALS
BODY MASS INDEX: 35.58 KG/M2 | DIASTOLIC BLOOD PRESSURE: 88 MMHG | HEART RATE: 103 BPM | SYSTOLIC BLOOD PRESSURE: 130 MMHG | OXYGEN SATURATION: 95 % | HEIGHT: 69 IN | WEIGHT: 240.2 LBS | TEMPERATURE: 96.8 F | RESPIRATION RATE: 18 BRPM

## 2024-04-30 DIAGNOSIS — K21.9 GASTROESOPHAGEAL REFLUX DISEASE WITHOUT ESOPHAGITIS: ICD-10-CM

## 2024-04-30 DIAGNOSIS — R79.89 LOW TESTOSTERONE: ICD-10-CM

## 2024-04-30 DIAGNOSIS — I50.22 CHRONIC SYSTOLIC CONGESTIVE HEART FAILURE (HCC): ICD-10-CM

## 2024-04-30 DIAGNOSIS — I25.10 CORONARY ARTERY DISEASE INVOLVING NATIVE CORONARY ARTERY OF NATIVE HEART WITHOUT ANGINA PECTORIS: ICD-10-CM

## 2024-04-30 DIAGNOSIS — E11.21 TYPE 2 DIABETES MELLITUS WITH NEPHROPATHY (HCC): ICD-10-CM

## 2024-04-30 DIAGNOSIS — G62.9 NEUROPATHY: ICD-10-CM

## 2024-04-30 DIAGNOSIS — I10 ESSENTIAL HYPERTENSION WITH GOAL BLOOD PRESSURE LESS THAN 140/90: Primary | ICD-10-CM

## 2024-04-30 DIAGNOSIS — F33.0 MILD EPISODE OF RECURRENT MAJOR DEPRESSIVE DISORDER (HCC): ICD-10-CM

## 2024-04-30 DIAGNOSIS — E66.01 SEVERE OBESITY (BMI 35.0-39.9) WITH COMORBIDITY (HCC): ICD-10-CM

## 2024-04-30 DIAGNOSIS — G47.33 OSA ON CPAP: ICD-10-CM

## 2024-04-30 PROCEDURE — 3079F DIAST BP 80-89 MM HG: CPT | Performed by: INTERNAL MEDICINE

## 2024-04-30 PROCEDURE — 3044F HG A1C LEVEL LT 7.0%: CPT | Performed by: INTERNAL MEDICINE

## 2024-04-30 PROCEDURE — 99214 OFFICE O/P EST MOD 30 MIN: CPT | Performed by: INTERNAL MEDICINE

## 2024-04-30 PROCEDURE — 3075F SYST BP GE 130 - 139MM HG: CPT | Performed by: INTERNAL MEDICINE

## 2024-04-30 RX ORDER — TIRZEPATIDE 7.5 MG/.5ML
7.5 INJECTION, SOLUTION SUBCUTANEOUS WEEKLY
Qty: 4 ADJUSTABLE DOSE PRE-FILLED PEN SYRINGE | Refills: 0 | Status: SHIPPED | OUTPATIENT
Start: 2024-04-30

## 2024-04-30 ASSESSMENT — PATIENT HEALTH QUESTIONNAIRE - PHQ9
SUM OF ALL RESPONSES TO PHQ QUESTIONS 1-9: 0
SUM OF ALL RESPONSES TO PHQ9 QUESTIONS 1 & 2: 0
2. FEELING DOWN, DEPRESSED OR HOPELESS: NOT AT ALL
1. LITTLE INTEREST OR PLEASURE IN DOING THINGS: NOT AT ALL

## 2024-04-30 NOTE — PROGRESS NOTES
\"Have you been to the ER, urgent care clinic since your last visit?  Hospitalized since your last visit?\"    NO    “Have you seen or consulted any other health care providers outside of [facility] since your last visit?”    NO          Click Here for Release of Records Request  
  Respiratory:  Negative for shortness of breath.    Cardiovascular:  Negative for chest pain.         Physical Exam  Constitutional:       General: He is not in acute distress.     Appearance: He is not ill-appearing, toxic-appearing or diaphoretic.   HENT:      Head: Normocephalic and atraumatic.   Eyes:      General:         Right eye: No discharge.         Left eye: No discharge.      Extraocular Movements: Extraocular movements intact.   Neck:      Vascular: No carotid bruit.   Cardiovascular:      Rate and Rhythm: Normal rate and regular rhythm.      Heart sounds: Normal heart sounds. No murmur heard.  Pulmonary:      Effort: Pulmonary effort is normal. No respiratory distress.      Breath sounds: Normal breath sounds. No wheezing.   Musculoskeletal:         General: No swelling, tenderness or deformity.      Cervical back: Normal range of motion and neck supple. No tenderness.      Right lower leg: No edema.      Left lower leg: No edema.   Lymphadenopathy:      Cervical: No cervical adenopathy.   Skin:     General: Skin is warm and dry.      Findings: No erythema.   Neurological:      General: No focal deficit present.      Mental Status: He is alert and oriented to person, place, and time. Mental status is at baseline.      Gait: Gait normal.   Psychiatric:         Mood and Affect: Mood normal.           ASSESSMENT and PLAN   Diagnosis Orders   1. Essential hypertension with goal blood pressure less than 140/90  Comprehensive Metabolic Panel    Hemoglobin A1C     Continues on losartan 50mg, coreg 6.25mg BID and HCTZ  25 mg      TSH   Controlled continue no change in dose check metabolic panel for K creatinine sodium levels       2. Type 2 diabetes mellitus with nephropathy (HCC)  Comprehensive Metabolic Panel    Hemoglobin A1C   Well-controlled on metformin takes 4 of these per day now on Mounjaro as well A1c at goal will increase Mounjaro dose to further assist with weight loss will go to 7.5 mg weekly TSH

## 2024-05-20 RX ORDER — EMTRICITABINE AND TENOFOVIR ALAFENAMIDE 200; 25 MG/1; MG/1
1 TABLET ORAL DAILY
Qty: 90 TABLET | Refills: 1 | Status: ACTIVE | OUTPATIENT
Start: 2024-05-20

## 2024-05-22 LAB
ERYTHROCYTE [DISTWIDTH] IN BLOOD BY AUTOMATED COUNT: 13.1 % (ref 11.6–15.4)
HCT VFR BLD AUTO: 52.8 % (ref 37.5–51)
HGB BLD-MCNC: 17.5 G/DL (ref 13–17.7)
MCH RBC QN AUTO: 30 PG (ref 26.6–33)
MCHC RBC AUTO-ENTMCNC: 33.1 G/DL (ref 31.5–35.7)
MCV RBC AUTO: 91 FL (ref 79–97)
PLATELET # BLD AUTO: 304 X10E3/UL (ref 150–450)
RBC # BLD AUTO: 5.83 X10E6/UL (ref 4.14–5.8)
WBC # BLD AUTO: 7.9 X10E3/UL (ref 3.4–10.8)

## 2024-05-23 LAB
ALBUMIN SERPL-MCNC: 4.7 G/DL (ref 3.8–4.9)
ALBUMIN/CREAT UR: 49 MG/G CREAT (ref 0–29)
ALBUMIN/GLOB SERPL: 1.7 {RATIO} (ref 1.2–2.2)
ALP SERPL-CCNC: 56 IU/L (ref 44–121)
ALT SERPL-CCNC: 21 IU/L (ref 0–44)
AST SERPL-CCNC: 23 IU/L (ref 0–40)
BILIRUB SERPL-MCNC: 0.5 MG/DL (ref 0–1.2)
BUN SERPL-MCNC: 18 MG/DL (ref 6–24)
BUN/CREAT SERPL: 15 (ref 9–20)
CALCIUM SERPL-MCNC: 10.1 MG/DL (ref 8.7–10.2)
CHLORIDE SERPL-SCNC: 100 MMOL/L (ref 96–106)
CHOLEST SERPL-MCNC: 108 MG/DL (ref 100–199)
CO2 SERPL-SCNC: 25 MMOL/L (ref 20–29)
CREAT SERPL-MCNC: 1.19 MG/DL (ref 0.76–1.27)
CREAT UR-MCNC: 432.6 MG/DL
EGFRCR SERPLBLD CKD-EPI 2021: 71 ML/MIN/1.73
EST. AVERAGE GLUCOSE BLD GHB EST-MCNC: 114 MG/DL
GLOBULIN SER CALC-MCNC: 2.8 G/DL (ref 1.5–4.5)
GLUCOSE SERPL-MCNC: 111 MG/DL (ref 70–99)
HBA1C MFR BLD: 5.6 % (ref 4.8–5.6)
HDLC SERPL-MCNC: 34 MG/DL
IMP & REVIEW OF LAB RESULTS: NORMAL
LDLC SERPL CALC-MCNC: 52 MG/DL (ref 0–99)
Lab: NORMAL
MICROALBUMIN UR-MCNC: 212.7 UG/ML
POTASSIUM SERPL-SCNC: 4.7 MMOL/L (ref 3.5–5.2)
PROT SERPL-MCNC: 7.5 G/DL (ref 6–8.5)
PSA SERPL-MCNC: 1.8 NG/ML (ref 0–4)
REFLEX CRITERIA: NORMAL
SODIUM SERPL-SCNC: 140 MMOL/L (ref 134–144)
TRIGL SERPL-MCNC: 120 MG/DL (ref 0–149)
VLDLC SERPL CALC-MCNC: 22 MG/DL (ref 5–40)

## 2024-05-27 LAB
TESTOST FREE SERPL-MCNC: 20.1 PG/ML (ref 7.2–24)
TESTOST SERPL-MCNC: 798 NG/DL (ref 264–916)

## 2024-05-28 RX ORDER — TIRZEPATIDE 5 MG/.5ML
INJECTION, SOLUTION SUBCUTANEOUS
Qty: 2 ML | Refills: 0 | OUTPATIENT
Start: 2024-05-28

## 2024-06-01 DIAGNOSIS — E29.1 HYPOGONADISM IN MALE: ICD-10-CM

## 2024-06-01 DIAGNOSIS — E11.42 TYPE 2 DIABETES MELLITUS WITH DIABETIC POLYNEUROPATHY, WITH LONG-TERM CURRENT USE OF INSULIN (HCC): ICD-10-CM

## 2024-06-01 DIAGNOSIS — Z79.4 TYPE 2 DIABETES MELLITUS WITH DIABETIC POLYNEUROPATHY, WITH LONG-TERM CURRENT USE OF INSULIN (HCC): ICD-10-CM

## 2024-06-01 DIAGNOSIS — E78.2 MIXED HYPERLIPIDEMIA: ICD-10-CM

## 2024-06-02 RX ORDER — TIRZEPATIDE 7.5 MG/.5ML
INJECTION, SOLUTION SUBCUTANEOUS
Qty: 4 ADJUSTABLE DOSE PRE-FILLED PEN SYRINGE | Refills: 0 | Status: SHIPPED | OUTPATIENT
Start: 2024-06-02

## 2024-06-09 DIAGNOSIS — E29.1 HYPOGONADISM IN MALE: ICD-10-CM

## 2024-06-10 RX ORDER — TESTOSTERONE CYPIONATE 200 MG/ML
VIAL (ML) INTRAMUSCULAR
Qty: 12 ML | Refills: 2 | Status: SHIPPED | OUTPATIENT
Start: 2024-06-10

## 2024-06-23 DIAGNOSIS — Z79.4 TYPE 2 DIABETES MELLITUS WITH DIABETIC POLYNEUROPATHY, WITH LONG-TERM CURRENT USE OF INSULIN (HCC): ICD-10-CM

## 2024-06-23 DIAGNOSIS — E11.42 TYPE 2 DIABETES MELLITUS WITH DIABETIC POLYNEUROPATHY, WITH LONG-TERM CURRENT USE OF INSULIN (HCC): ICD-10-CM

## 2024-06-24 ENCOUNTER — TELEPHONE (OUTPATIENT)
Age: 59
End: 2024-06-24

## 2024-06-24 RX ORDER — GABAPENTIN 300 MG/1
CAPSULE ORAL
Qty: 90 CAPSULE | Refills: 1 | Status: SHIPPED | OUTPATIENT
Start: 2024-06-24 | End: 2025-06-24

## 2024-06-24 RX ORDER — NEEDLES, DISPOSABLE 25GX5/8"
NEEDLE, DISPOSABLE MISCELLANEOUS
Qty: 13 EACH | Refills: 3 | Status: SHIPPED | OUTPATIENT
Start: 2024-06-24

## 2024-06-24 NOTE — TELEPHONE ENCOUNTER
----- Message from Brooks Jay sent at 6/21/2024  5:44 PM EDT -----  Regarding: Testosterone prescription   Contact: [phone]  Please contact [facility] so they can refill my testosterone. They say they have reached out to you and are waiting for someone to call back. It’s been 11 days. I am out. Please help.

## 2024-07-30 DIAGNOSIS — E29.1 HYPOGONADISM IN MALE: ICD-10-CM

## 2024-07-30 RX ORDER — TESTOSTERONE CYPIONATE 200 MG/ML
INJECTION, SOLUTION INTRAMUSCULAR
Qty: 4 ML | Refills: 1 | Status: SHIPPED | OUTPATIENT
Start: 2024-07-30 | End: 2024-07-30

## 2024-08-04 RX ORDER — HYDROCHLOROTHIAZIDE 12.5 MG/1
12.5 TABLET ORAL DAILY
Qty: 90 TABLET | Refills: 0 | Status: SHIPPED | OUTPATIENT
Start: 2024-08-04

## 2024-08-06 RX ORDER — HYDROCHLOROTHIAZIDE 12.5 MG/1
12.5 TABLET ORAL DAILY
Qty: 90 TABLET | Refills: 0 | Status: SHIPPED | OUTPATIENT
Start: 2024-08-06

## 2024-08-08 LAB
ALBUMIN SERPL-MCNC: 4.7 G/DL (ref 3.8–4.9)
ALP SERPL-CCNC: 50 IU/L (ref 44–121)
ALT SERPL-CCNC: 25 IU/L (ref 0–44)
AST SERPL-CCNC: 28 IU/L (ref 0–40)
BILIRUB SERPL-MCNC: 0.6 MG/DL (ref 0–1.2)
BUN SERPL-MCNC: 15 MG/DL (ref 6–24)
BUN/CREAT SERPL: 14 (ref 9–20)
CALCIUM SERPL-MCNC: 10.7 MG/DL (ref 8.7–10.2)
CHLORIDE SERPL-SCNC: 99 MMOL/L (ref 96–106)
CO2 SERPL-SCNC: 25 MMOL/L (ref 20–29)
CREAT SERPL-MCNC: 1.11 MG/DL (ref 0.76–1.27)
EGFRCR SERPLBLD CKD-EPI 2021: 77 ML/MIN/1.73
GLOBULIN SER CALC-MCNC: 2.8 G/DL (ref 1.5–4.5)
GLUCOSE SERPL-MCNC: 120 MG/DL (ref 70–99)
HBA1C MFR BLD: 6 % (ref 4.8–5.6)
PROT SERPL-MCNC: 7.5 G/DL (ref 6–8.5)
SODIUM SERPL-SCNC: 138 MMOL/L (ref 134–144)
TSH SERPL DL<=0.005 MIU/L-ACNC: 1.41 UIU/ML (ref 0.45–4.5)

## 2024-08-12 RX ORDER — HYDROCHLOROTHIAZIDE 12.5 MG/1
12.5 TABLET ORAL DAILY
Qty: 90 TABLET | Refills: 0 | Status: SHIPPED | OUTPATIENT
Start: 2024-08-12

## 2024-08-13 SDOH — ECONOMIC STABILITY: FOOD INSECURITY: WITHIN THE PAST 12 MONTHS, THE FOOD YOU BOUGHT JUST DIDN'T LAST AND YOU DIDN'T HAVE MONEY TO GET MORE.: NEVER TRUE

## 2024-08-13 SDOH — ECONOMIC STABILITY: FOOD INSECURITY: WITHIN THE PAST 12 MONTHS, YOU WORRIED THAT YOUR FOOD WOULD RUN OUT BEFORE YOU GOT MONEY TO BUY MORE.: NEVER TRUE

## 2024-08-13 SDOH — ECONOMIC STABILITY: INCOME INSECURITY: HOW HARD IS IT FOR YOU TO PAY FOR THE VERY BASICS LIKE FOOD, HOUSING, MEDICAL CARE, AND HEATING?: NOT VERY HARD

## 2024-08-14 ENCOUNTER — OFFICE VISIT (OUTPATIENT)
Age: 59
End: 2024-08-14
Payer: COMMERCIAL

## 2024-08-14 VITALS
OXYGEN SATURATION: 97 % | DIASTOLIC BLOOD PRESSURE: 75 MMHG | RESPIRATION RATE: 22 BRPM | HEIGHT: 69 IN | TEMPERATURE: 97.6 F | BODY MASS INDEX: 35.84 KG/M2 | WEIGHT: 242 LBS | SYSTOLIC BLOOD PRESSURE: 125 MMHG | HEART RATE: 99 BPM

## 2024-08-14 DIAGNOSIS — G62.9 NEUROPATHY: ICD-10-CM

## 2024-08-14 DIAGNOSIS — N52.9 ERECTILE DYSFUNCTION, UNSPECIFIED ERECTILE DYSFUNCTION TYPE: ICD-10-CM

## 2024-08-14 DIAGNOSIS — I50.22 CHRONIC SYSTOLIC CONGESTIVE HEART FAILURE (HCC): ICD-10-CM

## 2024-08-14 DIAGNOSIS — I25.10 CORONARY ARTERY DISEASE INVOLVING NATIVE CORONARY ARTERY OF NATIVE HEART WITHOUT ANGINA PECTORIS: ICD-10-CM

## 2024-08-14 DIAGNOSIS — E66.01 SEVERE OBESITY (BMI 35.0-39.9) WITH COMORBIDITY (HCC): ICD-10-CM

## 2024-08-14 DIAGNOSIS — F33.0 MILD EPISODE OF RECURRENT MAJOR DEPRESSIVE DISORDER (HCC): ICD-10-CM

## 2024-08-14 DIAGNOSIS — G47.33 OSA ON CPAP: ICD-10-CM

## 2024-08-14 DIAGNOSIS — K21.9 GASTROESOPHAGEAL REFLUX DISEASE WITHOUT ESOPHAGITIS: ICD-10-CM

## 2024-08-14 DIAGNOSIS — E11.21 TYPE 2 DIABETES MELLITUS WITH NEPHROPATHY (HCC): ICD-10-CM

## 2024-08-14 DIAGNOSIS — R79.89 LOW TESTOSTERONE: ICD-10-CM

## 2024-08-14 DIAGNOSIS — I10 ESSENTIAL HYPERTENSION WITH GOAL BLOOD PRESSURE LESS THAN 140/90: Primary | ICD-10-CM

## 2024-08-14 PROCEDURE — 99214 OFFICE O/P EST MOD 30 MIN: CPT | Performed by: INTERNAL MEDICINE

## 2024-08-14 PROCEDURE — 3078F DIAST BP <80 MM HG: CPT | Performed by: INTERNAL MEDICINE

## 2024-08-14 PROCEDURE — 3074F SYST BP LT 130 MM HG: CPT | Performed by: INTERNAL MEDICINE

## 2024-08-14 PROCEDURE — 3044F HG A1C LEVEL LT 7.0%: CPT | Performed by: INTERNAL MEDICINE

## 2024-08-14 RX ORDER — TIRZEPATIDE 10 MG/.5ML
10 INJECTION, SOLUTION SUBCUTANEOUS WEEKLY
Qty: 12 ADJUSTABLE DOSE PRE-FILLED PEN SYRINGE | Refills: 0 | Status: SHIPPED | OUTPATIENT
Start: 2024-08-14

## 2024-08-14 ASSESSMENT — PATIENT HEALTH QUESTIONNAIRE - PHQ9
SUM OF ALL RESPONSES TO PHQ QUESTIONS 1-9: 0
1. LITTLE INTEREST OR PLEASURE IN DOING THINGS: NOT AT ALL
SUM OF ALL RESPONSES TO PHQ QUESTIONS 1-9: 0
SUM OF ALL RESPONSES TO PHQ QUESTIONS 1-9: 0
SUM OF ALL RESPONSES TO PHQ9 QUESTIONS 1 & 2: 0
SUM OF ALL RESPONSES TO PHQ QUESTIONS 1-9: 0
2. FEELING DOWN, DEPRESSED OR HOPELESS: NOT AT ALL

## 2024-08-14 NOTE — PROGRESS NOTES
\"Have you been to the ER, urgent care clinic since your last visit?  Hospitalized since your last visit?\"    NO    “Have you seen or consulted any other health care providers outside of [facility] since your last visit?”    NO          Click Here for Release of Records Request  
dictated by Dr. Radha Avitia.    Current diagnosis and concerns discussed with pt at length. Pt understands risks and benefits or current treatment plan and medications, and accepts the treatment and medication with any possible risks. Pt asks appropriate questions, which were answered. Pt was instructed to call with any concerns or problems.    I have reviewed the note documented by the scribe. The services provided are my own. The documentation is accurate.

## 2024-09-01 RX ORDER — LOSARTAN POTASSIUM 50 MG/1
50 TABLET ORAL DAILY
Qty: 90 TABLET | Refills: 0 | Status: SHIPPED | OUTPATIENT
Start: 2024-09-01

## 2024-10-04 ENCOUNTER — TELEMEDICINE (OUTPATIENT)
Age: 59
End: 2024-10-04
Payer: COMMERCIAL

## 2024-10-04 DIAGNOSIS — M54.50 ACUTE LEFT-SIDED LOW BACK PAIN WITHOUT SCIATICA: Primary | ICD-10-CM

## 2024-10-04 PROCEDURE — 99213 OFFICE O/P EST LOW 20 MIN: CPT | Performed by: INTERNAL MEDICINE

## 2024-10-04 RX ORDER — TIZANIDINE 2 MG/1
2 TABLET ORAL 3 TIMES DAILY PRN
Qty: 30 TABLET | Refills: 0 | Status: SHIPPED | OUTPATIENT
Start: 2024-10-04

## 2024-10-04 RX ORDER — MELOXICAM 15 MG/1
15 TABLET ORAL DAILY
Qty: 30 TABLET | Refills: 0 | Status: SHIPPED | OUTPATIENT
Start: 2024-10-04

## 2024-10-04 ASSESSMENT — PATIENT HEALTH QUESTIONNAIRE - PHQ9
SUM OF ALL RESPONSES TO PHQ QUESTIONS 1-9: 0
SUM OF ALL RESPONSES TO PHQ9 QUESTIONS 1 & 2: 0
SUM OF ALL RESPONSES TO PHQ QUESTIONS 1-9: 0
SUM OF ALL RESPONSES TO PHQ QUESTIONS 1-9: 0
1. LITTLE INTEREST OR PLEASURE IN DOING THINGS: NOT AT ALL
2. FEELING DOWN, DEPRESSED OR HOPELESS: NOT AT ALL
SUM OF ALL RESPONSES TO PHQ QUESTIONS 1-9: 0

## 2024-10-04 ASSESSMENT — ENCOUNTER SYMPTOMS: SHORTNESS OF BREATH: 0

## 2024-10-04 NOTE — PROGRESS NOTES
HISTORY OF PRESENT ILLNESS  Brooks Jay is a 58 y.o. male.  HPI    This is an established visit completed with telemedicine was completed with video assist. The patient acknowledges and agrees to this method of visitation.    Last here 8/14/24. Presents for acute care.     He has been having back pain since last Thursday when he put in a new  the day before. The pain is on the entire L side of his back. Denies any pain, weakness, or tingling through his legs. The pain is worse when he has been sitting for long periods of time and sleeping is difficult but laying down helps the pain some. Denies bladder or bowel incontinence. He notes no improvement in his symptoms since last week. He has been taking 400mg ibuprofen which does not help much. Rx'd tizanidine, meloxicam. Advised him to rest as much as possible for 2 weeks, avoid lifting, pushing, or pulling.      Patient Active Problem List   Diagnosis    Mild episode of recurrent major depressive disorder (HCC)    Severe obesity (BMI 35.0-39.9) with comorbidity    Coronary artery disease involving native coronary artery of native heart without angina pectoris    JANEY on CPAP    Low testosterone    Type 2 diabetes mellitus with nephropathy (HCC)    Essential hypertension with goal blood pressure less than 140/90    Gastroesophageal reflux disease without esophagitis    Chronic systolic congestive heart failure (HCC)    Neuropathy     Current Outpatient Medications   Medication Sig Dispense Refill    metFORMIN (GLUCOPHAGE) 500 MG tablet TAKE 2 TABLETS BY MOUTH TWICE  DAILY WITH MEALS 360 tablet 1    losartan (COZAAR) 50 MG tablet Take 1 tablet by mouth daily 90 tablet 0    Tirzepatide (MOUNJARO) 10 MG/0.5ML SOPN SC injection Inject 0.5 mLs into the skin once a week 12 Adjustable Dose Pre-filled Pen Syringe 0    hydroCHLOROthiazide 12.5 MG tablet Take 1 tablet by mouth daily for high blood pressure 90 tablet 0    testosterone cypionate (DEPOTESTOTERONE

## 2024-10-14 RX ORDER — ROSUVASTATIN CALCIUM 10 MG/1
TABLET, COATED ORAL
Qty: 90 TABLET | Refills: 3 | Status: SHIPPED | OUTPATIENT
Start: 2024-10-14

## 2024-10-14 RX ORDER — VENLAFAXINE HYDROCHLORIDE 150 MG/1
150 CAPSULE, EXTENDED RELEASE ORAL DAILY
Qty: 90 CAPSULE | Refills: 3 | Status: SHIPPED | OUTPATIENT
Start: 2024-10-14

## 2024-10-14 RX ORDER — FENOFIBRATE 145 MG/1
145 TABLET, COATED ORAL DAILY
Qty: 90 TABLET | Refills: 3 | Status: SHIPPED | OUTPATIENT
Start: 2024-10-14

## 2024-10-31 RX ORDER — CARVEDILOL 6.25 MG/1
6.25 TABLET ORAL 2 TIMES DAILY
Qty: 180 TABLET | Refills: 0 | Status: SHIPPED | OUTPATIENT
Start: 2024-10-31

## 2024-10-31 RX ORDER — MELOXICAM 15 MG/1
15 TABLET ORAL DAILY
Qty: 30 TABLET | Refills: 0 | Status: SHIPPED | OUTPATIENT
Start: 2024-10-31

## 2024-11-01 RX ORDER — HYDROCHLOROTHIAZIDE 12.5 MG/1
12.5 TABLET ORAL DAILY
Qty: 90 TABLET | Refills: 0 | OUTPATIENT
Start: 2024-11-01

## 2024-11-01 RX ORDER — HYDROCHLOROTHIAZIDE 12.5 MG/1
12.5 TABLET ORAL DAILY
Qty: 90 TABLET | Refills: 0 | Status: SHIPPED | OUTPATIENT
Start: 2024-11-01

## 2024-11-14 RX ORDER — SYRINGE WITH NEEDLE, 1 ML 25GX5/8"
SYRINGE, EMPTY DISPOSABLE MISCELLANEOUS
Qty: 13 EACH | Refills: 3 | Status: SHIPPED | OUTPATIENT
Start: 2024-11-14

## 2024-11-20 LAB
ALBUMIN SERPL-MCNC: 4.6 G/DL (ref 3.8–4.9)
ALP SERPL-CCNC: 51 IU/L (ref 44–121)
ALT SERPL-CCNC: 28 IU/L (ref 0–44)
AST SERPL-CCNC: 26 IU/L (ref 0–40)
BILIRUB SERPL-MCNC: 0.6 MG/DL (ref 0–1.2)
BUN SERPL-MCNC: 15 MG/DL (ref 6–24)
BUN SERPL-MCNC: 15 MG/DL (ref 6–24)
BUN/CREAT SERPL: 14 (ref 9–20)
BUN/CREAT SERPL: 14 (ref 9–20)
CALCIUM SERPL-MCNC: 10.4 MG/DL (ref 8.7–10.2)
CALCIUM SERPL-MCNC: 10.5 MG/DL (ref 8.7–10.2)
CHLORIDE SERPL-SCNC: 98 MMOL/L (ref 96–106)
CHLORIDE SERPL-SCNC: 99 MMOL/L (ref 96–106)
CHOLEST SERPL-MCNC: 126 MG/DL (ref 100–199)
CO2 SERPL-SCNC: 23 MMOL/L (ref 20–29)
CO2 SERPL-SCNC: 24 MMOL/L (ref 20–29)
CREAT SERPL-MCNC: 1.06 MG/DL (ref 0.76–1.27)
CREAT SERPL-MCNC: 1.1 MG/DL (ref 0.76–1.27)
EGFRCR SERPLBLD CKD-EPI 2021: 78 ML/MIN/1.73
EGFRCR SERPLBLD CKD-EPI 2021: 81 ML/MIN/1.73
ERYTHROCYTE [DISTWIDTH] IN BLOOD BY AUTOMATED COUNT: 13.5 % (ref 11.6–15.4)
GLOBULIN SER CALC-MCNC: 2.4 G/DL (ref 1.5–4.5)
GLUCOSE SERPL-MCNC: 126 MG/DL (ref 70–99)
GLUCOSE SERPL-MCNC: 129 MG/DL (ref 70–99)
HBA1C MFR BLD: 6.1 % (ref 4.8–5.6)
HBA1C MFR BLD: 6.1 % (ref 4.8–5.6)
HCT VFR BLD AUTO: 52.9 % (ref 37.5–51)
HDLC SERPL-MCNC: 32 MG/DL
HGB BLD-MCNC: 18 G/DL (ref 13–17.7)
IMP & REVIEW OF LAB RESULTS: NORMAL
LDLC SERPL CALC-MCNC: 67 MG/DL (ref 0–99)
Lab: NORMAL
MCH RBC QN AUTO: 30.9 PG (ref 26.6–33)
MCHC RBC AUTO-ENTMCNC: 34 G/DL (ref 31.5–35.7)
MCV RBC AUTO: 91 FL (ref 79–97)
PLATELET # BLD AUTO: 318 X10E3/UL (ref 150–450)
POTASSIUM SERPL-SCNC: 4.1 MMOL/L (ref 3.5–5.2)
POTASSIUM SERPL-SCNC: 4.2 MMOL/L (ref 3.5–5.2)
PROT SERPL-MCNC: 7 G/DL (ref 6–8.5)
PSA SERPL-MCNC: 2.5 NG/ML (ref 0–4)
RBC # BLD AUTO: 5.82 X10E6/UL (ref 4.14–5.8)
SODIUM SERPL-SCNC: 137 MMOL/L (ref 134–144)
SODIUM SERPL-SCNC: 138 MMOL/L (ref 134–144)
TRIGL SERPL-MCNC: 158 MG/DL (ref 0–149)
VLDLC SERPL CALC-MCNC: 27 MG/DL (ref 5–40)
WBC # BLD AUTO: 6.7 X10E3/UL (ref 3.4–10.8)

## 2024-11-20 NOTE — PROGRESS NOTES
HISTORY OF PRESENT ILLNESS  Brooks Jay is a 59 y.o. male.  HPI  Last here vv 10/4/24. Pt is here to f/u on chronic conditions.       Has a history of hypertension  BP today is 127/80  BP at home: 121/78   Continues on losartan 50mg, coreg 6.25mg BID and HCTZ 25 mg      He is diabetic  BS: 120-125   Taking Metformin 2 tablets 500mg BID   He also takes ASA 81mg daily  He was on mounjaro 7.5 mg weekly, states he could not tolerate the 10mg dose, he has since self dc'd this because it was giving him a lot of bloating and felt uncomfortable did not like it     Wt today is 238 lbs, down 4 lbs since lov   Discussed diet and wt/l  working on portions   No longer on mounjaro, felt bloated and self dc'd this   Recall saxenda 3mg caused him to feel bloated and uncomfortable     Reviewed labs    Ordered labs         He follows with endocrinology Dr. Parikh (endo) for PSA and testosterone level checks   Lov 12/5/23 , f/u scheduled 12/24, will discuss elevated PSA and erythrocytosis   We have discussed slightly elevated Ca levels on previous labs   Discussed w/ patient that his PTH was low though Ca was nl on recent labs, advised him to discuss this w/ Dr. Parikh at next visit patient is aware and states he will  Discussed testosterone can cause erythrocytosis   On weekly testosterone injections  Of note, he is on a lower dose of testosterone d/t syncopal event in the past        He saw Dr. Ceballos (neph) to w/u kidney fx due to progressively elevating microalbuminuria  Lov with Dr Hernandez (nephro) 3/25/24, f/u annually      He saw Dr. Almaraz (ortho) for BL knee pain, osteoarthritis   Last visit w/ CHARAN Campa 2/5/24    He saw CHARAN Forte (ortho) for L knee pain  Lov 10/15/24  Reviewed note:  PLAN  Treatment Plan: Mr. Jay is a 59 y.o. male who presents with primary osteoarthritis of left knee    We reviewed clinical exam, imaging obtained, and above diagnosis. In reference to management of their diagnosis and

## 2024-11-25 LAB
TESTOST FREE SERPL-MCNC: 15 PG/ML (ref 7.2–24)
TESTOST SERPL-MCNC: 517.9 NG/DL (ref 264–916)

## 2024-11-26 ENCOUNTER — OFFICE VISIT (OUTPATIENT)
Age: 59
End: 2024-11-26
Payer: COMMERCIAL

## 2024-11-26 VITALS
RESPIRATION RATE: 16 BRPM | DIASTOLIC BLOOD PRESSURE: 80 MMHG | WEIGHT: 238 LBS | HEART RATE: 87 BPM | HEIGHT: 69 IN | BODY MASS INDEX: 35.25 KG/M2 | OXYGEN SATURATION: 97 % | TEMPERATURE: 97.7 F | SYSTOLIC BLOOD PRESSURE: 127 MMHG

## 2024-11-26 DIAGNOSIS — E11.21 TYPE 2 DIABETES MELLITUS WITH NEPHROPATHY (HCC): ICD-10-CM

## 2024-11-26 DIAGNOSIS — I10 ESSENTIAL HYPERTENSION WITH GOAL BLOOD PRESSURE LESS THAN 140/90: ICD-10-CM

## 2024-11-26 DIAGNOSIS — G62.9 NEUROPATHY: ICD-10-CM

## 2024-11-26 DIAGNOSIS — K21.9 GASTROESOPHAGEAL REFLUX DISEASE WITHOUT ESOPHAGITIS: ICD-10-CM

## 2024-11-26 DIAGNOSIS — I25.10 CORONARY ARTERY DISEASE INVOLVING NATIVE CORONARY ARTERY OF NATIVE HEART WITHOUT ANGINA PECTORIS: ICD-10-CM

## 2024-11-26 DIAGNOSIS — E66.01 SEVERE OBESITY (BMI 35.0-39.9) WITH COMORBIDITY: ICD-10-CM

## 2024-11-26 DIAGNOSIS — Z00.00 PHYSICAL EXAM: Primary | ICD-10-CM

## 2024-11-26 DIAGNOSIS — F33.0 MILD EPISODE OF RECURRENT MAJOR DEPRESSIVE DISORDER (HCC): ICD-10-CM

## 2024-11-26 DIAGNOSIS — I50.22 CHRONIC SYSTOLIC CONGESTIVE HEART FAILURE (HCC): ICD-10-CM

## 2024-11-26 DIAGNOSIS — R79.89 LOW TESTOSTERONE: ICD-10-CM

## 2024-11-26 DIAGNOSIS — G47.33 OSA ON CPAP: ICD-10-CM

## 2024-11-26 PROCEDURE — 3074F SYST BP LT 130 MM HG: CPT | Performed by: INTERNAL MEDICINE

## 2024-11-26 PROCEDURE — 99396 PREV VISIT EST AGE 40-64: CPT | Performed by: INTERNAL MEDICINE

## 2024-11-26 PROCEDURE — 3079F DIAST BP 80-89 MM HG: CPT | Performed by: INTERNAL MEDICINE

## 2024-11-26 ASSESSMENT — PATIENT HEALTH QUESTIONNAIRE - PHQ9
SUM OF ALL RESPONSES TO PHQ QUESTIONS 1-9: 0
3. TROUBLE FALLING OR STAYING ASLEEP: NOT AT ALL
SUM OF ALL RESPONSES TO PHQ9 QUESTIONS 1 & 2: 0
SUM OF ALL RESPONSES TO PHQ QUESTIONS 1-9: 0
1. LITTLE INTEREST OR PLEASURE IN DOING THINGS: NOT AT ALL
6. FEELING BAD ABOUT YOURSELF - OR THAT YOU ARE A FAILURE OR HAVE LET YOURSELF OR YOUR FAMILY DOWN: NOT AT ALL
10. IF YOU CHECKED OFF ANY PROBLEMS, HOW DIFFICULT HAVE THESE PROBLEMS MADE IT FOR YOU TO DO YOUR WORK, TAKE CARE OF THINGS AT HOME, OR GET ALONG WITH OTHER PEOPLE: NOT DIFFICULT AT ALL
SUM OF ALL RESPONSES TO PHQ QUESTIONS 1-9: 0
7. TROUBLE CONCENTRATING ON THINGS, SUCH AS READING THE NEWSPAPER OR WATCHING TELEVISION: NOT AT ALL
5. POOR APPETITE OR OVEREATING: NOT AT ALL
9. THOUGHTS THAT YOU WOULD BE BETTER OFF DEAD, OR OF HURTING YOURSELF: NOT AT ALL
4. FEELING TIRED OR HAVING LITTLE ENERGY: NOT AT ALL
2. FEELING DOWN, DEPRESSED OR HOPELESS: NOT AT ALL
SUM OF ALL RESPONSES TO PHQ QUESTIONS 1-9: 0
8. MOVING OR SPEAKING SO SLOWLY THAT OTHER PEOPLE COULD HAVE NOTICED. OR THE OPPOSITE, BEING SO FIGETY OR RESTLESS THAT YOU HAVE BEEN MOVING AROUND A LOT MORE THAN USUAL: NOT AT ALL

## 2024-11-26 NOTE — PROGRESS NOTES
Chief Complaint   Patient presents with    Follow-up     3 months       Vitals:    11/26/24 0957   BP: 127/80   Site: Left Upper Arm   Position: Sitting   Cuff Size: Large Adult   Pulse: 87   Resp: 16   Temp: 97.7 °F (36.5 °C)   TempSrc: Temporal   SpO2: 97%   Weight: 108 kg (238 lb)   Height: 1.753 m (5' 9\")        Med reconciliation correct and up to date with changes have been made with patient.  \"Reviewed record in preparation for visit and have obtained the necessary documentation\"

## 2024-11-29 RX ORDER — MELOXICAM 15 MG/1
15 TABLET ORAL DAILY
Qty: 30 TABLET | Refills: 0 | Status: SHIPPED | OUTPATIENT
Start: 2024-11-29

## 2024-12-02 RX ORDER — LOSARTAN POTASSIUM 50 MG/1
50 TABLET ORAL DAILY
Qty: 90 TABLET | Refills: 0 | OUTPATIENT
Start: 2024-12-02

## 2024-12-02 RX ORDER — LOSARTAN POTASSIUM 50 MG/1
50 TABLET ORAL DAILY
Qty: 90 TABLET | Refills: 0 | Status: SHIPPED | OUTPATIENT
Start: 2024-12-02

## 2024-12-12 ENCOUNTER — OFFICE VISIT (OUTPATIENT)
Age: 59
End: 2024-12-12
Payer: COMMERCIAL

## 2024-12-12 VITALS
DIASTOLIC BLOOD PRESSURE: 68 MMHG | SYSTOLIC BLOOD PRESSURE: 110 MMHG | WEIGHT: 238 LBS | HEART RATE: 92 BPM | BODY MASS INDEX: 35.25 KG/M2 | HEIGHT: 69 IN

## 2024-12-12 DIAGNOSIS — Z79.4 TYPE 2 DIABETES MELLITUS WITH DIABETIC POLYNEUROPATHY, WITH LONG-TERM CURRENT USE OF INSULIN (HCC): Primary | ICD-10-CM

## 2024-12-12 DIAGNOSIS — E78.2 MIXED HYPERLIPIDEMIA: ICD-10-CM

## 2024-12-12 DIAGNOSIS — E29.1 HYPOGONADISM IN MALE: ICD-10-CM

## 2024-12-12 DIAGNOSIS — E11.42 TYPE 2 DIABETES MELLITUS WITH DIABETIC POLYNEUROPATHY, WITH LONG-TERM CURRENT USE OF INSULIN (HCC): Primary | ICD-10-CM

## 2024-12-12 DIAGNOSIS — I10 ESSENTIAL (PRIMARY) HYPERTENSION: ICD-10-CM

## 2024-12-12 PROCEDURE — 3074F SYST BP LT 130 MM HG: CPT | Performed by: INTERNAL MEDICINE

## 2024-12-12 PROCEDURE — 99214 OFFICE O/P EST MOD 30 MIN: CPT | Performed by: INTERNAL MEDICINE

## 2024-12-12 PROCEDURE — 3044F HG A1C LEVEL LT 7.0%: CPT | Performed by: INTERNAL MEDICINE

## 2024-12-12 PROCEDURE — 3078F DIAST BP <80 MM HG: CPT | Performed by: INTERNAL MEDICINE

## 2024-12-12 NOTE — PROGRESS NOTES
JANEY.    Current Outpatient Medications   Medication Sig    losartan (COZAAR) 50 MG tablet Take 1 tablet by mouth daily    BD PLASTIPAK SYRINGE 21G X 1\" 3 ML MISC USE TO DRAW UP TESTOSTERONE ONCE WEEKLY    hydroCHLOROthiazide 12.5 MG tablet Take 1 tablet by mouth daily for high blood pressure    carvedilol (COREG) 6.25 MG tablet Take 1 tablet by mouth 2 times daily    fenofibrate (TRICOR) 145 MG tablet Take 1 tablet by mouth daily    rosuvastatin (CRESTOR) 10 MG tablet TAKE 1 TABLET BY MOUTH AT NIGHT    venlafaxine (EFFEXOR XR) 150 MG extended release capsule TAKE 1 CAPSULE DAILY    tiZANidine (ZANAFLEX) 2 MG tablet Take 1 tablet by mouth 3 times daily as needed (pain)    metFORMIN (GLUCOPHAGE) 500 MG tablet TAKE 2 TABLETS BY MOUTH TWICE  DAILY WITH MEALS    testosterone cypionate (DEPOTESTOTERONE CYPIONATE) 200 MG/ML injection INJECT 0.75 MLS WEEKLY.    gabapentin (NEURONTIN) 300 MG capsule TAKE 1 CAPSULE EVERY EVENING    BD HYPODERMIC NEEDLE 16G X 1\" MISC USE TO DRAW UP TESTOSTERONE ONCE WEEKLY    Testosterone Cypionate 200 MG/ML SOLN Inject 0.75ml weekly    emtricitabine-tenofovir alafenamide (DESCOVY) 200-25 MG TABS tablet Take 1 tablet by mouth daily    tadalafil (CIALIS) 10 MG tablet Take 1 tablet by mouth daily as needed for Erectile Dysfunction    Multiple Vitamin (MULTI-VITAMIN DAILY PO) Take by mouth daily    FIBER PO Take 2 tablets by mouth daily    diclofenac sodium (VOLTAREN) 1 % GEL Apply topically 4 times daily    famotidine (PEPCID) 20 MG tablet Take 1 tablet by mouth daily    Glucosamine 500 MG CAPS Take 500 mg by mouth daily    Melatonin 10 MG TABS Take by mouth    mometasone (NASONEX) 50 MCG/ACT nasal spray 2 sprays daily As needed    aspirin 81 MG chewable tablet Take 1 tablet by mouth     No current facility-administered medications for this visit.     No Known Allergies  Review of Systems:  As noted in HPI    Physical Examination:  Blood pressure 110/68, pulse 92, height 1.753 m (5' 9\"), weight

## 2024-12-21 DIAGNOSIS — Z79.4 TYPE 2 DIABETES MELLITUS WITH DIABETIC POLYNEUROPATHY, WITH LONG-TERM CURRENT USE OF INSULIN (HCC): ICD-10-CM

## 2024-12-21 DIAGNOSIS — E11.42 TYPE 2 DIABETES MELLITUS WITH DIABETIC POLYNEUROPATHY, WITH LONG-TERM CURRENT USE OF INSULIN (HCC): ICD-10-CM

## 2024-12-23 RX ORDER — GABAPENTIN 300 MG/1
CAPSULE ORAL
Qty: 90 CAPSULE | Refills: 1 | Status: SHIPPED | OUTPATIENT
Start: 2024-12-23 | End: 2025-12-23

## 2025-01-05 DIAGNOSIS — E29.1 HYPOGONADISM IN MALE: ICD-10-CM

## 2025-01-06 RX ORDER — HYDROCHLOROTHIAZIDE 12.5 MG/1
12.5 TABLET ORAL DAILY
Qty: 90 TABLET | Refills: 0 | Status: SHIPPED | OUTPATIENT
Start: 2025-01-06

## 2025-01-06 RX ORDER — CARVEDILOL 6.25 MG/1
6.25 TABLET ORAL 2 TIMES DAILY
Qty: 180 TABLET | Refills: 0 | Status: SHIPPED | OUTPATIENT
Start: 2025-01-06

## 2025-01-07 RX ORDER — TESTOSTERONE CYPIONATE 200 MG/ML
INJECTION, SOLUTION INTRAMUSCULAR
Qty: 4 ML | Refills: 5 | Status: SHIPPED | OUTPATIENT
Start: 2025-01-07 | End: 2026-01-07

## 2025-01-14 RX ORDER — HYDROCHLOROTHIAZIDE 12.5 MG/1
12.5 TABLET ORAL DAILY
Qty: 90 TABLET | Refills: 0 | Status: SHIPPED | OUTPATIENT
Start: 2025-01-14

## 2025-01-15 LAB
ERYTHROCYTE [DISTWIDTH] IN BLOOD BY AUTOMATED COUNT: 13.9 % (ref 11.6–15.4)
HCT VFR BLD AUTO: 49.8 % (ref 37.5–51)
HGB BLD-MCNC: 16.7 G/DL (ref 13–17.7)
MCH RBC QN AUTO: 30.3 PG (ref 26.6–33)
MCHC RBC AUTO-ENTMCNC: 33.5 G/DL (ref 31.5–35.7)
MCV RBC AUTO: 90 FL (ref 79–97)
PLATELET # BLD AUTO: 301 X10E3/UL (ref 150–450)
RBC # BLD AUTO: 5.51 X10E6/UL (ref 4.14–5.8)
WBC # BLD AUTO: 7.4 X10E3/UL (ref 3.4–10.8)

## 2025-01-16 LAB
ALBUMIN SERPL-MCNC: 4.6 G/DL (ref 3.8–4.9)
ALBUMIN/CREAT UR: 52 MG/G CREAT (ref 0–29)
ALP SERPL-CCNC: 54 IU/L (ref 44–121)
ALT SERPL-CCNC: 23 IU/L (ref 0–44)
AST SERPL-CCNC: 22 IU/L (ref 0–40)
BILIRUB SERPL-MCNC: 0.6 MG/DL (ref 0–1.2)
BUN SERPL-MCNC: 13 MG/DL (ref 6–24)
BUN/CREAT SERPL: 13 (ref 9–20)
CALCIUM SERPL-MCNC: 9.8 MG/DL (ref 8.7–10.2)
CHLORIDE SERPL-SCNC: 99 MMOL/L (ref 96–106)
CHOLEST SERPL-MCNC: 130 MG/DL (ref 100–199)
CO2 SERPL-SCNC: 24 MMOL/L (ref 20–29)
CREAT SERPL-MCNC: 1.02 MG/DL (ref 0.76–1.27)
CREAT UR-MCNC: 204.7 MG/DL
EGFRCR SERPLBLD CKD-EPI 2021: 85 ML/MIN/1.73
GLOBULIN SER CALC-MCNC: 2.6 G/DL (ref 1.5–4.5)
GLUCOSE SERPL-MCNC: 133 MG/DL (ref 70–99)
HBA1C MFR BLD: 6 % (ref 4.8–5.6)
HDLC SERPL-MCNC: 30 MG/DL
IMP & REVIEW OF LAB RESULTS: NORMAL
LDLC SERPL CALC-MCNC: 63 MG/DL (ref 0–99)
Lab: NORMAL
MICROALBUMIN UR-MCNC: 106.9 UG/ML
POTASSIUM SERPL-SCNC: 3.9 MMOL/L (ref 3.5–5.2)
PROT SERPL-MCNC: 7.2 G/DL (ref 6–8.5)
PSA SERPL-MCNC: 2.2 NG/ML (ref 0–4)
SODIUM SERPL-SCNC: 138 MMOL/L (ref 134–144)
TRIGL SERPL-MCNC: 223 MG/DL (ref 0–149)
VLDLC SERPL CALC-MCNC: 37 MG/DL (ref 5–40)

## 2025-01-20 LAB
TESTOST FREE SERPL-MCNC: 18.4 PG/ML (ref 7.2–24)
TESTOST SERPL-MCNC: 744.7 NG/DL (ref 264–916)

## 2025-01-27 RX ORDER — HYDROCHLOROTHIAZIDE 12.5 MG/1
12.5 TABLET ORAL DAILY
Qty: 90 TABLET | Refills: 0 | Status: SHIPPED | OUTPATIENT
Start: 2025-01-27

## 2025-01-27 RX ORDER — CARVEDILOL 6.25 MG/1
6.25 TABLET ORAL 2 TIMES DAILY
Qty: 180 TABLET | Refills: 0 | Status: SHIPPED | OUTPATIENT
Start: 2025-01-27

## 2025-02-01 DIAGNOSIS — E29.1 HYPOGONADISM IN MALE: ICD-10-CM

## 2025-02-01 DIAGNOSIS — Z79.4 TYPE 2 DIABETES MELLITUS WITH DIABETIC POLYNEUROPATHY, WITH LONG-TERM CURRENT USE OF INSULIN (HCC): ICD-10-CM

## 2025-02-01 DIAGNOSIS — E11.42 TYPE 2 DIABETES MELLITUS WITH DIABETIC POLYNEUROPATHY, WITH LONG-TERM CURRENT USE OF INSULIN (HCC): ICD-10-CM

## 2025-02-01 DIAGNOSIS — E78.2 MIXED HYPERLIPIDEMIA: ICD-10-CM

## 2025-02-01 DIAGNOSIS — I10 ESSENTIAL (PRIMARY) HYPERTENSION: ICD-10-CM

## 2025-02-10 RX ORDER — EMTRICITABINE AND TENOFOVIR ALAFENAMIDE 200; 25 MG/1; MG/1
1 TABLET ORAL DAILY
Qty: 90 TABLET | Refills: 1 | OUTPATIENT
Start: 2025-02-10

## 2025-02-10 NOTE — TELEPHONE ENCOUNTER
Patient requested refill for Descovy. Has not been seen since October 2023. Now would like an appointment. Attempted to call patient to explain that he will need an appointment before refill and LVM to call office. ERNESTINAR Sade printed [employer] message and gave to me about patient wanting virtual appointment. Patient will need in-person appointment as he has not been seen since 2023. Awaiting patient return phone call.       04:00 pm Patient did call back to get an appointment.

## 2025-02-18 RX ORDER — LOSARTAN POTASSIUM 50 MG/1
50 TABLET ORAL DAILY
Qty: 90 TABLET | Refills: 0 | Status: SHIPPED | OUTPATIENT
Start: 2025-02-18

## 2025-02-24 RX ORDER — EMTRICITABINE AND TENOFOVIR ALAFENAMIDE 200; 25 MG/1; MG/1
1 TABLET ORAL DAILY
Qty: 90 TABLET | Refills: 1 | OUTPATIENT
Start: 2025-02-24

## 2025-02-25 LAB
BUN SERPL-MCNC: 18 MG/DL (ref 6–24)
BUN/CREAT SERPL: 16 (ref 9–20)
CALCIUM SERPL-MCNC: 10.3 MG/DL (ref 8.7–10.2)
CHLORIDE SERPL-SCNC: 99 MMOL/L (ref 96–106)
CO2 SERPL-SCNC: 24 MMOL/L (ref 20–29)
CREAT SERPL-MCNC: 1.11 MG/DL (ref 0.76–1.27)
EGFRCR SERPLBLD CKD-EPI 2021: 77 ML/MIN/1.73
GLUCOSE SERPL-MCNC: 112 MG/DL (ref 70–99)
HBA1C MFR BLD: 6 % (ref 4.8–5.6)
POTASSIUM SERPL-SCNC: 4.2 MMOL/L (ref 3.5–5.2)
SODIUM SERPL-SCNC: 138 MMOL/L (ref 134–144)

## 2025-02-26 ENCOUNTER — OFFICE VISIT (OUTPATIENT)
Age: 60
End: 2025-02-26

## 2025-02-26 VITALS
HEART RATE: 100 BPM | SYSTOLIC BLOOD PRESSURE: 100 MMHG | HEIGHT: 69 IN | BODY MASS INDEX: 35.96 KG/M2 | WEIGHT: 242.8 LBS | DIASTOLIC BLOOD PRESSURE: 62 MMHG

## 2025-02-26 DIAGNOSIS — E11.42 TYPE 2 DIABETES MELLITUS WITH DIABETIC POLYNEUROPATHY, WITH LONG-TERM CURRENT USE OF INSULIN (HCC): ICD-10-CM

## 2025-02-26 DIAGNOSIS — E78.2 MIXED HYPERLIPIDEMIA: ICD-10-CM

## 2025-02-26 DIAGNOSIS — E29.1 HYPOGONADISM IN MALE: Primary | ICD-10-CM

## 2025-02-26 DIAGNOSIS — I10 ESSENTIAL (PRIMARY) HYPERTENSION: ICD-10-CM

## 2025-02-26 DIAGNOSIS — Z79.4 TYPE 2 DIABETES MELLITUS WITH DIABETIC POLYNEUROPATHY, WITH LONG-TERM CURRENT USE OF INSULIN (HCC): ICD-10-CM

## 2025-02-26 NOTE — PROGRESS NOTES
Chief Complaint   Patient presents with    Diabetes    Hypogonadism     Pcp and pharmacy verified     History of Present Illness: Brooks Jay is a 59 y.o. male here for follow up of diabetes and hypogonadism.    \"My knees are doing ok, I can tell when it is cold, or rainy. I am not needing a cane. [facility] am [facility]ing [facility], I am having a cortisone tomorrow. I get one every 4 months in the left knee.     He denies issues of CP, SOB, palpitations, tremors, HA, vision changes.    Pt is still taking the Metformin 1000mg BID.    His A1C in January 2025 was down to 6.0%.      He checks his BGs 1-2 times per week, fasting. It will run in the 120s range.  He denies issues of hypoglycemia.    Exercise consists of house work and chores. Pt works from home.  Pt had a \"minor MI\" in 2014, Pt follows with Dr. Tolentino (cardio) annually.     Pt is eating 3-4 meals per day and an occasional snack.  Pt wakes around 6AM.  - He has breakfast around 9AM, this AM he had cereal, almond milk and coffee (SNL and milk).  - He has lunch around Noon, yesterday he had 3 slices of pizza, salad and regular soda.    - He has dinner around 6PM, last night he had meat loaf, mashed potatoes and crystal lite.    Pt has hx of elevated Urine MA/Cr.       He notes he will occasionally get tingling and itching in his left foot.    Last eye exam was January 2024. \"Everything was fine\".     Pt was first diagnosed with hypogonadism around 2015. He was having issues of feeling very sluggish. He was tested for his testosterone and \"it was pretty low\". He is currently taking a weekly injection of Testosterone Cypionate (75ml/150mg).  He takes his injection every Saturday.    He denies issues of CP, SOB, palpitations, HAs, or LUTS symptoms.  He denies issues of mood swings or irritability.  He gives himself his injection in the muscle of the thigh.  He does not have any injection site issues.    Pt follows with Dr. Oh (pulm) annually, for JANEY. He is using his

## 2025-02-26 NOTE — TELEPHONE ENCOUNTER
[facility] - Infectious Disease  Staff49 minutes ago (10:37 AM)     GG  Appointment Request From: Brooks Jay     With Provider: Dr. Tameka Mehta MD [[facility] - Infectious Disease]     Preferred Date Range: 2/27/2025 - 4/4/2025     Preferred Times: Any Time     Reason for visit: Request an Appointment     Comments:  Checkup. Virtual visit please

## 2025-02-27 RX ORDER — EMTRICITABINE AND TENOFOVIR ALAFENAMIDE 200; 25 MG/1; MG/1
1 TABLET ORAL DAILY
Qty: 90 TABLET | Refills: 1 | Status: ACTIVE | OUTPATIENT
Start: 2025-02-27

## 2025-03-04 ENCOUNTER — OFFICE VISIT (OUTPATIENT)
Age: 60
End: 2025-03-04
Payer: COMMERCIAL

## 2025-03-04 VITALS
OXYGEN SATURATION: 98 % | HEIGHT: 69 IN | SYSTOLIC BLOOD PRESSURE: 122 MMHG | HEART RATE: 94 BPM | BODY MASS INDEX: 35.31 KG/M2 | TEMPERATURE: 97.7 F | WEIGHT: 238.38 LBS | DIASTOLIC BLOOD PRESSURE: 74 MMHG

## 2025-03-04 DIAGNOSIS — G62.9 NEUROPATHY: ICD-10-CM

## 2025-03-04 DIAGNOSIS — I10 ESSENTIAL HYPERTENSION WITH GOAL BLOOD PRESSURE LESS THAN 140/90: ICD-10-CM

## 2025-03-04 DIAGNOSIS — E11.21 TYPE 2 DIABETES MELLITUS WITH NEPHROPATHY (HCC): ICD-10-CM

## 2025-03-04 DIAGNOSIS — G47.33 OSA ON CPAP: ICD-10-CM

## 2025-03-04 DIAGNOSIS — E83.52 HYPERCALCEMIA: ICD-10-CM

## 2025-03-04 DIAGNOSIS — E66.9 OBESITY (BMI 30-39.9): ICD-10-CM

## 2025-03-04 DIAGNOSIS — K21.9 GASTROESOPHAGEAL REFLUX DISEASE WITHOUT ESOPHAGITIS: ICD-10-CM

## 2025-03-04 DIAGNOSIS — F33.0 MILD EPISODE OF RECURRENT MAJOR DEPRESSIVE DISORDER: ICD-10-CM

## 2025-03-04 DIAGNOSIS — I25.10 CORONARY ARTERY DISEASE INVOLVING NATIVE CORONARY ARTERY OF NATIVE HEART WITHOUT ANGINA PECTORIS: ICD-10-CM

## 2025-03-04 DIAGNOSIS — I50.22 CHRONIC SYSTOLIC CONGESTIVE HEART FAILURE (HCC): ICD-10-CM

## 2025-03-04 DIAGNOSIS — E66.01 SEVERE OBESITY (BMI 35.0-39.9) WITH COMORBIDITY: ICD-10-CM

## 2025-03-04 DIAGNOSIS — I10 ESSENTIAL HYPERTENSION WITH GOAL BLOOD PRESSURE LESS THAN 140/90: Primary | ICD-10-CM

## 2025-03-04 DIAGNOSIS — R79.89 LOW TESTOSTERONE: ICD-10-CM

## 2025-03-04 PROCEDURE — 3078F DIAST BP <80 MM HG: CPT | Performed by: INTERNAL MEDICINE

## 2025-03-04 PROCEDURE — 3044F HG A1C LEVEL LT 7.0%: CPT | Performed by: INTERNAL MEDICINE

## 2025-03-04 PROCEDURE — 99214 OFFICE O/P EST MOD 30 MIN: CPT | Performed by: INTERNAL MEDICINE

## 2025-03-04 PROCEDURE — 3074F SYST BP LT 130 MM HG: CPT | Performed by: INTERNAL MEDICINE

## 2025-03-04 SDOH — ECONOMIC STABILITY: FOOD INSECURITY: WITHIN THE PAST 12 MONTHS, YOU WORRIED THAT YOUR FOOD WOULD RUN OUT BEFORE YOU GOT MONEY TO BUY MORE.: NEVER TRUE

## 2025-03-04 SDOH — ECONOMIC STABILITY: FOOD INSECURITY: WITHIN THE PAST 12 MONTHS, THE FOOD YOU BOUGHT JUST DIDN'T LAST AND YOU DIDN'T HAVE MONEY TO GET MORE.: NEVER TRUE

## 2025-03-04 ASSESSMENT — PATIENT HEALTH QUESTIONNAIRE - PHQ9
SUM OF ALL RESPONSES TO PHQ QUESTIONS 1-9: 0
SUM OF ALL RESPONSES TO PHQ QUESTIONS 1-9: 0
1. LITTLE INTEREST OR PLEASURE IN DOING THINGS: NOT AT ALL
SUM OF ALL RESPONSES TO PHQ QUESTIONS 1-9: 0
SUM OF ALL RESPONSES TO PHQ QUESTIONS 1-9: 0
2. FEELING DOWN, DEPRESSED OR HOPELESS: NOT AT ALL

## 2025-03-04 NOTE — PROGRESS NOTES
\"Have you been to the ER, urgent care clinic since your last visit?  Hospitalized since your last visit?\"    NO    “Have you seen or consulted any other health care providers outside our system since your last visit?”    NO         
200-25 MG TABS tablet TAKE ONE TABLET BY MOUTH ONCE A DAY 90 tablet 1    testosterone cypionate (DEPOTESTOTERONE CYPIONATE) 200 MG/ML injection INJECT 0.75 MLS WEEKLY. 4 mL 1     No current facility-administered medications for this visit.     Past Surgical History:   Procedure Laterality Date    COLONOSCOPY  11/2016    GI      colonoscopy    OTHER SURGICAL HISTORY      anal fissure         Lab Results   Component Value Date    WBC 7.4 01/15/2025    HGB 16.7 01/15/2025    HCT 49.8 01/15/2025    MCV 90 01/15/2025     01/15/2025     Lab Results   Component Value Date    CHOL 130 01/15/2025    TRIG 223 (H) 01/15/2025    HDL 30 (L) 01/15/2025     Lab Results   Component Value Date    CREATININE 1.11 02/24/2025    BUN 18 02/24/2025     02/24/2025    K 4.2 02/24/2025    CL 99 02/24/2025    CO2 24 02/24/2025       Review of Systems   Respiratory:  Negative for shortness of breath.    Cardiovascular:  Negative for chest pain.         Physical Exam  Constitutional:       General: He is not in acute distress.     Appearance: He is not ill-appearing, toxic-appearing or diaphoretic.   HENT:      Head: Normocephalic and atraumatic.   Eyes:      General:         Right eye: No discharge.         Left eye: No discharge.      Extraocular Movements: Extraocular movements intact.   Neck:      Vascular: No carotid bruit.   Cardiovascular:      Rate and Rhythm: Normal rate and regular rhythm.      Heart sounds: Normal heart sounds. No murmur heard.  Pulmonary:      Effort: Pulmonary effort is normal. No respiratory distress.      Breath sounds: Normal breath sounds. No wheezing.   Musculoskeletal:         General: No swelling, tenderness or deformity.      Cervical back: Normal range of motion and neck supple. No tenderness.      Right lower leg: No edema.      Left lower leg: No edema.   Lymphadenopathy:      Cervical: No cervical adenopathy.   Skin:     General: Skin is warm and dry.      Findings: No erythema.

## 2025-03-11 ENCOUNTER — TELEPHONE (OUTPATIENT)
Age: 60
End: 2025-03-11

## 2025-03-11 NOTE — TELEPHONE ENCOUNTER
Patient came by the office today    He would like to schedule a virtual visit for a follow up    Please call patient with appointment date and time    [phone]

## 2025-04-16 RX ORDER — LOSARTAN POTASSIUM 50 MG/1
50 TABLET ORAL DAILY
Qty: 90 TABLET | Refills: 0 | Status: SHIPPED | OUTPATIENT
Start: 2025-04-16

## 2025-04-17 RX ORDER — HYDROCHLOROTHIAZIDE 12.5 MG/1
12.5 TABLET ORAL DAILY
Qty: 90 TABLET | Refills: 0 | Status: SHIPPED | OUTPATIENT
Start: 2025-04-17

## 2025-05-05 ENCOUNTER — TELEPHONE (OUTPATIENT)
Age: 60
End: 2025-05-05

## 2025-05-06 DIAGNOSIS — Z20.6 EXPOSURE TO HIV: Primary | ICD-10-CM

## 2025-05-06 DIAGNOSIS — Z20.2 STD EXPOSURE: ICD-10-CM

## 2025-05-06 DIAGNOSIS — Z20.6 EXPOSURE TO HIV: ICD-10-CM

## 2025-05-07 LAB
ALBUMIN SERPL-MCNC: 4.7 G/DL (ref 3.8–4.9)
ALP SERPL-CCNC: 54 IU/L (ref 44–121)
ALT SERPL-CCNC: 19 IU/L (ref 0–44)
AST SERPL-CCNC: 22 IU/L (ref 0–40)
BASOPHILS # BLD AUTO: 0.1 X10E3/UL (ref 0–0.2)
BASOPHILS NFR BLD AUTO: 1 %
BILIRUB SERPL-MCNC: 0.3 MG/DL (ref 0–1.2)
BUN SERPL-MCNC: 13 MG/DL (ref 6–24)
BUN/CREAT SERPL: 12 (ref 9–20)
CALCIUM SERPL-MCNC: 10.4 MG/DL (ref 8.7–10.2)
CHLORIDE SERPL-SCNC: 101 MMOL/L (ref 96–106)
CO2 SERPL-SCNC: 23 MMOL/L (ref 20–29)
CREAT SERPL-MCNC: 1.07 MG/DL (ref 0.76–1.27)
EGFRCR SERPLBLD CKD-EPI 2021: 80 ML/MIN/1.73
EOSINOPHIL # BLD AUTO: 0.2 X10E3/UL (ref 0–0.4)
EOSINOPHIL NFR BLD AUTO: 3 %
ERYTHROCYTE [DISTWIDTH] IN BLOOD BY AUTOMATED COUNT: 13.1 % (ref 11.6–15.4)
GLOBULIN SER CALC-MCNC: 2.8 G/DL (ref 1.5–4.5)
GLUCOSE SERPL-MCNC: 91 MG/DL (ref 70–99)
HAV IGM SERPL QL IA: NEGATIVE
HBV CORE IGM SERPL QL IA: NEGATIVE
HBV DNA SERPL NAA+PROBE-ACNC: NORMAL IU/ML
HBV DNA SERPL NAA+PROBE-LOG IU: NORMAL LOG10 IU/ML
HBV GENOTYPE: NORMAL
HBV SURFACE AG SERPL QL IA: NEGATIVE
HCT VFR BLD AUTO: 51.8 % (ref 37.5–51)
HCV AB SERPL QL IA: NON REACTIVE
HCV AB SERPL QL IA: NORMAL
HGB BLD-MCNC: 17.5 G/DL (ref 13–17.7)
HIV 1+2 AB+HIV1 P24 AG SERPL QL IA: NON REACTIVE
IMM GRANULOCYTES # BLD AUTO: 0 X10E3/UL (ref 0–0.1)
IMM GRANULOCYTES NFR BLD AUTO: 0 %
LYMPHOCYTES # BLD AUTO: 2.9 X10E3/UL (ref 0.7–3.1)
LYMPHOCYTES NFR BLD AUTO: 42 %
MCH RBC QN AUTO: 30.7 PG (ref 26.6–33)
MCHC RBC AUTO-ENTMCNC: 33.8 G/DL (ref 31.5–35.7)
MCV RBC AUTO: 91 FL (ref 79–97)
MONOCYTES # BLD AUTO: 0.5 X10E3/UL (ref 0.1–0.9)
MONOCYTES NFR BLD AUTO: 7 %
NEUTROPHILS # BLD AUTO: 3.3 X10E3/UL (ref 1.4–7)
NEUTROPHILS NFR BLD AUTO: 47 %
PLATELET # BLD AUTO: 289 X10E3/UL (ref 150–450)
POTASSIUM SERPL-SCNC: 4.6 MMOL/L (ref 3.5–5.2)
PROT SERPL-MCNC: 7.5 G/DL (ref 6–8.5)
RBC # BLD AUTO: 5.7 X10E6/UL (ref 4.14–5.8)
REF LAB TEST REF RANGE: NORMAL
RPR SER QL: NON REACTIVE
SODIUM SERPL-SCNC: 137 MMOL/L (ref 134–144)
WBC # BLD AUTO: 7 X10E3/UL (ref 3.4–10.8)

## 2025-05-08 ENCOUNTER — TELEPHONE (OUTPATIENT)
Age: 60
End: 2025-05-08

## 2025-05-08 ENCOUNTER — TELEMEDICINE (OUTPATIENT)
Age: 60
End: 2025-05-08
Payer: COMMERCIAL

## 2025-05-08 DIAGNOSIS — R76.8 HEPATITIS B CORE ANTIBODY POSITIVE: ICD-10-CM

## 2025-05-08 DIAGNOSIS — Z20.2 STD EXPOSURE: ICD-10-CM

## 2025-05-08 DIAGNOSIS — R79.89 LOW TESTOSTERONE: ICD-10-CM

## 2025-05-08 DIAGNOSIS — Z20.6 EXPOSURE TO HIV: Primary | ICD-10-CM

## 2025-05-08 DIAGNOSIS — E11.8 CONTROLLED DIABETES MELLITUS TYPE 2 WITH COMPLICATIONS, UNSPECIFIED WHETHER LONG TERM INSULIN USE (HCC): ICD-10-CM

## 2025-05-08 DIAGNOSIS — E66.9 OBESITY (BMI 30-39.9): ICD-10-CM

## 2025-05-08 DIAGNOSIS — Z20.6 EXPOSURE TO HIV: ICD-10-CM

## 2025-05-08 PROCEDURE — 99214 OFFICE O/P EST MOD 30 MIN: CPT | Performed by: INTERNAL MEDICINE

## 2025-05-08 PROCEDURE — 3044F HG A1C LEVEL LT 7.0%: CPT | Performed by: INTERNAL MEDICINE

## 2025-05-08 NOTE — PROGRESS NOTES
Infectious Disease Progress        Impression / Plan         IMPRESSION/PLAN:   Pt is on  PrEP, risk factor spouse is HIV +, other partners, on Descovy  Pts \" \"is on Odefsey, undetectable for many years , intermittent blips  DM controlled A1c 6  Primary HTN controlled  Hepatitis B core ab + , HBV negative   Vitamin D level, bone density test normal   Pt advised to exercise, do resistance work, take Vit D supplement, drink lots of water  Labs and follow-up 6 months.        omponent  Ref Range & Units (hover) 25 1209 24 1107 3/21/23 0818 22 0914 3/15/22 0823 11/3/21 0836 21 0804   HIV Screen 4th Generation wRfx Non Reactive Non Reactive CM Non Reactive CM Non Reactive CM Non Reactive CM Non Reactive Non Reactive   Comment: HIV-1/HIV-2 antibodies and HIV-1 p24 antigen were NOT detected.  There is no laboratory evidence of HIV infection.  HIV Negative           Extensive review of chart notes, labs, imaging, cultures done  Additionally review of done: Recent reports-Labs, cultures, imaging  D/w -hospitalist, NIRMAL Jay, was evaluated through a synchronous (real-time) audio-video encounter. The patient (or guardian if applicable) is aware that this is a billable service, which includes applicable co-pays. This Virtual Visit was conducted with patient's (and/or legal guardian's) consent. Patient identification was verified, and a caregiver was present when appropriate.   The patient was located at home [place], [place]  Provider was located at Facility (Appt Dept): [address] [place] [address] [address]  [place] [address]  Brooks Jay (:  1965) is a Established patient, presenting virtually for evaluation of the following:   PrEP  Denies new complaints. Pt has multiple partners, and spouse who is hiv + & undetectable.Denies new symptoms.  No STD exposure or concerns of STD exposure.  Patient is doing well overall.  No new health concerns, no new medications.  Patient

## 2025-06-03 DIAGNOSIS — Z79.4 TYPE 2 DIABETES MELLITUS WITH DIABETIC POLYNEUROPATHY, WITH LONG-TERM CURRENT USE OF INSULIN (HCC): ICD-10-CM

## 2025-06-03 DIAGNOSIS — E11.42 TYPE 2 DIABETES MELLITUS WITH DIABETIC POLYNEUROPATHY, WITH LONG-TERM CURRENT USE OF INSULIN (HCC): ICD-10-CM

## 2025-06-04 RX ORDER — GABAPENTIN 300 MG/1
CAPSULE ORAL
Qty: 90 CAPSULE | Refills: 1 | Status: SHIPPED | OUTPATIENT
Start: 2025-06-04 | End: 2026-06-04

## 2025-06-05 LAB — HBA1C MFR BLD: 6.1 % (ref 4.8–5.6)

## 2025-06-06 ENCOUNTER — RESULTS FOLLOW-UP (OUTPATIENT)
Age: 60
End: 2025-06-06

## 2025-06-07 LAB
BUN SERPL-MCNC: 15 MG/DL (ref 6–24)
BUN/CREAT SERPL: 13 (ref 9–20)
CALCIUM SERPL-MCNC: 10.1 MG/DL (ref 8.7–10.2)
CHLORIDE SERPL-SCNC: 98 MMOL/L (ref 96–106)
CO2 SERPL-SCNC: 25 MMOL/L (ref 20–29)
CREAT SERPL-MCNC: 1.14 MG/DL (ref 0.76–1.27)
EGFRCR SERPLBLD CKD-EPI 2021: 75 ML/MIN/1.73
GLUCOSE SERPL-MCNC: 116 MG/DL (ref 70–99)
POTASSIUM SERPL-SCNC: 4.5 MMOL/L (ref 3.5–5.2)
SODIUM SERPL-SCNC: 138 MMOL/L (ref 134–144)

## 2025-06-11 ENCOUNTER — OFFICE VISIT (OUTPATIENT)
Age: 60
End: 2025-06-11
Payer: COMMERCIAL

## 2025-06-11 VITALS
BODY MASS INDEX: 35.29 KG/M2 | OXYGEN SATURATION: 96 % | HEIGHT: 69 IN | HEART RATE: 82 BPM | DIASTOLIC BLOOD PRESSURE: 71 MMHG | SYSTOLIC BLOOD PRESSURE: 113 MMHG | WEIGHT: 238.25 LBS | TEMPERATURE: 97.2 F

## 2025-06-11 DIAGNOSIS — G47.33 OSA ON CPAP: ICD-10-CM

## 2025-06-11 DIAGNOSIS — G62.9 NEUROPATHY: ICD-10-CM

## 2025-06-11 DIAGNOSIS — R79.89 LOW TESTOSTERONE: ICD-10-CM

## 2025-06-11 DIAGNOSIS — E78.5 DYSLIPIDEMIA: ICD-10-CM

## 2025-06-11 DIAGNOSIS — I10 ESSENTIAL HYPERTENSION WITH GOAL BLOOD PRESSURE LESS THAN 140/90: ICD-10-CM

## 2025-06-11 DIAGNOSIS — F33.0 MILD EPISODE OF RECURRENT MAJOR DEPRESSIVE DISORDER: ICD-10-CM

## 2025-06-11 DIAGNOSIS — I50.22 CHRONIC SYSTOLIC CONGESTIVE HEART FAILURE (HCC): ICD-10-CM

## 2025-06-11 DIAGNOSIS — E11.21 TYPE 2 DIABETES MELLITUS WITH NEPHROPATHY (HCC): ICD-10-CM

## 2025-06-11 DIAGNOSIS — I25.10 CORONARY ARTERY DISEASE INVOLVING NATIVE CORONARY ARTERY OF NATIVE HEART WITHOUT ANGINA PECTORIS: Primary | ICD-10-CM

## 2025-06-11 DIAGNOSIS — K21.9 GASTROESOPHAGEAL REFLUX DISEASE WITHOUT ESOPHAGITIS: ICD-10-CM

## 2025-06-11 DIAGNOSIS — E66.01 SEVERE OBESITY (BMI 35.0-39.9) WITH COMORBIDITY (HCC): ICD-10-CM

## 2025-06-11 PROCEDURE — 3078F DIAST BP <80 MM HG: CPT | Performed by: INTERNAL MEDICINE

## 2025-06-11 PROCEDURE — 3074F SYST BP LT 130 MM HG: CPT | Performed by: INTERNAL MEDICINE

## 2025-06-11 PROCEDURE — 3044F HG A1C LEVEL LT 7.0%: CPT | Performed by: INTERNAL MEDICINE

## 2025-06-11 PROCEDURE — 99214 OFFICE O/P EST MOD 30 MIN: CPT | Performed by: INTERNAL MEDICINE

## 2025-06-11 RX ORDER — VENLAFAXINE HYDROCHLORIDE 75 MG/1
75 CAPSULE, EXTENDED RELEASE ORAL DAILY
Qty: 30 CAPSULE | Refills: 3 | Status: SHIPPED | OUTPATIENT
Start: 2025-06-11

## 2025-06-11 ASSESSMENT — PATIENT HEALTH QUESTIONNAIRE - PHQ9
2. FEELING DOWN, DEPRESSED OR HOPELESS: NOT AT ALL
SUM OF ALL RESPONSES TO PHQ QUESTIONS 1-9: 0
1. LITTLE INTEREST OR PLEASURE IN DOING THINGS: NOT AT ALL

## 2025-07-02 RX ORDER — LOSARTAN POTASSIUM 50 MG/1
50 TABLET ORAL DAILY
Qty: 90 TABLET | Refills: 0 | Status: SHIPPED | OUTPATIENT
Start: 2025-07-02

## 2025-07-06 RX ORDER — VENLAFAXINE HYDROCHLORIDE 75 MG/1
CAPSULE, EXTENDED RELEASE ORAL DAILY
Qty: 90 CAPSULE | Refills: 2 | Status: SHIPPED | OUTPATIENT
Start: 2025-07-06

## 2025-07-07 DIAGNOSIS — E29.1 HYPOGONADISM IN MALE: ICD-10-CM

## 2025-07-07 RX ORDER — NEEDLES, DISPOSABLE 25GX5/8"
NEEDLE, DISPOSABLE MISCELLANEOUS
Qty: 13 EACH | Refills: 3 | Status: SHIPPED | OUTPATIENT
Start: 2025-07-07

## 2025-07-07 RX ORDER — TESTOSTERONE CYPIONATE 200 MG/ML
INJECTION, SOLUTION INTRAMUSCULAR
Qty: 4 ML | Refills: 5 | Status: SHIPPED | OUTPATIENT
Start: 2025-07-07 | End: 2026-07-07

## 2025-07-11 RX ORDER — CARVEDILOL 6.25 MG/1
6.25 TABLET ORAL 2 TIMES DAILY
Qty: 180 TABLET | Refills: 0 | Status: SHIPPED | OUTPATIENT
Start: 2025-07-11

## 2025-08-01 DIAGNOSIS — Z79.4 TYPE 2 DIABETES MELLITUS WITH DIABETIC POLYNEUROPATHY, WITH LONG-TERM CURRENT USE OF INSULIN (HCC): ICD-10-CM

## 2025-08-01 DIAGNOSIS — E29.1 HYPOGONADISM IN MALE: ICD-10-CM

## 2025-08-01 DIAGNOSIS — E11.42 TYPE 2 DIABETES MELLITUS WITH DIABETIC POLYNEUROPATHY, WITH LONG-TERM CURRENT USE OF INSULIN (HCC): ICD-10-CM

## 2025-08-24 RX ORDER — SYRINGE WITH NEEDLE, 1 ML 25GX5/8"
SYRINGE, EMPTY DISPOSABLE MISCELLANEOUS
Qty: 12 EACH | Refills: 0 | Status: SHIPPED | OUTPATIENT
Start: 2025-08-24

## 2025-08-27 LAB
ALBUMIN SERPL-MCNC: 4.4 G/DL (ref 3.8–4.9)
ALBUMIN SERPL-MCNC: 4.6 G/DL (ref 3.8–4.9)
ALBUMIN/CREAT UR: 21 MG/G CREAT (ref 0–29)
ALP SERPL-CCNC: 46 IU/L (ref 44–121)
ALP SERPL-CCNC: 49 IU/L (ref 44–121)
ALT SERPL-CCNC: 24 IU/L (ref 0–44)
ALT SERPL-CCNC: 25 IU/L (ref 0–44)
AST SERPL-CCNC: 23 IU/L (ref 0–40)
AST SERPL-CCNC: 25 IU/L (ref 0–40)
BILIRUB SERPL-MCNC: 0.5 MG/DL (ref 0–1.2)
BILIRUB SERPL-MCNC: 0.5 MG/DL (ref 0–1.2)
BUN SERPL-MCNC: 17 MG/DL (ref 6–24)
BUN SERPL-MCNC: 18 MG/DL (ref 6–24)
BUN/CREAT SERPL: 16 (ref 9–20)
BUN/CREAT SERPL: 16 (ref 9–20)
CALCIUM SERPL-MCNC: 9.8 MG/DL (ref 8.7–10.2)
CALCIUM SERPL-MCNC: 9.9 MG/DL (ref 8.7–10.2)
CHLORIDE SERPL-SCNC: 100 MMOL/L (ref 96–106)
CHLORIDE SERPL-SCNC: 100 MMOL/L (ref 96–106)
CHOLEST SERPL-MCNC: 119 MG/DL (ref 100–199)
CO2 SERPL-SCNC: 23 MMOL/L (ref 20–29)
CO2 SERPL-SCNC: 24 MMOL/L (ref 20–29)
CREAT SERPL-MCNC: 1.07 MG/DL (ref 0.76–1.27)
CREAT SERPL-MCNC: 1.11 MG/DL (ref 0.76–1.27)
CREAT UR-MCNC: 170.1 MG/DL
EGFRCR SERPLBLD CKD-EPI 2021: 77 ML/MIN/1.73
EGFRCR SERPLBLD CKD-EPI 2021: 80 ML/MIN/1.73
ERYTHROCYTE [DISTWIDTH] IN BLOOD BY AUTOMATED COUNT: 13.9 % (ref 11.6–15.4)
GLOBULIN SER CALC-MCNC: 2.7 G/DL (ref 1.5–4.5)
GLOBULIN SER CALC-MCNC: 2.9 G/DL (ref 1.5–4.5)
GLUCOSE SERPL-MCNC: 116 MG/DL (ref 70–99)
GLUCOSE SERPL-MCNC: 120 MG/DL (ref 70–99)
HBA1C MFR BLD: 5.8 % (ref 4.8–5.6)
HBA1C MFR BLD: 5.8 % (ref 4.8–5.6)
HCT VFR BLD AUTO: 50.1 % (ref 37.5–51)
HDLC SERPL-MCNC: 32 MG/DL
HGB BLD-MCNC: 16.2 G/DL (ref 13–17.7)
LDLC SERPL CALC-MCNC: 62 MG/DL (ref 0–99)
MCH RBC QN AUTO: 29.9 PG (ref 26.6–33)
MCHC RBC AUTO-ENTMCNC: 32.3 G/DL (ref 31.5–35.7)
MCV RBC AUTO: 93 FL (ref 79–97)
MICROALBUMIN UR-MCNC: 36.5 UG/ML
PLATELET # BLD AUTO: 254 X10E3/UL (ref 150–450)
POTASSIUM SERPL-SCNC: 4.3 MMOL/L (ref 3.5–5.2)
POTASSIUM SERPL-SCNC: 4.3 MMOL/L (ref 3.5–5.2)
PROT SERPL-MCNC: 7.3 G/DL (ref 6–8.5)
PROT SERPL-MCNC: 7.3 G/DL (ref 6–8.5)
PSA SERPL-MCNC: 2.2 NG/ML (ref 0–4)
RBC # BLD AUTO: 5.41 X10E6/UL (ref 4.14–5.8)
SODIUM SERPL-SCNC: 136 MMOL/L (ref 134–144)
SODIUM SERPL-SCNC: 137 MMOL/L (ref 134–144)
TRIGL SERPL-MCNC: 145 MG/DL (ref 0–149)
TSH SERPL DL<=0.005 MIU/L-ACNC: 0.96 UIU/ML (ref 0.45–4.5)
VLDLC SERPL CALC-MCNC: 25 MG/DL (ref 5–40)
WBC # BLD AUTO: 6.1 X10E3/UL (ref 3.4–10.8)

## 2025-08-31 LAB
TESTOST FREE SERPL-MCNC: 17.2 PG/ML (ref 7.2–24)
TESTOST SERPL-MCNC: 791.3 NG/DL (ref 264–916)

## 2025-09-03 ENCOUNTER — OFFICE VISIT (OUTPATIENT)
Age: 60
End: 2025-09-03

## 2025-09-03 VITALS
WEIGHT: 241 LBS | HEART RATE: 81 BPM | SYSTOLIC BLOOD PRESSURE: 90 MMHG | DIASTOLIC BLOOD PRESSURE: 60 MMHG | HEIGHT: 69 IN | BODY MASS INDEX: 35.7 KG/M2

## 2025-09-03 DIAGNOSIS — I10 ESSENTIAL (PRIMARY) HYPERTENSION: ICD-10-CM

## 2025-09-03 DIAGNOSIS — Z79.4 TYPE 2 DIABETES MELLITUS WITH DIABETIC POLYNEUROPATHY, WITH LONG-TERM CURRENT USE OF INSULIN (HCC): ICD-10-CM

## 2025-09-03 DIAGNOSIS — E11.42 TYPE 2 DIABETES MELLITUS WITH DIABETIC POLYNEUROPATHY, WITH LONG-TERM CURRENT USE OF INSULIN (HCC): ICD-10-CM

## 2025-09-03 DIAGNOSIS — E78.2 MIXED HYPERLIPIDEMIA: ICD-10-CM

## 2025-09-03 DIAGNOSIS — E29.1 HYPOGONADISM IN MALE: Primary | ICD-10-CM
